# Patient Record
Sex: FEMALE | Race: WHITE | NOT HISPANIC OR LATINO | Employment: OTHER | URBAN - METROPOLITAN AREA
[De-identification: names, ages, dates, MRNs, and addresses within clinical notes are randomized per-mention and may not be internally consistent; named-entity substitution may affect disease eponyms.]

---

## 2017-01-27 ENCOUNTER — CLINICAL SUPPORT (OUTPATIENT)
Dept: CARDIOLOGY | Facility: CLINIC | Age: 72
End: 2017-01-27
Payer: MEDICARE

## 2017-01-27 ENCOUNTER — OFFICE VISIT (OUTPATIENT)
Dept: CARDIOLOGY | Facility: CLINIC | Age: 72
End: 2017-01-27
Payer: MEDICARE

## 2017-01-27 VITALS
SYSTOLIC BLOOD PRESSURE: 119 MMHG | HEART RATE: 63 BPM | HEIGHT: 66 IN | BODY MASS INDEX: 21.36 KG/M2 | DIASTOLIC BLOOD PRESSURE: 56 MMHG | WEIGHT: 132.94 LBS

## 2017-01-27 DIAGNOSIS — R09.89 BRUIT: ICD-10-CM

## 2017-01-27 DIAGNOSIS — E78.5 HYPERLIPIDEMIA, UNSPECIFIED HYPERLIPIDEMIA TYPE: ICD-10-CM

## 2017-01-27 DIAGNOSIS — I65.23 CAROTID ARTERY PLAQUE, BILATERAL: ICD-10-CM

## 2017-01-27 DIAGNOSIS — I65.23 CAROTID ARTERY PLAQUE, BILATERAL: Primary | ICD-10-CM

## 2017-01-27 LAB — INTERNAL CAROTID STENOSIS: ABNORMAL

## 2017-01-27 PROCEDURE — 99999 PR PBB SHADOW E&M-EST. PATIENT-LVL III: CPT | Mod: PBBFAC,,, | Performed by: INTERNAL MEDICINE

## 2017-01-27 PROCEDURE — 99213 OFFICE O/P EST LOW 20 MIN: CPT | Mod: S$PBB,,, | Performed by: INTERNAL MEDICINE

## 2017-01-27 PROCEDURE — 93880 EXTRACRANIAL BILAT STUDY: CPT | Mod: 26,S$PBB,, | Performed by: INTERNAL MEDICINE

## 2017-01-27 PROCEDURE — 99213 OFFICE O/P EST LOW 20 MIN: CPT | Mod: PBBFAC | Performed by: INTERNAL MEDICINE

## 2017-01-27 RX ORDER — ATORVASTATIN CALCIUM 20 MG/1
20 TABLET, FILM COATED ORAL DAILY
Qty: 90 TABLET | Refills: 3 | Status: SHIPPED | OUTPATIENT
Start: 2017-01-27 | End: 2017-11-03 | Stop reason: SDUPTHER

## 2017-01-27 NOTE — PATIENT INSTRUCTIONS
Continue regular exercise aiming for  30 minutes a day at least 5 days a week.  mediterranean diet as discussed

## 2017-01-27 NOTE — MR AVS SNAPSHOT
[facility]  [address]  [facility] [address]  Phone: [phone]                  Prema Phillips   2017 11:00 AM   Office Visit    Description:  Female : 1945   Provider:  Efraín Ash MD   Department:  [facility]           Reason for Visit     Carotid artery plaque, bilateral           Diagnoses this Visit        Comments    Carotid artery plaque, bilateral    -  Primary     Hyperlipidemia, unspecified hyperlipidemia type                To Do List           Future Appointments        Provider Department Dept Phone    3/9/2017 7:00 AM LAB, [facility] [phone]      Goals (5 Years of Data)     None      Follow-Up and Disposition     Return in about 1 year (around 2018).      [facility]     [facility] -  Assistance  Registered nurses in the [facility] provide clinical advisement, health education, appointment booking, and other advisory services.  Call for this free service at [phone].             Medications           Message regarding Medications     Verify the changes and/or additions to your medication regime listed below are the same as discussed with your clinician today.  If any of these changes or additions are incorrect, please notify your healthcare provider.        STOP taking these medications     vitamins  A,C,E-zinc-copper [id number] unit-mg-unit Cap Take by mouth once daily.           Verify that the below list of medications is an accurate representation of the medications you are currently taking.  If none reported, the list may be blank. If incorrect, please contact your healthcare provider. Carry this list with you in case of emergency.           Current Medications     aspirin (ECOTRIN) 81 MG EC tablet Take 81 mg by mouth once daily.    azelastine (ASTELIN) 137 mcg (0.1 %) nasal spray 1 spray (137 mcg total) by Nasal route 2 (two) times daily.     "calcium-vitamin D3-vitamin K 500-100-40 mg-unit-mcg Chew Take by mouth once daily.    clobetasol (TEMOVATE) 0.05 % cream     eletriptan (RELPAX) 40 MG tablet Take 40 mg by mouth. 1 Tablet Oral As directed    estrogens, conjugated, (PREMARIN) 0.3 MG tablet Take 0.3 mg by mouth once daily.    fluticasone (FLONASE) 50 mcg/actuation nasal spray 2 sprays by Each Nare route once daily. 1 Aerosol, Spray Nasal Every morning    hydrocodone-acetaminophen 5-325mg (NORCO) 5-325 mg per tablet Take 1 tablet by mouth every 8 (eight) hours as needed.    levothyroxine (SYNTHROID) 75 MCG tablet Take 1 tablet (75 mcg total) by mouth before breakfast.    lorazepam (ATIVAN) 1 MG tablet Take 1 mg by mouth once daily.    meloxicam (MOBIC) 15 MG tablet Take 15 mg by mouth once daily.    multivitamin capsule Take 1 capsule by mouth once daily.    simvastatin (ZOCOR) 20 MG tablet Take 1 tablet (20 mg total) by mouth every evening.    trazodone (DESYREL) 50 MG tablet TAKE 1 TABLET BY MOUTH EVERY EVENING    tretinoin (RETIN-A) 0.05 % cream nightly.     VIT A/VIT C/VIT E/ZINC/COPPER (PRESERVISION AREDS ORAL) Take by mouth.           Clinical Reference Information           Vital Signs - Last Recorded  Most recent update: 1/27/2017 10:52 AM by Flora Perez MA    BP Pulse Ht Wt BMI    (!) 119/56 (BP Location: Left arm, Patient Position: Sitting, BP Method: Automatic) 63 5' 6" (1.676 m) 60.3 kg (132 lb 15 oz) 21.46 kg/m2      Blood Pressure          Most Recent Value    Right Arm BP - Sitting  118/56    Left Arm BP - Sitting  119/56    BP  (!)  119/56      Allergies as of 1/27/2017     Neomycin      Immunizations Administered on Date of Encounter - 1/27/2017     None      Instructions    Continue regular exercise aiming for  30 minutes a day at least 5 days a week.  mediterranean diet as discussed       "

## 2017-01-27 NOTE — PROGRESS NOTES
Subjective:   Patient ID:  Prema Phillips is a 72 y.o. female who presents for follow-up of Carotid artery plaque, bilateral (1 yr f/u )      HPI:   Prema Phillips presents for follow up of bilateral carotid plaque that is nonobstructive. A repeat is pending. The patient remains asymptomatic. Prema Phillips denies chest pain, shortness of breath, palpitations, presyncope , or syncope. She is walking regularly.4 times a week a mile ). Prema Phillips has dyslipidemia with LDL less than 100 and elevated HDL.  Review of Systems   Constitution: Negative for weakness, malaise/fatigue, weight gain and weight loss.   HENT: Negative for headaches.    Eyes: Negative for blurred vision.   Cardiovascular: Negative for chest pain, claudication, cyanosis, dyspnea on exertion, irregular heartbeat, leg swelling, near-syncope, orthopnea, palpitations, paroxysmal nocturnal dyspnea and syncope.   Respiratory: Negative for cough, shortness of breath and wheezing.    Musculoskeletal: Positive for joint pain. Negative for falls and myalgias.        Arthritis first finger right hand.   Gastrointestinal: Negative for abdominal pain, heartburn, nausea and vomiting.   Genitourinary: Negative for nocturia.   Neurological: Negative for brief paralysis, dizziness, focal weakness, numbness and paresthesias.   Psychiatric/Behavioral: Negative for altered mental status.       Current Outpatient Prescriptions   Medication Sig    aspirin (ECOTRIN) 81 MG EC tablet Take 81 mg by mouth once daily.    azelastine (ASTELIN) 137 mcg (0.1 %) nasal spray 1 spray (137 mcg total) by Nasal route 2 (two) times daily.    calcium-vitamin D3-vitamin K 500-100-40 mg-unit-mcg Chew Take by mouth once daily.    clobetasol (TEMOVATE) 0.05 % cream     eletriptan (RELPAX) 40 MG tablet Take 40 mg by mouth. 1 Tablet Oral As directed    estrogens, conjugated, (PREMARIN) 0.3 MG tablet Take 0.3 mg by mouth once daily.    fluticasone (FLONASE) 50 mcg/actuation nasal spray 2  "sprays by Each Nare route once daily. 1 Aerosol, Spray Nasal Every morning    hydrocodone-acetaminophen 5-325mg (NORCO) 5-325 mg per tablet Take 1 tablet by mouth every 8 (eight) hours as needed.    levothyroxine (SYNTHROID) 75 MCG tablet Take 1 tablet (75 mcg total) by mouth before breakfast.    lorazepam (ATIVAN) 1 MG tablet Take 1 mg by mouth once daily.    meloxicam (MOBIC) 15 MG tablet Take 15 mg by mouth once daily.    multivitamin capsule Take 1 capsule by mouth once daily.    simvastatin (ZOCOR) 20 MG tablet Take 1 tablet (20 mg total) by mouth every evening.    trazodone (DESYREL) 50 MG tablet TAKE 1 TABLET BY MOUTH EVERY EVENING    tretinoin (RETIN-A) 0.05 % cream nightly.     VIT A/VIT C/VIT E/ZINC/COPPER (PRESERVISION AREDS ORAL) Take by mouth.     No current facility-administered medications for this visit.      Objective:   Physical Exam   Constitutional: She is oriented to person, place, and time. She appears well-developed. No distress.   BP (!) 119/56 (BP Location: Left arm, Patient Position: Sitting, BP Method: Automatic)  Pulse 63  Ht 5' 6" (1.676 m)  Wt 60.3 kg (132 lb 15 oz)  BMI 21.46 kg/m2   HENT:   Head: Normocephalic.   Eyes: Conjunctivae are normal. Pupils are equal, round, and reactive to light. No scleral icterus.   Neck: Neck supple. No JVD present. No thyromegaly present.   Cardiovascular: Normal rate, regular rhythm, normal heart sounds and intact distal pulses.  PMI is not displaced.  Exam reveals no gallop and no friction rub.    No murmur heard.  Pulmonary/Chest: Effort normal and breath sounds normal. No respiratory distress. She has no wheezes. She has no rales.   Abdominal: Soft. She exhibits no distension. There is no splenomegaly or hepatomegaly. There is no tenderness.   Musculoskeletal: She exhibits no edema or tenderness.   Gait normal  Brace on right first finger   Neurological: She is alert and oriented to person, place, and time.   Skin: Skin is warm and dry. She " is not diaphoretic.   Psychiatric: She has a normal mood and affect. Her behavior is normal.       Lab Results   Component Value Date     12/02/2016    K 4.1 12/02/2016     12/02/2016    CO2 28 12/02/2016    BUN 19 12/02/2016    CREATININE 0.9 12/02/2016    GLU 88 12/02/2016    AST 17 12/02/2016    ALT 11 12/02/2016    ALBUMIN 3.3 (L) 12/02/2016    PROT 6.3 12/02/2016    BILITOT 0.3 12/02/2016    WBC 5.20 06/02/2016    HGB 12.9 06/02/2016    HCT 38.4 06/02/2016    MCV 92 06/02/2016     06/02/2016    TSH 4.430 (H) 12/02/2016    CHOL 175 12/02/2016    HDL 61 12/02/2016    LDLCALC 91.6 12/02/2016    TRIG 112 12/02/2016       Assessment:     1. Carotid artery plaque, bilateral : Repeat interpretation pending   2. Hyperlipidemia, unspecified hyperlipidemia type :  on low intensity statin therapy with LDL less than 100 and elevated HDL.       Plan:     Prema was seen today for carotid artery plaque, bilateral.    Diagnoses and all orders for this visit:    Carotid artery plaque, bilateral    Results available and mild progression since last check   Repeat in a year.  Hyperlipidemia, unspecified hyperlipidemia type  Atorvastatin 20 mg daily in place of Simvastatin.  Repeat lipids and CMP in 2 months.  mediterranean diet discussed

## 2017-03-09 ENCOUNTER — LAB VISIT (OUTPATIENT)
Dept: LAB | Facility: HOSPITAL | Age: 72
End: 2017-03-09
Attending: FAMILY MEDICINE
Payer: MEDICARE

## 2017-03-09 ENCOUNTER — TELEPHONE (OUTPATIENT)
Dept: FAMILY MEDICINE | Facility: CLINIC | Age: 72
End: 2017-03-09

## 2017-03-09 DIAGNOSIS — E03.9 ACQUIRED HYPOTHYROIDISM: ICD-10-CM

## 2017-03-09 LAB
T4 FREE SERPL-MCNC: 1.05 NG/DL
TSH SERPL DL<=0.005 MIU/L-ACNC: 1.64 UIU/ML

## 2017-03-09 PROCEDURE — 84439 ASSAY OF FREE THYROXINE: CPT

## 2017-03-09 PROCEDURE — 36415 COLL VENOUS BLD VENIPUNCTURE: CPT | Mod: PO

## 2017-03-09 PROCEDURE — 84443 ASSAY THYROID STIM HORMONE: CPT

## 2017-03-09 NOTE — TELEPHONE ENCOUNTER
Called patient to review results. Left a voicemail requesting a callback.        Thyroid labs now perfectly in range on current dose of thyroid medication. Will see her in June for annual physical, sooner if concerns

## 2017-03-27 ENCOUNTER — LAB VISIT (OUTPATIENT)
Dept: LAB | Facility: HOSPITAL | Age: 72
End: 2017-03-27
Attending: INTERNAL MEDICINE
Payer: MEDICARE

## 2017-03-27 DIAGNOSIS — E78.5 HYPERLIPIDEMIA, UNSPECIFIED HYPERLIPIDEMIA TYPE: ICD-10-CM

## 2017-03-27 LAB
ALBUMIN SERPL BCP-MCNC: 3.4 G/DL
ALP SERPL-CCNC: 59 U/L
ALT SERPL W/O P-5'-P-CCNC: 14 U/L
ANION GAP SERPL CALC-SCNC: 6 MMOL/L
AST SERPL-CCNC: 19 U/L
BILIRUB SERPL-MCNC: 0.4 MG/DL
BUN SERPL-MCNC: 17 MG/DL
CALCIUM SERPL-MCNC: 8.9 MG/DL
CHLORIDE SERPL-SCNC: 107 MMOL/L
CHOLEST/HDLC SERPL: 2.7 {RATIO}
CO2 SERPL-SCNC: 28 MMOL/L
CREAT SERPL-MCNC: 0.8 MG/DL
EST. GFR  (AFRICAN AMERICAN): >60 ML/MIN/1.73 M^2
EST. GFR  (NON AFRICAN AMERICAN): >60 ML/MIN/1.73 M^2
GLUCOSE SERPL-MCNC: 87 MG/DL
HDL/CHOLESTEROL RATIO: 37.5 %
HDLC SERPL-MCNC: 168 MG/DL
HDLC SERPL-MCNC: 63 MG/DL
LDLC SERPL CALC-MCNC: 91 MG/DL
NONHDLC SERPL-MCNC: 105 MG/DL
POTASSIUM SERPL-SCNC: 4.2 MMOL/L
PROT SERPL-MCNC: 6.5 G/DL
SODIUM SERPL-SCNC: 141 MMOL/L
TRIGL SERPL-MCNC: 70 MG/DL

## 2017-03-27 PROCEDURE — 80061 LIPID PANEL: CPT

## 2017-03-27 PROCEDURE — 80053 COMPREHEN METABOLIC PANEL: CPT

## 2017-03-27 PROCEDURE — 36415 COLL VENOUS BLD VENIPUNCTURE: CPT | Mod: PO

## 2017-04-25 ENCOUNTER — PATIENT MESSAGE (OUTPATIENT)
Dept: FAMILY MEDICINE | Facility: CLINIC | Age: 72
End: 2017-04-25

## 2017-04-25 RX ORDER — ALPRAZOLAM 1 MG/1
1 TABLET ORAL NIGHTLY
Refills: 3 | COMMUNITY
Start: 2017-02-08 | End: 2017-10-30

## 2017-04-25 RX ORDER — ESTRADIOL 0.5 MG/1
0.5 TABLET ORAL DAILY
Refills: 3 | COMMUNITY
Start: 2017-02-08 | End: 2017-10-30

## 2017-07-27 ENCOUNTER — TELEPHONE (OUTPATIENT)
Dept: FAMILY MEDICINE | Facility: CLINIC | Age: 72
End: 2017-07-27

## 2017-07-27 NOTE — TELEPHONE ENCOUNTER
----- Message from Olive Austin sent at 7/27/2017 11:29 AM CDT -----  Contact: Self/ [phone]  Patient would like to reschedule her appointment for the fourth week of October because she will be out of town. Patient would like to speak with you about the schedule. Please advise.

## 2017-07-27 NOTE — TELEPHONE ENCOUNTER
----- Message from Kari Cordero sent at 7/27/2017 10:43 AM CDT -----  Contact: [phone]  Patient is requesting to speak with you to get an appt. For October 2017 for a physical

## 2017-10-30 ENCOUNTER — OFFICE VISIT (OUTPATIENT)
Dept: FAMILY MEDICINE | Facility: CLINIC | Age: 72
End: 2017-10-30
Payer: MEDICARE

## 2017-10-30 ENCOUNTER — HOSPITAL ENCOUNTER (OUTPATIENT)
Dept: RADIOLOGY | Facility: HOSPITAL | Age: 72
Discharge: HOME | End: 2017-10-30
Attending: FAMILY MEDICINE
Payer: MEDICARE

## 2017-10-30 VITALS
DIASTOLIC BLOOD PRESSURE: 71 MMHG | HEIGHT: 66 IN | SYSTOLIC BLOOD PRESSURE: 124 MMHG | WEIGHT: 137.56 LBS | OXYGEN SATURATION: 99 % | BODY MASS INDEX: 22.11 KG/M2 | HEART RATE: 72 BPM

## 2017-10-30 DIAGNOSIS — M70.62 TROCHANTERIC BURSITIS OF BOTH HIPS: ICD-10-CM

## 2017-10-30 DIAGNOSIS — E03.9 ACQUIRED HYPOTHYROIDISM: ICD-10-CM

## 2017-10-30 DIAGNOSIS — F51.01 PRIMARY INSOMNIA: ICD-10-CM

## 2017-10-30 DIAGNOSIS — M76.30 ILIOTIBIAL BAND SYNDROME, UNSPECIFIED LATERALITY: ICD-10-CM

## 2017-10-30 DIAGNOSIS — Z90.79 HISTORY OF TOTAL HYSTERECTOMY WITH BILATERAL SALPINGO-OOPHORECTOMY (BSO): ICD-10-CM

## 2017-10-30 DIAGNOSIS — Z79.82 LONG-TERM USE OF ASPIRIN THERAPY: ICD-10-CM

## 2017-10-30 DIAGNOSIS — Z79.899 MEDICATION MANAGEMENT: ICD-10-CM

## 2017-10-30 DIAGNOSIS — I65.23 ATHEROSCLEROSIS OF BOTH CAROTID ARTERIES: ICD-10-CM

## 2017-10-30 DIAGNOSIS — M70.61 TROCHANTERIC BURSITIS OF BOTH HIPS: ICD-10-CM

## 2017-10-30 DIAGNOSIS — Z79.1 LONG TERM CURRENT USE OF NON-STEROIDAL ANTI-INFLAMMATORIES (NSAID): ICD-10-CM

## 2017-10-30 DIAGNOSIS — M85.80 OSTEOPENIA OF THE ELDERLY: ICD-10-CM

## 2017-10-30 DIAGNOSIS — Z90.722 HISTORY OF TOTAL HYSTERECTOMY WITH BILATERAL SALPINGO-OOPHORECTOMY (BSO): ICD-10-CM

## 2017-10-30 DIAGNOSIS — Z78.0 POSTMENOPAUSAL: ICD-10-CM

## 2017-10-30 DIAGNOSIS — Z79.890 POSTMENOPAUSAL HRT (HORMONE REPLACEMENT THERAPY): ICD-10-CM

## 2017-10-30 DIAGNOSIS — H35.30: ICD-10-CM

## 2017-10-30 DIAGNOSIS — Z00.00 ROUTINE GENERAL MEDICAL EXAMINATION AT A HEALTH CARE FACILITY: Primary | ICD-10-CM

## 2017-10-30 DIAGNOSIS — I45.10 RBBB: ICD-10-CM

## 2017-10-30 DIAGNOSIS — G43.709 CHRONIC MIGRAINE WITHOUT AURA WITHOUT STATUS MIGRAINOSUS, NOT INTRACTABLE: ICD-10-CM

## 2017-10-30 DIAGNOSIS — E78.5 HYPERLIPIDEMIA, UNSPECIFIED HYPERLIPIDEMIA TYPE: ICD-10-CM

## 2017-10-30 DIAGNOSIS — J30.2 CHRONIC SEASONAL ALLERGIC RHINITIS, UNSPECIFIED TRIGGER: ICD-10-CM

## 2017-10-30 DIAGNOSIS — Z90.710 HISTORY OF TOTAL HYSTERECTOMY WITH BILATERAL SALPINGO-OOPHORECTOMY (BSO): ICD-10-CM

## 2017-10-30 PROBLEM — G43.909 MIGRAINE HEADACHE: Status: ACTIVE | Noted: 2017-10-30

## 2017-10-30 PROCEDURE — 99214 OFFICE O/P EST MOD 30 MIN: CPT | Mod: PBBFAC,PO | Performed by: FAMILY MEDICINE

## 2017-10-30 PROCEDURE — 99999 PR PBB SHADOW E&M-EST. PATIENT-LVL IV: CPT | Mod: PBBFAC,,, | Performed by: FAMILY MEDICINE

## 2017-10-30 PROCEDURE — 99397 PER PM REEVAL EST PAT 65+ YR: CPT | Mod: S$PBB,,, | Performed by: FAMILY MEDICINE

## 2017-10-30 PROCEDURE — 77080 DXA BONE DENSITY AXIAL: CPT | Mod: TC

## 2017-10-30 PROCEDURE — 77080 DXA BONE DENSITY AXIAL: CPT | Mod: 26,,, | Performed by: RADIOLOGY

## 2017-10-30 RX ORDER — LEVOTHYROXINE SODIUM 75 UG/1
75 TABLET ORAL
Qty: 90 TABLET | Refills: 3 | Status: SHIPPED | OUTPATIENT
Start: 2017-10-30 | End: 2018-11-02 | Stop reason: SDUPTHER

## 2017-10-30 RX ORDER — LEVOTHYROXINE SODIUM 75 UG/1
75 TABLET ORAL
Qty: 90 TABLET | Refills: 3 | Status: SHIPPED | OUTPATIENT
Start: 2017-10-30 | End: 2017-10-30 | Stop reason: SDUPTHER

## 2017-10-30 NOTE — PATIENT INSTRUCTIONS
"Trial of stretches including "leg cross over stretches with Tylenol or strap" and "figure of 4" stretches to help with bilateral iliotibial band tightness and associated trochanteric bursitis of the hip.  If this pain persists follow-up to consider cortisone injection.  Otherwise, health maintenance reviewed and is up to date.  Continue regular exercise and regular eye and dental exams.  Follow-up in one year for annual physical, sooner if concerns  "

## 2017-10-30 NOTE — PROGRESS NOTES
Office Visit    Patient Name: Prema Phillips    : 1945  MRN: [id number]    Subjective:  Prema is a 72 y.o. female who presents today for:    Annual Exam    Prema Phillips presents today for annual wellness exam and monitoring of chronic conditions including hyperlipidemia, nonocclusive bilateral carotid artery disease, acquired hypothyroidism, postmenopausal osteopenia with use of hormone replacement therapy, migraine headaches, knee bursitis, allergic rhinitis.  She is postmenopausal.  She has a gynecologist Dr. Sheppard but no longer needs paps due to history of TAYLER/BSO.      They have been feeling overall well except for some new bilateral leg pain over the last year. Notes pain radiating from her hip down the outside of her leg to around her knee area.  No numbness or paresthesias, no leg weakness. Started around the same time she changed from simvastatin to lipitor due to some increased carotid artery plaque.        General lifestyle habits are as follows:  Diet is described as healthy, exercise is described as fair-- walking, sleep is described as fair- Ativan most nights helps-- interrupted but ultimately sleeps 7 hours nightly. Weight is up a few punds in  last year bu overall stable     Immunizations: TDaP 2015, Pneumovax 23 completed  and Prevnar 13 2016 , Zostavax complete, YEARLY FLU COMPLETED     Screening Tests: last Mammogram 2017--> repeat one year, DEXA-->10/2015--> mild osteopenia and repeat 2 years (ordered) , Colonoscopy up to date per Dr. Watt with [facility], last one was 2014--> repeat 5 years (family history), Hep C screening negative 2016     Eye/Dental: up to date      Carotid US 2017: reviewed by Dr Ash- Cardiology-- mild progression since previous study  CONCLUSIONS   There is 40 - 49% right Internal Carotid stenosis.  There is 40 - 49% left Internal Carotid stenosis.       Past Medical History  Past Medical History:   Diagnosis Date    Allergic rhinitis  5/31/2016    Carotid artery plaque 11/18/2013    History of total hysterectomy with bilateral salpingo-oophorectomy (BSO) 5/31/2016    Hyperlipidemia 7/17/2012    Hypothyroid     Insomnia     Macular degeneration, right eye 5/31/2016    Migraine headache     Osteopenia of the elderly 10/20/2015    Postmenopausal HRT (hormone replacement therapy)     RBBB 7/17/2012       Past Surgical History  Past Surgical History:   Procedure Laterality Date    APPENDECTOMY      STAPEDES SURGERY  2006    TOTAL ABDOMINAL HYSTERECTOMY W/ BILATERAL SALPINGOOPHORECTOMY         Family History  Family History   Problem Relation Age of Onset    Heart disease Father     COPD Father     Cancer Maternal Aunt      breast cancer    Cancer Maternal Grandmother      breast cancer    Cancer Cousin      colon cancer       Social History  Social History     Social History    Marital status:      Spouse name: N/A    Number of children: 2    Years of education: N/A     Occupational History    Not on file.     Social History Main Topics    Smoking status: Never Smoker    Smokeless tobacco: Never Used    Alcohol use Yes      Comment: rare    Drug use: No    Sexual activity: Yes     Partners: Male      Comment: 2 kids,       Other Topics Concern    Not on file     Social History Narrative    No narrative on file       Current Medications  Medications reviewed and updated.     Allergies   Review of patient's allergies indicates:   Allergen Reactions    Neomycin      Other reaction(s): Unknown       Review of Systems (Pertinent positives)  Review of Systems   Constitutional: Negative for activity change and unexpected weight change.   HENT: Positive for hearing loss. Negative for rhinorrhea and trouble swallowing.    Eyes: Positive for visual disturbance. Negative for discharge.   Respiratory: Negative for chest tightness and wheezing.    Cardiovascular: Negative for chest pain and palpitations.  "  Gastrointestinal: Negative for blood in stool, constipation, diarrhea and vomiting.   Endocrine: Negative for polydipsia and polyuria.   Genitourinary: Negative for difficulty urinating, dysuria, hematuria and menstrual problem.   Musculoskeletal: Negative for arthralgias, back pain (bilateral leg pain), joint swelling and neck pain.   Neurological: Negative for weakness and headaches.   Psychiatric/Behavioral: Negative for confusion and dysphoric mood.       /71   Pulse 72   Ht 5' 6" (1.676 m)   Wt 62.4 kg (137 lb 9.1 oz)   SpO2 99%   BMI 22.20 kg/m²     Physical Exam   Constitutional: She is oriented to person, place, and time. She appears well-developed and well-nourished. No distress.   HENT:   Head: Normocephalic and atraumatic.   Right Ear: Ear canal normal. Tympanic membrane is not erythematous and not bulging.   Left Ear: Ear canal normal. Tympanic membrane is not erythematous and not bulging.   Mouth/Throat: No oropharyngeal exudate.   Eyes: Conjunctivae are normal.   Neck: Carotid bruit is not present. No thyroid mass and no thyromegaly present.   Cardiovascular: Normal rate, regular rhythm and normal heart sounds.    No murmur heard.  Pulses:       Dorsalis pedis pulses are 2+ on the right side, and 2+ on the left side.   Pulmonary/Chest: Effort normal and breath sounds normal. No respiratory distress.   Abdominal: Soft. Bowel sounds are normal. She exhibits no distension and no mass. There is no hepatosplenomegaly. There is no tenderness.   Musculoskeletal: Normal range of motion.        Legs:  Lymphadenopathy:     She has no cervical adenopathy.   Neurological: She is alert and oriented to person, place, and time.   Skin: Skin is warm and dry. No rash noted.   Psychiatric: She has a normal mood and affect.   Vitals reviewed.        Assessment/Plan:  Prema Phillips is a 72 y.o. female who presents today for :    Prema was seen today for annual exam.    Diagnoses and all orders for this " visit:    Routine general medical examination at a health care facility  -     Hemoglobin A1c; Future  -     Comprehensive metabolic panel; Future  -     Lipid panel; Future  -     Vitamin D; Future  -     CBC auto differential; Future  -     TSH; Future    BMI 22.0-22.9, adult    History of total hysterectomy with bilateral salpingo-oophorectomy (BSO)    Postmenopausal HRT (hormone replacement therapy)    Osteopenia of the elderly  -     DXA Bone Density Spine And Hip; Future  -     Vitamin D; Future    Postmenopausal  -     DXA Bone Density Spine And Hip; Future    Chronic migraine without aura without status migrainosus, not intractable    Acquired hypothyroidism  -     TSH; Future  -     levothyroxine (SYNTHROID) 75 MCG tablet; Take 1 tablet (75 mcg total) by mouth before breakfast.    Hyperlipidemia, unspecified hyperlipidemia type  -     Hemoglobin A1c; Future  -     Comprehensive metabolic panel; Future  -     Lipid panel; Future    Atherosclerosis of both carotid arteries  -     Lipid panel; Future  -     CBC auto differential; Future    Long-term use of aspirin therapy    RBBB  -     CBC auto differential; Future    Primary insomnia    Chronic seasonal allergic rhinitis, unspecified trigger    Macular degeneration, right eye    Long term current use of non-steroidal anti-inflammatories (NSAID)  -     Comprehensive metabolic panel; Future    Medication management  -     Hemoglobin A1c; Future  -     Comprehensive metabolic panel; Future  -     Lipid panel; Future  -     Vitamin D; Future  -     CBC auto differential; Future  -     TSH; Future    Iliotibial band syndrome, unspecified laterality    Trochanteric bursitis of both hips            ICD-10-CM ICD-9-CM    1. Routine general medical examination at a health care facility Z00.00 V70.0 Hemoglobin A1c      Comprehensive metabolic panel      Lipid panel      Vitamin D      CBC auto differential      TSH   2. BMI 22.0-22.9, adult Z68.22 V85.1    3. History  "of total hysterectomy with bilateral salpingo-oophorectomy (BSO) Z90.710 V15.29     Z90.722      Z90.79     4. Postmenopausal HRT (hormone replacement therapy) Z79.890 V07.4    5. Osteopenia of the elderly M85.80 733.90 DXA Bone Density Spine And Hip      Vitamin D   6. Postmenopausal Z78.0 V49.81 DXA Bone Density Spine And Hip   7. Chronic migraine without aura without status migrainosus, not intractable G43.709 346.70    8. Acquired hypothyroidism E03.9 244.9 TSH      levothyroxine (SYNTHROID) 75 MCG tablet   9. Hyperlipidemia, unspecified hyperlipidemia type E78.5 272.4 Hemoglobin A1c      Comprehensive metabolic panel      Lipid panel   10. Atherosclerosis of both carotid arteries I65.23 433.10 Lipid panel     433.30 CBC auto differential   11. Long-term use of aspirin therapy Z79.82 V58.66    12. RBBB I45.10 426.4 CBC auto differential   13. Primary insomnia F51.01 307.42    14. Chronic seasonal allergic rhinitis, unspecified trigger J30.2 477.8    15. Macular degeneration, right eye H35.30 362.50    16. Long term current use of non-steroidal anti-inflammatories (NSAID) Z79.1 V58.64 Comprehensive metabolic panel   17. Medication management Z79.899 V58.69 Hemoglobin A1c      Comprehensive metabolic panel      Lipid panel      Vitamin D      CBC auto differential      TSH   18. Iliotibial band syndrome, unspecified laterality M76.30 728.89    19. Trochanteric bursitis of both hips M70.61 726.5     M70.62         Patient Instructions   Trial of stretches including "leg cross over stretches with Tylenol or strap" and "figure of 4" stretches to help with bilateral iliotibial band tightness and associated trochanteric bursitis of the hip.  If this pain persists follow-up to consider cortisone injection.  Otherwise, health maintenance reviewed and is up to date.  Continue regular exercise and regular eye and dental exams.  Follow-up in one year for annual physical, sooner if concerns        Return in about 1 year " (around 10/30/2018) for return as needed for new concerns.

## 2017-11-02 ENCOUNTER — TELEPHONE (OUTPATIENT)
Dept: FAMILY MEDICINE | Facility: CLINIC | Age: 72
End: 2017-11-02

## 2017-11-03 DIAGNOSIS — E78.5 HYPERLIPIDEMIA, UNSPECIFIED HYPERLIPIDEMIA TYPE: ICD-10-CM

## 2017-11-06 RX ORDER — ATORVASTATIN CALCIUM 20 MG/1
TABLET, FILM COATED ORAL
Qty: 90 TABLET | Refills: 3 | Status: SHIPPED | OUTPATIENT
Start: 2017-11-06 | End: 2018-12-10 | Stop reason: SDUPTHER

## 2018-01-19 ENCOUNTER — CLINICAL SUPPORT (OUTPATIENT)
Dept: CARDIOLOGY | Facility: CLINIC | Age: 73
End: 2018-01-19
Attending: INTERNAL MEDICINE
Payer: MEDICARE

## 2018-01-19 DIAGNOSIS — R09.89 BRUIT: ICD-10-CM

## 2018-01-19 LAB — INTERNAL CAROTID STENOSIS: NORMAL

## 2018-01-19 PROCEDURE — 93880 EXTRACRANIAL BILAT STUDY: CPT | Mod: PBBFAC | Performed by: INTERNAL MEDICINE

## 2018-01-24 ENCOUNTER — OFFICE VISIT (OUTPATIENT)
Dept: CARDIOLOGY | Facility: CLINIC | Age: 73
End: 2018-01-24
Payer: MEDICARE

## 2018-01-24 ENCOUNTER — TELEPHONE (OUTPATIENT)
Dept: CARDIOLOGY | Facility: CLINIC | Age: 73
End: 2018-01-24

## 2018-01-24 VITALS
HEART RATE: 69 BPM | BODY MASS INDEX: 22.22 KG/M2 | SYSTOLIC BLOOD PRESSURE: 121 MMHG | HEIGHT: 66 IN | DIASTOLIC BLOOD PRESSURE: 57 MMHG | WEIGHT: 138.25 LBS

## 2018-01-24 DIAGNOSIS — E78.5 HYPERLIPIDEMIA, UNSPECIFIED HYPERLIPIDEMIA TYPE: ICD-10-CM

## 2018-01-24 DIAGNOSIS — I65.23 ATHEROSCLEROSIS OF BOTH CAROTID ARTERIES: Primary | ICD-10-CM

## 2018-01-24 PROCEDURE — 99213 OFFICE O/P EST LOW 20 MIN: CPT | Mod: PBBFAC | Performed by: INTERNAL MEDICINE

## 2018-01-24 PROCEDURE — 99213 OFFICE O/P EST LOW 20 MIN: CPT | Mod: S$PBB,,, | Performed by: INTERNAL MEDICINE

## 2018-01-24 PROCEDURE — 99999 PR PBB SHADOW E&M-EST. PATIENT-LVL III: CPT | Mod: PBBFAC,,, | Performed by: INTERNAL MEDICINE

## 2018-01-24 RX ORDER — ESTRADIOL 0.5 MG/1
0.5 TABLET ORAL DAILY
COMMUNITY
End: 2018-06-29

## 2018-01-24 NOTE — PROGRESS NOTES
Subjective:   Patient ID:  Prema Phillips is a 73 y.o. female who presents for follow-up of Carotid artery plaque, bilateral (1 yr fu) and Leg Problem (aching both legs,mainly at night)      HPI:   The patient returns for follow up of mild- moderate bilateral carotid stenosis with recent check demonstrating mild narrowing. She is asx. Prema Phillips is not exercising regularly. Prema Phillips denies chest pain, shortness of breath, palpitations, presyncope , or syncope. Prema Phillips has dyslipidemia with LDL higher last check but HDL up. Prema Phillips admits not being dietary compliant going on cruises..  Review of Systems   Constitution: Negative for weakness, malaise/fatigue, weight gain and weight loss.   Eyes: Negative for blurred vision.   Cardiovascular: Negative for chest pain, claudication, cyanosis, dyspnea on exertion, irregular heartbeat, leg swelling, near-syncope, orthopnea, palpitations, paroxysmal nocturnal dyspnea and syncope.   Respiratory: Negative for cough, shortness of breath and wheezing.    Musculoskeletal: Negative for falls and myalgias.        Nocturnal aching of thighs intermittently .   Gastrointestinal: Negative for abdominal pain, heartburn, nausea and vomiting.   Genitourinary: Negative for nocturia.   Neurological: Negative for brief paralysis, dizziness, focal weakness, headaches, numbness and paresthesias.   Psychiatric/Behavioral: Negative for altered mental status.       Current Outpatient Prescriptions   Medication Sig    aspirin (ECOTRIN) 81 MG EC tablet Take 81 mg by mouth once daily.    atorvastatin (LIPITOR) 20 MG tablet TAKE 1 TABLET ONCE DAILY    azelastine (ASTELIN) 137 mcg (0.1 %) nasal spray 1 spray (137 mcg total) by Nasal route 2 (two) times daily.    calcium-vitamin D3-vitamin K 500-100-40 mg-unit-mcg Chew Take by mouth once daily.    clobetasol (TEMOVATE) 0.05 % cream     eletriptan (RELPAX) 40 MG tablet Take 40 mg by mouth. 1 Tablet Oral As directed     "estradiol (ESTRACE) 0.5 MG tablet Take 0.5 mg by mouth once daily.    fluticasone (FLONASE) 50 mcg/actuation nasal spray 2 sprays by Each Nare route once daily. 1 Aerosol, Spray Nasal Every morning    levothyroxine (SYNTHROID) 75 MCG tablet Take 1 tablet (75 mcg total) by mouth before breakfast.    lorazepam (ATIVAN) 1 MG tablet Take 1 mg by mouth once daily.    meloxicam (MOBIC) 15 MG tablet Take 15 mg by mouth once daily.    multivitamin capsule Take 1 capsule by mouth once daily.    tretinoin (RETIN-A) 0.05 % cream nightly.     TURMERIC, BULK, MISC by Misc.(Non-Drug; Combo Route) route once daily.    VIT A/VIT C/VIT E/ZINC/COPPER (PRESERVISION AREDS ORAL) Take by mouth once daily.      No current facility-administered medications for this visit.      Objective:   Physical Exam   Constitutional: She is oriented to person, place, and time. She appears well-developed. No distress.   BP (!) 121/57 (BP Location: Left arm, Patient Position: Sitting, BP Method: Large (Automatic))   Pulse 69   Ht 5' 6" (1.676 m)   Wt 62.7 kg (138 lb 3.7 oz)   BMI 22.31 kg/m²    HENT:   Head: Normocephalic.   Eyes: Conjunctivae are normal. Pupils are equal, round, and reactive to light. No scleral icterus.   Neck: Neck supple. No JVD present. No thyromegaly present.   Cardiovascular: Normal rate, regular rhythm, normal heart sounds and intact distal pulses.  PMI is not displaced.  Exam reveals no gallop and no friction rub.    No murmur heard.  Pulmonary/Chest: Effort normal and breath sounds normal. No respiratory distress. She has no wheezes. She has no rales.   Abdominal: Soft. She exhibits no distension. There is no splenomegaly or hepatomegaly. There is no tenderness.   Musculoskeletal: She exhibits no edema or tenderness.   Gait normal   Neurological: She is alert and oriented to person, place, and time.   Skin: Skin is warm and dry. She is not diaphoretic.   Psychiatric: She has a normal mood and affect. Her behavior is " normal.       Lab Results   Component Value Date     10/30/2017    K 4.6 10/30/2017     10/30/2017    CO2 30 (H) 10/30/2017    BUN 16 10/30/2017    CREATININE 0.8 10/30/2017    GLU 95 10/30/2017    HGBA1C 5.4 10/30/2017    AST 19 10/30/2017    ALT 15 10/30/2017    ALBUMIN 3.7 10/30/2017    PROT 7.3 10/30/2017    BILITOT 0.5 10/30/2017    WBC 4.87 10/30/2017    HGB 14.0 10/30/2017    HCT 42.9 10/30/2017    MCV 94 10/30/2017     10/30/2017    TSH 2.062 10/30/2017    CHOL 203 (H) 10/30/2017    HDL 72 10/30/2017    LDLCALC 111.2 10/30/2017    TRIG 99 10/30/2017       Assessment:     1. Atherosclerosis of both carotid arteries: Mild-stable    2. Hyperlipidemia, unspecified hyperlipidemia type :  on moderate intensity statin therapy but not at LDL goal last check ( HDL elevated )       Plan:     Prema was seen today for carotid artery plaque, bilateral and leg problem.    Diagnoses and all orders for this visit:    Atherosclerosis of both carotid arteries    Hyperlipidemia, unspecified hyperlipidemia type  -     Lipid panel; Future; Expected date: 03/25/2018  .[place]iter[place] diet recommended  CoQ 10 , 200 mg daily suggested  Other orders  -     estradiol (ESTRACE) 0.5 MG tablet; Take 0.5 mg by mouth once daily.  -     TURMERIC, BULK, MISC; by Misc.(Non-Drug; Combo Route) route once daily.

## 2018-01-24 NOTE — PATIENT INSTRUCTIONS
.[place] diet recommended  Graded exercise for 30 minutes a day at least 5 days a week suggested.  Suggest CoQ 10,  200 mg daily

## 2018-01-24 NOTE — TELEPHONE ENCOUNTER
----- Message from Mimi Garland sent at 1/24/2018  2:32 PM CST -----  Contact: pt   Pt called because she was on Zocar for a long period of time and she was doing great.  She said her cholesterol was always under 200.  She was switched to the Lipitor a year agon and since then her cholesterol has been running over 200.  She wants to know if there is a difference on what the medication does.  She was here today seeing Dr. Ash and she forgot to ask that question.  She wants to know if she can go back on the Zocar.  She can be reached @ [phone].  Thanks!!

## 2018-01-25 NOTE — TELEPHONE ENCOUNTER
Left a detailed VM for the pt with Dr. Ash's message, and call back name and number for any questions or concerns.

## 2018-03-23 ENCOUNTER — LAB VISIT (OUTPATIENT)
Dept: LAB | Facility: HOSPITAL | Age: 73
End: 2018-03-23
Attending: INTERNAL MEDICINE
Payer: MEDICARE

## 2018-03-23 DIAGNOSIS — E78.5 HYPERLIPIDEMIA, UNSPECIFIED HYPERLIPIDEMIA TYPE: ICD-10-CM

## 2018-03-23 LAB
CHOLEST SERPL-MCNC: 158 MG/DL
CHOLEST/HDLC SERPL: 2.6 {RATIO}
HDLC SERPL-MCNC: 61 MG/DL
HDLC SERPL: 38.6 %
LDLC SERPL CALC-MCNC: 80.2 MG/DL
NONHDLC SERPL-MCNC: 97 MG/DL
TRIGL SERPL-MCNC: 84 MG/DL

## 2018-03-23 PROCEDURE — 80061 LIPID PANEL: CPT

## 2018-03-23 PROCEDURE — 36415 COLL VENOUS BLD VENIPUNCTURE: CPT | Mod: PO

## 2018-06-28 ENCOUNTER — TELEPHONE (OUTPATIENT)
Dept: FAMILY MEDICINE | Facility: CLINIC | Age: 73
End: 2018-06-28

## 2018-06-28 NOTE — TELEPHONE ENCOUNTER
----- Message from Oliva Zhang sent at 6/28/2018  2:06 PM CDT -----  Contact: [phone]/self  Patient would like to be seen sooner than the next available appointment. Please advise.

## 2018-06-29 ENCOUNTER — OFFICE VISIT (OUTPATIENT)
Dept: INTERNAL MEDICINE | Facility: CLINIC | Age: 73
End: 2018-06-29
Payer: MEDICARE

## 2018-06-29 VITALS
TEMPERATURE: 98 F | HEART RATE: 74 BPM | WEIGHT: 134.5 LBS | OXYGEN SATURATION: 97 % | DIASTOLIC BLOOD PRESSURE: 60 MMHG | BODY MASS INDEX: 21.62 KG/M2 | HEIGHT: 66 IN | SYSTOLIC BLOOD PRESSURE: 108 MMHG

## 2018-06-29 DIAGNOSIS — B96.89 ACUTE BACTERIAL SINUSITIS: Primary | ICD-10-CM

## 2018-06-29 DIAGNOSIS — J01.90 ACUTE BACTERIAL SINUSITIS: Primary | ICD-10-CM

## 2018-06-29 PROCEDURE — 99214 OFFICE O/P EST MOD 30 MIN: CPT | Mod: S$PBB,,, | Performed by: INTERNAL MEDICINE

## 2018-06-29 PROCEDURE — 99213 OFFICE O/P EST LOW 20 MIN: CPT | Mod: PBBFAC,PO | Performed by: INTERNAL MEDICINE

## 2018-06-29 PROCEDURE — 99999 PR PBB SHADOW E&M-EST. PATIENT-LVL III: CPT | Mod: PBBFAC,,, | Performed by: INTERNAL MEDICINE

## 2018-06-29 RX ORDER — AMOXICILLIN AND CLAVULANATE POTASSIUM 875; 125 MG/1; MG/1
1 TABLET, FILM COATED ORAL EVERY 12 HOURS
Qty: 10 TABLET | Refills: 0 | Status: SHIPPED | OUTPATIENT
Start: 2018-06-29 | End: 2018-07-04

## 2018-06-29 RX ORDER — ESTROGENS, CONJUGATED 0.3 MG/1
0.3 TABLET, FILM COATED ORAL DAILY
COMMUNITY
Start: 2018-06-27 | End: 2020-12-17

## 2018-06-29 NOTE — PATIENT INSTRUCTIONS
Please take one dose of allegra by mouth daily  Please use azelastine nasal spray, 1 spray in each nostril, 1 to 2 times daily  Please take all antibiotics that are prescribed for full duration of 5 days total.      Amoxicillin; Clavulanic Acid tablets  What is this medicine?  AMOXICILLIN; CLAVULANIC ACID (a mox i JESUS in; FRED paulino lety ic AS id) is a penicillin antibiotic. It is used to treat certain kinds of bacterial infections. It will not work for colds, flu, or other viral infections.  How should I use this medicine?  Take this medicine by mouth with a full glass of water. Follow the directions on the prescription label. Take at the start of a meal. Do not crush or chew. If the tablet has a score line, you may cut it in half at the score line for easier swallowing. Take your medicine at regular intervals. Do not take your medicine more often than directed. Take all of your medicine as directed even if you think you are better. Do not skip doses or stop your medicine early.  Talk to your pediatrician regarding the use of this medicine in children. Special care may be needed.  What side effects may I notice from receiving this medicine?  Side effects that you should report to your doctor or health care professional as soon as possible:  · allergic reactions like skin rash, itching or hives, swelling of the face, lips, or tongue  · breathing problems  · dark urine  · fever or chills, sore throat  · redness, blistering, peeling or loosening of the skin, including inside the mouth  · seizures  · trouble passing urine or change in the amount of urine  · unusual bleeding, bruising  · unusually weak or tired  · white patches or sores in the mouth or throat  Side effects that usually do not require medical attention (report to your doctor or health care professional if they continue or are bothersome):  · diarrhea  · dizziness  · headache  · nausea, vomiting  · stomach upset  · vaginal or anal irritation  What may  interact with this medicine?  · allopurinol  · anticoagulants  · birth control pills  · methotrexate  · probenecid  What if I miss a dose?  If you miss a dose, take it as soon as you can. If it is almost time for your next dose, take only that dose. Do not take double or extra doses.  Where should I keep my medicine?  Keep out of the reach of children.  Store at room temperature below 25 degrees C (77 degrees F). Keep container tightly closed. Throw away any unused medicine after the expiration date.  What should I tell my health care provider before I take this medicine?  They need to know if you have any of these conditions:  · bowel disease, like colitis  · kidney disease  · liver disease  · mononucleosis  · an unusual or allergic reaction to amoxicillin, penicillin, cephalosporin, other antibiotics, clavulanic acid, other medicines, foods, dyes, or preservatives  · pregnant or trying to get pregnant  · breast-feeding  What should I watch for while using this medicine?  Tell your doctor or health care professional if your symptoms do not improve.  Do not treat diarrhea with over the counter products. Contact your doctor if you have diarrhea that lasts more than 2 days or if it is severe and watery.  If you have diabetes, you may get a false-positive result for sugar in your urine. Check with your doctor or health care professional.  Birth control pills may not work properly while you are taking this medicine. Talk to your doctor about using an extra method of birth control.  NOTE:This sheet is a summary. It may not cover all possible information. If you have questions about this medicine, talk to your doctor, pharmacist, or health care provider. Copyright© 2017 [employer]

## 2018-06-29 NOTE — PROGRESS NOTES
"Subjective:       Patient ID: Prema Phillips is a 73 y.o. female.    Chief Complaint: Sinus Problem (x 1 wk n/off) and Nasal Congestion    HPI Mrs. Phillips is a 73 year old female with ALLERGIC rhinitis and a history of a right stapes surgery who presents with a chief complaint of sinus problem.  Since last week, she has felt "clogged up".  She has been feeling lousy.  She has sinus pressure and pain in multiple areas of the face.  She used Flonase.  However she only used it for 2 days because it caused her to feel nauseated.  She has had associated symptoms of headache (she points to the front of her face as the location of this), clear runny nose, postnasal drip, and left ear discomfort.  She denies any sore throat.  She has not had any associated fever.  She has a prescription for azelastine nasal spray, but she has not used this recently at home.  Symptoms are constant and worsening.  The patient has plans to fly to [place] next week and she is worried that her symptoms will worsen, particularly before she gets on a plane.    Review of Systems   Constitutional: Negative for fever.   HENT: Positive for congestion, postnasal drip, rhinorrhea, sinus pain and sinus pressure.    Neurological: Positive for headaches.       Objective:      Physical Exam   Constitutional: She is oriented to person, place, and time. She appears well-developed and well-nourished. No distress.   HENT:   Head: Normocephalic and atraumatic.   Right Ear: External ear and ear canal normal.   Left Ear: Tympanic membrane, external ear and ear canal normal.   Nose: Nose normal.   Mouth/Throat: Oropharynx is clear and moist. No oropharyngeal exudate.   Unable to visualize a light reflex on the right.   Eyes: Conjunctivae and EOM are normal.   Cardiovascular: Normal rate, regular rhythm, normal heart sounds and intact distal pulses.  Exam reveals no gallop and no friction rub.    No murmur heard.  Pulmonary/Chest: Effort normal and breath sounds " normal. No respiratory distress. She has no wheezes. She has no rales.   Musculoskeletal: She exhibits no edema or deformity.   Neurological: She is alert and oriented to person, place, and time.   Skin: Skin is warm and dry. She is not diaphoretic.   Psychiatric: She has a normal mood and affect. Her behavior is normal. Judgment and thought content normal.   Vitals reviewed.      Assessment:       1. Acute bacterial sinusitis        Plan:     #1 acute bacterial sinusitis  Augmentin 875/125 mg by mouth twice a day ×5 days  Azelastine nasal spray, one spray in each nostril 1-2 times daily, use routinely for the next week  Patient has Allegra at home. Recommend she take one tablet daily, consistently, for at least the next one week    RTC PRN

## 2018-08-23 ENCOUNTER — TELEPHONE (OUTPATIENT)
Dept: FAMILY MEDICINE | Facility: CLINIC | Age: 73
End: 2018-08-23

## 2018-08-23 NOTE — TELEPHONE ENCOUNTER
----- Message from Rosalio Campos sent at 8/23/2018 10:01 AM CDT -----  Contact: [phone]/self  Pt requesting to speak with you concerning a letter she received in the mail  Please call

## 2018-08-23 NOTE — TELEPHONE ENCOUNTER
Returned patient's call and she said she received a letter in regards to the 24 hour nurse line. She wanted to know if there were any fees. I informed her to call the number provided on the paper for additional info. I also gave her the number to our billing dept.

## 2018-09-19 ENCOUNTER — TELEPHONE (OUTPATIENT)
Dept: FAMILY MEDICINE | Facility: CLINIC | Age: 73
End: 2018-09-19

## 2018-09-19 DIAGNOSIS — M85.80 OSTEOPENIA OF THE ELDERLY: ICD-10-CM

## 2018-09-19 DIAGNOSIS — E03.9 ACQUIRED HYPOTHYROIDISM: Primary | ICD-10-CM

## 2018-09-19 DIAGNOSIS — Z79.899 MEDICATION MANAGEMENT: ICD-10-CM

## 2018-09-19 DIAGNOSIS — E78.5 HYPERLIPIDEMIA, UNSPECIFIED HYPERLIPIDEMIA TYPE: ICD-10-CM

## 2018-09-19 NOTE — TELEPHONE ENCOUNTER
----- Message from Keshia Mahoney sent at 9/19/2018  8:51 AM CDT -----  Contact: [phone]/ self   Pt its requesting lab work for her physical . Please advise

## 2018-09-20 ENCOUNTER — TELEPHONE (OUTPATIENT)
Dept: FAMILY MEDICINE | Facility: CLINIC | Age: 73
End: 2018-09-20

## 2018-10-30 ENCOUNTER — LAB VISIT (OUTPATIENT)
Dept: LAB | Facility: HOSPITAL | Age: 73
End: 2018-10-30
Attending: FAMILY MEDICINE
Payer: MEDICARE

## 2018-10-30 DIAGNOSIS — E78.5 HYPERLIPIDEMIA, UNSPECIFIED HYPERLIPIDEMIA TYPE: ICD-10-CM

## 2018-10-30 DIAGNOSIS — M85.80 OSTEOPENIA OF THE ELDERLY: ICD-10-CM

## 2018-10-30 DIAGNOSIS — E03.9 ACQUIRED HYPOTHYROIDISM: ICD-10-CM

## 2018-10-30 DIAGNOSIS — Z79.899 MEDICATION MANAGEMENT: ICD-10-CM

## 2018-10-30 LAB
ALBUMIN SERPL BCP-MCNC: 3.3 G/DL
ALP SERPL-CCNC: 56 U/L
ALT SERPL W/O P-5'-P-CCNC: 13 U/L
ANION GAP SERPL CALC-SCNC: 6 MMOL/L
AST SERPL-CCNC: 17 U/L
BASOPHILS # BLD AUTO: 0.02 K/UL
BASOPHILS NFR BLD: 0.4 %
BILIRUB SERPL-MCNC: 0.4 MG/DL
BUN SERPL-MCNC: 17 MG/DL
CALCIUM SERPL-MCNC: 9.3 MG/DL
CHLORIDE SERPL-SCNC: 106 MMOL/L
CHOLEST SERPL-MCNC: 174 MG/DL
CHOLEST/HDLC SERPL: 2.8 {RATIO}
CO2 SERPL-SCNC: 28 MMOL/L
CREAT SERPL-MCNC: 0.8 MG/DL
DIFFERENTIAL METHOD: NORMAL
EOSINOPHIL # BLD AUTO: 0.2 K/UL
EOSINOPHIL NFR BLD: 3.2 %
ERYTHROCYTE [DISTWIDTH] IN BLOOD BY AUTOMATED COUNT: 12.8 %
EST. GFR  (AFRICAN AMERICAN): >60 ML/MIN/1.73 M^2
EST. GFR  (NON AFRICAN AMERICAN): >60 ML/MIN/1.73 M^2
ESTIMATED AVG GLUCOSE: 111 MG/DL
GLUCOSE SERPL-MCNC: 93 MG/DL
HBA1C MFR BLD HPLC: 5.5 %
HCT VFR BLD AUTO: 38.8 %
HDLC SERPL-MCNC: 62 MG/DL
HDLC SERPL: 35.6 %
HGB BLD-MCNC: 12.7 G/DL
LDLC SERPL CALC-MCNC: 83.6 MG/DL
LYMPHOCYTES # BLD AUTO: 1.8 K/UL
LYMPHOCYTES NFR BLD: 36.2 %
MCH RBC QN AUTO: 31 PG
MCHC RBC AUTO-ENTMCNC: 32.7 G/DL
MCV RBC AUTO: 95 FL
MONOCYTES # BLD AUTO: 0.4 K/UL
MONOCYTES NFR BLD: 8.7 %
NEUTROPHILS # BLD AUTO: 2.6 K/UL
NEUTROPHILS NFR BLD: 51.3 %
NONHDLC SERPL-MCNC: 112 MG/DL
PLATELET # BLD AUTO: 196 K/UL
PMV BLD AUTO: 11.3 FL
POTASSIUM SERPL-SCNC: 4.3 MMOL/L
PROT SERPL-MCNC: 6.4 G/DL
RBC # BLD AUTO: 4.1 M/UL
SODIUM SERPL-SCNC: 140 MMOL/L
TRIGL SERPL-MCNC: 142 MG/DL
TSH SERPL DL<=0.005 MIU/L-ACNC: 1.36 UIU/ML
WBC # BLD AUTO: 5.03 K/UL

## 2018-10-30 PROCEDURE — 85025 COMPLETE CBC W/AUTO DIFF WBC: CPT

## 2018-10-30 PROCEDURE — 84443 ASSAY THYROID STIM HORMONE: CPT

## 2018-10-30 PROCEDURE — 80061 LIPID PANEL: CPT

## 2018-10-30 PROCEDURE — 83036 HEMOGLOBIN GLYCOSYLATED A1C: CPT

## 2018-10-30 PROCEDURE — 80053 COMPREHEN METABOLIC PANEL: CPT

## 2018-10-30 PROCEDURE — 36415 COLL VENOUS BLD VENIPUNCTURE: CPT

## 2018-11-02 ENCOUNTER — OFFICE VISIT (OUTPATIENT)
Dept: FAMILY MEDICINE | Facility: CLINIC | Age: 73
End: 2018-11-02
Payer: MEDICARE

## 2018-11-02 VITALS
TEMPERATURE: 98 F | BODY MASS INDEX: 22.15 KG/M2 | DIASTOLIC BLOOD PRESSURE: 59 MMHG | SYSTOLIC BLOOD PRESSURE: 98 MMHG | WEIGHT: 137.81 LBS | HEIGHT: 66 IN | HEART RATE: 75 BPM

## 2018-11-02 DIAGNOSIS — Z00.00 ROUTINE GENERAL MEDICAL EXAMINATION AT A HEALTH CARE FACILITY: Primary | ICD-10-CM

## 2018-11-02 DIAGNOSIS — Z90.722 HISTORY OF TOTAL HYSTERECTOMY WITH BILATERAL SALPINGO-OOPHORECTOMY (BSO): ICD-10-CM

## 2018-11-02 DIAGNOSIS — E03.9 ACQUIRED HYPOTHYROIDISM: ICD-10-CM

## 2018-11-02 DIAGNOSIS — M85.80 OSTEOPENIA OF THE ELDERLY: ICD-10-CM

## 2018-11-02 DIAGNOSIS — Z79.890 POSTMENOPAUSAL HRT (HORMONE REPLACEMENT THERAPY): ICD-10-CM

## 2018-11-02 DIAGNOSIS — F51.01 PRIMARY INSOMNIA: ICD-10-CM

## 2018-11-02 DIAGNOSIS — I45.10 RBBB: ICD-10-CM

## 2018-11-02 DIAGNOSIS — Z90.79 HISTORY OF TOTAL HYSTERECTOMY WITH BILATERAL SALPINGO-OOPHORECTOMY (BSO): ICD-10-CM

## 2018-11-02 DIAGNOSIS — I65.23 ATHEROSCLEROSIS OF BOTH CAROTID ARTERIES: ICD-10-CM

## 2018-11-02 DIAGNOSIS — Z90.710 HISTORY OF TOTAL HYSTERECTOMY WITH BILATERAL SALPINGO-OOPHORECTOMY (BSO): ICD-10-CM

## 2018-11-02 DIAGNOSIS — Z79.82 LONG-TERM USE OF ASPIRIN THERAPY: ICD-10-CM

## 2018-11-02 DIAGNOSIS — G43.709 CHRONIC MIGRAINE WITHOUT AURA WITHOUT STATUS MIGRAINOSUS, NOT INTRACTABLE: ICD-10-CM

## 2018-11-02 DIAGNOSIS — E78.5 HYPERLIPIDEMIA, UNSPECIFIED HYPERLIPIDEMIA TYPE: ICD-10-CM

## 2018-11-02 DIAGNOSIS — Z79.899 MEDICATION MANAGEMENT: ICD-10-CM

## 2018-11-02 DIAGNOSIS — H93.13 TINNITUS OF BOTH EARS: ICD-10-CM

## 2018-11-02 DIAGNOSIS — H35.30 MACULAR DEGENERATION OF RIGHT EYE, UNSPECIFIED TYPE: ICD-10-CM

## 2018-11-02 DIAGNOSIS — J30.2 SEASONAL ALLERGIC RHINITIS, UNSPECIFIED TRIGGER: ICD-10-CM

## 2018-11-02 PROCEDURE — 99213 OFFICE O/P EST LOW 20 MIN: CPT | Mod: PBBFAC,PO | Performed by: FAMILY MEDICINE

## 2018-11-02 PROCEDURE — 99999 PR PBB SHADOW E&M-EST. PATIENT-LVL III: CPT | Mod: PBBFAC,,, | Performed by: FAMILY MEDICINE

## 2018-11-02 PROCEDURE — 99397 PER PM REEVAL EST PAT 65+ YR: CPT | Mod: S$PBB,,, | Performed by: FAMILY MEDICINE

## 2018-11-02 RX ORDER — AZELASTINE 1 MG/ML
1 SPRAY, METERED NASAL 2 TIMES DAILY
Qty: 30 ML | Refills: 11 | Status: SHIPPED | OUTPATIENT
Start: 2018-11-02 | End: 2023-02-13 | Stop reason: SDUPTHER

## 2018-11-02 RX ORDER — ACETAMINOPHEN 160 MG/5ML
200 SUSPENSION, ORAL (FINAL DOSE FORM) ORAL DAILY
Status: ON HOLD | COMMUNITY
End: 2024-03-13 | Stop reason: HOSPADM

## 2018-11-02 RX ORDER — LEVOTHYROXINE SODIUM 75 UG/1
75 TABLET ORAL
Qty: 90 TABLET | Refills: 3 | Status: SHIPPED | OUTPATIENT
Start: 2018-11-02 | End: 2018-11-05 | Stop reason: SDUPTHER

## 2018-11-02 NOTE — PROGRESS NOTES
Office Visit    Patient Name: Prema Phillips    : 1945  MRN: [id number]    Subjective:  Prema is a 73 y.o. female who presents today for:    Annual Exam    Prema Phillips presents today for annual wellness exam and monitoring of chronic conditions including hyperlipidemia, nonocclusive bilateral carotid artery disease, acquired hypothyroidism, postmenopausal osteopenia with use of hormone replacement therapy, migraine headaches, allergic rhinitis.  She is postmenopausal.      She has a gynecologist Dr. Sheppard but no longer needs paps due to history of TAYLER/BSO.   Saw cardiologist Dr Ash 18-- stable and F/U 1 year-- Carotid stenosis improved on US.      They have been feeling overall well--  Saw Dr Conner 18 for UC for sinusitis.  MSK/Osteoarthritis stable and takes Meloxicam as needed.       General lifestyle habits are as follows:  Diet is described as healthy-- fruits/veggies/some fish, exercise is described as fair-- walking sometimes for exercise, sleep is described as fair- Ativan most nights helps (RX's by Dr Daniels)-- interrupted but ultimately sleeps 7 hours nightly. Weight is overall stable     Immunizations: TDaP 2015, Pneumovax 23 completed  and Prevnar 13 2016 , Zostavax completed, YEARLY FLU COMPLETED     Screening Tests: last Mammogram 18 per GYN--> repeat one year, DEXA-->2017--> stable osteopenia and repeat 2 years , Colonoscopy up to date per Dr. Watt with [facility], last one was 2014--> repeat 5 years (family history), Hep C screening negative 2016     Eye/Dental: up to date, ophthalmologist-- macular degeneration-- seeing retina specialist-- [facility] and cousins- [facility], dental UTD      Past Medical History  Past Medical History:   Diagnosis Date    Allergic rhinitis 2016    Carotid artery plaque 2013    History of total hysterectomy with bilateral salpingo-oophorectomy (BSO) 2016    Hyperlipidemia 2012    Hypothyroid      Insomnia     Macular degeneration, right eye 5/31/2016    Migraine headache     Osteopenia of the elderly 10/20/2015    Postmenopausal HRT (hormone replacement therapy)     RBBB 7/17/2012       Past Surgical History  Past Surgical History:   Procedure Laterality Date    APPENDECTOMY      STAPEDES SURGERY  2006    TOTAL ABDOMINAL HYSTERECTOMY W/ BILATERAL SALPINGOOPHORECTOMY         Family History  Family History   Problem Relation Age of Onset    Heart disease Father     COPD Father     Cancer Maternal Aunt         breast cancer    Cancer Maternal Grandmother         breast cancer    Cancer Cousin         colon cancer       Social History  Social History     Socioeconomic History    Marital status:      Spouse name: Not on file    Number of children: 2    Years of education: Not on file    Highest education level: Not on file   Social Needs    Financial resource strain: Not on file    Food insecurity - worry: Not on file    Food insecurity - inability: Not on file    Transportation needs - medical: Not on file    Transportation needs - non-medical: Not on file   Occupational History    Not on file   Tobacco Use    Smoking status: Never Smoker    Smokeless tobacco: Never Used   Substance and Sexual Activity    Alcohol use: Yes     Comment: rare    Drug use: No    Sexual activity: Yes     Partners: Male     Comment: 2 kids,     Other Topics Concern    Not on file   Social History Narrative    Not on file       Current Medications  Medications reviewed and updated.     Allergies   Review of patient's allergies indicates:   Allergen Reactions    Neomycin      Other reaction(s): Unknown       Review of Systems (Pertinent positives)  Review of Systems   Constitutional: Negative for activity change and unexpected weight change.   HENT: Positive for hearing loss (stable on hearing aids) and rhinorrhea. Negative for trouble swallowing.    Eyes: Positive for visual disturbance.  "Negative for discharge.   Respiratory: Negative for chest tightness and wheezing.    Cardiovascular: Negative for chest pain and palpitations.   Gastrointestinal: Negative for blood in stool, constipation, diarrhea and vomiting.   Endocrine: Negative for polydipsia and polyuria.   Genitourinary: Negative for difficulty urinating, dysuria, hematuria and menstrual problem.   Musculoskeletal: Positive for arthralgias (athritis). Negative for joint swelling and neck pain.   Neurological: Negative for weakness and headaches.   Psychiatric/Behavioral: Negative for confusion and dysphoric mood.       BP (!) 98/59   Pulse 75   Temp 98.1 °F (36.7 °C)   Ht 5' 6" (1.676 m)   Wt 62.5 kg (137 lb 12.6 oz)   BMI 22.24 kg/m²     Physical Exam   Constitutional: She is oriented to person, place, and time. She appears well-developed and well-nourished. No distress.   HENT:   Head: Normocephalic and atraumatic.   Right Ear: Ear canal normal. Tympanic membrane is not erythematous and not bulging.   Left Ear: Ear canal normal. Tympanic membrane is not erythematous and not bulging.   Mouth/Throat: No oropharyngeal exudate.   Eyes: Conjunctivae are normal.   Neck: Carotid bruit is not present. No thyroid mass and no thyromegaly present.   Cardiovascular: Normal rate, regular rhythm and normal heart sounds.   No murmur heard.  Pulses:       Dorsalis pedis pulses are 2+ on the right side, and 2+ on the left side.   Pulmonary/Chest: Effort normal and breath sounds normal. No respiratory distress.   Abdominal: Soft. Bowel sounds are normal. She exhibits no distension and no mass. There is no hepatosplenomegaly. There is no tenderness.   Musculoskeletal: Normal range of motion.   Lymphadenopathy:     She has no cervical adenopathy.   Neurological: She is alert and oriented to person, place, and time.   Skin: Skin is warm and dry. No rash noted.   Psychiatric: She has a normal mood and affect.   Vitals " reviewed.        Assessment/Plan:  Prema Phillips is a 73 y.o. female who presents today for :    Prema was seen today for annual exam.    Diagnoses and all orders for this visit:    Routine general medical examination at a health care facility  Comments:  health maintenance reviewed and up to date, FLU shot completed, advised on healthy diet, regular exercise, eye/dental exams     BMI 22.0-22.9, adult    History of total hysterectomy with bilateral salpingo-oophorectomy (BSO)    Postmenopausal HRT (hormone replacement therapy)  Comments:  premarin per GYN     Osteopenia of the elderly  Comments:  continue calcium, vitamin D, WB exercises, and repeat DEXA next year    Hyperlipidemia, unspecified hyperlipidemia type  Comments:  continue lipitor, LDL 83 on recent labs, recheck 1 yr    Acquired hypothyroidism  Comments:  on synthroid, TSH normal range on recent labs  Orders:  -     levothyroxine (SYNTHROID) 75 MCG tablet; Take 1 tablet (75 mcg total) by mouth before breakfast.    Atherosclerosis of both carotid arteries  Comments:  only 20-39% stenosis noted on US 1/19/2018: continue aspirin, statin, consider repeat US in 2 years to follow    Long-term use of aspirin therapy    RBBB    Chronic migraine without aura without status migrainosus, not intractable  Comments:  Relpax PRN, currently rare    Tinnitus of both ears  Comments:  stable on hearing aids-- monitored by audiology    Seasonal allergic rhinitis, unspecified trigger  Comments:  stable on astelin/ flonase, with PRN antihistamine  Orders:  -     azelastine (ASTELIN) 137 mcg (0.1 %) nasal spray; 1 spray (137 mcg total) by Nasal route 2 (two) times daily.    Macular degeneration of right eye, unspecified type    Primary insomnia    Medication management    Seasonal allergic rhinitis, unspecified trigger  -     azelastine (ASTELIN) 137 mcg (0.1 %) nasal spray; 1 spray (137 mcg total) by Nasal route 2 (two) times daily.    Acquired hypothyroidism  -      levothyroxine (SYNTHROID) 75 MCG tablet; Take 1 tablet (75 mcg total) by mouth before breakfast.            ICD-10-CM ICD-9-CM    1. Routine general medical examination at a health care facility Z00.00 V70.0     health maintenance reviewed and up to date, FLU shot completed, advised on healthy diet, regular exercise, eye/dental exams    2. BMI 22.0-22.9, adult Z68.22 V85.1    3. History of total hysterectomy with bilateral salpingo-oophorectomy (BSO) Z90.710 V15.29     Z90.722      Z90.79     4. Postmenopausal HRT (hormone replacement therapy) Z79.890 V07.4     premarin per GYN    5. Osteopenia of the elderly M85.80 733.90     continue calcium, vitamin D, WB exercises, and repeat DEXA next year   6. Hyperlipidemia, unspecified hyperlipidemia type E78.5 272.4     continue lipitor, LDL 83 on recent labs, recheck 1 yr   7. Acquired hypothyroidism E03.9 244.9 levothyroxine (SYNTHROID) 75 MCG tablet    on synthroid, TSH normal range on recent labs   8. Atherosclerosis of both carotid arteries I65.23 433.10      433.30     only 20-39% stenosis noted on US 1/19/2018: continue aspirin, statin, consider repeat US in 2 years to follow   9. Long-term use of aspirin therapy Z79.82 V58.66    10. RBBB I45.10 426.4    11. Chronic migraine without aura without status migrainosus, not intractable G43.709 346.70     Relpax PRN, currently rare   12. Tinnitus of both ears H93.13 388.30     stable on hearing aids-- monitored by audiology   13. Seasonal allergic rhinitis, unspecified trigger J30.2 477.9 azelastine (ASTELIN) 137 mcg (0.1 %) nasal spray    stable on astelin/ flonase, with PRN antihistamine   14. Macular degeneration of right eye, unspecified type H35.30 362.50    15. Primary insomnia F51.01 307.42    16. Medication management Z79.899 V58.69    17. Seasonal allergic rhinitis, unspecified trigger J30.2 477.9 azelastine (ASTELIN) 137 mcg (0.1 %) nasal spray   18. Acquired hypothyroidism E03.9 244.9 levothyroxine (SYNTHROID)  75 MCG tablet       There are no Patient Instructions on file for this visit.      Follow-up in about 1 year (around 11/2/2019) for return as needed for new concerns.

## 2018-11-02 NOTE — LETTER
November 2, 2018      Other  [address]  [place]  [place] [address]           [address]  [place] [address]  Phone: [phone]  Fax: [phone]          Patient: Prema Phillips   MR Number: [id number]   YOB: 1945   Date of Visit: 11/2/2018       Dear Other:    Thank you for referring Prema Phillips to me for evaluation. Attached you will find relevant portions of my assessment and plan of care.    If you have questions, please do not hesitate to call me. I look forward to following Prema Phillips along with you.    Sincerely,    Odette Zhang MD    Enclosure  CC:  No Recipients    If you would like to receive this communication electronically, please contact [email or web address] or [phone] to request more information on [employer] Link access.    For providers and/or their staff who would like to refer a patient to [facility], please contact us through our one-stop-shop provider referral line, [facility], at [phone].    If you feel you have received this communication in error or would no longer like to receive these types of communications, please e-mail [email or web address]

## 2018-11-05 DIAGNOSIS — E03.9 ACQUIRED HYPOTHYROIDISM: ICD-10-CM

## 2018-11-05 RX ORDER — LEVOTHYROXINE SODIUM 75 UG/1
75 TABLET ORAL
Qty: 90 TABLET | Refills: 3 | Status: SHIPPED | OUTPATIENT
Start: 2018-11-05 | End: 2019-11-08 | Stop reason: SDUPTHER

## 2018-11-05 NOTE — TELEPHONE ENCOUNTER
----- Message from Paris Greenfield sent at 11/5/2018  8:57 AM CST -----  Contact: [phone]/ self    Patient called in stating the following medication went to incorrect pharmacy. Pt would like Rx sent to [facility] - [place], [place] - [address] AT Portal to Registered [facility] Sites. Pt stated she gets the brand name at generic price.     1. levothyroxine (SYNTHROID) 75 MCG tablet

## 2018-12-10 ENCOUNTER — OFFICE VISIT (OUTPATIENT)
Dept: FAMILY MEDICINE | Facility: CLINIC | Age: 73
End: 2018-12-10
Payer: MEDICARE

## 2018-12-10 VITALS
SYSTOLIC BLOOD PRESSURE: 100 MMHG | HEIGHT: 66 IN | TEMPERATURE: 98 F | OXYGEN SATURATION: 97 % | HEART RATE: 70 BPM | DIASTOLIC BLOOD PRESSURE: 68 MMHG | WEIGHT: 136.88 LBS | BODY MASS INDEX: 22 KG/M2

## 2018-12-10 DIAGNOSIS — E03.9 ACQUIRED HYPOTHYROIDISM: ICD-10-CM

## 2018-12-10 DIAGNOSIS — F51.01 PRIMARY INSOMNIA: ICD-10-CM

## 2018-12-10 DIAGNOSIS — Z00.00 ENCOUNTER FOR PREVENTIVE HEALTH EXAMINATION: Primary | ICD-10-CM

## 2018-12-10 DIAGNOSIS — E78.5 HYPERLIPIDEMIA, UNSPECIFIED HYPERLIPIDEMIA TYPE: ICD-10-CM

## 2018-12-10 DIAGNOSIS — I65.23 ATHEROSCLEROSIS OF BOTH CAROTID ARTERIES: ICD-10-CM

## 2018-12-10 DIAGNOSIS — G43.709 CHRONIC MIGRAINE WITHOUT AURA WITHOUT STATUS MIGRAINOSUS, NOT INTRACTABLE: ICD-10-CM

## 2018-12-10 DIAGNOSIS — H35.30 MACULAR DEGENERATION OF RIGHT EYE, UNSPECIFIED TYPE: ICD-10-CM

## 2018-12-10 PROCEDURE — 99215 OFFICE O/P EST HI 40 MIN: CPT | Mod: PBBFAC,PO | Performed by: NURSE PRACTITIONER

## 2018-12-10 PROCEDURE — G0439 PPPS, SUBSEQ VISIT: HCPCS | Mod: S$GLB,,, | Performed by: NURSE PRACTITIONER

## 2018-12-10 PROCEDURE — 99999 PR PBB SHADOW E&M-EST. PATIENT-LVL V: CPT | Mod: PBBFAC,,, | Performed by: NURSE PRACTITIONER

## 2018-12-10 RX ORDER — ATORVASTATIN CALCIUM 20 MG/1
20 TABLET, FILM COATED ORAL DAILY
Qty: 90 TABLET | Refills: 0 | Status: SHIPPED | OUTPATIENT
Start: 2018-12-10 | End: 2019-01-24 | Stop reason: SDUPTHER

## 2018-12-10 NOTE — PATIENT INSTRUCTIONS
Counseling and Referral of Other Preventative  (Italic type indicates deductible and co-insurance are waived)    Patient Name: Prema Phillips  Today's Date: 12/10/2018    Health Maintenance       Date Due Completion Date    Zoster Vaccine 01/18/2005 ---    Pneumococcal Vaccine (65+ Low/Medium Risk) (2 of 2 - PPSV23) 05/31/2017 5/31/2016    Eye Exam 06/26/2019 6/26/2018    Override on 12/8/2017: [facility]    Override on 12/5/2016: [facility]    High Dose Statin 11/02/2019 11/2/2018    Mammogram 01/25/2020 1/25/2018    DEXA SCAN 11/01/2020 11/1/2017    Lipid Panel 10/30/2023 10/30/2018    Colonoscopy 05/15/2024 5/15/2014    Override on 5/5/2009: Done (repeat in 5 yrs-shalini cherry )    TETANUS VACCINE 06/01/2025 6/1/2015        No orders of the defined types were placed in this encounter.    The following information is provided to all patients.  This information is to help you find resources for any of the problems found today that may be affecting your health:                Living healthy guide: www.[facility].[place].gov      Understanding Diabetes: www.diabetes.org      Eating healthy: www.cdc.gov/healthyweight      CDC home safety checklist: www.cdc.gov/steadi/patient.html      Agency on Aging: www.goea.[place].[facility]      Alcoholics anonymous (AA): www.aa.org      Physical Activity: www.adal.nih.gov/nt4juay      Tobacco use: www.quitwithusla.org

## 2018-12-10 NOTE — TELEPHONE ENCOUNTER
----- Message from Clover Burnett sent at 12/10/2018 10:48 AM CST -----  Contact: pt  RX request - refill or new RX.  Is this a refill or new RX:  refill  RX name and strength:  atorvastatin (LIPITOR) 20 MG tablet   Directions: TAKE 1 TABLET ONCE DAILY   Is this a 30 day or 90 day RX:  90  Local pharmacy or mail order pharmacy:  Mail order  Pharmacy name and phone # [facility]  [facility] - [place], [place] - [facility] [address] AT Portal to Registered [facility] Sites [phone] (Phone)  [phone] (Fax)    Mihir Richmond 1/24/18

## 2018-12-11 NOTE — PROGRESS NOTES
"Prema Phillips presented for a  Medicare AWV and comprehensive Health Risk Assessment today. The following components were reviewed and updated:    · Medical history  · Family History  · Social history  · Allergies and Current Medications  · Health Risk Assessment  · Health Maintenance  · Care Team     ** See Completed Assessments for Annual Wellness Visit within the encounter summary.**       The following assessments were completed:  · Living Situation  · CAGE  · Depression Screening  · Timed Get Up and Go  · Whisper Test  · Cognitive Function Screening  · Nutrition Screening  · ADL Screening  · PAQ Screening    Vitals:    12/10/18 1354   BP: 100/68   BP Location: Right arm   Patient Position: Sitting   BP Method: Medium (Manual)   Pulse: 70   Temp: 97.6 °F (36.4 °C)   TempSrc: Oral   SpO2: 97%   Weight: 62.1 kg (136 lb 14.5 oz)   Height: 5' 6" (1.676 m)     Body mass index is 22.1 kg/m².     Physical Exam   Constitutional: She is oriented to person, place, and time. She appears well-developed and well-nourished. No distress.   HENT:   Head: Normocephalic and atraumatic.   Eyes: EOM are normal. Pupils are equal, round, and reactive to light.   Neck: Neck supple. No JVD present. No tracheal deviation present.   Cardiovascular: Normal rate, regular rhythm, normal heart sounds and intact distal pulses.   No murmur heard.  Pulmonary/Chest: Effort normal and breath sounds normal. No respiratory distress. She has no wheezes. She has no rales.   Abdominal: Soft. Bowel sounds are normal. She exhibits no distension and no mass. There is no tenderness.   Musculoskeletal: Normal range of motion. She exhibits no edema or tenderness.   Neurological: She is alert and oriented to person, place, and time. Coordination normal.   Skin: Skin is warm and dry. No erythema. No pallor.   Psychiatric: She has a normal mood and affect. Her behavior is normal. Judgment and thought content normal. Cognition and memory are normal. She expresses " no homicidal and no suicidal ideation.   Nursing note and vitals reviewed.        Diagnoses and health risks identified today and associated recommendations/orders:    1. Encounter for preventive health examination    2. Hyperlipidemia, unspecified hyperlipidemia type  Chronic; stable with lifestyle modifications.  Followed by PCP.    3. Atherosclerosis of both carotid arteries  Chronic; stable.  Followed by Cardiology.    4. Acquired hypothyroidism  Chronic; stable on medication.  Followed by PCP.    5. Chronic migraine without aura without status migrainosus, not intractable  Chronic; stable on medication.  Followed by PCP.    6. Primary insomnia  Chronic; stable on medication.  Followed by PCP.    7. Macular degeneration of right eye, unspecified type  Chronic; stable.  Followed by external Ophthalmology.    8. BMI 22.0-22.9, adult      Provided Prema with a 5-10 year written screening schedule and personal prevention plan. Recommendations were developed using the USPSTF age appropriate recommendations. Education, counseling, and referrals were provided as needed. After Visit Summary printed and given to patient which includes a list of additional screenings\tests needed.    Follow-up in about 11 months (around 11/2/2019) for follow-up with PCP, Annual Wellness Visit in 1 year.    Irina Singh NP

## 2018-12-18 ENCOUNTER — TELEPHONE (OUTPATIENT)
Dept: FAMILY MEDICINE | Facility: CLINIC | Age: 73
End: 2018-12-18

## 2018-12-18 NOTE — TELEPHONE ENCOUNTER
----- Message from Bonnie Robles sent at 12/18/2018 10:32 AM CST -----  Contact: [phone]/self  Patient is requesting a call back regarding medical clearance for cataract surgery. She needs an EKG ordered and to see doctor as soon as possible. Please advise.

## 2018-12-18 NOTE — TELEPHONE ENCOUNTER
Spoke to pt and having Cataract Surgery on 1/2. Scheduled pt with Dr. Hoff for Pre-Op clearance. Pt bringing paperwork to appt.

## 2019-01-24 ENCOUNTER — OFFICE VISIT (OUTPATIENT)
Dept: CARDIOLOGY | Facility: CLINIC | Age: 74
End: 2019-01-24
Payer: MEDICARE

## 2019-01-24 VITALS
WEIGHT: 136.69 LBS | DIASTOLIC BLOOD PRESSURE: 58 MMHG | SYSTOLIC BLOOD PRESSURE: 119 MMHG | BODY MASS INDEX: 21.97 KG/M2 | HEART RATE: 70 BPM | HEIGHT: 66 IN

## 2019-01-24 DIAGNOSIS — E78.5 HYPERLIPIDEMIA, UNSPECIFIED HYPERLIPIDEMIA TYPE: ICD-10-CM

## 2019-01-24 DIAGNOSIS — I65.23 ATHEROSCLEROSIS OF BOTH CAROTID ARTERIES: Primary | ICD-10-CM

## 2019-01-24 PROCEDURE — 99999 PR PBB SHADOW E&M-EST. PATIENT-LVL IV: ICD-10-PCS | Mod: PBBFAC,,, | Performed by: INTERNAL MEDICINE

## 2019-01-24 PROCEDURE — 99213 PR OFFICE/OUTPT VISIT, EST, LEVL III, 20-29 MIN: ICD-10-PCS | Mod: S$PBB,,, | Performed by: INTERNAL MEDICINE

## 2019-01-24 PROCEDURE — 99999 PR PBB SHADOW E&M-EST. PATIENT-LVL IV: CPT | Mod: PBBFAC,,, | Performed by: INTERNAL MEDICINE

## 2019-01-24 PROCEDURE — 99214 OFFICE O/P EST MOD 30 MIN: CPT | Mod: PBBFAC | Performed by: INTERNAL MEDICINE

## 2019-01-24 PROCEDURE — 99213 OFFICE O/P EST LOW 20 MIN: CPT | Mod: S$PBB,,, | Performed by: INTERNAL MEDICINE

## 2019-01-24 RX ORDER — ATORVASTATIN CALCIUM 20 MG/1
20 TABLET, FILM COATED ORAL DAILY
Qty: 90 TABLET | Refills: 3 | Status: SHIPPED | OUTPATIENT
Start: 2019-01-24 | End: 2020-03-09

## 2019-01-24 NOTE — PROGRESS NOTES
Subjective:   Patient ID:  Prema Phillips is a 74 y.o. female who presents for follow-up of Atherosclerosis of both carotid arteries (1 yr fu)      HPI:   Prema Phillips has mild  carotid stenosis. Prema Phillips currently exercises.  Review of Systems   Constitution: Negative for weakness, malaise/fatigue, weight gain and weight loss.   Eyes: Negative for blurred vision.   Cardiovascular: Negative for chest pain, claudication, cyanosis, dyspnea on exertion, irregular heartbeat, leg swelling, near-syncope, orthopnea, palpitations, paroxysmal nocturnal dyspnea and syncope.   Respiratory: Negative for cough, shortness of breath and wheezing.    Musculoskeletal: Negative for falls and myalgias.        Occasional leg aching at rest.   Gastrointestinal: Negative for abdominal pain, heartburn, nausea and vomiting.   Genitourinary: Negative for nocturia.   Neurological: Negative for brief paralysis, dizziness, focal weakness, headaches, numbness and paresthesias.   Psychiatric/Behavioral: Negative for altered mental status.    a couple times a week and is active working in her yard. Prema Phillips denies chest pain, shortness of breath, palpitations, lateralizing neurologic sxs, presyncope , or syncope. Prema Phillips has dyslipidemia  on moderate intensity statin therapy with LDL in the 80s but HDL elevated...    Current Outpatient Medications   Medication Sig    aspirin (ECOTRIN) 81 MG EC tablet Take 81 mg by mouth once daily.    atorvastatin (LIPITOR) 20 MG tablet Take 1 tablet (20 mg total) by mouth once daily.    azelastine (ASTELIN) 137 mcg (0.1 %) nasal spray 1 spray (137 mcg total) by Nasal route 2 (two) times daily.    calcium-vitamin D3-vitamin K 500-100-40 mg-unit-mcg Chew Take by mouth once daily.    clobetasol (TEMOVATE) 0.05 % cream daily as needed.     coenzyme Q10 (CO Q-10) 200 mg capsule Take 200 mg by mouth once daily.    eletriptan (RELPAX) 40 MG tablet Take 40 mg by mouth. 1 Tablet Oral As directed  "   levothyroxine (SYNTHROID) 75 MCG tablet Take 1 tablet (75 mcg total) by mouth before breakfast.    lorazepam (ATIVAN) 1 MG tablet Take 1 mg by mouth once daily.    meloxicam (MOBIC) 15 MG tablet Take 15 mg by mouth once daily.    multivitamin capsule Take 1 capsule by mouth once daily.    PREMARIN 0.3 mg tablet Take 0.3 mg by mouth once daily.     tretinoin (RETIN-A) 0.05 % cream nightly.     TURMERIC, BULK, MISC by Misc.(Non-Drug; Combo Route) route once daily.    VIT A/VIT C/VIT E/ZINC/COPPER (PRESERVISION AREDS ORAL) Take by mouth once daily.      No current facility-administered medications for this visit.      Objective:   Physical Exam   Constitutional: She is oriented to person, place, and time. She appears well-developed. No distress.   BP (!) 119/58 (BP Location: Left arm, Patient Position: Sitting, BP Method: Large (Automatic))   Pulse 70   Ht 5' 6" (1.676 m)   Wt 62 kg (136 lb 11 oz)   BMI 22.06 kg/m²    HENT:   Head: Normocephalic.   Eyes: Conjunctivae are normal. Pupils are equal, round, and reactive to light. No scleral icterus.   Neck: Neck supple. No JVD present. No thyromegaly present.   Cardiovascular: Normal rate, regular rhythm, normal heart sounds and intact distal pulses. PMI is not displaced. Exam reveals no gallop and no friction rub.   No murmur heard.  Pulses:       Femoral pulses are 2+ on the right side, and 2+ on the left side.       Dorsalis pedis pulses are 0 on the right side, and 2+ on the left side.        Posterior tibial pulses are 2+ on the right side, and 2+ on the left side.   Pulmonary/Chest: Effort normal and breath sounds normal. No respiratory distress. She has no wheezes. She has no rales.   Abdominal: Soft. She exhibits no distension. There is no splenomegaly or hepatomegaly. There is no tenderness.   Musculoskeletal: She exhibits no edema or tenderness.   Gait normal   Neurological: She is alert and oriented to person, place, and time.   Skin: Skin is warm " and dry. She is not diaphoretic.   Psychiatric: She has a normal mood and affect. Her behavior is normal.       Lab Results   Component Value Date     10/30/2018    K 4.3 10/30/2018     10/30/2018    CO2 28 10/30/2018    BUN 17 10/30/2018    CREATININE 0.8 10/30/2018    GLU 93 10/30/2018    HGBA1C 5.5 10/30/2018    AST 17 10/30/2018    ALT 13 10/30/2018    ALBUMIN 3.3 (L) 10/30/2018    PROT 6.4 10/30/2018    BILITOT 0.4 10/30/2018    WBC 5.03 10/30/2018    HGB 12.7 10/30/2018    HCT 38.8 10/30/2018    MCV 95 10/30/2018     10/30/2018    TSH 1.358 10/30/2018    CHOL 174 10/30/2018    HDL 62 10/30/2018    LDLCALC 83.6 10/30/2018    TRIG 142 10/30/2018       Assessment:     1. Atherosclerosis of both carotid arteries : Mild narrowing last CFS   2. Hyperlipidemia, unspecified hyperlipidemia type :  on moderate intensity statin therapy.       Plan:     Prema was seen today for atherosclerosis of both carotid arteries.    Diagnoses and all orders for this visit:    Atherosclerosis of both carotid arteries  -     Cancel: CV Ultrasound doppler carotid (Cupid Only); Future  -     CV Ultrasound doppler carotid (Cupid Only); Future; Expected date: 01/24/2020    Hyperlipidemia, unspecified hyperlipidemia type  -     atorvastatin (LIPITOR) 20 MG tablet; Take 1 tablet (20 mg total) by mouth once daily.  Continue mediterranean diet

## 2019-03-28 ENCOUNTER — TELEPHONE (OUTPATIENT)
Dept: INTERNAL MEDICINE | Facility: CLINIC | Age: 74
End: 2019-03-28

## 2019-03-28 NOTE — TELEPHONE ENCOUNTER
----- Message from Mandy Lawson sent at 3/28/2019 12:43 PM CDT -----  Contact: Self/ [phone]  Patient asked to schedule a medical clearance appointment June 1 but the schedule for June is not coming up.    Please call patient.

## 2019-06-05 ENCOUNTER — OFFICE VISIT (OUTPATIENT)
Dept: FAMILY MEDICINE | Facility: CLINIC | Age: 74
End: 2019-06-05
Payer: MEDICARE

## 2019-06-05 ENCOUNTER — LAB VISIT (OUTPATIENT)
Dept: LAB | Facility: HOSPITAL | Age: 74
End: 2019-06-05
Attending: FAMILY MEDICINE
Payer: MEDICARE

## 2019-06-05 VITALS
WEIGHT: 136.69 LBS | BODY MASS INDEX: 21.97 KG/M2 | OXYGEN SATURATION: 98 % | HEIGHT: 66 IN | DIASTOLIC BLOOD PRESSURE: 70 MMHG | TEMPERATURE: 98 F | HEART RATE: 81 BPM | SYSTOLIC BLOOD PRESSURE: 116 MMHG

## 2019-06-05 DIAGNOSIS — Z01.810 PREOP CARDIOVASCULAR EXAM: ICD-10-CM

## 2019-06-05 DIAGNOSIS — I45.10 RBBB: ICD-10-CM

## 2019-06-05 DIAGNOSIS — H25.9 SENILE CATARACT OF RIGHT EYE, UNSPECIFIED AGE-RELATED CATARACT TYPE: Primary | ICD-10-CM

## 2019-06-05 DIAGNOSIS — Z79.899 MEDICATION MANAGEMENT: ICD-10-CM

## 2019-06-05 DIAGNOSIS — E78.5 HYPERLIPIDEMIA, UNSPECIFIED HYPERLIPIDEMIA TYPE: ICD-10-CM

## 2019-06-05 DIAGNOSIS — E03.9 ACQUIRED HYPOTHYROIDISM: ICD-10-CM

## 2019-06-05 DIAGNOSIS — I65.23 ATHEROSCLEROSIS OF BOTH CAROTID ARTERIES: ICD-10-CM

## 2019-06-05 DIAGNOSIS — H53.9 VISION ABNORMALITIES: ICD-10-CM

## 2019-06-05 DIAGNOSIS — Z79.82 LONG-TERM USE OF ASPIRIN THERAPY: ICD-10-CM

## 2019-06-05 DIAGNOSIS — M54.2 NECK PAIN: ICD-10-CM

## 2019-06-05 LAB
ALBUMIN SERPL BCP-MCNC: 3.6 G/DL (ref 3.5–5.2)
ALP SERPL-CCNC: 69 U/L (ref 55–135)
ALT SERPL W/O P-5'-P-CCNC: 12 U/L (ref 10–44)
ANION GAP SERPL CALC-SCNC: 4 MMOL/L (ref 8–16)
AST SERPL-CCNC: 18 U/L (ref 10–40)
BASOPHILS # BLD AUTO: 0.02 K/UL (ref 0–0.2)
BASOPHILS NFR BLD: 0.3 % (ref 0–1.9)
BILIRUB SERPL-MCNC: 0.2 MG/DL (ref 0.1–1)
BUN SERPL-MCNC: 17 MG/DL (ref 8–23)
CALCIUM SERPL-MCNC: 9.7 MG/DL (ref 8.7–10.5)
CHLORIDE SERPL-SCNC: 104 MMOL/L (ref 95–110)
CO2 SERPL-SCNC: 32 MMOL/L (ref 23–29)
CREAT SERPL-MCNC: 0.9 MG/DL (ref 0.5–1.4)
DIFFERENTIAL METHOD: ABNORMAL
EOSINOPHIL # BLD AUTO: 0.1 K/UL (ref 0–0.5)
EOSINOPHIL NFR BLD: 1 % (ref 0–8)
ERYTHROCYTE [DISTWIDTH] IN BLOOD BY AUTOMATED COUNT: 12.5 % (ref 11.5–14.5)
EST. GFR  (AFRICAN AMERICAN): >60 ML/MIN/1.73 M^2
EST. GFR  (NON AFRICAN AMERICAN): >60 ML/MIN/1.73 M^2
GLUCOSE SERPL-MCNC: 94 MG/DL (ref 70–110)
HCT VFR BLD AUTO: 40.9 % (ref 37–48.5)
HGB BLD-MCNC: 13 G/DL (ref 12–16)
LYMPHOCYTES # BLD AUTO: 1.8 K/UL (ref 1–4.8)
LYMPHOCYTES NFR BLD: 31.3 % (ref 18–48)
MCH RBC QN AUTO: 29.7 PG (ref 27–31)
MCHC RBC AUTO-ENTMCNC: 31.8 G/DL (ref 32–36)
MCV RBC AUTO: 94 FL (ref 82–98)
MONOCYTES # BLD AUTO: 0.6 K/UL (ref 0.3–1)
MONOCYTES NFR BLD: 9.8 % (ref 4–15)
NEUTROPHILS # BLD AUTO: 3.3 K/UL (ref 1.8–7.7)
NEUTROPHILS NFR BLD: 57.4 % (ref 38–73)
PLATELET # BLD AUTO: 208 K/UL (ref 150–350)
PMV BLD AUTO: 11.2 FL (ref 9.2–12.9)
POTASSIUM SERPL-SCNC: 4.4 MMOL/L (ref 3.5–5.1)
PROT SERPL-MCNC: 7 G/DL (ref 6–8.4)
RBC # BLD AUTO: 4.37 M/UL (ref 4–5.4)
SODIUM SERPL-SCNC: 140 MMOL/L (ref 136–145)
TSH SERPL DL<=0.005 MIU/L-ACNC: 1.47 UIU/ML (ref 0.4–4)
WBC # BLD AUTO: 5.81 K/UL (ref 3.9–12.7)

## 2019-06-05 PROCEDURE — 84443 ASSAY THYROID STIM HORMONE: CPT

## 2019-06-05 PROCEDURE — 99999 PR PBB SHADOW E&M-EST. PATIENT-LVL V: CPT | Mod: PBBFAC,,, | Performed by: FAMILY MEDICINE

## 2019-06-05 PROCEDURE — 93010 EKG 12-LEAD: ICD-10-PCS | Mod: ,,, | Performed by: INTERNAL MEDICINE

## 2019-06-05 PROCEDURE — 99215 OFFICE O/P EST HI 40 MIN: CPT | Mod: PBBFAC,PO | Performed by: FAMILY MEDICINE

## 2019-06-05 PROCEDURE — 93005 ELECTROCARDIOGRAM TRACING: CPT

## 2019-06-05 PROCEDURE — 85025 COMPLETE CBC W/AUTO DIFF WBC: CPT

## 2019-06-05 PROCEDURE — 99214 OFFICE O/P EST MOD 30 MIN: CPT | Mod: S$PBB,,, | Performed by: FAMILY MEDICINE

## 2019-06-05 PROCEDURE — 93010 ELECTROCARDIOGRAM REPORT: CPT | Mod: ,,, | Performed by: INTERNAL MEDICINE

## 2019-06-05 PROCEDURE — 99214 PR OFFICE/OUTPT VISIT, EST, LEVL IV, 30-39 MIN: ICD-10-PCS | Mod: S$PBB,,, | Performed by: FAMILY MEDICINE

## 2019-06-05 PROCEDURE — 80053 COMPREHEN METABOLIC PANEL: CPT

## 2019-06-05 PROCEDURE — 36415 COLL VENOUS BLD VENIPUNCTURE: CPT

## 2019-06-05 PROCEDURE — 99999 PR PBB SHADOW E&M-EST. PATIENT-LVL V: ICD-10-PCS | Mod: PBBFAC,,, | Performed by: FAMILY MEDICINE

## 2019-06-05 NOTE — PROGRESS NOTES
Office Visit    Patient Name: Prema Phillips    : 1945  MRN: [id number]    Subjective:  Prema is a 74 y.o. 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 chest pain, shortness of breath, palpitations, dizziness/lightheadedness, edema    Previous Trouble with Anesthesia: NONE    Easy Bleeding/Bruising?  Use of anticoagulants, blood thinners?:  She takes a daily baby aspirin and meloxicam as needed for pain-advised to stop these at least 10 days prior to surgery.    Chronic Conditions well Controlled: yes, lipid panel at goal on atorvastatin, TSH within normal limits on Synthroid, history of right bundle branch block but with good stable exercise tolerance.  Does have some chronic neck pain and would like PT for referral for this        Past Medical History  Past Medical History:   Diagnosis Date    Allergic rhinitis 2016    Carotid artery plaque 2013    History of total hysterectomy with bilateral salpingo-oophorectomy (BSO) 2016    Hyperlipidemia 2012    Hypothyroid     Insomnia     Macular degeneration, right eye 2016    Migraine headache     Osteopenia of the elderly 10/20/2015    Postmenopausal HRT (hormone replacement therapy)     RBBB 2012       Past Surgical History  Past Surgical History:    Procedure Laterality Date    APPENDECTOMY      HYSTERECTOMY      STAPEDES SURGERY  2006    TOTAL ABDOMINAL HYSTERECTOMY W/ BILATERAL SALPINGOOPHORECTOMY      TUBAL LIGATION         Family History  Family History   Problem Relation Age of Onset    Heart disease Father     COPD Father     Cancer Maternal Aunt         breast cancer    Cancer Maternal Grandmother         breast cancer    Cancer Cousin         colon cancer       Social History  Social History     Socioeconomic History    Marital status:      Spouse name: Not on file    Number of children: 2    Years of education: Not on file    Highest education level: Not on file   Occupational History    Not on file   Social Needs    Financial resource strain: Not on file    Food insecurity:     Worry: Not on file     Inability: Not on file    Transportation needs:     Medical: Not on file     Non-medical: Not on file   Tobacco Use    Smoking status: Never Smoker    Smokeless tobacco: Never Used   Substance and Sexual Activity    Alcohol use: Yes     Comment: occasionally; once every 3-4 months    Drug use: No    Sexual activity: Not Currently     Partners: Male     Comment: 2 kids,     Lifestyle    Physical activity:     Days per week: Not on file     Minutes per session: Not on file    Stress: Not on file   Relationships    Social connections:     Talks on phone: Patient refused     Gets together: Patient refused     Attends Islam service: Not on file     Active member of club or organization: Yes     Attends meetings of clubs or organizations: More than 4 times per year     Relationship status:    Other Topics Concern    Not on file   Social History Narrative    Not on file       Current Medications  Medications reviewed and updated.     Allergies   Review of patient's allergies indicates:   Allergen Reactions    Neomycin Other (See Comments)     Other reaction(s): Unknown       Review of Systems (Pertinent  "positives)  Review of Systems   Constitutional: Negative for activity change and unexpected weight change.   HENT: Positive for hearing loss. Negative for rhinorrhea and trouble swallowing.    Eyes: Positive for visual disturbance. Negative for discharge.   Respiratory: Negative for chest tightness and wheezing.    Cardiovascular: Negative for chest pain and palpitations.   Gastrointestinal: Negative for blood in stool, constipation, diarrhea and vomiting.   Endocrine: Negative for polydipsia and polyuria.   Genitourinary: Negative for difficulty urinating, dysuria, hematuria and menstrual problem.   Musculoskeletal: Negative for arthralgias, joint swelling and neck pain.   Neurological: Negative for weakness and headaches.   Psychiatric/Behavioral: Negative for confusion and dysphoric mood.       /70   Pulse 81   Temp 97.7 °F (36.5 °C)   Ht 5' 6" (1.676 m)   Wt 62 kg (136 lb 11 oz)   SpO2 98%   BMI 22.06 kg/m²     Physical Exam   Constitutional: She is oriented to person, place, and time. She appears well-developed and well-nourished. No distress.   HENT:   Head: Normocephalic and atraumatic.   Mouth/Throat: Oropharynx is clear and moist. No oropharyngeal exudate.   Eyes: Conjunctivae are normal.   Neck: Muscular tenderness (and tightness) present.   Cardiovascular: Normal rate and regular rhythm.   Pulmonary/Chest: Effort normal and breath sounds normal.   Musculoskeletal: She exhibits no edema.   Lymphadenopathy:     She has no cervical adenopathy.   Neurological: She is alert and oriented to person, place, and time.   Skin: Skin is warm and dry.   Psychiatric: She has a normal mood and affect.   Vitals reviewed.        Assessment/Plan:  Prema Phillips is a 74 y.o. female who presents today for :    Prema was seen today for pre-op exam.    Diagnoses and all orders for this visit:    Senile cataract of right eye, unspecified age-related cataract type    Vision abnormalities  Comments:  Related to right " eye cataract and macular degeneration, surgery plan for above, following with Ophthalmology    Preop cardiovascular exam  Comments:  Patien clear for surgery pending results of labs and EKG.  Will complete/ FAX forms from ophtho office.  Chronic conditions controlled, good exercise tolerance.  Orders:  -     Comprehensive metabolic panel; Future  -     CBC auto differential; Future  -     TSH; Future  -     EKG 12-lead; Future    Hyperlipidemia, unspecified hyperlipidemia type  -     Comprehensive metabolic panel; Future  -     CBC auto differential; Future  -     TSH; Future  -     EKG 12-lead; Future    RBBB  -     EKG 12-lead; Future    Acquired hypothyroidism  Comments:  Has been stable on levothyroxine 75 mcg daily, recheck ordered  Orders:  -     TSH; Future    Atherosclerosis of both carotid arteries  Comments:  Stable on aspirin, statin, advised to stop aspirin 1 week prior to surgery  CONCLUSIONS   There is 20 - 39% right & left Internal Carotid stenosis.    Orders:  -     EKG 12-lead; Future    Long-term use of aspirin therapy  Comments:  Stopping daily baby aspirin 10 days prior to surgery.    BMI 22.0-22.9, adult    Medication management  -     Comprehensive metabolic panel; Future  -     CBC auto differential; Future  -     TSH; Future    Neck pain  -     Ambulatory Referral to Physical/Occupational Therapy            ICD-10-CM ICD-9-CM    1. Senile cataract of right eye, unspecified age-related cataract type H25.9 366.10    2. Vision abnormalities H53.9 368.9     Related to right eye cataract and macular degeneration, surgery plan for above, following with Ophthalmology   3. Preop cardiovascular exam Z01.810 V72.81 Comprehensive metabolic panel      CBC auto differential      TSH      EKG 12-lead    Patien clear for surgery pending results of labs and EKG.  Will complete/ FAX forms from ophtho office.  Chronic conditions controlled, good exercise tolerance.   4. Hyperlipidemia, unspecified  hyperlipidemia type E78.5 272.4 Comprehensive metabolic panel      CBC auto differential      TSH      EKG 12-lead   5. RBBB I45.10 426.4 EKG 12-lead   6. Acquired hypothyroidism E03.9 244.9 TSH    Has been stable on levothyroxine 75 mcg daily, recheck ordered   7. Atherosclerosis of both carotid arteries I65.23 433.10 EKG 12-lead     433.30     Stable on aspirin, statin, advised to stop aspirin 1 week prior to surgery  CONCLUSIONS   There is 20 - 39% right & left Internal Carotid stenosis.     8. Long-term use of aspirin therapy Z79.82 V58.66     Stopping daily baby aspirin 10 days prior to surgery.   9. BMI 22.0-22.9, adult Z68.22 V85.1    10. Medication management Z79.899 V58.69 Comprehensive metabolic panel      CBC auto differential      TSH   11. Neck pain M54.2 723.1 Ambulatory Referral to Physical/Occupational Therapy       Patient Instructions   She takes a daily baby aspirin and meloxicam as needed for pain-advised to stop these at least 10 days prior to surgery.        Follow up for return as needed for new concerns- annual exam 11/2019.

## 2019-06-10 ENCOUNTER — TELEPHONE (OUTPATIENT)
Dept: FAMILY MEDICINE | Facility: CLINIC | Age: 74
End: 2019-06-10

## 2019-06-10 NOTE — TELEPHONE ENCOUNTER
Called pt to let her know that EKG showed normal variant stable right bundle branch block nothing that has changed from prior. The clearance  was completed for pt and faxed it over. Left message requesting a call back.

## 2019-06-10 NOTE — TELEPHONE ENCOUNTER
----- Message from Nicolasa Becerra sent at 6/10/2019 10:42 AM CDT -----  No. [phone]    Patient returned your call.

## 2019-06-10 NOTE — TELEPHONE ENCOUNTER
----- Message from Odette Zhang MD sent at 6/8/2019 12:58 PM CDT -----  Prema- your EKG shows a normal variant stable right bundle branch block-- unchanged from prior. Your clearance for was completed Friday. #NASRIN please ensure that clearance form with copy of EKG and labs have been faxed, thanks.

## 2019-06-18 ENCOUNTER — PATIENT MESSAGE (OUTPATIENT)
Dept: FAMILY MEDICINE | Facility: CLINIC | Age: 74
End: 2019-06-18

## 2019-07-05 ENCOUNTER — CLINICAL SUPPORT (OUTPATIENT)
Dept: REHABILITATION | Facility: HOSPITAL | Age: 74
End: 2019-07-05
Payer: MEDICARE

## 2019-07-05 DIAGNOSIS — M43.6 STIFFNESS OF NECK: ICD-10-CM

## 2019-07-05 DIAGNOSIS — M54.2 MUSCULOSKELETAL NECK PAIN: Primary | ICD-10-CM

## 2019-07-05 DIAGNOSIS — R29.898 DECREASED ROM OF NECK: ICD-10-CM

## 2019-07-05 PROCEDURE — 97110 THERAPEUTIC EXERCISES: CPT | Mod: PN

## 2019-07-05 PROCEDURE — 97161 PT EVAL LOW COMPLEX 20 MIN: CPT | Mod: PN

## 2019-07-05 NOTE — PLAN OF CARE
[facility]  Physical Therapy Initial Evaluation    Name: Prema Phillips  Clinic Number: [id number]    Therapy Diagnosis:   Encounter Diagnoses   Name Primary?    Musculoskeletal neck pain Yes    Stiffness of neck     Decreased ROM of neck      Physician: Odette Zhang MD    Physician Orders: PT Eval and Treat: please evaluate for benefits of DRY NEEDLING AND ASTYM.  Medical Diagnosis: M54.2 (ICD-10-CM) - Neck pain  Evaluation Date: 7/5/2019  Authorization Period Expiration: 12/31/2019  Plan of Care Certification Period: 7/5/2019 to 8/16/2019  Visit # / Visits authorized: 1/50  FOTO: 1/10    Time In: 0915  Time Out: 0956  Total Billable Time: 41 minutes    Precautions: Standard     Subjective     Date of onset: 5 years ago; exacerbation 3 weeks ago    History of current condition - Prema reports: Chronic neck pain worsening 3 weeks ago with extreme neck rotation while trying to find something off of bed. Patient reports no mechanism of injury but that she is a tense person which increases pain when worse. Patient reports history of compression fracture in middle back; area burns with increased daily activities. Patient denies arm pain, numbness or tingling. Patient does report history of B hand arthritis. Patient mentioned neck pain at recent visit with Dr. Zhang; she suggested PT for dry needling.     Past Medical History:   Diagnosis Date    Allergic rhinitis 5/31/2016    Carotid artery plaque 11/18/2013    History of total hysterectomy with bilateral salpingo-oophorectomy (BSO) 5/31/2016    Hyperlipidemia 7/17/2012    Hypothyroid     Insomnia     Macular degeneration, right eye 5/31/2016    Migraine headache     Osteopenia of the elderly 10/20/2015    Postmenopausal HRT (hormone replacement therapy)     RBBB 7/17/2012     Prema Phillips  has a past surgical history that includes Stapedes surgery (2006); Total abdominal hysterectomy w/ bilateral salpingoophorectomy;  Appendectomy; Hysterectomy; Tubal ligation; and Catatact surgery (Right, 2019).    Prema has a current medication list which includes the following prescription(s): aspirin, atorvastatin, azelastine, calcium carbonate, calcium-vitamin d3-vitamin k, clobetasol, coenzyme q10, eletriptan, levothyroxine, lorazepam, meloxicam, multivitamin, premarin, tretinoin, turmeric, and vit a/vit c/vit e/zinc/copper.    Review of patient's allergies indicates:   Allergen Reactions    Neomycin Other (See Comments)     Other reaction(s): Unknown      Imaging: None    Prior Therapy: For shoulder after tripping and hitting it against door; unsure laterality   Social History: Patient lives with . Patient denies household barriers  Occupation: Retired  Prior Level of Function: Independent  Current Level of Function: Independent; pain with cervical AROM    Pain:  Current 2/10, worst 5/10, best 1/10   Location: B cervicoscapular area  Description: Pulling  Aggravating Factors: Cervical rotation and sidebending  Easing Factors: Rest, relaxation    Pts goals: Move neck without pain    Objective     Posture: Hyper-kyphotic thoracic spine; lower cervical flexion and upper extension; B scapular abduction and protraction   Palpation: Tenderness to B upper trapezius, less to B middle trapezius and levator scapulae  Range of Motion/Strength:     Cervical AROM: Pain/Dysfunction with Movement:   Flexion: 66 degrees No pain   Extension: 41 degrees Stretching sensation to B cervicoscapular area   Right side bendin degrees Painful pull to L side of neck; combined with R cervical rotation   Left side bendin degrees Painful pull to R side of neck   Right rotation: 50 degrees Painful pull to L side of neck   Left rotation: 60 degrees Painful pull to R side of neck     Shoulder  Right   Left  Pain/Dysfunction with Movement    AROM PROM MMT AROM PROM MMT    flexion WNL NT 4/5 WNL NT 4/5    extension WNL NT 4/5 WNL NT 4/5    abduction  "WNL NT 4-/5 WNL NT 4-/5    IR at 90° abd WNL NT 5/5 WNL NT 4+/5    IR at 0° abd WNL NT 5/5 WNL NT 5/5    ER at 90° abd WNL NT 4/5 WNL NT 4/5    ER at 0° abd WNL NT 4/5 WNL NT 4+/5       Flexibility: B upper trapezius impaired  Special tests: Spurling's (-) for pain with overpressure; contralateral neck pain with positioning B   Maximal cervical compression (-) for pain with overpressure; contralateral neck pain with positioning B    CMS Impairment/Limitation/Restriction for FOTO Neck Survey    Therapist reviewed FOTO scores for Prema Phillips on 7/5/2019.   FOTO documents entered into [employer] - see Media section.    Limitation Score: 44%  Category: Mobility    Current : CK = at least 40% but < 60% impaired, limited or restricted  Goal: CJ = at least 20% but < 40% impaired, limited or restricted     TREATMENT     Treatment Time In: 0940  Treatment Time Out: 0956  Total Treatment time separate from Evaluation time: 16 minutes    Prema received therapeutic exercises to develop strength and flexibility for 10 minutes including:  Shoulder rolls: x10; verbal and visual cues for pain free technique  Shoulder shrugs: x10; visual cues for technique  Upper trapezius stretch: x20" B; visual cues for technique  Scapular retraction and depression: x10; visual and tactile cues for technique  Visual cues in mirror    Prema received the following manual therapy techniques: were applied to the: B cervicoscapular area for 6 minutes, including:  Instrument assisted (Hawk's ) soft tissue mobilization along course of B upper trapezius    Home Exercises and Patient Education Provided    Education provided:   - Mechanisms underlying pain and impairments; role of therapy to address  - HEP of therapeutic exercises performed above    Written Home Exercises Provided: yes.  Exercises were reviewed and Prema was able to demonstrate them prior to the end of the session.  Prema demonstrated good  understanding of the education provided.     See EMR " under Patient Instructions for exercises provided 7/5/2019.    Assessment     Prema is a 74 y.o. female referred to outpatient Physical Therapy with a medical diagnosis of neck pain. Pt presents to initial evaluation with signs and symptoms of myofascial irritation to B cervicoscapular area, most notable to B upper trapezius; stress may be a contributing factor. Cervical side bend and rotation AROM limited. Increased thoracic kyphosis and habitual forward head positioning. Verbal cues required throughout therapeutic exercises to perform through pain free range, especially with upper trap involvement. Patient tolerated manual intervention well.     Pt prognosis is Excellent.   Pt will benefit from skilled outpatient Physical Therapy to address the deficits stated above and in the chart below, provide pt/family education, and to maximize pt's level of independence.     Plan of care discussed with patient: Yes  Pt's spiritual, cultural and educational needs considered and pt agreeable to plan of care and goals as stated below:     Anticipated Barriers for therapy: none    Medical Necessity is demonstrated by the following  History  Co-morbidities and personal factors that may impact the plan of care Co-morbidities:   Arthritis, Back pain, Headaches    Personal Factors:   lifestyle-Patient completed 20 minutes of exercise seldom or never     moderate   Examination  Body Structures and Functions, activity limitations and participation restrictions that may impact the plan of care Body Regions:   neck    Body Systems:    gross symmetry  ROM  transitions    Participation Restrictions:   none    Activity limitations:   Learning and applying knowledge  no deficits    General Tasks and Commands  no deficits    Communication  no deficits    Mobility  Cervical AROM    Self care  no deficits    Domestic Life  doing house work (cleaning house, washing dishes, laundry)    Interactions/Relationships  no deficits    Life Areas  no  deficits    Community and Social Life  recreation and leisure         moderate   Clinical Presentation stable and uncomplicated low   Decision Making/ Complexity Score: low     Goals:  Short Term Goals (3 Weeks):   1. Pt will be independent with HEP to supplement PT in improving pain free cervical mobility  2. Pt will improve cervical side bending AROM 10 deg in all planes to improve cervical mobility.  3. Pt will report 50% improvement in pain to promote QOL.  Long Term Goals (6 Weeks):    1. Pt will improve FOTO to </=34% limitation to improve perceived limitation with changing and maintaining mobility.  2. Pt will report no pain with cervical AROM in all planes to promote functional QOL.   3. Pt will report 100% improvement in pain to promote QOL.    Plan     Certification Period: 7/5/2019 to 8/16/2019.    Outpatient Physical Therapy 1 times weekly for 6 weeks to include the following interventions: Manual Therapy, Moist Heat/ Ice, Neuromuscular Re-ed, Patient Education, Therapeutic Activites and Therapeutic Exercise.     Jena Martínez, PT, DPT

## 2019-07-17 ENCOUNTER — CLINICAL SUPPORT (OUTPATIENT)
Dept: REHABILITATION | Facility: HOSPITAL | Age: 74
End: 2019-07-17
Payer: MEDICARE

## 2019-07-17 DIAGNOSIS — R29.898 DECREASED ROM OF NECK: ICD-10-CM

## 2019-07-17 DIAGNOSIS — M54.2 MUSCULOSKELETAL NECK PAIN: ICD-10-CM

## 2019-07-17 DIAGNOSIS — M43.6 STIFFNESS OF NECK: ICD-10-CM

## 2019-07-17 PROCEDURE — 97140 MANUAL THERAPY 1/> REGIONS: CPT | Mod: PN

## 2019-07-17 PROCEDURE — 97110 THERAPEUTIC EXERCISES: CPT | Mod: PN

## 2019-07-17 NOTE — PROGRESS NOTES
Physical Therapy Daily Treatment Note     Name: Prema Phillips  Clinic Number: [id number]    Therapy Diagnosis:   Encounter Diagnoses   Name Primary?    Musculoskeletal neck pain     Stiffness of neck     Decreased ROM of neck      Physician: Odette Zhang MD    Visit Date: 7/17/2019  Physician Orders: PT Eval and Treat: please evaluate for benefits of DRY NEEDLING AND ASTYM.  Medical Diagnosis: M54.2 (ICD-10-CM) - Neck pain  Evaluation Date: 7/5/2019  Authorization Period Expiration: 12/31/2019  Plan of Care Certification Period: 7/5/2019 to 8/16/2019  Visit # / Visits authorized: 1/50  FOTO: 1/10    Time In: 1000  Time Out: 1100  Total Billable Time: 30 minutes (1Te, 1 MT) 30 min group    Precautions: Standard    Subjective      Pt reports: she was somewhat compliant with home exercise program given last session.   Response to previous treatment:NC  Functional change: NC    Pain: 3/10  Location: bilateral neck      Objective     Prema received therapeutic exercises to develop ROM and posture for 25 minutes including:  - Cervical retraction  - Open books  - Trunk rotation with bar 3x 10 sec B  - Brugers x 20  - UT stretch Seated    Prema received the following manual therapy techniques: Joint mobilizations, Myofacial release: 20 min  - Strain/counterstrain SCM release  - MFR CT junction  - Prone IASTM to UT        Assessment     Pt tolerated tx well noting she felt like she got a workout.  Prema is progressing well towards her goals.   Pt prognosis is Excellent.     Pt will continue to benefit from skilled outpatient physical therapy to address the deficits listed in the problem list box on initial evaluation, provide pt/family education and to maximize pt's level of independence in the home and community environment.     Pt's spiritual, cultural and educational needs considered and pt agreeable to plan of care and goals.      Plan     Continue POC    Gianluca Morales, PT

## 2019-07-29 ENCOUNTER — PATIENT MESSAGE (OUTPATIENT)
Dept: FAMILY MEDICINE | Facility: CLINIC | Age: 74
End: 2019-07-29

## 2019-07-29 DIAGNOSIS — F51.01 PRIMARY INSOMNIA: Primary | ICD-10-CM

## 2019-07-29 RX ORDER — TRAZODONE HYDROCHLORIDE 50 MG/1
50 TABLET ORAL NIGHTLY PRN
Qty: 30 TABLET | Refills: 11 | Status: SHIPPED | OUTPATIENT
Start: 2019-07-29 | End: 2019-11-04

## 2019-07-31 ENCOUNTER — PES CALL (OUTPATIENT)
Dept: ADMINISTRATIVE | Facility: CLINIC | Age: 74
End: 2019-07-31

## 2019-07-31 ENCOUNTER — CLINICAL SUPPORT (OUTPATIENT)
Dept: REHABILITATION | Facility: HOSPITAL | Age: 74
End: 2019-07-31
Payer: MEDICARE

## 2019-07-31 DIAGNOSIS — M54.2 MUSCULOSKELETAL NECK PAIN: ICD-10-CM

## 2019-07-31 DIAGNOSIS — M43.6 STIFFNESS OF NECK: ICD-10-CM

## 2019-07-31 DIAGNOSIS — R29.898 DECREASED ROM OF NECK: ICD-10-CM

## 2019-07-31 PROCEDURE — 97140 MANUAL THERAPY 1/> REGIONS: CPT | Mod: PN

## 2019-07-31 PROCEDURE — 97110 THERAPEUTIC EXERCISES: CPT | Mod: PN

## 2019-07-31 NOTE — PROGRESS NOTES
Physical Therapy Dry Needling Note       Date:  7/31/2019    Name: Prema Phillips  Clinic Number: [id number]    Therapy Diagnosis:   Encounter Diagnoses   Name Primary?    Musculoskeletal neck pain     Stiffness of neck     Decreased ROM of neck      Physician: Odette Zhang MD    Start Time:  1430  Stop Time:  1440  Total Billable Time: 10 minutes (MT-1)    Subjective     Pt reports: See note by Jena Martínez PT.  See note by Jena Martínez PT    Pain: See note by Jena Martínez PT  Location: See note by Jena Martínez PT    Patient verbalized consent to FDN: yes    Objective   Palpation Assessment to determine the necessity for Functional Dry Needling  Tenderness to B upper trapezius.   Patient provided written and verbal consent to Functional Dry Needling   Prema received the following manual therapy techniques:  Functional Dry Needling  to B upper trapezius.     Home Exercises Provided and Patient Education Provided     Education provided:   - functional dry needling    Written Handout on response to FDN provided: yes    Prema demonstrated good  understanding of the education provided.       Assessment     Patient demonstrated appropriate response to Functional Dry Needling.     Plan     Monitor response to Functional Dry Needling. Continue with Functional Dry Needling in POC as appropriate

## 2019-07-31 NOTE — PROGRESS NOTES
"                            Physical Therapy Daily Treatment Note     Name: Prema Phillips  Clinic Number: [id number]    Therapy Diagnosis:   Encounter Diagnoses   Name Primary?    Musculoskeletal neck pain     Stiffness of neck     Decreased ROM of neck      Physician: Odette Zhang MD    Visit Date: 7/31/2019  Physician Orders: PT Eval and Treat: please evaluate for benefits of DRY NEEDLING AND ASTYM.  Medical Diagnosis: M54.2 (ICD-10-CM) - Neck pain  Evaluation Date: 7/5/2019  Authorization Period Expiration: 12/31/2019  Plan of Care Certification Period: 7/5/2019 to 8/16/2019  Visit # / Visits authorized: 3/50  FOTO: 3/10  Cap visit: 60.64  Cap total: 295.12    Time In: 1403; 1440  Time Out: 1430; 1500  Total Billable Time: 30 minutes     Precautions: Standard    Subjective      Pt reports: no change since initial evaluation. Does not think tools are making much of a difference  she was compliant with home exercise program.   Response to previous treatment: no change.  Functional change: no change.    Pain: 0/10 at rest; 7-8/10 with cervical side bend  Location: bilateral neck      Objective     Prema received hot pack to neck for 10 minutes to decrease pain and improve tissue extensibility prior to interventions performed.    Prema received therapeutic exercises to develop strength, endurance, and flexibilityand posture for 37 minutes including:  Palpation: tenderness to distal 1/2 of B upper trapezius    Supine:  - Chin tuck: 5"x10; verbal and tactile cues to relax superficial cervical flexors and upper traps  - Chin tuck + cervical retraction: 5"x10; verbal and tactile cues to relax superficial cervical flexors and upper traps  - Shoulder shrugs: 2x10    Seated  - Shoulder rolls: x10  - Upper trapezius stretch: 2x20" B before dry needling; 3x30" B after  - Scapular retraction and depression: 5"x10; verbal and tactile cues to relax superficial cervical flexors and upper traps    Rows: YTB x10    Home " Exercises Provided and Patient Education Provided     Education provided:   - Continue HEP; perform in mirror if unsure if shoulders are elevated   - Perform a few shoulder shrugs hourly with emphasis on scapular depression to decrease tension in neck    Written Home Exercises Provided: Not today  Exercises were reviewed and Prema was able to demonstrate them prior to the end of the session.  Prema demonstrated good  understanding of the education provided.     See EMR under Patient Instructions for exercises provided 7/5/2019.    Assessment     Decreased pain with cervical side bend after needling performed. Cues required throughout session to decrease platysmal and SCM activation during chin tuck exercises and decrease upper trap activation throughout.    Prema is progressing well towards her goals.   Pt prognosis is Excellent.   Pt will continue to benefit from skilled outpatient physical therapy to address the deficits listed in the problem list box on initial evaluation, provide pt/family education and to maximize pt's level of independence in the home and community environment.     Pt's spiritual, cultural and educational needs considered and pt agreeable to plan of care and goals as stated below:   Anticipated Barriers for therapy: none     Goals:  Short Term Goals (3 Weeks):   1. Pt will be independent with HEP to supplement PT in improving pain free cervical mobility. PROGRESSING, NOT MET 7/31/2019   2. Pt will improve cervical side bending AROM 10 deg in all planes to improve cervical mobility. PROGRESSING, NOT MET 7/31/2019   3. Pt will report 50% improvement in pain to promote QOL. PROGRESSING, NOT MET 7/31/2019   Long Term Goals (6 Weeks):    1. Pt will improve FOTO to </=34% limitation to improve perceived limitation with changing and maintaining mobility. PROGRESSING, NOT MET 7/31/2019   2. Pt will report no pain with cervical AROM in all planes to promote functional QOL. PROGRESSING, NOT MET 7/31/2019    3. Pt will report 100% improvement in pain to promote QOL. PROGRESSING, NOT MET 7/31/2019     Plan     Assess response to needling.    Jena Martínez, PT

## 2019-08-02 ENCOUNTER — CLINICAL SUPPORT (OUTPATIENT)
Dept: REHABILITATION | Facility: HOSPITAL | Age: 74
End: 2019-08-02
Payer: MEDICARE

## 2019-08-02 DIAGNOSIS — M43.6 STIFFNESS OF NECK: ICD-10-CM

## 2019-08-02 DIAGNOSIS — M54.2 MUSCULOSKELETAL NECK PAIN: ICD-10-CM

## 2019-08-02 DIAGNOSIS — R29.898 DECREASED ROM OF NECK: ICD-10-CM

## 2019-08-02 PROCEDURE — 97110 THERAPEUTIC EXERCISES: CPT | Mod: PN

## 2019-08-02 PROCEDURE — 97140 MANUAL THERAPY 1/> REGIONS: CPT | Mod: PN

## 2019-08-02 NOTE — PROGRESS NOTES
"                            Physical Therapy Daily Treatment Note     Name: Prema Phillips  Clinic Number: [id number]    Therapy Diagnosis:   Encounter Diagnoses   Name Primary?    Musculoskeletal neck pain     Stiffness of neck     Decreased ROM of neck      Physician: Odette Zhang MD    Visit Date: 8/2/2019  Physician Orders: PT Eval and Treat: please evaluate for benefits of DRY NEEDLING AND ASTYM.  Medical Diagnosis: M54.2 (ICD-10-CM) - Neck pain  Evaluation Date: 7/5/2019  Authorization Period Expiration: 12/31/2019  Plan of Care Certification Period: 7/5/2019 to 8/16/2019  Visit # / Visits authorized: 4/50  FOTO: 4/10  Cap visit: 57.78  Cap total: 352.9    Time In: 1400  Time Out: 1455  Total Billable Time: 25 minutes     Precautions: Standard    Subjective      Pt reports: improved pain on R side of neck after needling; soreness on L  she was compliant with home exercise program.   Response to previous treatment: soreness on L side of neck; decreased pain on R side  Functional change: increased postural awareness    Pain: 5-6/10 R side of neck; 7-8/10 L side of neck    Objective     Prema received hot pack to neck for 10 minutes to decrease pain and improve tissue extensibility prior to interventions performed.    Prema received the following manual therapy techniques: were applied to the: neck for 15 minutes, including:  Soft tissue mobilization and myofascial release to B upper trapezius and levator scapulae  Manual upper trapezius stretching    Prema received therapeutic exercises to develop strength, endurance, and flexibility for 10 minutes including:  Palpation: tenderness to distal 1/2 of B upper trapezius    Seated  - Shoulder rolls: 2x10  - Cervical retraction: x10; verbal and tactile cues for correct technique  - Upper trapezius stretch: 3x30" B  - Levator scapulae stretch: 3x30" B  - Scapular retraction and depression: 5"x15; verbal and tactile cues for correct technique  - Self upper trapezius " "TPR via theracane    Standing:  - Scapular retraction and depression against 1/2 foam on wall: 5"x15  - Rows: YTB x10  - Wall angels: x10    Home Exercises Provided and Patient Education Provided     Education provided:   - Continue HEP  - Gave options of locations for theracane purchase    Written Home Exercises Provided: Not today  Exercises were reviewed and Prema was able to demonstrate them prior to the end of the session.  Prema demonstrated good  understanding of the education provided.     See EMR under Patient Instructions for exercises provided 7/5/2019.    Assessment     Decreased pain after manual therapy; guarding throughout. Appeared to have more relief with self TPR using theracane. Cues required for both cervical and scapular retractions.    Prema is progressing well towards her goals.   Pt prognosis is Excellent.   Pt will continue to benefit from skilled outpatient physical therapy to address the deficits listed in the problem list box on initial evaluation, provide pt/family education and to maximize pt's level of independence in the home and community environment.     Pt's spiritual, cultural and educational needs considered and pt agreeable to plan of care and goals as stated below:   Anticipated Barriers for therapy: none     Goals:  Short Term Goals (3 Weeks):   1. Pt will be independent with HEP to supplement PT in improving pain free cervical mobility. PROGRESSING, NOT MET 8/2/2019   2. Pt will improve cervical side bending AROM 10 deg in all planes to improve cervical mobility. PROGRESSING, NOT MET 8/2/2019   3. Pt will report 50% improvement in pain to promote QOL. PROGRESSING, NOT MET 8/2/2019   Long Term Goals (6 Weeks):    1. Pt will improve FOTO to </=34% limitation to improve perceived limitation with changing and maintaining mobility. PROGRESSING, NOT MET 8/2/2019   2. Pt will report no pain with cervical AROM in all planes to promote functional QOL. PROGRESSING, NOT MET 8/2/2019   3. " Pt will report 100% improvement in pain to promote QOL. PROGRESSING, NOT MET 8/2/2019     Plan     Monitor pain. Consider another attempt of dry needling.    Jena Martínez, PT

## 2019-08-05 ENCOUNTER — CLINICAL SUPPORT (OUTPATIENT)
Dept: REHABILITATION | Facility: HOSPITAL | Age: 74
End: 2019-08-05
Payer: MEDICARE

## 2019-08-05 DIAGNOSIS — M54.2 MUSCULOSKELETAL NECK PAIN: ICD-10-CM

## 2019-08-05 DIAGNOSIS — M43.6 STIFFNESS OF NECK: ICD-10-CM

## 2019-08-05 DIAGNOSIS — R29.898 DECREASED ROM OF NECK: ICD-10-CM

## 2019-08-05 PROCEDURE — 97110 THERAPEUTIC EXERCISES: CPT | Mod: PN

## 2019-08-05 PROCEDURE — 97140 MANUAL THERAPY 1/> REGIONS: CPT | Mod: PN

## 2019-08-05 NOTE — PROGRESS NOTES
Physical Therapy Daily Treatment Note     Name: Prema Phillips  Clinic Number: [id number]    Therapy Diagnosis:   Encounter Diagnoses   Name Primary?    Musculoskeletal neck pain     Stiffness of neck     Decreased ROM of neck      Physician: Odette Zhang MD    Visit Date: 8/5/2019  Physician Orders: PT Eval and Treat: please evaluate for benefits of DRY NEEDLING AND ASTYM.  Medical Diagnosis: M54.2 (ICD-10-CM) - Neck pain  Evaluation Date: 7/5/2019  Authorization Period Expiration: 12/31/2019  Plan of Care Certification Period: 7/5/2019 to 8/16/2019  Visit # / Visits authorized: 4/50  FOTO: 4/10  Cap visit: 57.78  Cap total: 352.9    Time In: 1400  Time Out: 1455  Total Billable Time: 25 minutes     Precautions: Standard    Subjective      Pt reports: improved pain on R side of neck after needling; soreness on L  she was compliant with home exercise program.   Response to previous treatment: soreness on L side of neck; decreased pain on R side  Functional change: increased postural awareness    Pain: 5-6/10 R side of neck; 7-8/10 L side of neck    Objective   O:  L cervical shift, C1-C3, decreased R to L cervical sideglides C1-C6, decreased R>L C1-C3 rotation, decreased L>R cervical SB, increased thoracic kyphosis     Prema received hot pack to neck for 10 minutes to decrease pain and improve tissue extensibility prior to interventions performed.    Prema received the following manual therapy techniques: were applied to the: neck for 15 minutes, including:  - Cervical assessment  - SBO  - Manual cervical traction  - Cervical sideglides Grade III-IV  - R cervical sidebending MET for mid cervical spine  - R down and back Grade II-III for mid cervical spine    Prema received therapeutic exercises to develop strength, endurance, and flexibility for 10 minutes including:  Seated  - Shoulder box rolls: 2x10  - Cervical retraction: x15; verbal and tactile cues for correct technique  - Upper  "trapezius stretch: 3x30" B  - Levator scapulae stretch: 3x30" B  - Scapular retraction and depression: 5"x20; verbal and tactile cues for correct technique  - Self upper trapezius TPR via theracane  - Thoracic extensions: 20x c/ bolster    Standing:  - Scapular retraction and depression against 1/2 foam on wall: 5"x15  - Rows: RTB 3x10  - Wall angels: x15, poor ROM without losing points of contact on wall    Home Exercises Provided and Patient Education Provided     Education provided:   - Continue HEP  - Gave options of locations for theracane purchase    Written Home Exercises Provided: Not today  Exercises were reviewed and Prema was able to demonstrate them prior to the end of the session.  Prema demonstrated good  understanding of the education provided.     See EMR under Patient Instructions for exercises provided 7/5/2019.    Assessment     Pt reported no increase in neck pain throughout session. Mod guarding and decreased right to left cervical sidegliding; increased thoracic kyphosis present.    Prema is progressing well towards her goals.   Pt prognosis is Excellent.   Pt will continue to benefit from skilled outpatient physical therapy to address the deficits listed in the problem list box on initial evaluation, provide pt/family education and to maximize pt's level of independence in the home and community environment.     Pt's spiritual, cultural and educational needs considered and pt agreeable to plan of care and goals as stated below:   Anticipated Barriers for therapy: none     Goals:  Short Term Goals (3 Weeks):   1. Pt will be independent with HEP to supplement PT in improving pain free cervical mobility. PROGRESSING, NOT MET 8/5/2019   2. Pt will improve cervical side bending AROM 10 deg in all planes to improve cervical mobility. PROGRESSING, NOT MET 8/5/2019   3. Pt will report 50% improvement in pain to promote QOL. PROGRESSING, NOT MET 8/5/2019   Long Term Goals (6 Weeks):    1. Pt will improve " FOTO to </=34% limitation to improve perceived limitation with changing and maintaining mobility. PROGRESSING, NOT MET 8/5/2019   2. Pt will report no pain with cervical AROM in all planes to promote functional QOL. PROGRESSING, NOT MET 8/5/2019   3. Pt will report 100% improvement in pain to promote QOL. PROGRESSING, NOT MET 8/5/2019     Plan     Monitor pain. Consider another attempt of dry needling.    Wilbur Cabral, PT

## 2019-08-07 ENCOUNTER — OFFICE VISIT (OUTPATIENT)
Dept: FAMILY MEDICINE | Facility: CLINIC | Age: 74
End: 2019-08-07
Payer: MEDICARE

## 2019-08-07 VITALS
HEIGHT: 66 IN | HEART RATE: 71 BPM | OXYGEN SATURATION: 96 % | TEMPERATURE: 98 F | BODY MASS INDEX: 21.56 KG/M2 | SYSTOLIC BLOOD PRESSURE: 116 MMHG | DIASTOLIC BLOOD PRESSURE: 68 MMHG | WEIGHT: 134.13 LBS

## 2019-08-07 DIAGNOSIS — R09.82 ALLERGIC RHINITIS WITH POSTNASAL DRIP: Primary | ICD-10-CM

## 2019-08-07 DIAGNOSIS — J30.9 ALLERGIC RHINITIS WITH POSTNASAL DRIP: Primary | ICD-10-CM

## 2019-08-07 PROCEDURE — 99999 PR PBB SHADOW E&M-EST. PATIENT-LVL V: ICD-10-PCS | Mod: PBBFAC,,, | Performed by: NURSE PRACTITIONER

## 2019-08-07 PROCEDURE — 99215 OFFICE O/P EST HI 40 MIN: CPT | Mod: PBBFAC,PN | Performed by: NURSE PRACTITIONER

## 2019-08-07 PROCEDURE — 99213 OFFICE O/P EST LOW 20 MIN: CPT | Mod: S$PBB,,, | Performed by: NURSE PRACTITIONER

## 2019-08-07 PROCEDURE — 99999 PR PBB SHADOW E&M-EST. PATIENT-LVL V: CPT | Mod: PBBFAC,,, | Performed by: NURSE PRACTITIONER

## 2019-08-07 PROCEDURE — 99213 PR OFFICE/OUTPT VISIT, EST, LEVL III, 20-29 MIN: ICD-10-PCS | Mod: S$PBB,,, | Performed by: NURSE PRACTITIONER

## 2019-08-07 RX ORDER — FLUTICASONE PROPIONATE 50 MCG
2 SPRAY, SUSPENSION (ML) NASAL DAILY
Qty: 16 G | Refills: 0 | Status: SHIPPED | OUTPATIENT
Start: 2019-08-07 | End: 2019-09-05 | Stop reason: SDUPTHER

## 2019-08-07 NOTE — PROGRESS NOTES
Subjective:       Patient ID: Prema Phillips is a 74 y.o. female.    Chief Complaint: Nasal Congestion (pt states her throat feels a little dry and scratchy.)    75 y/o female with multiple diagnoses as listed in problem list and medical history presents to clinic for urgent care visit with c/o nasal congestion.    URI    This is a new problem. The current episode started in the past 7 days. The problem has been unchanged. There has been no fever. Associated symptoms include congestion, ear pain and rhinorrhea. Pertinent negatives include no abdominal pain, chest pain, coughing, headaches, plugged ear sensation, sneezing, sore throat, swollen glands or wheezing. She has tried antihistamine for the symptoms. The treatment provided mild relief.     Patient Active Problem List   Diagnosis    RBBB    Hyperlipidemia    Hypothyroid    Carotid artery plaque    Postmenopausal HRT (hormone replacement therapy)    Insomnia    Osteopenia of the elderly    Allergic rhinitis    History of total hysterectomy with bilateral salpingo-oophorectomy (BSO)    BMI 22.0-22.9, adult    Macular degeneration, right eye    Long-term use of aspirin therapy    Family history of breast cancer in mother    Ear ringing    Migraine headache    Musculoskeletal neck pain    Stiffness of neck    Decreased ROM of neck       Current Outpatient Medications   Medication Sig Dispense Refill    aspirin (ECOTRIN) 81 MG EC tablet Take 81 mg by mouth once daily.      atorvastatin (LIPITOR) 20 MG tablet Take 1 tablet (20 mg total) by mouth once daily. 90 tablet 3    azelastine (ASTELIN) 137 mcg (0.1 %) nasal spray 1 spray (137 mcg total) by Nasal route 2 (two) times daily. 30 mL 11    calcium carbonate (CALCIUM 500 ORAL) calcium      calcium-vitamin D3-vitamin K 500-100-40 mg-unit-mcg Chew Take by mouth once daily.      clobetasol (TEMOVATE) 0.05 % cream daily as needed.       coenzyme Q10 (CO Q-10) 200 mg capsule Take 200 mg by mouth  once daily.      eletriptan (RELPAX) 40 MG tablet Take 40 mg by mouth. 1 Tablet Oral As directed      levothyroxine (SYNTHROID) 75 MCG tablet Take 1 tablet (75 mcg total) by mouth before breakfast. 90 tablet 3    lorazepam (ATIVAN) 1 MG tablet Take 1 mg by mouth once daily.  3    multivitamin capsule Take 1 capsule by mouth once daily.      PREMARIN 0.3 mg tablet Take 0.3 mg by mouth once daily.       traZODone (DESYREL) 50 MG tablet Take 1 tablet (50 mg total) by mouth nightly as needed for Insomnia. 30 tablet 11    tretinoin (RETIN-A) 0.05 % cream nightly.       TURMERIC, BULK, MISC by Misc.(Non-Drug; Combo Route) route once daily.      VIT A/VIT C/VIT E/ZINC/COPPER (PRESERVISION AREDS ORAL) Take by mouth once daily.       fluticasone propionate (FLONASE) 50 mcg/actuation nasal spray 2 sprays (100 mcg total) by Each Nostril route once daily. 16 g 0    meloxicam (MOBIC) 15 MG tablet Take 15 mg by mouth once daily.  6     No current facility-administered medications for this visit.        Past Medical History:   Diagnosis Date    Allergic rhinitis 5/31/2016    Carotid artery plaque 11/18/2013    History of total hysterectomy with bilateral salpingo-oophorectomy (BSO) 5/31/2016    Hyperlipidemia 7/17/2012    Hypothyroid     Insomnia     Macular degeneration, right eye 5/31/2016    Migraine headache     Osteopenia of the elderly 10/20/2015    Postmenopausal HRT (hormone replacement therapy)     RBBB 7/17/2012       Past Surgical History:   Procedure Laterality Date    APPENDECTOMY      Catatact surgery Right 06/19/2019    without any complications    HYSTERECTOMY      STAPEDES SURGERY  2006    TOTAL ABDOMINAL HYSTERECTOMY W/ BILATERAL SALPINGOOPHORECTOMY      TUBAL LIGATION         Family History   Problem Relation Age of Onset    Heart disease Father     COPD Father     Cancer Maternal Aunt         breast cancer    Cancer Maternal Grandmother         breast cancer    Cancer Cousin          colon cancer       Social History     Socioeconomic History    Marital status:      Spouse name: Not on file    Number of children: 2    Years of education: Not on file    Highest education level: Not on file   Occupational History    Not on file   Social Needs    Financial resource strain: Not on file    Food insecurity:     Worry: Not on file     Inability: Not on file    Transportation needs:     Medical: Not on file     Non-medical: Not on file   Tobacco Use    Smoking status: Never Smoker    Smokeless tobacco: Never Used   Substance and Sexual Activity    Alcohol use: Yes     Comment: occasionally; once every 3-4 months    Drug use: No    Sexual activity: Not Currently     Partners: Male     Comment: 2 kids,     Lifestyle    Physical activity:     Days per week: Not on file     Minutes per session: Not on file    Stress: Not on file   Relationships    Social connections:     Talks on phone: Patient refused     Gets together: Patient refused     Attends Zoroastrianism service: Not on file     Active member of club or organization: Yes     Attends meetings of clubs or organizations: More than 4 times per year     Relationship status:    Other Topics Concern    Not on file   Social History Narrative    Not on file       Review of Systems   Constitutional: Negative for fatigue and fever.   HENT: Positive for congestion, ear pain and rhinorrhea. Negative for sneezing and sore throat.    Respiratory: Negative for cough and wheezing.    Cardiovascular: Negative for chest pain.   Gastrointestinal: Negative for abdominal pain.   Musculoskeletal: Negative for myalgias.   Allergic/Immunologic: Positive for environmental allergies. Negative for immunocompromised state.   Neurological: Negative for headaches.   Hematological: Negative for adenopathy. Does not bruise/bleed easily.         Objective:     Vitals:    08/07/19 1623   BP: 116/68   Pulse: 71   Temp: 97.9 °F (36.6 °C)  "  TempSrc: Oral   SpO2: 96%   Weight: 60.9 kg (134 lb 2.4 oz)   Height: 5' 6" (1.676 m)          Physical Exam   Constitutional: She is oriented to person, place, and time. She appears well-developed and well-nourished.   HENT:   Head: Normocephalic.   Right Ear: Tympanic membrane and ear canal normal.   Left Ear: Ear canal normal. Tympanic membrane is not injected. A middle ear effusion is present.   Nose: Rhinorrhea present. No mucosal edema.   Mouth/Throat: Uvula is midline. No oropharyngeal exudate, posterior oropharyngeal edema or posterior oropharyngeal erythema.   Eyes: Pupils are equal, round, and reactive to light. Conjunctivae are normal.   Neck: Normal range of motion. Neck supple.   Cardiovascular: Normal rate and regular rhythm.   Pulmonary/Chest: Effort normal.   Musculoskeletal: Normal range of motion.   Lymphadenopathy:     She has no cervical adenopathy.   Neurological: She is oriented to person, place, and time.   Skin: Skin is warm and dry. Capillary refill takes less than 2 seconds.   Psychiatric: She has a normal mood and affect. Her behavior is normal.         Assessment:         ICD-10-CM ICD-9-CM   1. Allergic rhinitis with postnasal drip J30.9 477.9    R09.82 784.91       Plan:       Allergic rhinitis with postnasal drip  -     fluticasone propionate (FLONASE) 50 mcg/actuation nasal spray; 2 sprays (100 mcg total) by Each Nostril route once daily.  Dispense: 16 g; Refill: 0        -   Take loratadine 10 mg daily for at least 2 weeks then as needed     Follow up if symptoms worsen or fail to improve.     Patient's Medications   New Prescriptions    FLUTICASONE PROPIONATE (FLONASE) 50 MCG/ACTUATION NASAL SPRAY    2 sprays (100 mcg total) by Each Nostril route once daily.   Previous Medications    ASPIRIN (ECOTRIN) 81 MG EC TABLET    Take 81 mg by mouth once daily.    ATORVASTATIN (LIPITOR) 20 MG TABLET    Take 1 tablet (20 mg total) by mouth once daily.    AZELASTINE (ASTELIN) 137 MCG (0.1 %) " NASAL SPRAY    1 spray (137 mcg total) by Nasal route 2 (two) times daily.    CALCIUM CARBONATE (CALCIUM 500 ORAL)    calcium    CALCIUM-VITAMIN D3-VITAMIN K 500-100-40 MG-UNIT-MCG CHEW    Take by mouth once daily.    CLOBETASOL (TEMOVATE) 0.05 % CREAM    daily as needed.     COENZYME Q10 (CO Q-10) 200 MG CAPSULE    Take 200 mg by mouth once daily.    ELETRIPTAN (RELPAX) 40 MG TABLET    Take 40 mg by mouth. 1 Tablet Oral As directed    LEVOTHYROXINE (SYNTHROID) 75 MCG TABLET    Take 1 tablet (75 mcg total) by mouth before breakfast.    LORAZEPAM (ATIVAN) 1 MG TABLET    Take 1 mg by mouth once daily.    MELOXICAM (MOBIC) 15 MG TABLET    Take 15 mg by mouth once daily.    MULTIVITAMIN CAPSULE    Take 1 capsule by mouth once daily.    PREMARIN 0.3 MG TABLET    Take 0.3 mg by mouth once daily.     TRAZODONE (DESYREL) 50 MG TABLET    Take 1 tablet (50 mg total) by mouth nightly as needed for Insomnia.    TRETINOIN (RETIN-A) 0.05 % CREAM    nightly.     TURMERIC, BULK, MISC    by Misc.(Non-Drug; Combo Route) route once daily.    VIT A/VIT C/VIT E/ZINC/COPPER (PRESERVISION AREDS ORAL)    Take by mouth once daily.    Modified Medications    No medications on file   Discontinued Medications    No medications on file

## 2019-08-07 NOTE — PATIENT INSTRUCTIONS

## 2019-08-08 ENCOUNTER — TELEPHONE (OUTPATIENT)
Dept: REHABILITATION | Facility: HOSPITAL | Age: 74
End: 2019-08-08

## 2019-08-09 LAB
LEFT EYE DM RETINOPATHY: NORMAL
RIGHT EYE DM RETINOPATHY: NORMAL

## 2019-08-15 ENCOUNTER — CLINICAL SUPPORT (OUTPATIENT)
Dept: REHABILITATION | Facility: HOSPITAL | Age: 74
End: 2019-08-15
Payer: MEDICARE

## 2019-08-15 DIAGNOSIS — M54.2 MUSCULOSKELETAL NECK PAIN: ICD-10-CM

## 2019-08-15 DIAGNOSIS — R29.898 DECREASED ROM OF NECK: ICD-10-CM

## 2019-08-15 DIAGNOSIS — M43.6 STIFFNESS OF NECK: ICD-10-CM

## 2019-08-15 PROCEDURE — 97110 THERAPEUTIC EXERCISES: CPT | Mod: PN

## 2019-08-15 PROCEDURE — 97140 MANUAL THERAPY 1/> REGIONS: CPT | Mod: PN

## 2019-08-15 NOTE — PROGRESS NOTES
Physical Therapy Daily Treatment Note     Name: Prema Phillips  Clinic Number: [id number]    Therapy Diagnosis:   Encounter Diagnoses   Name Primary?    Musculoskeletal neck pain     Stiffness of neck     Decreased ROM of neck      Physician: Odette Zhang MD    Visit Date: 8/15/2019  Physician Orders: PT Eval and Treat: please evaluate for benefits of DRY NEEDLING AND ASTYM.  Medical Diagnosis: M54.2 (ICD-10-CM) - Neck pain  Evaluation Date: 7/5/2019  Authorization Period Expiration: 12/31/2019  Plan of Care Certification Period: 7/5/2019 to 8/16/2019  Visit # / Visits authorized: 5/50  FOTO: 5/10  Cap visit: 57.78  Cap total: 410.68    Time In: 1609  Time Out: 1655  Total Billable Time: 26 minutes     Precautions: Standard    Subjective      Pt reports: stress contributing to pain; a lot has been going on outside of therapy..   She was NOT compliant with home exercise program.   Response to previous treatment: intermittent improvements in pain  Functional change: improved rotation of neck    Pain: 0/10    Objective     Prema received the following manual therapy techniques: were applied to the: neck for 15 minutes, including:  - Suboccipital release  - Soft tissue mobilization to B upper trapezius  - Grade III sideglides of C2-7 articular pillars  - Grade III posterior to anterior mobilizations of C2-7 spinous processes    Prema received therapeutic exercises to develop strength, endurance, and flexibility for 31 minutes including:  Palpation: decreased R to L cervical sideglides C2-C6  Kinesiotape application to B upper trapezius with 50% distal to proximal pull for inhibition B    Seated  - Cervical side bend: x5 B before and after kinesiotape application  - Cervical retraction: 2x10; visual cues from mirror  - Scapular retraction and depression: 2x10  - Cervical + scapular retraction; visual cues from mirror  - Thoracic extensions: x20 against bolster over chair  armrests    Standing:  - Rows: Purple sports cord 3x10  - Straight arm pull downs: Purple sports cord 3x10  - Wall angels: 2x10    Home Exercises Provided and Patient Education Provided     Education provided:   - Continue HEP  - Gave options of locations for theracane purchase    Written Home Exercises Provided: Not today  Exercises were reviewed and Prema was able to demonstrate them prior to the end of the session.  Prema demonstrated good  understanding of the education provided.     See EMR under Patient Instructions for exercises provided 7/5/2019.    Assessment     Decreased pain with cervical side bending, more so L sided pain with R side bend, after taping. Continued cues required for proper performance of retraction exercises. Difficulty with range aspect of wall angels.     Prema is progressing well towards her goals.   Pt prognosis is Excellent.   Pt will continue to benefit from skilled outpatient physical therapy to address the deficits listed in the problem list box on initial evaluation, provide pt/family education and to maximize pt's level of independence in the home and community environment.     Pt's spiritual, cultural and educational needs considered and pt agreeable to plan of care and goals as stated below:   Anticipated Barriers for therapy: none     Goals:  Short Term Goals (3 Weeks):   1. Pt will be independent with HEP to supplement PT in improving pain free cervical mobility. PROGRESSING, NOT MET 8/15/2019   2. Pt will improve cervical side bending AROM 10 deg in all planes to improve cervical mobility. PROGRESSING, NOT MET 8/15/2019   3. Pt will report 50% improvement in pain to promote QOL. PROGRESSING, NOT MET 8/15/2019   Long Term Goals (6 Weeks):    1. Pt will improve FOTO to </=34% limitation to improve perceived limitation with changing and maintaining mobility. PROGRESSING, NOT MET 8/15/2019   2. Pt will report no pain with cervical AROM in all planes to promote functional QOL.  PROGRESSING, NOT MET 8/15/2019   3. Pt will report 100% improvement in pain to promote QOL. PROGRESSING, NOT MET 8/15/2019     Plan     Plan of care update next.     Jena Martínez, PT

## 2019-08-15 NOTE — PROGRESS NOTES
"  Physical Therapy Daily Treatment Note     Name: Prema Phillips  Clinic Number: [id number]    Therapy Diagnosis: No diagnosis found.  Physician: Odette Zhang MD    Visit Date: 8/15/2019    Physician Orders: PT Eval and Treat: please evaluate for benefits of DRY NEEDLING AND ASTYM.  Medical Diagnosis: M54.2 (ICD-10-CM) - Neck pain  Evaluation Date: 7/5/2019  Authorization Period Expiration: 12/31/2019  Plan of Care Certification Period: 7/5/2019 to 8/16/2019  Visit # / Visits authorized: 5/50  FOTO: 5/10  Cap visit: ***  Cap total: 352.9     Time In: ***  Time Out: ***  Total Billable Time: *** minutes      Precautions: Standard      Subjective     Pt reports: ***.  She {Actions; was/was not:47047} compliant with home exercise program.  Response to previous treatment: ***  Functional change: ***    Pain: {0-10:87018::"0"}/10  Location: {RIGHT/LEFT/BILATERAL:26281} {LOCATION ON BODY:35521}     Objective      Prema received hot pack to neck for 10 minutes to decrease pain and improve tissue extensibility prior to interventions performed.    Prema received the following manual therapy techniques: {AMB PT PROGRESS MANUAL THERAPY:12847} were applied to the: *** for *** minutes, including:  ***  Soft tissue mobilization and myofascial release to B upper trapezius and levator scapulae  Manual upper trapezius stretching    Prema received therapeutic exercises to develop {AMB PT PROGRESS OBJECTIVE:12707} for *** minutes including:  ***  Seated  - Shoulder box rolls: 2x10  - Cervical retraction: x15; verbal and tactile cues for correct technique  - Upper trapezius stretch: 3x30" B  - Levator scapulae stretch: 3x30" B  - Scapular retraction and depression: 5"x20; verbal and tactile cues for correct technique  - Self upper trapezius TPR via theracane  - Thoracic extensions: 20x c/ bolster     Standing:  - Scapular retraction and depression against 1/2 foam on wall: 5"x15  - Rows: RTB 3x10  - Wall angels: x15, poor ROM without " "Subjective:       Patient ID: Marco Antonio Chavez is a 33 y.o. male.    Chief Complaint: Chills; Generalized Body Aches; Fever; Sinusitis; Cough; and Night Sweats    Cough   This is a new problem. The current episode started in the past 7 days. The problem has been waxing and waning. The problem occurs every few minutes. The cough is non-productive. Associated symptoms include a fever, myalgias, nasal congestion and a sore throat. Pertinent negatives include no chest pain, chills, shortness of breath or wheezing. Nothing aggravates the symptoms. He has tried oral steroids and OTC cough suppressant for the symptoms. The treatment provided mild relief. There is no history of asthma.       There is no problem list on file for this patient.      No family history on file.  Past Surgical History:   Procedure Laterality Date    NO PAST SURGERIES           Current Outpatient Prescriptions:     ibuprofen (ADVIL,MOTRIN) 200 MG tablet, Take 400 mg by mouth every 6 (six) hours as needed., Disp: , Rfl:     traZODone (DESYREL) 50 MG tablet, Take 50 mg by mouth., Disp: , Rfl:     Review of Systems   Constitutional: Positive for fever. Negative for chills.   HENT: Positive for sore throat.    Eyes: Negative for visual disturbance.   Respiratory: Positive for cough. Negative for shortness of breath and wheezing.    Cardiovascular: Negative for chest pain.   Gastrointestinal: Negative for abdominal pain.   Musculoskeletal: Positive for myalgias.   Neurological: Negative for dizziness.       Objective:   /86 (BP Location: Left arm, Patient Position: Sitting, BP Method: X-Large (Automatic))   Pulse 102   Temp 97.1 °F (36.2 °C) (Tympanic)   Resp 18   Ht 5' 10.5" (1.791 m)   Wt 106.8 kg (235 lb 7.2 oz)   SpO2 96%   BMI 33.31 kg/m²      Physical Exam   Constitutional: He is oriented to person, place, and time. He appears well-developed and well-nourished.   HENT:   Head: Normocephalic and atraumatic.   Eyes: Conjunctivae are " "losing points of contact on wall    Home Exercises Provided and Patient Education Provided     Education provided:   - ***    Written Home Exercises Provided: {Blank single:64936::"yes","Patient instructed to cont prior HEP"}.  Exercises were reviewed and Prema was able to demonstrate them prior to the end of the session.  Prema demonstrated {Desc; good/fair/poor:69279} understanding of the education provided.     See EMR under {Blank single:18123::"Media","Patient Instructions"} for exercises provided {Blank single:68426::"8/15/2019","prior visit"}.      Assessment     ***  Prema {IS/IS NOT:94057} progressing well towards her goals.   Pt prognosis is {REHAB PROGNOSIS OHS:65721}.     Pt will continue to benefit from skilled outpatient physical therapy to address the deficits listed in the problem list box on initial evaluation, provide pt/family education and to maximize pt's level of independence in the home and community environment.     Pt's spiritual, cultural and educational needs considered and pt agreeable to plan of care and goals.    Anticipated barriers to physical therapy: none    Goals:   Short Term Goals (3 Weeks):   1. Pt will be independent with HEP to supplement PT in improving pain free cervical mobility. PROGRESSING, NOT MET 8/5/2019   2. Pt will improve cervical side bending AROM 10 deg in all planes to improve cervical mobility. PROGRESSING, NOT MET 8/5/2019   3. Pt will report 50% improvement in pain to promote QOL. PROGRESSING, NOT MET 8/5/2019   Long Term Goals (6 Weeks):    1. Pt will improve FOTO to </=34% limitation to improve perceived limitation with changing and maintaining mobility. PROGRESSING, NOT MET 8/5/2019   2. Pt will report no pain with cervical AROM in all planes to promote functional QOL. PROGRESSING, NOT MET 8/5/2019   3. Pt will report 100% improvement in pain to promote QOL. PROGRESSING, NOT MET 8/5/2019     Plan     ***    Alpa Dove PTA   " normal.   Cardiovascular: Normal rate.    Pulmonary/Chest: Effort normal. No respiratory distress.   Musculoskeletal: He exhibits no edema.   Neurological: He is alert and oriented to person, place, and time. Coordination normal.   Skin: Skin is warm and dry. No rash noted.   Psychiatric: He has a normal mood and affect. His behavior is normal.   Vitals reviewed.      Assessment & Plan      1. Common cold  Rapid flu was negative.  He recently had a intramuscular steroid injection earlier this week.  I encouraged him to continue supportive care with over-the-counter medication, adequate hydration and rest.  Follow-up if symptoms worsen  - Influenza antigen Nasopharyngeal Swab

## 2019-08-19 ENCOUNTER — CLINICAL SUPPORT (OUTPATIENT)
Dept: REHABILITATION | Facility: HOSPITAL | Age: 74
End: 2019-08-19
Payer: MEDICARE

## 2019-08-19 DIAGNOSIS — R29.898 DECREASED ROM OF NECK: ICD-10-CM

## 2019-08-19 DIAGNOSIS — M54.2 MUSCULOSKELETAL NECK PAIN: ICD-10-CM

## 2019-08-19 DIAGNOSIS — M43.6 STIFFNESS OF NECK: ICD-10-CM

## 2019-08-19 PROCEDURE — G8978 MOBILITY CURRENT STATUS: HCPCS | Mod: CJ,PN

## 2019-08-19 PROCEDURE — G8979 MOBILITY GOAL STATUS: HCPCS | Mod: CJ,PN

## 2019-08-19 PROCEDURE — 97110 THERAPEUTIC EXERCISES: CPT | Mod: PN

## 2019-08-19 NOTE — PROGRESS NOTES
Physical Therapy Daily Treatment Note     Name: Prema Phillips  Clinic Number: [id number]    Therapy Diagnosis:   Encounter Diagnoses   Name Primary?    Musculoskeletal neck pain     Stiffness of neck     Decreased ROM of neck      Physician: Odette Zhang MD    Visit Date: 8/19/2019  Physician Orders: PT Eval and Treat: please evaluate for benefits of DRY NEEDLING AND ASTYM.  Medical Diagnosis: M54.2 (ICD-10-CM) - Neck pain  Evaluation Date: 7/5/2019  Authorization Period Expiration: 12/31/2019  Plan of Care Certification Period: 7/5/2019 to 8/16/2019 update date next  Visit # / Visits authorized: 7/50  FOTO: 7/10  Cap visit: 90.96  Cap total: 501.64    Time In: 1300  Time Out: 1348  Total Billable Time: 48 minutes     Precautions: Standard    Subjective      Pt reports: making a point to not stretch to pain. Going on vacation for a week starting this week.   She was compliant with home exercise program.   Response to previous treatment: decreased pain.  Functional change: improved rotation and side bending of neck.     Pain: 0/10  Location: B cervicoscapular area    Objective     Prema received the following manual therapy techniques: were applied to the: neck for 10 minutes, including:  - Suboccipital release  - Soft tissue mobilization to B upper trapezius  - Grade III-IV posterior to anterior mobilizations of C2-7 and T1-3 spinous processes    Prema received therapeutic exercises to develop strength, endurance, and flexibility for 38 minutes including:    Supine over 2 towel rolls: pec stretch x2'; 1# dowel flexion 2x10    Seated  - Cervical side bend + respiration: x5 B with end range on exhale  - Cervical retraction: 2x10; visual cues from mirror, tactile cues to prevent upper trapezius activation  - Scapular retraction and depression: 2x10  - Cervical + scapular retraction: 2x10; visual cues from mirror  - Chair thoracic extension mobilizations: x20 against tan bolster  "    Standing:  - Rows: Pink sports cord 3x10  - Doorway pec stretch: 3x20"  - Wall angels: 2x10    Home Exercises Provided and Patient Education Provided     Education provided:   - Gave patient updated HEP including: cervical retractions, scapular retractions, cervical + scapular retractions, thoracic extension mobilizations, upper trapezius stretch, doorway pec stretch, wall angels, and rows    Written Home Exercises Provided: Yes.   Exercises were reviewed and Prema was able to demonstrate them prior to the end of the session.  Prema demonstrated good  understanding of the education provided.     See EMR under Patient Instructions for exercises provided 8/19/2019.    Assessment     See Updated POC in Treatment section     Plan     See Updated POC in Treatment section     Jena Martínez, PT   "

## 2019-08-19 NOTE — PLAN OF CARE
Outpatient Therapy Updated Plan of Care     Visit Date: 8/19/2019  Name: Prema Phillips  Clinic Number: [id number]    Therapy Diagnosis:   Encounter Diagnoses   Name Primary?    Musculoskeletal neck pain     Stiffness of neck     Decreased ROM of neck      Physician: Odette Zhang MD    Physician Orders: PT Eval and Treat: please evaluate for benefits of DRY NEEDLING AND ASTYM.  Medical Diagnosis: M54.2 (ICD-10-CM) - Neck pain  Evaluation Date: 7/5/2019    Total Visits Received: 7    Current Certification Period:  7/5/2019 to 8/16/2019  Precautions:  Standard  Visits from Evaluation Date:  7  Functional Level Prior to Evaluation:  Independent    Subjective     Update: Patient reports 75% improvement since initial evaluation in early July. Cervical motion improved as well as stretching within tolerance. Patient also reports improved awareness and self correction of posture. Patient would like to continue therapy to achieve full pain relief and pain free cervical motion.    Objective     Update:     Posture: Increased upper thoracic kyphosis  Palpation: Tenderness throughout B upper trapezius; posterior to anterior hypomobility at C7 and T1-2 spinous processes    Cervical AROM: Pain/Dysfunction with Movement:   Flexion 57     Extension 40     Right side bending 24    Left side bending 26    Right rotation 45    Left rotation 51      Shoulder  Right  Left Pain/Dysfunction with Movement    AROM PROM AROM PROM    flexion WNL NT WNL NT    extension WNL NT WNL NT    abduction WNL NT WNL NT    IR at 90° abd WNL NT WNL NT    IR at 0° abd WNL NT WNL NT    ER at 90° abd 90% NT 80% NT    ER at 0° abd 90% NT 80% NT      CMS Impairment/Limitation/Restriction for FOTO Neck Survey    Therapist reviewed FOTO scores for Prema Phillips on 8/19/2019.   FOTO documents entered into [employer] - see Media section.    Limitation Score: 39%  Category: Body Position    Current : CJ = at least 20% but < 40% impaired, limited or  restricted  Goal: CJ = at least 20% but < 40% impaired, limited or restricted     Assessment     Update: Less cueing required for correct performance of retraction exercises; cervical retraction performance better when combined with scapular retraction. Hypomobility of upper thoracic segments may contribute to pain and impaired cervical rotation and side bending. No change in ROM compared to evaluation; rotation and flexion with regression however it may be related to avoidance of painful stretching.    Prema is progressing well towards her goals.   Pt prognosis is Excellent.   Pt will continue to benefit from skilled outpatient physical therapy to address the deficits listed in the problem list box on initial evaluation, provide pt/family education and to maximize pt's level of independence in the home and community environment.     Pt's spiritual, cultural and educational needs considered and pt agreeable to plan of care and goals as stated below:   Anticipated Barriers for therapy: none     Goals:  Short Term Goals (3 Weeks):   1. Pt will be independent with HEP to supplement PT in improving pain free cervical mobility. MET  2. Pt will improve cervical side bending AROM 10 deg in all planes to improve cervical mobility. NOT MET 2019   3. Pt will report 50% improvement in pain to promote QOL. MET  Short Term Goal Status: Continue goal #2  Long Term Goals (6 Weeks):    1. Pt will improve FOTO to </=34% limitation to improve perceived limitation with changing and maintaining mobility. PROGRESSING, NOT MET 2019   2. Pt will report no pain with cervical AROM in all planes to promote functional QOL. MET  3. Pt will report 100% improvement in pain to promote QOL. PROGRESSING, NOT MET 2019   Long Term Goal Status: Continue goals #1 and 3  Reasons for Recertification of Therapy:  POC     Plan     Updated Certification Period: 2019 to 2019  Recommended Treatment Plan: 10 times in 40 days:  Electrical Stimulation -, Manual Therapy, Moist Heat/ Ice, Neuromuscular Re-ed, Patient Education, Therapeutic Activites and Therapeutic Exercise  Other Recommendations: modalities prn, ASTYM prn, kinesiotape prn, Functional Dry Needling prn     Jena Martínez, PT  8/19/2019      I CERTIFY THE NEED FOR THESE SERVICES FURNISHED UNDER THIS PLAN OF TREATMENT AND WHILE UNDER MY CARE    Physician's comments:        Physician's Signature: ___________________________________________________

## 2019-08-23 ENCOUNTER — PES CALL (OUTPATIENT)
Dept: ADMINISTRATIVE | Facility: CLINIC | Age: 74
End: 2019-08-23

## 2019-08-27 ENCOUNTER — DOCUMENTATION ONLY (OUTPATIENT)
Dept: REHABILITATION | Facility: HOSPITAL | Age: 74
End: 2019-08-27

## 2019-08-27 DIAGNOSIS — M54.2 MUSCULOSKELETAL NECK PAIN: ICD-10-CM

## 2019-08-27 DIAGNOSIS — R29.898 DECREASED ROM OF NECK: ICD-10-CM

## 2019-08-27 DIAGNOSIS — M43.6 STIFFNESS OF NECK: ICD-10-CM

## 2019-08-30 ENCOUNTER — CLINICAL SUPPORT (OUTPATIENT)
Dept: REHABILITATION | Facility: HOSPITAL | Age: 74
End: 2019-08-30
Payer: MEDICARE

## 2019-08-30 DIAGNOSIS — M43.6 STIFFNESS OF NECK: ICD-10-CM

## 2019-08-30 DIAGNOSIS — R29.898 DECREASED ROM OF NECK: ICD-10-CM

## 2019-08-30 DIAGNOSIS — M54.2 MUSCULOSKELETAL NECK PAIN: ICD-10-CM

## 2019-08-30 PROCEDURE — 97140 MANUAL THERAPY 1/> REGIONS: CPT | Mod: PN

## 2019-08-30 PROCEDURE — 97110 THERAPEUTIC EXERCISES: CPT | Mod: PN

## 2019-08-30 NOTE — PROGRESS NOTES
Physical Therapy Daily Treatment Note     Name: Prema Phillips  Clinic Number: [id number]    Therapy Diagnosis:   Encounter Diagnoses   Name Primary?    Musculoskeletal neck pain     Stiffness of neck     Decreased ROM of neck      Physician: Odette Zhang MD    Visit Date: 8/30/2019  Physician Orders: PT Eval and Treat: please evaluate for benefits of DRY NEEDLING AND ASTYM.  Medical Diagnosis: M54.2 (ICD-10-CM) - Neck pain  Evaluation Date: 7/5/2019  Authorization Period Expiration: 12/31/2019  Plan of Care Certification Period: 8/19/2019 to 9/27/2019  Visit # / Visits authorized: 8/50  FOTO: 8/10  Cap visit: 88.1  Cap total: 589.74    Time In: 1305  Time Out: 1353  Total Billable Time: 48 minutes     Precautions: Standard    Subjective      Pt reports: tightness at end range cervical lateral flexion and rotation. Patient reports she does not have any additional appointments scheduled but would like a break secondary to her  having surgery in 2 weeks and personally having so many medically related appointments over the summer.  She was compliant with home exercise program.   Response to previous treatment: decreased pain.  Functional change: improved rotation and side bending of neck.     Pain: 0/10  Location: B cervicoscapular area    Objective     Prema received the following manual therapy techniques: were applied to the: neck for 10 minutes, including:  - Soft tissue mobilization to B upper trapezius  - Grade III-IV posterior to anterior mobilizations of C2-7 and T1-3 spinous processes  - Grade III side glides from C4-7 articular pillars    Prema received therapeutic exercises to develop strength, endurance, and flexibility for 38 minutes including:  Cervical side bend: WNL B, increased contralateral stretch at end range B; verbal and visual cues to correct ipsilateral rotation  Cervical rotation: WFL B, increased contralateral stretch at end range B    Supine over 2 towel  "rolls: pec stretch x2'; 1# dowel flexion 2x10; shoulder horizontal abduction RTB 2x10   Supine cervical rotation + respiration: x5 B with end range motion on exhale    Seated  - Cervical rotation + respiration: x5 B with end range motion on exhale  - Cervical retraction: 3x10  - Scapular retraction and depression: 3x10  - Cervical + scapular retraction: 3x10  - Chair thoracic extension mobilizations: x10 to both upper and middle thoracic segmenets against tan bolster     Standing:  - Rows: Pink sports cord 3x10  - Doorway pec stretch: 3x30"  - Wall angels: 2x10    Home Exercises Provided and Patient Education Provided     Education provided:   - Continue updated HEP.   - E-mail physical therapist over the next few weeks with regards to pain and function.     Written Home Exercises Provided: Not today.  Exercises were reviewed and Prema was able to demonstrate them prior to the end of the session.  Prema demonstrated good  understanding of the education provided.     See EMR under Patient Instructions for exercises provided 8/19/2019.    Assessment     No cues required for correct performance of retraction exercises. Cervical side bending appears normal today.      Prema is progressing well towards her goals.   Pt prognosis is Excellent.   Pt will continue to benefit from skilled outpatient physical therapy to address the deficits listed in the problem list box on initial evaluation, provide pt/family education and to maximize pt's level of independence in the home and community environment.      Pt's spiritual, cultural and educational needs considered and pt agreeable to plan of care and goals as stated below:   Anticipated Barriers for therapy: none      Goals:  Short Term Goals (3 Weeks):   2. Pt will improve cervical side bending AROM 10 deg in all planes to improve cervical mobility. PROGRESSING, NOT MET 8/30/2019   Long Term Goals (6 Weeks):    1. Pt will improve FOTO to </=34% limitation to improve perceived " limitation with changing and maintaining mobility. PROGRESSING, NOT MET 8/30/2019   3. Pt will report 100% improvement in pain to promote QOL. PROGRESSING, NOT MET 8/30/2019     Plan     Break from therapy. Follow up via e-mail.    Jena Martínez, PT

## 2019-09-05 DIAGNOSIS — J30.9 ALLERGIC RHINITIS WITH POSTNASAL DRIP: ICD-10-CM

## 2019-09-05 DIAGNOSIS — R09.82 ALLERGIC RHINITIS WITH POSTNASAL DRIP: ICD-10-CM

## 2019-09-05 RX ORDER — FLUTICASONE PROPIONATE 50 MCG
SPRAY, SUSPENSION (ML) NASAL
Qty: 16 ML | Refills: 0 | Status: SHIPPED | OUTPATIENT
Start: 2019-09-05 | End: 2022-02-11 | Stop reason: SDUPTHER

## 2019-09-06 PROBLEM — K29.70 GASTRITIS: Status: ACTIVE | Noted: 2019-09-06

## 2019-09-18 ENCOUNTER — TELEPHONE (OUTPATIENT)
Dept: FAMILY MEDICINE | Facility: CLINIC | Age: 74
End: 2019-09-18

## 2019-09-18 ENCOUNTER — PATIENT MESSAGE (OUTPATIENT)
Dept: FAMILY MEDICINE | Facility: CLINIC | Age: 74
End: 2019-09-18

## 2019-09-18 NOTE — TELEPHONE ENCOUNTER
----- Message from Bonnie Robles sent at 9/18/2019 10:00 AM CDT -----  Contact: [phone]/SELF  Type:  Sooner Apoointment Request    Caller is requesting a sooner appointment.  Caller declined first available appointment listed below.  Caller will not accept being placed on the waitlist and is requesting a message be sent to doctor.  Name of Caller: self  When is the first available appointment? 12/30  Symptoms: ANNUAL  Would the patient rather a call back or a response via [employer]?  CALL  Best Call Back Number:   Additional Information:

## 2019-10-19 ENCOUNTER — PATIENT OUTREACH (OUTPATIENT)
Dept: ADMINISTRATIVE | Facility: HOSPITAL | Age: 74
End: 2019-10-19

## 2019-11-04 ENCOUNTER — OFFICE VISIT (OUTPATIENT)
Dept: FAMILY MEDICINE | Facility: CLINIC | Age: 74
End: 2019-11-04
Payer: MEDICARE

## 2019-11-04 VITALS
BODY MASS INDEX: 21.72 KG/M2 | OXYGEN SATURATION: 99 % | WEIGHT: 135.13 LBS | HEART RATE: 69 BPM | HEIGHT: 66 IN | SYSTOLIC BLOOD PRESSURE: 112 MMHG | TEMPERATURE: 97 F | DIASTOLIC BLOOD PRESSURE: 62 MMHG

## 2019-11-04 DIAGNOSIS — K29.70 GASTRITIS, PRESENCE OF BLEEDING UNSPECIFIED, UNSPECIFIED CHRONICITY, UNSPECIFIED GASTRITIS TYPE: ICD-10-CM

## 2019-11-04 DIAGNOSIS — M54.2 MUSCULOSKELETAL NECK PAIN: ICD-10-CM

## 2019-11-04 DIAGNOSIS — K59.09 CHRONIC CONSTIPATION: ICD-10-CM

## 2019-11-04 DIAGNOSIS — H35.30 MACULAR DEGENERATION OF RIGHT EYE, UNSPECIFIED TYPE: ICD-10-CM

## 2019-11-04 DIAGNOSIS — Z79.890 POSTMENOPAUSAL HRT (HORMONE REPLACEMENT THERAPY): ICD-10-CM

## 2019-11-04 DIAGNOSIS — Z80.3 FAMILY HISTORY OF BREAST CANCER IN MOTHER: ICD-10-CM

## 2019-11-04 DIAGNOSIS — I65.23 ATHEROSCLEROSIS OF BOTH CAROTID ARTERIES: ICD-10-CM

## 2019-11-04 DIAGNOSIS — M85.80 OSTEOPENIA OF THE ELDERLY: ICD-10-CM

## 2019-11-04 DIAGNOSIS — Z90.722 HISTORY OF TOTAL HYSTERECTOMY WITH BILATERAL SALPINGO-OOPHORECTOMY (BSO): ICD-10-CM

## 2019-11-04 DIAGNOSIS — Z00.00 ROUTINE GENERAL MEDICAL EXAMINATION AT A HEALTH CARE FACILITY: Primary | ICD-10-CM

## 2019-11-04 DIAGNOSIS — E03.9 ACQUIRED HYPOTHYROIDISM: ICD-10-CM

## 2019-11-04 DIAGNOSIS — Z90.710 HISTORY OF TOTAL HYSTERECTOMY WITH BILATERAL SALPINGO-OOPHORECTOMY (BSO): ICD-10-CM

## 2019-11-04 DIAGNOSIS — H93.13 TINNITUS OF BOTH EARS: ICD-10-CM

## 2019-11-04 DIAGNOSIS — Z90.79 HISTORY OF TOTAL HYSTERECTOMY WITH BILATERAL SALPINGO-OOPHORECTOMY (BSO): ICD-10-CM

## 2019-11-04 DIAGNOSIS — E78.5 HYPERLIPIDEMIA, UNSPECIFIED HYPERLIPIDEMIA TYPE: ICD-10-CM

## 2019-11-04 DIAGNOSIS — Z79.899 ENCOUNTER FOR MONITORING LONG-TERM PROTON PUMP INHIBITOR THERAPY: ICD-10-CM

## 2019-11-04 DIAGNOSIS — F51.01 PRIMARY INSOMNIA: ICD-10-CM

## 2019-11-04 DIAGNOSIS — J30.2 SEASONAL ALLERGIC RHINITIS, UNSPECIFIED TRIGGER: ICD-10-CM

## 2019-11-04 DIAGNOSIS — Z79.82 LONG-TERM USE OF ASPIRIN THERAPY: ICD-10-CM

## 2019-11-04 DIAGNOSIS — Z78.0 POSTMENOPAUSAL: ICD-10-CM

## 2019-11-04 DIAGNOSIS — Z23 NEED FOR SHINGLES VACCINE: ICD-10-CM

## 2019-11-04 DIAGNOSIS — Z51.81 ENCOUNTER FOR MONITORING LONG-TERM PROTON PUMP INHIBITOR THERAPY: ICD-10-CM

## 2019-11-04 DIAGNOSIS — G43.709 CHRONIC MIGRAINE WITHOUT AURA WITHOUT STATUS MIGRAINOSUS, NOT INTRACTABLE: ICD-10-CM

## 2019-11-04 DIAGNOSIS — I45.10 RBBB: ICD-10-CM

## 2019-11-04 PROCEDURE — 99999 PR PBB SHADOW E&M-EST. PATIENT-LVL IV: CPT | Mod: PBBFAC,,, | Performed by: FAMILY MEDICINE

## 2019-11-04 PROCEDURE — 99214 OFFICE O/P EST MOD 30 MIN: CPT | Mod: PBBFAC,PO | Performed by: FAMILY MEDICINE

## 2019-11-04 PROCEDURE — 99397 PR PREVENTIVE VISIT,EST,65 & OVER: ICD-10-PCS | Mod: S$PBB,,, | Performed by: FAMILY MEDICINE

## 2019-11-04 PROCEDURE — 99397 PER PM REEVAL EST PAT 65+ YR: CPT | Mod: S$PBB,,, | Performed by: FAMILY MEDICINE

## 2019-11-04 PROCEDURE — 99999 PR PBB SHADOW E&M-EST. PATIENT-LVL IV: ICD-10-PCS | Mod: PBBFAC,,, | Performed by: FAMILY MEDICINE

## 2019-11-04 RX ORDER — CHOLECALCIFEROL (VITAMIN D3) 50 MCG
TABLET ORAL DAILY
Status: ON HOLD | COMMUNITY
Start: 2019-08-01 | End: 2024-03-13 | Stop reason: HOSPADM

## 2019-11-04 RX ORDER — TRAMADOL HYDROCHLORIDE 50 MG/1
50 TABLET ORAL EVERY 4 HOURS PRN
Refills: 0 | COMMUNITY
Start: 2019-08-30 | End: 2022-01-07

## 2019-11-04 RX ORDER — OMEPRAZOLE 20 MG/1
CAPSULE, DELAYED RELEASE ORAL
Refills: 3 | COMMUNITY
Start: 2019-09-04 | End: 2023-02-13 | Stop reason: SDUPTHER

## 2019-11-04 RX ORDER — OMEPRAZOLE 20 MG/1
TABLET, ORALLY DISINTEGRATING, DELAYED RELEASE ORAL
COMMUNITY
Start: 2019-08-01 | End: 2019-11-04 | Stop reason: SDUPTHER

## 2019-11-04 RX ORDER — AMITRIPTYLINE HYDROCHLORIDE 25 MG/1
TABLET, FILM COATED ORAL
Qty: 30 TABLET | Refills: 11 | Status: SHIPPED | OUTPATIENT
Start: 2019-11-04 | End: 2020-10-21 | Stop reason: SDUPTHER

## 2019-11-04 NOTE — PATIENT INSTRUCTIONS
ADVISE LOOKING INTO NEW 2-PART, NON-LIVE SHINGRIX SHINGLES VACCINE THROUGH YOUR LOCAL PHARMACY.     CITRUCEL ORANGE POWDER (NOT SUGAR FREE AS ARTIFICIAL SWEETENERS CAN WORSEN GAS/BLOATING):  MIX 1 HEAPING TABLESPOON WITH 10 OZ OF COLD WATER AND DRINK NIGHTLY AFTER DINNER. ADD A SECOND DOSE AFTER BREAKFAST IF NEEDED.

## 2019-11-05 ENCOUNTER — HOSPITAL ENCOUNTER (OUTPATIENT)
Dept: RADIOLOGY | Facility: HOSPITAL | Age: 74
Discharge: HOME | End: 2019-11-05
Attending: FAMILY MEDICINE
Payer: MEDICARE

## 2019-11-05 DIAGNOSIS — M85.80 OSTEOPENIA OF THE ELDERLY: ICD-10-CM

## 2019-11-05 DIAGNOSIS — Z78.0 POSTMENOPAUSAL: ICD-10-CM

## 2019-11-05 PROCEDURE — 77080 DEXA BONE DENSITY SPINE HIP: ICD-10-PCS | Mod: 26,,, | Performed by: RADIOLOGY

## 2019-11-05 PROCEDURE — 77080 DXA BONE DENSITY AXIAL: CPT | Mod: 26,,, | Performed by: RADIOLOGY

## 2019-11-05 PROCEDURE — 77080 DXA BONE DENSITY AXIAL: CPT | Mod: TC

## 2019-11-08 ENCOUNTER — PATIENT OUTREACH (OUTPATIENT)
Dept: ADMINISTRATIVE | Facility: HOSPITAL | Age: 74
End: 2019-11-08

## 2019-11-08 DIAGNOSIS — E03.9 ACQUIRED HYPOTHYROIDISM: ICD-10-CM

## 2019-11-09 RX ORDER — LEVOTHYROXINE SODIUM 75 UG/1
TABLET ORAL
Qty: 90 TABLET | Refills: 4 | Status: SHIPPED | OUTPATIENT
Start: 2019-11-09 | End: 2020-12-09 | Stop reason: SDUPTHER

## 2019-12-04 ENCOUNTER — PES CALL (OUTPATIENT)
Dept: ADMINISTRATIVE | Facility: CLINIC | Age: 74
End: 2019-12-04

## 2019-12-11 ENCOUNTER — OFFICE VISIT (OUTPATIENT)
Dept: FAMILY MEDICINE | Facility: CLINIC | Age: 74
End: 2019-12-11
Payer: MEDICARE

## 2019-12-11 VITALS
OXYGEN SATURATION: 97 % | WEIGHT: 134.25 LBS | BODY MASS INDEX: 21.57 KG/M2 | TEMPERATURE: 98 F | HEIGHT: 66 IN | HEART RATE: 73 BPM | DIASTOLIC BLOOD PRESSURE: 76 MMHG | SYSTOLIC BLOOD PRESSURE: 124 MMHG

## 2019-12-11 DIAGNOSIS — E78.5 HYPERLIPIDEMIA, UNSPECIFIED HYPERLIPIDEMIA TYPE: ICD-10-CM

## 2019-12-11 DIAGNOSIS — M85.80 OSTEOPENIA OF THE ELDERLY: ICD-10-CM

## 2019-12-11 DIAGNOSIS — Z72.3 LACK OF PHYSICAL ACTIVITY: ICD-10-CM

## 2019-12-11 DIAGNOSIS — I45.10 RBBB: ICD-10-CM

## 2019-12-11 DIAGNOSIS — Z79.82 LONG-TERM USE OF ASPIRIN THERAPY: ICD-10-CM

## 2019-12-11 DIAGNOSIS — E03.9 ACQUIRED HYPOTHYROIDISM: ICD-10-CM

## 2019-12-11 DIAGNOSIS — G47.00 INSOMNIA, UNSPECIFIED TYPE: ICD-10-CM

## 2019-12-11 DIAGNOSIS — H35.30 MACULAR DEGENERATION OF RIGHT EYE, UNSPECIFIED TYPE: ICD-10-CM

## 2019-12-11 DIAGNOSIS — Z23 NEED FOR PROPHYLACTIC VACCINATION AGAINST STREPTOCOCCUS PNEUMONIAE (PNEUMOCOCCUS) AND INFLUENZA: ICD-10-CM

## 2019-12-11 DIAGNOSIS — Z00.00 ENCOUNTER FOR PREVENTIVE HEALTH EXAMINATION: Primary | ICD-10-CM

## 2019-12-11 PROCEDURE — G0009 ADMIN PNEUMOCOCCAL VACCINE: HCPCS | Mod: PBBFAC,PO

## 2019-12-11 PROCEDURE — 99999 PR PBB SHADOW E&M-EST. PATIENT-LVL IV: CPT | Mod: PBBFAC,,, | Performed by: PHYSICIAN ASSISTANT

## 2019-12-11 PROCEDURE — 99214 OFFICE O/P EST MOD 30 MIN: CPT | Mod: PBBFAC,PO,25 | Performed by: PHYSICIAN ASSISTANT

## 2019-12-11 PROCEDURE — 99999 PR PBB SHADOW E&M-EST. PATIENT-LVL IV: ICD-10-PCS | Mod: PBBFAC,,, | Performed by: PHYSICIAN ASSISTANT

## 2019-12-11 PROCEDURE — G0439 PPPS, SUBSEQ VISIT: HCPCS | Mod: S$GLB,,, | Performed by: PHYSICIAN ASSISTANT

## 2019-12-11 PROCEDURE — G0439 PR MEDICARE ANNUAL WELLNESS SUBSEQUENT VISIT: ICD-10-PCS | Mod: S$GLB,,, | Performed by: PHYSICIAN ASSISTANT

## 2019-12-11 RX ORDER — LORAZEPAM 1 MG/1
0.5 TABLET ORAL
COMMUNITY
Start: 2019-12-06 | End: 2022-12-16 | Stop reason: SDUPTHER

## 2019-12-11 NOTE — PROGRESS NOTES
"Prema Phillips presented for a Medicare AWV and comprehensive Health Risk Assessment today. The following components were reviewed and updated:    · Medical history  · Family History  · Social history  · Allergies and Current Medications  · Health Risk Assessment  · Health Maintenance  · Care Team     ** See Completed Assessments for Annual Wellness Visit within the encounter summary.**       The following assessments were completed:  · Living Situation  · CAGE  · Depression Screening  · Timed Get Up and Go  · Whisper Test  · Cognitive Function Screening  · Nutrition Screening  · ADL Screening  · PAQ Screening    Vitals:    12/11/19 1106   BP: 124/76   Pulse: 73   Temp: 97.5 °F (36.4 °C)   TempSrc: Oral   SpO2: 97%   Weight: 60.9 kg (134 lb 4.2 oz)   Height: 5' 6" (1.676 m)     Body mass index is 21.67 kg/m².     Physical Exam   Constitutional: She is oriented to person, place, and time. She appears well-developed and well-nourished. No distress.   HENT:   Head: Normocephalic and atraumatic.   Right Ear: External ear normal.   Left Ear: External ear normal.   Nose: Nose normal.   Mouth/Throat: Oropharynx is clear and moist. No oropharyngeal exudate.   Eyes: Pupils are equal, round, and reactive to light. Conjunctivae and EOM are normal.   Neck: Normal range of motion. Neck supple. No JVD present. No tracheal deviation present.   Cardiovascular: Normal rate, regular rhythm, normal heart sounds and intact distal pulses.   Pulmonary/Chest: Effort normal and breath sounds normal.   Abdominal: Soft. Bowel sounds are normal. There is no tenderness.   Musculoskeletal: Normal range of motion. She exhibits no edema.   Neurological: She is alert and oriented to person, place, and time.   Skin: Skin is warm. Capillary refill takes less than 2 seconds. No erythema.   Psychiatric: She has a normal mood and affect. Her behavior is normal. Judgment and thought content normal.   Vitals reviewed.        Diagnoses and health risks " identified today and associated recommendations/orders:    1. Encounter for preventive health examination  Discussed current medical problems and addressed any outstanding health maintenance. Continue management with PCP.    2. Need for prophylactic vaccination against Streptococcus pneumoniae (pneumococcus) and influenza  - Pneumococcal polysaccharide vaccine 23-valent greater than or equal to 1yo subcutaneous/IM    3. Lack of physical activity  Encouraged moderate exercise at least three times per week. Patient is attempting to increase her physical activity.    4. Long-term use of aspirin therapy  Stable.  Followed by PCP.    5. Acquired hypothyroidism  Chronic; stable on synthroid.  Followed by PCP.    6. Hyperlipidemia, unspecified hyperlipidemia type  Chronic; stable on atorvastatin.  Followed by PCP.    7. Osteopenia of the elderly  Chronic; continue calcium and vitamin D3 supplements. Followed by PCP.    8. Macular degeneration of right eye, unspecified type  Chronic; stable. Followed by Ophthalmology.    9. Insomnia, unspecified type  Chronic; stable on current medication regimen. Followed by PCP.    10. RBBB  Chronic; stable. Followed by PCP.      Provided Prema with a 5-10 year written screening schedule and personal prevention plan. Recommendations were developed using the USPSTF age appropriate recommendations. Education, counseling, and referrals were provided as needed. After Visit Summary printed and given to patient which includes a list of additional screenings\tests needed.    I offered to discuss end of life issues, including information on how to make advance directives that the patient could use to name someone who would make medical decisions on their behalf if they became too ill to make themselves.    ___Patient declined  _X_Patient is interested, I provided paper work and offered to discuss.    Follow up in about 1 year (around 12/11/2020) for Follow-up with PCP, Annual Wellness Visit in 1  year.       Cinda Garcia PA-C

## 2019-12-11 NOTE — PATIENT INSTRUCTIONS
Counseling and Referral of Other Preventative  (Italic type indicates deductible and co-insurance are waived)    Patient Name: Prema Phillips  Today's Date: 12/11/2019    Health Maintenance       Date Due Completion Date    Pneumococcal Vaccine (65+ Low/Medium Risk) (2 of 2 - PPSV23) 05/31/2017 5/31/2016    Shingles Vaccine (2 of 2) 12/31/2019 11/5/2019    Eye Exam 08/09/2020 8/9/2019    Override on 8/9/2019: Done    Override on 12/8/2017: Done ([facility] - see media)    Override on 12/5/2016: Done ([facility])    High Dose Statin 12/11/2020 12/11/2019    Aspirin/Antiplatelet Therapy 12/11/2020 12/11/2019    Mammogram 01/31/2021 1/31/2019    DEXA SCAN 11/05/2022 11/5/2019    Colonoscopy 08/27/2024 8/27/2019    Override on 5/5/2009: Done (repeat in 5 yrs-shalini cherry )    Lipid Panel 11/05/2024 11/5/2019    TETANUS VACCINE 06/01/2025 6/1/2015        Orders Placed This Encounter   Procedures    Pneumococcal polysaccharide vaccine 23-valent greater than or equal to 3yo subcutaneous/IM     The following information is provided to all patients.  This information is to help you find resources for any of the problems found today that may be affecting your health:                Living healthy guide: www.[facility].[place].gov      Understanding Diabetes: www.diabetes.org      Eating healthy: www.cdc.gov/healthyweight      CDC home safety checklist: www.cdc.gov/steadi/patient.html      Agency on Aging: www.goea.[place].[facility]      Alcoholics anonymous (AA): www.aa.org      Physical Activity: www.adal.nih.gov/vh9ntft      Tobacco use: www.quitwithusla.org

## 2020-01-10 ENCOUNTER — TELEPHONE (OUTPATIENT)
Dept: FAMILY MEDICINE | Facility: CLINIC | Age: 75
End: 2020-01-10

## 2020-01-10 DIAGNOSIS — I45.10 RBBB: Primary | ICD-10-CM

## 2020-01-10 DIAGNOSIS — Z01.810 PRE-OPERATIVE CARDIOVASCULAR EXAMINATION: ICD-10-CM

## 2020-01-13 ENCOUNTER — TELEPHONE (OUTPATIENT)
Dept: FAMILY MEDICINE | Facility: CLINIC | Age: 75
End: 2020-01-13

## 2020-01-13 ENCOUNTER — CLINICAL SUPPORT (OUTPATIENT)
Dept: LAB | Facility: HOSPITAL | Age: 75
End: 2020-01-13
Attending: FAMILY MEDICINE
Payer: MEDICARE

## 2020-01-13 ENCOUNTER — DOCUMENTATION ONLY (OUTPATIENT)
Dept: REHABILITATION | Facility: HOSPITAL | Age: 75
End: 2020-01-13

## 2020-01-13 DIAGNOSIS — Z01.810 PRE-OPERATIVE CARDIOVASCULAR EXAMINATION: ICD-10-CM

## 2020-01-13 DIAGNOSIS — M43.6 STIFFNESS OF NECK: ICD-10-CM

## 2020-01-13 DIAGNOSIS — M54.2 MUSCULOSKELETAL NECK PAIN: ICD-10-CM

## 2020-01-13 DIAGNOSIS — I45.10 RBBB: ICD-10-CM

## 2020-01-13 DIAGNOSIS — R29.898 DECREASED ROM OF NECK: ICD-10-CM

## 2020-01-13 PROCEDURE — 93010 EKG 12-LEAD: ICD-10-PCS | Mod: ,,, | Performed by: INTERNAL MEDICINE

## 2020-01-13 PROCEDURE — 93005 ELECTROCARDIOGRAM TRACING: CPT

## 2020-01-13 PROCEDURE — 93010 ELECTROCARDIOGRAM REPORT: CPT | Mod: ,,, | Performed by: INTERNAL MEDICINE

## 2020-01-13 RX ORDER — BROMFENAC SODIUM 0.7 MG/ML
SOLUTION/ DROPS OPHTHALMIC
COMMUNITY
Start: 2020-01-05 | End: 2020-12-17

## 2020-01-13 RX ORDER — DUREZOL 0.5 MG/ML
1 EMULSION OPHTHALMIC DAILY
COMMUNITY
Start: 2020-01-03 | End: 2020-12-17

## 2020-01-13 RX ORDER — OFLOXACIN 3 MG/ML
SOLUTION/ DROPS OPHTHALMIC
COMMUNITY
Start: 2020-01-03 | End: 2020-12-17

## 2020-01-13 NOTE — PROGRESS NOTES
Pt was evaluated on 7/5/2019 and was seen 8 times for PT. Pt has not attended PT since 8/30/2019; requested break from therapy. Pt was given HEP update on 8/19/2019. Current status is unknown. Start of new calendar year. Pt to be d/c'd at this time.

## 2020-01-31 ENCOUNTER — PATIENT OUTREACH (OUTPATIENT)
Dept: ADMINISTRATIVE | Facility: HOSPITAL | Age: 75
End: 2020-01-31

## 2020-02-26 ENCOUNTER — OFFICE VISIT (OUTPATIENT)
Dept: CARDIOLOGY | Facility: CLINIC | Age: 75
End: 2020-02-26
Payer: MEDICARE

## 2020-02-26 VITALS
HEIGHT: 66 IN | DIASTOLIC BLOOD PRESSURE: 64 MMHG | SYSTOLIC BLOOD PRESSURE: 134 MMHG | WEIGHT: 137.13 LBS | HEART RATE: 77 BPM | BODY MASS INDEX: 22.04 KG/M2

## 2020-02-26 DIAGNOSIS — Z79.82 LONG-TERM USE OF ASPIRIN THERAPY: ICD-10-CM

## 2020-02-26 DIAGNOSIS — I65.23 ATHEROSCLEROSIS OF BOTH CAROTID ARTERIES: ICD-10-CM

## 2020-02-26 DIAGNOSIS — E78.5 HYPERLIPIDEMIA, UNSPECIFIED HYPERLIPIDEMIA TYPE: Primary | ICD-10-CM

## 2020-02-26 PROCEDURE — 99213 OFFICE O/P EST LOW 20 MIN: CPT | Mod: S$PBB,,, | Performed by: INTERNAL MEDICINE

## 2020-02-26 PROCEDURE — 99999 PR PBB SHADOW E&M-EST. PATIENT-LVL V: CPT | Mod: PBBFAC,,, | Performed by: INTERNAL MEDICINE

## 2020-02-26 PROCEDURE — 99215 OFFICE O/P EST HI 40 MIN: CPT | Mod: PBBFAC | Performed by: INTERNAL MEDICINE

## 2020-02-26 PROCEDURE — 99213 PR OFFICE/OUTPT VISIT, EST, LEVL III, 20-29 MIN: ICD-10-PCS | Mod: S$PBB,,, | Performed by: INTERNAL MEDICINE

## 2020-02-26 PROCEDURE — 99999 PR PBB SHADOW E&M-EST. PATIENT-LVL V: ICD-10-PCS | Mod: PBBFAC,,, | Performed by: INTERNAL MEDICINE

## 2020-02-26 NOTE — PATIENT INSTRUCTIONS
Recommended mediterranean/low cholesterol diet    Increase exercise to 30 minutes of brisk walking a day for at least 5 days a week or a total of 150 minutes a week.  You can use a stationary bike or swim for 30 minutes a day instead of walking.  Whatever exercise you choose, make sure you are working hard enough to increase your heart rate.

## 2020-02-26 NOTE — PROGRESS NOTES
Subjective:   Chief Complaint: No chief complaint on file.        History of Present Illness: Prema Phillips is a 75 y.o.  Female here for f/u visit (one year) for mild carotid stenosis.  She denies symptoms of ischemia, heart failure, arrhythmia or claudication.   Over the past 2.5months, she has been watching her cholesterol intake.  Her diet is low in fish and not following Mediterranean diet.   Inconsistent exercise.  Can walk 2.5miles without SOB,  No SOB at rest or TRONCOSO, sleeps on one pillow, no PND.  She reports last BP with /64.         Review of Systems   Constitution: Negative for chills, decreased appetite, diaphoresis, fever, malaise/fatigue, weight gain and weight loss.        Denies change in activity level   HENT: Positive for hearing loss. Negative for ear pain, nosebleeds, sore throat and tinnitus.         Bilateral hearing aides   Eyes: Negative for blurred vision, vision loss in left eye, vision loss in right eye and visual disturbance.        No amaurosis fugax; recent cataract sx, +macular degeneration   Cardiovascular: Negative for chest pain, claudication, cyanosis, dyspnea on exertion, irregular heartbeat, leg swelling, near-syncope, orthopnea, palpitations, paroxysmal nocturnal dyspnea and syncope.   Respiratory: Negative for cough, shortness of breath, sleep disturbances due to breathing, snoring and wheezing.         Denies symptoms of LEANDRA   Endocrine:        Denies DM disease   Hematologic/Lymphatic: Negative for bleeding problem. Does not bruise/bleed easily.   Skin: Negative for color change, nail changes and rash.   Musculoskeletal: Positive for arthritis. Negative for falls, muscle cramps and myalgias.        Denies muscle twitching; +hand arthritis   Gastrointestinal: Positive for constipation. Negative for abdominal pain, diarrhea, dysphagia, heartburn, hematemesis, hematochezia, melena, nausea and vomiting.        Constipation relieved with prn Miralax use   Genitourinary:  Negative for dysuria and hematuria.        No change in urinary output   Neurological: Negative for excessive daytime sleepiness, dizziness, headaches, light-headedness, loss of balance, tremors, vertigo and weakness.   Psychiatric/Behavioral: Negative for altered mental status. The patient is not nervous/anxious.    Allergic/Immunologic:        Drug allergies listed elsewhere if present     Medications:  Outpatient Encounter Medications as of 2/26/2020   Medication Sig Dispense Refill    amitriptyline (ELAVIL) 25 MG tablet 1/2-1 tab nightly for insomnia. Ok to take with the low dose of ativan 30 tablet 11    aspirin (ECOTRIN) 81 MG EC tablet Take 81 mg by mouth once daily.      atorvastatin (LIPITOR) 20 MG tablet Take 1 tablet (20 mg total) by mouth once daily. 90 tablet 3    azelastine (ASTELIN) 137 mcg (0.1 %) nasal spray 1 spray (137 mcg total) by Nasal route 2 (two) times daily. 30 mL 11    calcium carbonate (CALCIUM 500 ORAL) calcium      calcium-vitamin D3-vitamin K 500-100-40 mg-unit-mcg Chew Take by mouth once daily.      cholecalciferol, vitamin D3, (VITAMIN D3) 2,000 unit Tab once daily.       clobetasol (TEMOVATE) 0.05 % cream daily as needed.       coenzyme Q10 (CO Q-10) 200 mg capsule Take 200 mg by mouth once daily.      DUREZOL 0.05 % Drop ophthalmic solution 1 drop once daily.       eletriptan (RELPAX) 40 MG tablet Take 40 mg by mouth. 1 Tablet Oral As directed      fluticasone propionate (FLONASE) 50 mcg/actuation nasal spray SPRAY TWICE IN EACH NOSTRIL ONCE DAILY 16 mL 0    LORazepam (ATIVAN) 1 MG tablet Take 0.5 mg by mouth.      meloxicam (MOBIC) 15 MG tablet Take 15 mg by mouth once daily.  6    multivitamin capsule Take 1 capsule by mouth once daily.      omeprazole (PRILOSEC) 20 MG capsule TAKE 1 CAPSULE BY MOUTH EVERY DAY IN THE MORNING  3    PROLENSA 0.07 % Drop 1 DROP IN SURGICAL EYE IN THE MORNING START 3 DAYS BEFORE SURGERY.      SYNTHROID 75 mcg tablet TAKE 1 TABLET  BEFORE       BREAKFAST 90 tablet 4    traMADol (ULTRAM) 50 mg tablet Take 50 mg by mouth every 4 (four) hours as needed.  0    tretinoin (RETIN-A) 0.05 % cream nightly.       TURMERIC, BULK, MISC by Misc.(Non-Drug; Combo Route) route once daily.      VIT A/VIT C/VIT E/ZINC/COPPER (PRESERVISION AREDS ORAL) Take by mouth once daily.       lorazepam (ATIVAN) 1 MG tablet Take 1 mg by mouth once daily.  3    ofloxacin (OCUFLOX) 0.3 % ophthalmic solution       PREMARIN 0.3 mg tablet Take 0.3 mg by mouth once daily.        No facility-administered encounter medications on file as of 2/26/2020.      Family History:  Prema's family history includes COPD in her father; Cancer in her cousin, maternal aunt, and maternal grandmother; Heart disease in her father.    Social History:  Prema reports that she has never smoked. She has never used smokeless tobacco. She reports that she drinks alcohol. She reports that she does not use drugs.    Objective:   There were no vitals taken for this visit.    Lab Results   Component Value Date     11/05/2019    K 4.6 11/05/2019     11/05/2019    CO2 29 11/05/2019    BUN 17 11/05/2019    CREATININE 0.8 11/05/2019    GLU 90 11/05/2019    HGBA1C 5.5 10/30/2018    MG 2.1 11/05/2019    AST 18 11/05/2019    ALT 11 11/05/2019    ALBUMIN 3.5 11/05/2019    PROT 6.9 11/05/2019    BILITOT 0.4 11/05/2019    WBC 5.81 06/05/2019    HGB 13.0 06/05/2019    HCT 40.9 06/05/2019    MCV 94 06/05/2019     06/05/2019    TSH 1.793 11/05/2019    CHOL 190 11/05/2019    HDL 64 11/05/2019    LDLCALC 105.6 11/05/2019    TRIG 102 11/05/2019         Physical Exam   Constitutional: She is oriented to person, place, and time and well-developed, well-nourished, and in no distress. No distress.   HENT:   Head: Normocephalic and atraumatic.   Nose: Nose normal.   Mouth/Throat: No oropharyngeal exudate.   Eyes: Pupils are equal, round, and reactive to light. Conjunctivae and EOM are normal. Right eye  exhibits no discharge. Left eye exhibits no discharge. No scleral icterus.   Neck: Normal range of motion. Neck supple. No JVD present. No tracheal deviation present. No thyromegaly present.   Cardiovascular: Normal rate, regular rhythm, S1 normal, S2 normal, normal heart sounds and intact distal pulses. Exam reveals no gallop and no friction rub.   No murmur heard.  Pulses:       Carotid pulses are 2+ on the right side, and 2+ on the left side.       Radial pulses are 2+ on the right side, and 2+ on the left side.        Femoral pulses are 2+ on the right side, and 2+ on the left side.       Dorsalis pedis pulses are 2+ on the right side, and 2+ on the left side.        Posterior tibial pulses are 2+ on the right side, and 2+ on the left side.   Pulmonary/Chest: Effort normal and breath sounds normal. No stridor. No respiratory distress. She has no wheezes. She has no rales. She exhibits no tenderness.   Abdominal: Soft. Bowel sounds are normal. She exhibits no distension and no mass. There is no tenderness. There is no rebound and no guarding.   Musculoskeletal: Normal range of motion. She exhibits no edema, tenderness or deformity.   Lymphadenopathy:     She has no cervical adenopathy.   Neurological: She is alert and oriented to person, place, and time. Gait normal. Coordination normal.   Non focal exam   Skin: Skin is warm and dry. No rash noted. She is not diaphoretic. No cyanosis or erythema. No pallor. Nails show no clubbing.   Psychiatric: Mood, memory, affect and judgment normal.           Assessment:     1. Hyperlipidemia, unspecified hyperlipidemia type-on moderate intensity statin therapy.     2. Atherosclerosis of both carotid arteries - Mild last check 2 years ago   3. Long-term use of aspirin therapy      Plan:     Prema Phillips was seen today for mild bilateral carotid atherosclerosis.  Last US carotid 1/2018, will repeat since it has been 2 years.      Diagnosis and orders for this visit:      Hyperlipidemia, unspecified hyperlipidemia type  -Recommended mediterranean/low cholesterol diet    -Increase exercise to 30 minutes of brisk walking a day for at least 5 days a week or a total of 150 minutes a week.     -continue moderate intensity statin therapy.    Atherosclerosis of both carotid arteries  -     CV Ultrasound Bilateral Doppler Carotid; Future  - Continue lipitor 20mg daily  Recheck lipids in 6 months    Long-term use of aspirin therapy        Follow up in about 1 year (around 2/26/2021).      This patient was seen and discussed with Dr. Mihir Mccabe, APRN, FNP-BC   The patient has been interviewed and examined . Agree with the above edited note.

## 2020-03-09 ENCOUNTER — CLINICAL SUPPORT (OUTPATIENT)
Dept: CARDIOLOGY | Facility: CLINIC | Age: 75
End: 2020-03-09
Attending: NURSE PRACTITIONER
Payer: MEDICARE

## 2020-03-09 DIAGNOSIS — E78.5 HYPERLIPIDEMIA, UNSPECIFIED HYPERLIPIDEMIA TYPE: ICD-10-CM

## 2020-03-09 DIAGNOSIS — I65.23 ATHEROSCLEROSIS OF BOTH CAROTID ARTERIES: ICD-10-CM

## 2020-03-09 LAB
LEFT ARM DIASTOLIC BLOOD PRESSURE: 71 MMHG
LEFT ARM SYSTOLIC BLOOD PRESSURE: 116 MMHG
LEFT CBA DIAS: 22 CM/S
LEFT CBA SYS: 63 CM/S
LEFT CCA DIST DIAS: 20 CM/S
LEFT CCA DIST SYS: 69 CM/S
LEFT CCA MID DIAS: 19 CM/S
LEFT CCA MID SYS: 80 CM/S
LEFT CCA PROX DIAS: 17 CM/S
LEFT CCA PROX SYS: 87 CM/S
LEFT ECA DIAS: 8 CM/S
LEFT ECA SYS: 77 CM/S
LEFT ICA DIST DIAS: 38 CM/S
LEFT ICA DIST SYS: 126 CM/S
LEFT ICA MID DIAS: 30 CM/S
LEFT ICA MID SYS: 74 CM/S
LEFT ICA PROX DIAS: 24 CM/S
LEFT ICA PROX SYS: 52 CM/S
LEFT VERTEBRAL DIAS: 14 CM/S
LEFT VERTEBRAL SYS: 48 CM/S
OHS CV CAROTID RIGHT ICA EDV HIGHEST: 44
OHS CV CAROTID ULTRASOUND LEFT ICA/CCA RATIO: 1.45
OHS CV CAROTID ULTRASOUND RIGHT ICA/CCA RATIO: 1.28
OHS CV PV CAROTID LEFT HIGHEST CCA: 87
OHS CV PV CAROTID LEFT HIGHEST ICA: 126
OHS CV PV CAROTID RIGHT HIGHEST CCA: 87
OHS CV PV CAROTID RIGHT HIGHEST ICA: 111
OHS CV US CAROTID LEFT HIGHEST EDV: 38
RIGHT ARM DIASTOLIC BLOOD PRESSURE: 69 MMHG
RIGHT ARM SYSTOLIC BLOOD PRESSURE: 112 MMHG
RIGHT CBA DIAS: 26 CM/S
RIGHT CBA SYS: 64 CM/S
RIGHT CCA DIST DIAS: 20 CM/S
RIGHT CCA DIST SYS: 62 CM/S
RIGHT CCA MID DIAS: 16 CM/S
RIGHT CCA MID SYS: 77 CM/S
RIGHT CCA PROX DIAS: 19 CM/S
RIGHT CCA PROX SYS: 87 CM/S
RIGHT ECA DIAS: 10 CM/S
RIGHT ECA SYS: 82 CM/S
RIGHT ICA DIST DIAS: 44 CM/S
RIGHT ICA DIST SYS: 111 CM/S
RIGHT ICA MID DIAS: 30 CM/S
RIGHT ICA MID SYS: 84 CM/S
RIGHT ICA PROX DIAS: 23 CM/S
RIGHT ICA PROX SYS: 63 CM/S
RIGHT VERTEBRAL DIAS: 10 CM/S
RIGHT VERTEBRAL SYS: 32 CM/S

## 2020-03-09 PROCEDURE — 93880 CV US DOPPLER CAROTID (CUPID ONLY): ICD-10-PCS | Mod: 26,S$PBB,, | Performed by: INTERNAL MEDICINE

## 2020-03-09 PROCEDURE — 93880 EXTRACRANIAL BILAT STUDY: CPT | Mod: PBBFAC | Performed by: INTERNAL MEDICINE

## 2020-03-09 RX ORDER — ATORVASTATIN CALCIUM 20 MG/1
TABLET, FILM COATED ORAL
Qty: 90 TABLET | Refills: 3 | Status: SHIPPED | OUTPATIENT
Start: 2020-03-09 | End: 2021-01-08

## 2020-07-15 DIAGNOSIS — Z71.89 COMPLEX CARE COORDINATION: ICD-10-CM

## 2020-10-21 ENCOUNTER — PATIENT MESSAGE (OUTPATIENT)
Dept: FAMILY MEDICINE | Facility: CLINIC | Age: 75
End: 2020-10-21

## 2020-10-21 DIAGNOSIS — F51.01 PRIMARY INSOMNIA: ICD-10-CM

## 2020-10-21 DIAGNOSIS — G43.709 CHRONIC MIGRAINE WITHOUT AURA WITHOUT STATUS MIGRAINOSUS, NOT INTRACTABLE: ICD-10-CM

## 2020-10-22 RX ORDER — AMITRIPTYLINE HYDROCHLORIDE 25 MG/1
TABLET, FILM COATED ORAL
Qty: 30 TABLET | Refills: 11 | Status: SHIPPED | OUTPATIENT
Start: 2020-10-22 | End: 2021-10-11 | Stop reason: SDUPTHER

## 2020-12-09 ENCOUNTER — PATIENT MESSAGE (OUTPATIENT)
Dept: FAMILY MEDICINE | Facility: CLINIC | Age: 75
End: 2020-12-09

## 2020-12-09 DIAGNOSIS — E03.9 ACQUIRED HYPOTHYROIDISM: ICD-10-CM

## 2020-12-09 RX ORDER — LEVOTHYROXINE SODIUM 75 UG/1
TABLET ORAL
Qty: 90 TABLET | Refills: 0 | Status: SHIPPED | OUTPATIENT
Start: 2020-12-09 | End: 2020-12-10 | Stop reason: SDUPTHER

## 2020-12-10 DIAGNOSIS — E03.9 ACQUIRED HYPOTHYROIDISM: ICD-10-CM

## 2020-12-10 RX ORDER — LEVOTHYROXINE SODIUM 75 UG/1
TABLET ORAL
Qty: 90 TABLET | Refills: 0 | Status: SHIPPED | OUTPATIENT
Start: 2020-12-10 | End: 2020-12-11 | Stop reason: SDUPTHER

## 2020-12-10 NOTE — TELEPHONE ENCOUNTER
"Spoke to pt and stated that she need a prescription for her Levothyroxine 75mcg, but she needs the prescription to say "Dispense as Written" and the prescription also needs to have on it 5 Brand/Generic for pt to pay the generic price for brand name. Pls advise.   "

## 2020-12-10 NOTE — TELEPHONE ENCOUNTER
----- Message from Mary Anne Birch sent at 12/10/2020 11:11 AM CST -----  Type:  Patient Returning Call    Who Called: Prema   Would the patient rather a call back or a response via [facility]? Call   Best Call Back Number: [phone]   Additional Information: calling in ref to a prescription that she needs a special note added to get a lower price.  Pt has cancelled the Rx sent to pharmacy in [facility]

## 2020-12-11 ENCOUNTER — TELEPHONE (OUTPATIENT)
Dept: FAMILY MEDICINE | Facility: CLINIC | Age: 75
End: 2020-12-11

## 2020-12-11 DIAGNOSIS — E03.9 ACQUIRED HYPOTHYROIDISM: ICD-10-CM

## 2020-12-11 RX ORDER — LEVOTHYROXINE SODIUM 75 UG/1
TABLET ORAL
Qty: 90 TABLET | Refills: 0 | Status: SHIPPED | OUTPATIENT
Start: 2020-12-11 | End: 2020-12-17 | Stop reason: SDUPTHER

## 2020-12-11 NOTE — TELEPHONE ENCOUNTER
----- Message from Kerrie Redman sent at 12/11/2020 11:58 AM CST -----  Contact: TRINY Galo [facility], [phone]  States there was a problem with script # [id number]  Can't dispense Synthroid as written, doctor needs to allow for substitution.

## 2020-12-11 NOTE — TELEPHONE ENCOUNTER
----- Message from Vaishali Morse sent at 12/11/2020 11:19 AM CST -----  Contact: [phone]  Type:  Needs Medical Advice    Who Called:  PT  Reason for call: regarding the pt's prescription for SYNTHROID 75 mcg tablet, pt would like to spek with the nurse regarding the way the prescription was written  Pharmacy name and phone #:  [facility] - [place], [place] - [address] AT [address] TO [facility] S[address]  Would the patient rather a call back or a response via [facility]? Call back  Best Call Back Number: [phone]

## 2020-12-11 NOTE — TELEPHONE ENCOUNTER
"Spoke to Rosa with [facility] and stated that pt's Levothyroxine prescription was cancelled by pt. on today. Called pt and stated that the prescription was written wrong and the pharmacy wants to charge her $114. Pt states that the prescription needs to be rewritten with "Brand Only for Generic Price" and "Dispense As Written" to [facility]. Pls advise.   "

## 2020-12-11 NOTE — TELEPHONE ENCOUNTER
Spoke to pt and informed pt that the covering physician wrote her Synthroid, as per her request. Pt stated that [facility] rep informed her otherwise. Pt stated that she will call Cynthia back regarding her prescription.

## 2020-12-17 ENCOUNTER — OFFICE VISIT (OUTPATIENT)
Dept: FAMILY MEDICINE | Facility: CLINIC | Age: 75
End: 2020-12-17
Payer: MEDICARE

## 2020-12-17 VITALS
DIASTOLIC BLOOD PRESSURE: 56 MMHG | TEMPERATURE: 97 F | OXYGEN SATURATION: 98 % | WEIGHT: 137 LBS | HEART RATE: 78 BPM | RESPIRATION RATE: 18 BRPM | HEIGHT: 66 IN | SYSTOLIC BLOOD PRESSURE: 111 MMHG | BODY MASS INDEX: 22.02 KG/M2

## 2020-12-17 DIAGNOSIS — G43.709 CHRONIC MIGRAINE WITHOUT AURA WITHOUT STATUS MIGRAINOSUS, NOT INTRACTABLE: ICD-10-CM

## 2020-12-17 DIAGNOSIS — E78.5 HYPERLIPIDEMIA, UNSPECIFIED HYPERLIPIDEMIA TYPE: ICD-10-CM

## 2020-12-17 DIAGNOSIS — E03.9 ACQUIRED HYPOTHYROIDISM: Primary | ICD-10-CM

## 2020-12-17 DIAGNOSIS — E03.9 ACQUIRED HYPOTHYROIDISM: ICD-10-CM

## 2020-12-17 DIAGNOSIS — Z79.82 LONG-TERM USE OF ASPIRIN THERAPY: ICD-10-CM

## 2020-12-17 DIAGNOSIS — Z80.3 FAMILY HISTORY OF BREAST CANCER IN MOTHER: ICD-10-CM

## 2020-12-17 DIAGNOSIS — Z00.00 ENCOUNTER FOR PREVENTIVE HEALTH EXAMINATION: Primary | ICD-10-CM

## 2020-12-17 DIAGNOSIS — I65.23 ATHEROSCLEROSIS OF BOTH CAROTID ARTERIES: ICD-10-CM

## 2020-12-17 DIAGNOSIS — M85.80 OSTEOPENIA OF THE ELDERLY: ICD-10-CM

## 2020-12-17 DIAGNOSIS — H35.30 MACULAR DEGENERATION OF RIGHT EYE, UNSPECIFIED TYPE: ICD-10-CM

## 2020-12-17 PROCEDURE — G0439 PPPS, SUBSEQ VISIT: HCPCS | Mod: ,,, | Performed by: NURSE PRACTITIONER

## 2020-12-17 PROCEDURE — 99999 PR PBB SHADOW E&M-EST. PATIENT-LVL V: ICD-10-PCS | Mod: PBBFAC,,, | Performed by: NURSE PRACTITIONER

## 2020-12-17 PROCEDURE — 99215 OFFICE O/P EST HI 40 MIN: CPT | Mod: PBBFAC,PN | Performed by: NURSE PRACTITIONER

## 2020-12-17 PROCEDURE — G0439 PR MEDICARE ANNUAL WELLNESS SUBSEQUENT VISIT: ICD-10-PCS | Mod: ,,, | Performed by: NURSE PRACTITIONER

## 2020-12-17 PROCEDURE — 99999 PR PBB SHADOW E&M-EST. PATIENT-LVL V: CPT | Mod: PBBFAC,,, | Performed by: NURSE PRACTITIONER

## 2020-12-17 RX ORDER — LEVOTHYROXINE SODIUM 75 UG/1
TABLET ORAL
Qty: 90 TABLET | Refills: 0 | Status: SHIPPED | OUTPATIENT
Start: 2020-12-17 | End: 2021-02-18 | Stop reason: SDUPTHER

## 2020-12-17 NOTE — PROGRESS NOTES
"  Prema Phillips presented for a  Medicare AWV and comprehensive Health Risk Assessment today. The following components were reviewed and updated:    · Medical history  · Family History  · Social history  · Allergies and Current Medications  · Health Risk Assessment  · Health Maintenance  · Care Team         ** See Completed Assessments for Annual Wellness Visit within the encounter summary.**         The following assessments were completed:  · Living Situation  · CAGE  · Depression Screening  · Timed Get Up and Go  · Whisper Test  · Cognitive Function Screening  · Nutrition Screening  · ADL Screening  · PAQ Screening        Vitals:    12/17/20 0854   BP: (!) 111/56   Pulse: 78   Resp: 18   Temp: 97.1 °F (36.2 °C)   SpO2: 98%   Weight: 62.1 kg (137 lb)   Height: 5' 6" (1.676 m)     Body mass index is 22.11 kg/m².  Physical Exam  Vitals signs and nursing note reviewed.   Constitutional:       General: She is not in acute distress.     Appearance: She is well-developed and normal weight. She is not ill-appearing.   HENT:      Head: Normocephalic and atraumatic.   Eyes:      Pupils: Pupils are equal, round, and reactive to light.   Neck:      Musculoskeletal: Normal range of motion.   Cardiovascular:      Rate and Rhythm: Normal rate and regular rhythm.      Heart sounds: Normal heart sounds.   Pulmonary:      Effort: Pulmonary effort is normal. No respiratory distress.      Breath sounds: Normal breath sounds.   Musculoskeletal: Normal range of motion.   Skin:     General: Skin is warm and dry.   Neurological:      Mental Status: She is alert and oriented to person, place, and time.      Coordination: Coordination normal.   Psychiatric:         Mood and Affect: Mood normal.         Behavior: Behavior normal.         Thought Content: Thought content normal.         Judgment: Judgment normal.           Diagnoses and health risks identified today and associated recommendations/orders:    1. Encounter for preventive health " examination    2. Acquired hypothyroidism  Chronic; stable. Continue current treatment plan as previously prescribed by PCP.     3. Hyperlipidemia, unspecified hyperlipidemia type  Chronic; stable. Continue current treatment plan as previously prescribed by PCP.    4. Atherosclerosis of both carotid arteries  Chronic; stable. Continue current treatment plan as previously prescribed by Cardiology (Dr. Ash).    5. Long-term use of aspirin therapy  Chronic; stable. Continue current treatment plan as previously prescribed by PCP.    6. Macular degeneration of right eye, unspecified type  Chronic; stable. Continue current treatment plan as previously prescribed by Ophthalmology (Dr. Valarie MENDOZA & Dr. Lacey).     7. Chronic migraine without aura without status migrainosus, not intractable  Chronic; stable. Continue current treatment plan as previously prescribed by PCP.    8. Osteopenia of the elderly  Noted on DEXA scan dated 11/5/2019. Patient followed by PCP.     9. Family history of breast cancer in mother  Chronic; stable. Patient followed by PCP.      Provided Prema with a 5-10 year written screening schedule and personal prevention plan. Recommendations were developed using the USPSTF age appropriate recommendations. Education, counseling, and referrals were provided as needed. After Visit Summary printed and given to patient which includes a list of additional screenings\tests needed.    Follow up for HRA visit in 1 year.    Hanna Lux NP  I offered to discuss end of life issues, including information on how to make advance directives that the patient could use to name someone who would make medical decisions on their behalf if they became too ill to make themselves.    ___Patient declined  _X_Patient is interested, I provided paper work and offered to discuss.

## 2020-12-17 NOTE — PATIENT INSTRUCTIONS
Counseling and Referral of Other Preventative  (Italic type indicates deductible and co-insurance are waived)    Patient Name: Prema Phillips  Today's Date: 12/17/2020    Health Maintenance       Date Due Completion Date    Shingles Vaccine (2 of 2) 12/31/2019 11/5/2019    Influenza Vaccine (1) 08/01/2020 10/7/2019    Aspirin/Antiplatelet Therapy 02/26/2021 2/26/2020    High Dose Statin 03/09/2021 3/9/2020    Eye Exam 07/17/2021 7/17/2020    Override on 8/9/2019: Done    Override on 12/8/2017: Done ([facility] - see media)    Override on 12/5/2016: Done ([facility])    DEXA SCAN 11/05/2022 11/5/2019    Colorectal Cancer Screening 08/27/2024 8/27/2019    Override on 5/5/2009: Done (repeat in 5 yrs-shalini cherry )    Lipid Panel 11/05/2024 11/5/2019    TETANUS VACCINE 06/01/2025 6/1/2015        No orders of the defined types were placed in this encounter.    The following information is provided to all patients.  This information is to help you find resources for any of the problems found today that may be affecting your health:                Living healthy guide: www.[facility].[place].gov      Understanding Diabetes: www.diabetes.org      Eating healthy: www.cdc.gov/healthyweight      CDC home safety checklist: www.cdc.gov/steadi/patient.html      Agency on Aging: www.goea.[place].gov      Alcoholics anonymous (AA): www.aa.org      Physical Activity: www.adal.nih.gov/qp8glcx      Tobacco use: www.quitwithusla.org

## 2020-12-17 NOTE — PROGRESS NOTES
Patient was seen today for a HRA visit and requested a refill on her levothyroxine until her PCP returns from maternity leave in January.    Diagnoses and all orders for this visit:    Acquired hypothyroidism  Comments:  on synthroid, TSH normal range on recent labs  Orders:  -     levothyroxine (SYNTHROID) 75 MCG tablet; TAKE 1 TABLET BEFORE BREAKFAST DAILY. BRAND ONLY FOR GENERIC PRICE    Follow-up with PCP.    Hanna Lux NP

## 2021-02-08 ENCOUNTER — OFFICE VISIT (OUTPATIENT)
Dept: FAMILY MEDICINE | Facility: CLINIC | Age: 76
End: 2021-02-08
Payer: MEDICARE

## 2021-02-08 VITALS
SYSTOLIC BLOOD PRESSURE: 124 MMHG | WEIGHT: 136 LBS | OXYGEN SATURATION: 99 % | HEART RATE: 87 BPM | DIASTOLIC BLOOD PRESSURE: 76 MMHG | HEIGHT: 66 IN | BODY MASS INDEX: 21.86 KG/M2

## 2021-02-08 DIAGNOSIS — Z00.00 ROUTINE GENERAL MEDICAL EXAMINATION AT A HEALTH CARE FACILITY: Primary | ICD-10-CM

## 2021-02-08 DIAGNOSIS — J30.2 SEASONAL ALLERGIC RHINITIS, UNSPECIFIED TRIGGER: ICD-10-CM

## 2021-02-08 DIAGNOSIS — E78.5 HYPERLIPIDEMIA, UNSPECIFIED HYPERLIPIDEMIA TYPE: ICD-10-CM

## 2021-02-08 DIAGNOSIS — I65.23 ATHEROSCLEROSIS OF BOTH CAROTID ARTERIES: ICD-10-CM

## 2021-02-08 DIAGNOSIS — M85.80 OSTEOPENIA OF THE ELDERLY: ICD-10-CM

## 2021-02-08 DIAGNOSIS — F41.8 ANXIETY WITH DEPRESSION: ICD-10-CM

## 2021-02-08 DIAGNOSIS — Z79.899 MEDICATION MANAGEMENT: ICD-10-CM

## 2021-02-08 DIAGNOSIS — G47.00 INSOMNIA, UNSPECIFIED TYPE: ICD-10-CM

## 2021-02-08 DIAGNOSIS — G43.709 CHRONIC MIGRAINE WITHOUT AURA WITHOUT STATUS MIGRAINOSUS, NOT INTRACTABLE: ICD-10-CM

## 2021-02-08 DIAGNOSIS — Z90.710 HISTORY OF TOTAL HYSTERECTOMY WITH BILATERAL SALPINGO-OOPHORECTOMY (BSO): ICD-10-CM

## 2021-02-08 DIAGNOSIS — Z51.81 ENCOUNTER FOR MONITORING LONG-TERM PROTON PUMP INHIBITOR THERAPY: ICD-10-CM

## 2021-02-08 DIAGNOSIS — E03.9 ACQUIRED HYPOTHYROIDISM: ICD-10-CM

## 2021-02-08 DIAGNOSIS — Z79.899 ENCOUNTER FOR MONITORING LONG-TERM PROTON PUMP INHIBITOR THERAPY: ICD-10-CM

## 2021-02-08 DIAGNOSIS — I45.10 RBBB: ICD-10-CM

## 2021-02-08 DIAGNOSIS — K21.9 GASTROESOPHAGEAL REFLUX DISEASE, UNSPECIFIED WHETHER ESOPHAGITIS PRESENT: ICD-10-CM

## 2021-02-08 DIAGNOSIS — Z90.722 HISTORY OF TOTAL HYSTERECTOMY WITH BILATERAL SALPINGO-OOPHORECTOMY (BSO): ICD-10-CM

## 2021-02-08 DIAGNOSIS — Z79.82 LONG-TERM USE OF ASPIRIN THERAPY: ICD-10-CM

## 2021-02-08 DIAGNOSIS — Z23 HIGH PRIORITY FOR COVID-19 VIRUS VACCINATION: ICD-10-CM

## 2021-02-08 DIAGNOSIS — Z63.8 STRESS DUE TO FAMILY TENSION: ICD-10-CM

## 2021-02-08 DIAGNOSIS — Z90.79 HISTORY OF TOTAL HYSTERECTOMY WITH BILATERAL SALPINGO-OOPHORECTOMY (BSO): ICD-10-CM

## 2021-02-08 PROCEDURE — 99215 OFFICE O/P EST HI 40 MIN: CPT | Mod: PBBFAC,PO | Performed by: FAMILY MEDICINE

## 2021-02-08 PROCEDURE — 99999 PR PBB SHADOW E&M-EST. PATIENT-LVL V: CPT | Mod: PBBFAC,,, | Performed by: FAMILY MEDICINE

## 2021-02-08 PROCEDURE — 99397 PR PREVENTIVE VISIT,EST,65 & OVER: ICD-10-PCS | Mod: S$PBB,,, | Performed by: FAMILY MEDICINE

## 2021-02-08 PROCEDURE — 99397 PER PM REEVAL EST PAT 65+ YR: CPT | Mod: S$PBB,,, | Performed by: FAMILY MEDICINE

## 2021-02-08 PROCEDURE — 99999 PR PBB SHADOW E&M-EST. PATIENT-LVL V: ICD-10-PCS | Mod: PBBFAC,,, | Performed by: FAMILY MEDICINE

## 2021-02-08 RX ORDER — ESCITALOPRAM OXALATE 10 MG/1
10 TABLET ORAL DAILY
Qty: 30 TABLET | Refills: 3 | Status: SHIPPED | OUTPATIENT
Start: 2021-02-08 | End: 2021-04-13 | Stop reason: SDUPTHER

## 2021-02-08 SDOH — SOCIAL DETERMINANTS OF HEALTH (SDOH): OTHER SPECIFIED PROBLEMS RELATED TO PRIMARY SUPPORT GROUP: Z63.8

## 2021-02-09 ENCOUNTER — LAB VISIT (OUTPATIENT)
Dept: LAB | Facility: HOSPITAL | Age: 76
End: 2021-02-09
Attending: FAMILY MEDICINE
Payer: MEDICARE

## 2021-02-09 DIAGNOSIS — Z79.899 MEDICATION MANAGEMENT: ICD-10-CM

## 2021-02-09 DIAGNOSIS — Z51.81 ENCOUNTER FOR MONITORING LONG-TERM PROTON PUMP INHIBITOR THERAPY: ICD-10-CM

## 2021-02-09 DIAGNOSIS — E78.5 HYPERLIPIDEMIA, UNSPECIFIED HYPERLIPIDEMIA TYPE: ICD-10-CM

## 2021-02-09 DIAGNOSIS — Z79.899 ENCOUNTER FOR MONITORING LONG-TERM PROTON PUMP INHIBITOR THERAPY: ICD-10-CM

## 2021-02-09 DIAGNOSIS — E03.9 ACQUIRED HYPOTHYROIDISM: ICD-10-CM

## 2021-02-09 DIAGNOSIS — Z00.00 ROUTINE GENERAL MEDICAL EXAMINATION AT A HEALTH CARE FACILITY: ICD-10-CM

## 2021-02-09 DIAGNOSIS — M85.80 OSTEOPENIA OF THE ELDERLY: ICD-10-CM

## 2021-02-09 LAB
25(OH)D3+25(OH)D2 SERPL-MCNC: 52 NG/ML (ref 30–96)
ALBUMIN SERPL BCP-MCNC: 3.6 G/DL (ref 3.5–5.2)
ALP SERPL-CCNC: 63 U/L (ref 55–135)
ALT SERPL W/O P-5'-P-CCNC: 16 U/L (ref 10–44)
ANION GAP SERPL CALC-SCNC: 6 MMOL/L (ref 8–16)
AST SERPL-CCNC: 18 U/L (ref 10–40)
BASOPHILS # BLD AUTO: 0.04 K/UL (ref 0–0.2)
BASOPHILS NFR BLD: 0.9 % (ref 0–1.9)
BILIRUB SERPL-MCNC: 0.3 MG/DL (ref 0.1–1)
BUN SERPL-MCNC: 17 MG/DL (ref 8–23)
CALCIUM SERPL-MCNC: 8.8 MG/DL (ref 8.7–10.5)
CHLORIDE SERPL-SCNC: 105 MMOL/L (ref 95–110)
CHOLEST SERPL-MCNC: 152 MG/DL (ref 120–199)
CHOLEST/HDLC SERPL: 2.6 {RATIO} (ref 2–5)
CO2 SERPL-SCNC: 30 MMOL/L (ref 23–29)
CREAT SERPL-MCNC: 0.8 MG/DL (ref 0.5–1.4)
DIFFERENTIAL METHOD: NORMAL
EOSINOPHIL # BLD AUTO: 0.2 K/UL (ref 0–0.5)
EOSINOPHIL NFR BLD: 4.3 % (ref 0–8)
ERYTHROCYTE [DISTWIDTH] IN BLOOD BY AUTOMATED COUNT: 12.5 % (ref 11.5–14.5)
EST. GFR  (AFRICAN AMERICAN): >60 ML/MIN/1.73 M^2
EST. GFR  (NON AFRICAN AMERICAN): >60 ML/MIN/1.73 M^2
ESTIMATED AVG GLUCOSE: 117 MG/DL (ref 68–131)
GLUCOSE SERPL-MCNC: 91 MG/DL (ref 70–110)
HBA1C MFR BLD: 5.7 % (ref 4–5.6)
HCT VFR BLD AUTO: 40.6 % (ref 37–48.5)
HDLC SERPL-MCNC: 58 MG/DL (ref 40–75)
HDLC SERPL: 38.2 % (ref 20–50)
HGB BLD-MCNC: 13.2 G/DL (ref 12–16)
IMM GRANULOCYTES # BLD AUTO: 0.01 K/UL (ref 0–0.04)
IMM GRANULOCYTES NFR BLD AUTO: 0.2 % (ref 0–0.5)
LDLC SERPL CALC-MCNC: 83.2 MG/DL (ref 63–159)
LYMPHOCYTES # BLD AUTO: 1.6 K/UL (ref 1–4.8)
LYMPHOCYTES NFR BLD: 35.1 % (ref 18–48)
MAGNESIUM SERPL-MCNC: 2.2 MG/DL (ref 1.6–2.6)
MCH RBC QN AUTO: 30.7 PG (ref 27–31)
MCHC RBC AUTO-ENTMCNC: 32.5 G/DL (ref 32–36)
MCV RBC AUTO: 94 FL (ref 82–98)
MONOCYTES # BLD AUTO: 0.5 K/UL (ref 0.3–1)
MONOCYTES NFR BLD: 11.8 % (ref 4–15)
NEUTROPHILS # BLD AUTO: 2.1 K/UL (ref 1.8–7.7)
NEUTROPHILS NFR BLD: 47.7 % (ref 38–73)
NONHDLC SERPL-MCNC: 94 MG/DL
NRBC BLD-RTO: 0 /100 WBC
PLATELET # BLD AUTO: 216 K/UL (ref 150–350)
PMV BLD AUTO: 11 FL (ref 9.2–12.9)
POTASSIUM SERPL-SCNC: 4.5 MMOL/L (ref 3.5–5.1)
PROT SERPL-MCNC: 6.6 G/DL (ref 6–8.4)
RBC # BLD AUTO: 4.3 M/UL (ref 4–5.4)
SODIUM SERPL-SCNC: 141 MMOL/L (ref 136–145)
TRIGL SERPL-MCNC: 54 MG/DL (ref 30–150)
TSH SERPL DL<=0.005 MIU/L-ACNC: 1.89 UIU/ML (ref 0.4–4)
VIT B12 SERPL-MCNC: 840 PG/ML (ref 210–950)
WBC # BLD AUTO: 4.41 K/UL (ref 3.9–12.7)

## 2021-02-09 PROCEDURE — 36415 COLL VENOUS BLD VENIPUNCTURE: CPT

## 2021-02-09 PROCEDURE — 82607 VITAMIN B-12: CPT

## 2021-02-09 PROCEDURE — 80061 LIPID PANEL: CPT

## 2021-02-09 PROCEDURE — 83735 ASSAY OF MAGNESIUM: CPT

## 2021-02-09 PROCEDURE — 84443 ASSAY THYROID STIM HORMONE: CPT

## 2021-02-09 PROCEDURE — 80053 COMPREHEN METABOLIC PANEL: CPT

## 2021-02-09 PROCEDURE — 82306 VITAMIN D 25 HYDROXY: CPT

## 2021-02-09 PROCEDURE — 85025 COMPLETE CBC W/AUTO DIFF WBC: CPT

## 2021-02-09 PROCEDURE — 83036 HEMOGLOBIN GLYCOSYLATED A1C: CPT

## 2021-02-18 ENCOUNTER — PATIENT MESSAGE (OUTPATIENT)
Dept: FAMILY MEDICINE | Facility: CLINIC | Age: 76
End: 2021-02-18

## 2021-02-18 DIAGNOSIS — E03.9 ACQUIRED HYPOTHYROIDISM: ICD-10-CM

## 2021-02-18 RX ORDER — LEVOTHYROXINE SODIUM 75 UG/1
TABLET ORAL
Qty: 90 TABLET | Refills: 4 | Status: SHIPPED | OUTPATIENT
Start: 2021-02-18 | End: 2021-02-22 | Stop reason: SDUPTHER

## 2021-02-21 ENCOUNTER — PATIENT MESSAGE (OUTPATIENT)
Dept: FAMILY MEDICINE | Facility: CLINIC | Age: 76
End: 2021-02-21

## 2021-02-22 DIAGNOSIS — E03.9 ACQUIRED HYPOTHYROIDISM: ICD-10-CM

## 2021-02-22 RX ORDER — LEVOTHYROXINE SODIUM 75 UG/1
TABLET ORAL
Qty: 90 TABLET | Refills: 4 | Status: SHIPPED | OUTPATIENT
Start: 2021-02-22 | End: 2022-01-11

## 2021-04-13 ENCOUNTER — OFFICE VISIT (OUTPATIENT)
Dept: FAMILY MEDICINE | Facility: CLINIC | Age: 76
End: 2021-04-13
Payer: MEDICARE

## 2021-04-13 DIAGNOSIS — F43.22 ADJUSTMENT REACTION WITH ANXIETY: Primary | ICD-10-CM

## 2021-04-13 DIAGNOSIS — Z63.8 STRESS DUE TO FAMILY TENSION: ICD-10-CM

## 2021-04-13 DIAGNOSIS — E03.9 ACQUIRED HYPOTHYROIDISM: ICD-10-CM

## 2021-04-13 DIAGNOSIS — E78.5 HYPERLIPIDEMIA, UNSPECIFIED HYPERLIPIDEMIA TYPE: ICD-10-CM

## 2021-04-13 DIAGNOSIS — G47.00 INSOMNIA, UNSPECIFIED TYPE: ICD-10-CM

## 2021-04-13 PROCEDURE — 99213 PR OFFICE/OUTPT VISIT, EST, LEVL III, 20-29 MIN: ICD-10-PCS | Mod: 95,,, | Performed by: FAMILY MEDICINE

## 2021-04-13 PROCEDURE — 99213 OFFICE O/P EST LOW 20 MIN: CPT | Mod: 95,,, | Performed by: FAMILY MEDICINE

## 2021-04-13 RX ORDER — ESCITALOPRAM OXALATE 10 MG/1
10 TABLET ORAL DAILY
Qty: 90 TABLET | Refills: 4 | Status: SHIPPED | OUTPATIENT
Start: 2021-04-13 | End: 2022-02-11 | Stop reason: SDUPTHER

## 2021-04-13 SDOH — SOCIAL DETERMINANTS OF HEALTH (SDOH): OTHER SPECIFIED PROBLEMS RELATED TO PRIMARY SUPPORT GROUP: Z63.8

## 2021-04-16 ENCOUNTER — OFFICE VISIT (OUTPATIENT)
Dept: CARDIOLOGY | Facility: CLINIC | Age: 76
End: 2021-04-16
Payer: MEDICARE

## 2021-04-16 VITALS
BODY MASS INDEX: 22.29 KG/M2 | HEART RATE: 84 BPM | WEIGHT: 138.69 LBS | SYSTOLIC BLOOD PRESSURE: 109 MMHG | HEIGHT: 66 IN | DIASTOLIC BLOOD PRESSURE: 59 MMHG

## 2021-04-16 DIAGNOSIS — I65.22 ATHEROSCLEROSIS OF LEFT CAROTID ARTERY: Primary | ICD-10-CM

## 2021-04-16 DIAGNOSIS — E78.5 HYPERLIPIDEMIA, UNSPECIFIED HYPERLIPIDEMIA TYPE: ICD-10-CM

## 2021-04-16 PROCEDURE — 99213 PR OFFICE/OUTPT VISIT, EST, LEVL III, 20-29 MIN: ICD-10-PCS | Mod: S$PBB,,, | Performed by: INTERNAL MEDICINE

## 2021-04-16 PROCEDURE — 99999 PR PBB SHADOW E&M-EST. PATIENT-LVL III: CPT | Mod: PBBFAC,,, | Performed by: INTERNAL MEDICINE

## 2021-04-16 PROCEDURE — 99213 OFFICE O/P EST LOW 20 MIN: CPT | Mod: PBBFAC | Performed by: INTERNAL MEDICINE

## 2021-04-16 PROCEDURE — 99213 OFFICE O/P EST LOW 20 MIN: CPT | Mod: S$PBB,,, | Performed by: INTERNAL MEDICINE

## 2021-04-16 PROCEDURE — 99999 PR PBB SHADOW E&M-EST. PATIENT-LVL III: ICD-10-PCS | Mod: PBBFAC,,, | Performed by: INTERNAL MEDICINE

## 2021-04-28 ENCOUNTER — PES CALL (OUTPATIENT)
Dept: ADMINISTRATIVE | Facility: CLINIC | Age: 76
End: 2021-04-28

## 2021-10-11 DIAGNOSIS — G43.709 CHRONIC MIGRAINE WITHOUT AURA WITHOUT STATUS MIGRAINOSUS, NOT INTRACTABLE: ICD-10-CM

## 2021-10-11 DIAGNOSIS — F51.01 PRIMARY INSOMNIA: ICD-10-CM

## 2021-10-11 RX ORDER — AMITRIPTYLINE HYDROCHLORIDE 25 MG/1
TABLET, FILM COATED ORAL
Qty: 30 TABLET | Refills: 11 | Status: SHIPPED | OUTPATIENT
Start: 2021-10-11 | End: 2022-06-21

## 2021-11-08 ENCOUNTER — PES CALL (OUTPATIENT)
Dept: ADMINISTRATIVE | Facility: CLINIC | Age: 76
End: 2021-11-08
Payer: MEDICARE

## 2021-12-21 ENCOUNTER — TELEPHONE (OUTPATIENT)
Dept: ADMINISTRATIVE | Facility: CLINIC | Age: 76
End: 2021-12-21
Payer: MEDICARE

## 2022-01-03 NOTE — TELEPHONE ENCOUNTER
"[facility]    Hepatology Follow-up    CC: Confusion    Dx: Alcoholic hepatitis complicated by ascites                  Encephalopathy, suspect hepatic-resolving                  Acute on chronic macrocytic anemia                      24 hour events:   No acute events    Subjective:  Denied abd pain, no other concerns    Medications  Current Facility-Administered Medications   Medication Dose Route Frequency    cefTRIAXone  2 g Intravenous Q24H    lactulose  10 g Oral TID    multivitamin, therapeutic  1 tablet Oral Daily    pantoprazole  40 mg Oral BID    rifaximin  550 mg Oral BID    sodium chloride (PF)  3 mL Intracatheter Q8H       Review of systems  A 10-point review of systems was negative.    Examination  BP 92/49 (BP Location: Right arm)   Pulse 91   Temp 97.9  F (36.6  C) (Oral)   Resp 15   Ht 1.651 m (5' 5\")   Wt 67.5 kg (148 lb 13 oz)   SpO2 93%   BMI 24.76 kg/m      Intake/Output Summary (Last 24 hours) at 1/1/2022 0612  Last data filed at 1/1/2022 0521  Gross per 24 hour   Intake 1120 ml   Output 1090 ml   Net 30 ml       Constitutional: cooperative, pleasant  Eyes: Sclera icteric  Ears/nose/mouth/throat: mucus membranes moist  Neck: supple  CV: No edema  Respiratory: Unlabored breathing  Abd: distended, +bs, nontender, fluid thrill +  Skin: warm, perfused, jaundiced  Neuro: AAO x 3, + asterixis  MSK: No gross deformities    Laboratory  Lab Results   Component Value Date     12/31/2021     09/18/2020    POTASSIUM 4.1 12/31/2021    POTASSIUM 3.1 09/18/2020    CHLORIDE 95 12/31/2021    CHLORIDE 106 09/18/2020    CO2 21 12/31/2021    CO2 25 09/18/2020    BUN 62 12/31/2021    BUN 2 09/18/2020    CR 1.71 12/31/2021    CR 0.56 09/18/2020       Lab Results   Component Value Date    BILITOTAL 27.1 12/31/2021    BILITOTAL 1.1 09/18/2020    ALT 40 12/31/2021    ALT 57 09/18/2020    AST 71 12/31/2021     09/18/2020    ALKPHOS 77 12/31/2021    ALKPHOS 261 " Informed pt that the covering physician, Dr. Hoff resent the Levothyroxine with the new corrections.    09/18/2020       Lab Results   Component Value Date    WBC 14.8 12/31/2021    WBC 12.8 09/18/2020    HGB 7.7 12/31/2021    HGB 12.3 09/18/2020     12/31/2021     09/18/2020    PLT 58 12/31/2021     09/18/2020       Lab Results   Component Value Date    INR 2.57 12/31/2021    INR 1.9 10/07/2021       MELD-Na score: 33 at 1/3/2022  7:41 AM  MELD score: 32 at 1/3/2022  7:41 AM  Calculated from:  Serum Creatinine: 1.16 mg/dL at 1/3/2022  7:41 AM  Serum Sodium: 135 mmol/L at 1/3/2022  7:41 AM  Total Bilirubin: 18.6 mg/dL at 1/3/2022  7:41 AM  INR(ratio): 3.16 at 1/3/2022  7:33 AM  Age: 50 years    Radiology  US ABD 12/20/21:  Impression:   1. Cirrhotic morphology of liver with sequela of portal hypertension  including splenomegaly, ascites and to and fro flow in the main portal  and right portal vein.  2. Gallbladder sludge and stones. Gallbladder wall thickening  nonspecific in the setting of cirrhosis/ascites.      CTA 12/31/21:  IMPRESSION:   1. No acute or suspicious findings in the chest.   2. Incidental findings include anemic blood pool, small hiatal hernia,  and known cirrhosis with sequela of portal hypertension (ascites).  3. Please see cardiology report for detailed assessment of the cardiac  Findings.      ECHO 1/2/22:  Interpretation Summary  Left ventricular size, wall motion and function are normal. The ejection  fraction is 60-65%.  Right ventricular function, chamber size, wall motion, and thickness are  normal.  Right ventricular systolic pressure is 27mmHg above the right atrial pressure.  Pulmonary artery systolic pressure is normal.  IVC diameter <2.1 cm collapsing >50% with sniff suggests a normal RA pressure  of 3 mmHg.  No pericardial effusion is present.      Assessment  50 year old with recent alcoholic hepatitis complicated by ascites, last alcohol intake in September 2021, meld 33 undergoing liver transplantation.    Recent alcoholic hepatitis, in LT eval  Her meld is 35  mainly due to bilirubin and INR.  Patient is undergoing transplant evaluation.  ECHO was noted to be normal. Her WBC slightly uptrended, will plan infectious workup. 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 CXR from today was reviewed, pt is already on Abx for PNA. Pt is due for screening colonoscopy required before listing which will be performed tomorrow. Mammogram and colposcopy can be performed as outpatient.  Her case will be presented upon transplant conference tomorrow.    Acute on chronic macrocytic anemia  Patient had slight drop in her hemoglobin with no signs of bleeding her recent endoscopy was in October 2021 with small esophageal varices, portal hypertensive gastropathy and gastric erosions without bleeding.  At this point, would recommend to continue monitoring.    ROSSY-resolving  Pt has slightly worsened Cr from her normal baseline of 0.86, likely component of acute kidney injury, creatinine down trended in the last few days after getting the albumin challenge consistent with pre-renal ROSSY.    Recommendations  -- Awaiting presentation upon transplant conference tomorrow  -- Plan for colonoscopy tomorrow  -- Clear liquid diet today and start Golytely prep today with NPO after MN  -- Continue Abx as per primary team  -- Rifaximin 550 mg BID  -- Lactulose PO, titrate to 3-4 BMs per day  -- Monitor transaminases, bilirubin, INR  --Consider restarting low dose diuretics in next few days if infectious workup -ve  -- Ensure sodium restriction to 2000 mg per day  -- Physical therapy/OT  -- Adequate protein diet (1.2 - 1.5g/kg/day)      Thank you for involving us in this patient's care. Please do not hesitate to contact the GI service with any questions or concerns.      Pt care plan discussed with Dr. Steele, Hepatology staff physician.    This note was created with voice recognition software, and while reviewed for accuracy, typos may remain.    Niecy Dumas MD  Advance and Transplant  Hepatology Fellow  Pager: [phone]    ATTENDING NOTE, GASTROENTEROLOGY/HEPATOLOGY    I saw and discussed this patient with the fellow and participated in the decision making. I agree with the fellow's note. Zita Steele MD

## 2022-01-07 ENCOUNTER — OFFICE VISIT (OUTPATIENT)
Dept: INTERNAL MEDICINE | Facility: CLINIC | Age: 77
End: 2022-01-07
Payer: MEDICARE

## 2022-01-07 VITALS
HEIGHT: 66 IN | RESPIRATION RATE: 18 BRPM | BODY MASS INDEX: 22.47 KG/M2 | HEART RATE: 72 BPM | WEIGHT: 139.81 LBS | OXYGEN SATURATION: 98 % | SYSTOLIC BLOOD PRESSURE: 112 MMHG | DIASTOLIC BLOOD PRESSURE: 68 MMHG

## 2022-01-07 DIAGNOSIS — Z00.00 ENCOUNTER FOR PREVENTIVE HEALTH EXAMINATION: Primary | ICD-10-CM

## 2022-01-07 DIAGNOSIS — M85.80 OSTEOPENIA OF THE ELDERLY: ICD-10-CM

## 2022-01-07 DIAGNOSIS — F43.22 ADJUSTMENT REACTION WITH ANXIETY: ICD-10-CM

## 2022-01-07 DIAGNOSIS — E03.9 ACQUIRED HYPOTHYROIDISM: ICD-10-CM

## 2022-01-07 DIAGNOSIS — Z79.890 POSTMENOPAUSAL HRT (HORMONE REPLACEMENT THERAPY): ICD-10-CM

## 2022-01-07 DIAGNOSIS — H35.30 MACULAR DEGENERATION OF RIGHT EYE, UNSPECIFIED TYPE: ICD-10-CM

## 2022-01-07 DIAGNOSIS — E78.5 HYPERLIPIDEMIA, UNSPECIFIED HYPERLIPIDEMIA TYPE: ICD-10-CM

## 2022-01-07 DIAGNOSIS — G43.709 CHRONIC MIGRAINE WITHOUT AURA WITHOUT STATUS MIGRAINOSUS, NOT INTRACTABLE: ICD-10-CM

## 2022-01-07 DIAGNOSIS — G47.00 INSOMNIA, UNSPECIFIED TYPE: ICD-10-CM

## 2022-01-07 DIAGNOSIS — I65.22 ATHEROSCLEROSIS OF LEFT CAROTID ARTERY: ICD-10-CM

## 2022-01-07 DIAGNOSIS — Z80.3 FAMILY HISTORY OF BREAST CANCER IN MOTHER: ICD-10-CM

## 2022-01-07 PROCEDURE — 99999 PR PBB SHADOW E&M-EST. PATIENT-LVL V: ICD-10-PCS | Mod: PBBFAC,,, | Performed by: NURSE PRACTITIONER

## 2022-01-07 PROCEDURE — 99999 PR PBB SHADOW E&M-EST. PATIENT-LVL V: CPT | Mod: PBBFAC,,, | Performed by: NURSE PRACTITIONER

## 2022-01-07 PROCEDURE — G0439 PR MEDICARE ANNUAL WELLNESS SUBSEQUENT VISIT: ICD-10-PCS | Mod: ,,, | Performed by: NURSE PRACTITIONER

## 2022-01-07 PROCEDURE — 99215 OFFICE O/P EST HI 40 MIN: CPT | Mod: PBBFAC,PN | Performed by: NURSE PRACTITIONER

## 2022-01-07 PROCEDURE — G0439 PPPS, SUBSEQ VISIT: HCPCS | Mod: ,,, | Performed by: NURSE PRACTITIONER

## 2022-01-07 NOTE — PROGRESS NOTES
"  Prema Phillips presented for a  Medicare AWV and comprehensive Health Risk Assessment today. The following components were reviewed and updated:    · Medical history  · Family History  · Social history  · Allergies and Current Medications  · Health Risk Assessment  · Health Maintenance  · Care Team         ** See Completed Assessments for Annual Wellness Visit within the encounter summary.**         The following assessments were completed:  · Living Situation  · CAGE  · Depression Screening  · Timed Get Up and Go  · Whisper Test  · Cognitive Function Screening  · Nutrition Screening  · ADL Screening  · PAQ Screening        Vitals:    01/07/22 0859   BP: 112/68   BP Location: Left arm   Patient Position: Sitting   BP Method: Medium (Manual)   Pulse: 72   Resp: 18   SpO2: 98%   Weight: 63.4 kg (139 lb 12.8 oz)   Height: 5' 6" (1.676 m)     Body mass index is 22.56 kg/m².  Physical Exam  Vitals and nursing note reviewed.   Constitutional:       General: She is not in acute distress.     Appearance: She is well-developed, normal weight and well-nourished. She is not ill-appearing.   HENT:      Head: Normocephalic and atraumatic.      Ears:      Comments: Wearing bilateral hearing aids.   Eyes:      Extraocular Movements: EOM normal.      Pupils: Pupils are equal, round, and reactive to light.   Cardiovascular:      Rate and Rhythm: Normal rate and regular rhythm.      Pulses: Intact distal pulses.      Heart sounds: Normal heart sounds.   Pulmonary:      Effort: Pulmonary effort is normal. No respiratory distress.      Breath sounds: Normal breath sounds.   Musculoskeletal:         General: No edema. Normal range of motion.      Cervical back: Normal range of motion.   Skin:     General: Skin is warm and dry.   Neurological:      Mental Status: She is alert and oriented to person, place, and time.      Coordination: Coordination normal.   Psychiatric:         Mood and Affect: Mood and affect and mood normal.         " Behavior: Behavior normal.         Thought Content: Thought content normal.         Judgment: Judgment normal.               Diagnoses and health risks identified today and associated recommendations/orders:    1. Encounter for preventive health examination    2. Chronic migraine without aura without status migrainosus, not intractable  Chronic; stable. Continue current treatment plan as previously prescribed by PCP.     3. Adjustment reaction with anxiety  Chronic; stable. Continue current treatment plan as previously prescribed by PCP.    4. Macular degeneration of right eye, unspecified type  Chronic; stable. Continue current treatment plan as previously prescribed by PCP.    5. Hyperlipidemia, unspecified hyperlipidemia type  Chronic; stable. Continue current treatment plan as previously prescribed by PCP.    6. Atherosclerosis of left carotid artery  Chronic; stable. Continue current treatment plan as previously prescribed by Cardiology (Dr. Ash).     7. Postmenopausal HRT (hormone replacement therapy)  Chronic; stable. Continue current treatment plan as previously prescribed by OBGYN.    8. Acquired hypothyroidism  Chronic; stable. Continue current treatment plan as previously prescribed by PCP.    9. Osteopenia of the elderly  Chronic; stable. Continue current treatment plan as previously prescribed by PCP.    10. Insomnia, unspecified type  Chronic; stable. Continue current treatment plan as previously prescribed by PCP.    11. Family history of breast cancer in mother  Chronic; stable. Patient followed by PCP.      Provided Prema with a 5-10 year written screening schedule and personal prevention plan. Recommendations were developed using the USPSTF age appropriate recommendations. Education, counseling, and referrals were provided as needed. After Visit Summary printed and given to patient which includes a list of additional screenings\tests needed.    I offered to discuss advanced care planning, including  how to pick a person who would make decisions for you if you were unable to make them for yourself, called a health care power of , and what kind of decisions you might make such as use of life sustaining treatments such as ventilators and tube feeding when faced with a life limiting illness recorded on a living will that they will need to know. (How you want to be cared for as you near the end of your natural life)      X Patient is interested in learning more about how to make advanced directives.  I provided them paperwork and offered to discuss this with them.    Follow up for AWV in 1 year.    Hanna Lux NP

## 2022-01-07 NOTE — PATIENT INSTRUCTIONS
Counseling and Referral of Other Preventative  (Italic type indicates deductible and co-insurance are waived)    Patient Name: Prema Phillips  Today's Date: 1/7/2022    Health Maintenance       Date Due Completion Date    COVID-19 Vaccine (1) Never done ---    Shingles Vaccine (2 of 2) 01/05/2020 11/10/2019    Aspirin/Antiplatelet Therapy 02/08/2022 2/8/2021    Eye Exam 07/30/2022 7/30/2021    Override on 8/9/2019: Done    Override on 12/8/2017: Done ([facility] - see media)    Override on 12/5/2016: Done ([facility])    DEXA SCAN 11/05/2022 11/5/2019    Colonoscopy 08/27/2024 8/27/2019    Override on 5/5/2009: Done (repeat in 5 yrs-shalini cherry )    TETANUS VACCINE 06/01/2025 6/1/2015    Lipid Panel 02/09/2026 2/9/2021        No orders of the defined types were placed in this encounter.    The following information is provided to all patients.  This information is to help you find resources for any of the problems found today that may be affecting your health:                Living healthy guide: www.[facility].[place].gov      Understanding Diabetes: www.diabetes.org      Eating healthy: www.cdc.gov/healthyweight      CDC home safety checklist: www.cdc.gov/steadi/patient.html      Agency on Aging: www.goea.[place].[facility]      Alcoholics anonymous (AA): www.aa.org      Physical Activity: www.adal.nih.gov/cv6vfyq      Tobacco use: www.quitwithusla.org

## 2022-01-07 NOTE — PROGRESS NOTES
I offered to discuss advanced care planning, including how to pick a person who would make decisions for you if you were unable to make them for yourself, called a health care power of , and what kind of decisions you might make such as use of life sustaining treatments such as ventilators and tube feeding when faced with a life limiting illness recorded on a living will that they will need to know. (How you want to be cared for as you near the end of your natural life)     X Patient is interested in learning more about how to make advanced directives.  I provided them paperwork and offered to discuss this with them.

## 2022-01-11 DIAGNOSIS — E03.9 ACQUIRED HYPOTHYROIDISM: ICD-10-CM

## 2022-01-11 RX ORDER — LEVOTHYROXINE SODIUM 75 UG/1
TABLET ORAL
Qty: 90 TABLET | Refills: 0 | Status: SHIPPED | OUTPATIENT
Start: 2022-01-11 | End: 2022-02-11 | Stop reason: SDUPTHER

## 2022-01-11 NOTE — TELEPHONE ENCOUNTER
No new care gaps identified.  Powered by [employer] Reference number: [id number].   1/11/2022 5:03:20 PM CST

## 2022-01-11 NOTE — TELEPHONE ENCOUNTER
Refill Authorization Note   Prema Phillips  is requesting a refill authorization.  Brief Assessment and Rationale for Refill:  Approve     Medication Therapy Plan:       Medication Reconciliation Completed: No   Comments:   --->Care Gap information included below if applicable.   Orders Placed This Encounter    levothyroxine (SYNTHROID) 75 MCG tablet      Requested Prescriptions   Signed Prescriptions Disp Refills    levothyroxine (SYNTHROID) 75 MCG tablet 90 tablet 0     Sig: TAKE 1 TABLET DAILY BEFORE BREAKFAST       Endocrinology:  Hypothyroid Agents Failed - 1/11/2022  5:03 PM        Failed - T4 free within 1080 days     Free T4   Date Value Ref Range Status   03/09/2017 1.05 0.71 - 1.51 ng/dL Final   12/02/2016 0.80 0.71 - 1.51 ng/dL Final   08/12/2013 1.01 0.71 - 1.51 ng/dl Final              Passed - Patient is at least 18 years old        Passed - Valid encounter within last 15 months     Recent Visits  Date Type Provider Dept   04/13/21 Office Visit Odette Zhang MD [facility]   02/08/21 Office Visit Odette Zhang MD [facility] Medicine   Showing recent visits within past 720 days and meeting all other requirements  Future Appointments  No visits were found meeting these conditions.  Showing future appointments within next 150 days and meeting all other requirements      Future Appointments              In 1 month Odette Zhang MD [facility] Medicine, [facility]    In 1 month Odette Wilkinsno NP [facility] - Cardiology, [place]                Passed - Manual Review: FT4 is not required if last TSH is WNL.        Passed - TSH in normal range and within 360 days     TSH   Date Value Ref Range Status   02/09/2021 1.892 0.400 - 4.000 uIU/mL Final   11/05/2019 1.793 0.400 - 4.000 uIU/mL Final   06/05/2019 1.473 0.400 - 4.000 uIU/mL Final                  Appointments  past 12m or future 3m with PCP    Date Provider   Last Visit   4/13/2021 Odette Zhang MD   Next  Visit   2/11/2022 Odette Zhang MD   ED visits in past 90 days: 0     Note composed:5:53 PM 01/11/2022

## 2022-02-11 ENCOUNTER — OFFICE VISIT (OUTPATIENT)
Dept: FAMILY MEDICINE | Facility: CLINIC | Age: 77
End: 2022-02-11
Payer: MEDICARE

## 2022-02-11 VITALS
HEIGHT: 66 IN | HEART RATE: 83 BPM | DIASTOLIC BLOOD PRESSURE: 82 MMHG | WEIGHT: 140 LBS | SYSTOLIC BLOOD PRESSURE: 128 MMHG | BODY MASS INDEX: 22.5 KG/M2 | OXYGEN SATURATION: 100 %

## 2022-02-11 DIAGNOSIS — M19.042 PRIMARY OSTEOARTHRITIS OF BOTH HANDS: ICD-10-CM

## 2022-02-11 DIAGNOSIS — F43.22 ADJUSTMENT REACTION WITH ANXIETY: ICD-10-CM

## 2022-02-11 DIAGNOSIS — L85.3 DRY SKIN DERMATITIS: ICD-10-CM

## 2022-02-11 DIAGNOSIS — E78.5 HYPERLIPIDEMIA, UNSPECIFIED HYPERLIPIDEMIA TYPE: ICD-10-CM

## 2022-02-11 DIAGNOSIS — Z00.00 ROUTINE GENERAL MEDICAL EXAMINATION AT A HEALTH CARE FACILITY: Primary | ICD-10-CM

## 2022-02-11 DIAGNOSIS — Z90.710 HISTORY OF TOTAL HYSTERECTOMY WITH BILATERAL SALPINGO-OOPHORECTOMY (BSO): ICD-10-CM

## 2022-02-11 DIAGNOSIS — E03.9 ACQUIRED HYPOTHYROIDISM: ICD-10-CM

## 2022-02-11 DIAGNOSIS — Z63.8 STRESS DUE TO FAMILY TENSION: ICD-10-CM

## 2022-02-11 DIAGNOSIS — Z79.899 MEDICATION MANAGEMENT: ICD-10-CM

## 2022-02-11 DIAGNOSIS — J30.9 ALLERGIC RHINITIS WITH POSTNASAL DRIP: ICD-10-CM

## 2022-02-11 DIAGNOSIS — Z79.82 LONG-TERM USE OF ASPIRIN THERAPY: ICD-10-CM

## 2022-02-11 DIAGNOSIS — Z51.81 ENCOUNTER FOR MONITORING LONG-TERM PROTON PUMP INHIBITOR THERAPY: ICD-10-CM

## 2022-02-11 DIAGNOSIS — M85.80 OSTEOPENIA OF THE ELDERLY: ICD-10-CM

## 2022-02-11 DIAGNOSIS — Z78.0 POSTMENOPAUSAL: ICD-10-CM

## 2022-02-11 DIAGNOSIS — R09.82 ALLERGIC RHINITIS WITH POSTNASAL DRIP: ICD-10-CM

## 2022-02-11 DIAGNOSIS — H35.30 MACULAR DEGENERATION OF RIGHT EYE, UNSPECIFIED TYPE: ICD-10-CM

## 2022-02-11 DIAGNOSIS — K29.70 GASTRITIS, PRESENCE OF BLEEDING UNSPECIFIED, UNSPECIFIED CHRONICITY, UNSPECIFIED GASTRITIS TYPE: ICD-10-CM

## 2022-02-11 DIAGNOSIS — M19.041 PRIMARY OSTEOARTHRITIS OF BOTH HANDS: ICD-10-CM

## 2022-02-11 DIAGNOSIS — Z90.722 HISTORY OF TOTAL HYSTERECTOMY WITH BILATERAL SALPINGO-OOPHORECTOMY (BSO): ICD-10-CM

## 2022-02-11 DIAGNOSIS — I65.22 ATHEROSCLEROSIS OF LEFT CAROTID ARTERY: ICD-10-CM

## 2022-02-11 DIAGNOSIS — Z79.899 ENCOUNTER FOR MONITORING LONG-TERM PROTON PUMP INHIBITOR THERAPY: ICD-10-CM

## 2022-02-11 DIAGNOSIS — Z90.79 HISTORY OF TOTAL HYSTERECTOMY WITH BILATERAL SALPINGO-OOPHORECTOMY (BSO): ICD-10-CM

## 2022-02-11 DIAGNOSIS — Z23 NEED FOR SHINGLES VACCINE: ICD-10-CM

## 2022-02-11 DIAGNOSIS — G47.00 INSOMNIA, UNSPECIFIED TYPE: ICD-10-CM

## 2022-02-11 DIAGNOSIS — I45.10 RBBB: ICD-10-CM

## 2022-02-11 PROCEDURE — 99214 OFFICE O/P EST MOD 30 MIN: CPT | Mod: PBBFAC,PO | Performed by: FAMILY MEDICINE

## 2022-02-11 PROCEDURE — 99999 PR PBB SHADOW E&M-EST. PATIENT-LVL IV: CPT | Mod: PBBFAC,,, | Performed by: FAMILY MEDICINE

## 2022-02-11 PROCEDURE — 99999 PR PBB SHADOW E&M-EST. PATIENT-LVL IV: ICD-10-PCS | Mod: PBBFAC,,, | Performed by: FAMILY MEDICINE

## 2022-02-11 PROCEDURE — 99397 PER PM REEVAL EST PAT 65+ YR: CPT | Mod: S$PBB,,, | Performed by: FAMILY MEDICINE

## 2022-02-11 PROCEDURE — 99397 PR PREVENTIVE VISIT,EST,65 & OVER: ICD-10-PCS | Mod: S$PBB,,, | Performed by: FAMILY MEDICINE

## 2022-02-11 RX ORDER — TRIAMCINOLONE ACETONIDE 0.25 MG/G
OINTMENT TOPICAL 2 TIMES DAILY
Qty: 15 G | Refills: 2 | Status: ON HOLD | OUTPATIENT
Start: 2022-02-11 | End: 2024-03-13 | Stop reason: HOSPADM

## 2022-02-11 RX ORDER — FLUTICASONE PROPIONATE 50 MCG
SPRAY, SUSPENSION (ML) NASAL
Qty: 16 ML | Refills: 11 | Status: ON HOLD | OUTPATIENT
Start: 2022-02-11 | End: 2024-03-13 | Stop reason: HOSPADM

## 2022-02-11 RX ORDER — LEVOTHYROXINE SODIUM 75 UG/1
TABLET ORAL
Qty: 90 TABLET | Refills: 4 | Status: SHIPPED | OUTPATIENT
Start: 2022-02-11 | End: 2023-02-07

## 2022-02-11 RX ORDER — MELOXICAM 15 MG/1
15 TABLET ORAL DAILY
Qty: 90 TABLET | Refills: 4 | Status: SHIPPED | OUTPATIENT
Start: 2022-02-11 | End: 2023-02-13 | Stop reason: SDUPTHER

## 2022-02-11 RX ORDER — ESCITALOPRAM OXALATE 10 MG/1
10 TABLET ORAL DAILY
Qty: 90 TABLET | Refills: 4 | Status: SHIPPED | OUTPATIENT
Start: 2022-02-11 | End: 2023-01-25

## 2022-02-11 SDOH — SOCIAL DETERMINANTS OF HEALTH (SDOH): OTHER SPECIFIED PROBLEMS RELATED TO PRIMARY SUPPORT GROUP: Z63.8

## 2022-02-11 NOTE — PROGRESS NOTES
Office Visit    Patient Name: Prema Phillips    : 1945  MRN: [id number]    Subjective:  Prema is a 77 y.o. female who presents today for:    Annual Exam    Prema Phillips presents today for annual wellness exam and monitoring of chronic conditions including hyperlipidemia, nonocclusive bilateral carotid artery disease, acquired hypothyroidism, postmenopausal osteopenia-- s/p hormone replacement therapy per GYN, migraine headaches, allergic rhinitis, chronic constipation managed by outside GI- [facility].       She did not have labs for today's office visit-- prior labs have been unremarkable.     She is postmenopausal.    She has a gynecologist Dr. Sheppard but no longer needs paps due to history of TAYLER/BSO.   Saw cardiologist Dr Ash 2021 & has NP F/U 3/8/22-- moderate left internal carotid stenosis (40-49%) and mild right internal carotid stenosis (20-39%).  She remains asymptomatic.   Periodic Imaging--last 3/9/20     She has been feeling overall well and has kept up with some travelling.   She will be travelling to [place] soon.   Her bowel movements remain improved on regimen of MiraLax and fiber supplement p.r.n..   Physical therapy for Neck pain was helpful. MSK/Osteoarthritis stable and takes Meloxicam and doing better with taking daily Prilosec to protect against associated GERD.      Has some ongoing family stressors/ conflict and continues to benefit from Lexapro 10 mg daily for associated anxiety.      General lifestyle habits are as follows:  Diet is described as healthy-- fruits/veggies/some fish and stays hydrated, exercise is described as fair-- walking sometimes for exercise and working in yard and some neck exercises-- NEEDS TO BE MORE CONSISTENT-- and some pedal exercise, sleep is described as good- Ativan most nights helps (RX's by Dr Daniels)-- along with nightly Amitriptyline ofr sleep and migraines. Weight is overall stable at BMI 22.     Immunizations: TDaP 2015, Pneumovax 23  2/11/19 and Prevnar 13 5/31/2016 , Zostavax complete, YEARLY FLU COMPLETED, SHINGRIX 1/2 11/10/2019 -- 2/2 ADVISED, COVID-19 Vaccine completed w/ Moderna booster 11/18/21     Screening Tests: MAMMOGRAMS-- YEARLY IN January PER GYN [facility], DEXA-->11/5/19--> stable osteopenia and repeat 2 years-- ORDERED, Colonoscopy up to date per Dr. Watt with [facility] & most recently was 8/27/2019- 5 year repeat advised, EGD 8/27/2019- Hep C screening negative 8/16/2016     Eye/Dental: up to date, ophthalmologist-- macular degeneration-- seeing retina specialist-- [facility], dental UTD          PAST MEDICAL HISTORY, SURGICAL/SOCIAL/FAMILY HISTORY REVIEWED AS PER CHART, WITH PERTINENT FINDINGS INCLUDED IN HISTORY SECTION OF NOTE.     Current Medications    Medication List with Changes/Refills   New Medications    TRIAMCINOLONE ACETONIDE 0.025% (KENALOG) 0.025 % OINT    Apply topically 2 (two) times daily.   Current Medications    AMITRIPTYLINE (ELAVIL) 25 MG TABLET    1/2-1 tab nightly for insomnia. Ok to take with the low dose of ativan    ASPIRIN (ECOTRIN) 81 MG EC TABLET    Take 81 mg by mouth once daily.    ATORVASTATIN (LIPITOR) 20 MG TABLET    Take 1 tablet (20 mg total) by mouth once daily.    AZELASTINE (ASTELIN) 137 MCG (0.1 %) NASAL SPRAY    1 spray (137 mcg total) by Nasal route 2 (two) times daily.    CALCIUM-VITAMIN D3-VITAMIN K 500-100-40 MG-UNIT-MCG CHEW    Take by mouth once daily.    CHOLECALCIFEROL, VITAMIN D3, (VITAMIN D3) 50 MCG (2,000 UNIT) TAB    once daily.     COENZYME Q10 200 MG CAPSULE    Take 200 mg by mouth once daily.    ELETRIPTAN (RELPAX) 40 MG TABLET    Take 40 mg by mouth. 1 Tablet Oral As directed    LORAZEPAM (ATIVAN) 1 MG TABLET    Take 0.5 mg by mouth.    MULTIVITAMIN CAPSULE    Take 1 capsule by mouth once daily.    OMEPRAZOLE (PRILOSEC) 20 MG CAPSULE    TAKE 1 CAPSULE BY MOUTH EVERY DAY IN THE MORNING    TURMERIC, BULK, MISC    by Misc.(Non-Drug; Combo Route)  route once daily.    VIT A/VIT C/VIT E/ZINC/COPPER (PRESERVISION AREDS ORAL)    Take by mouth once daily.    Changed and/or Refilled Medications    Modified Medication Previous Medication    ESCITALOPRAM OXALATE (LEXAPRO) 10 MG TABLET EScitalopram oxalate (LEXAPRO) 10 MG tablet       Take 1 tablet (10 mg total) by mouth once daily.    Take 1 tablet (10 mg total) by mouth once daily.    FLUTICASONE PROPIONATE (FLONASE) 50 MCG/ACTUATION NASAL SPRAY fluticasone propionate (FLONASE) 50 mcg/actuation nasal spray       SPRAY TWICE IN EACH NOSTRIL ONCE DAILY    SPRAY TWICE IN EACH NOSTRIL ONCE DAILY    LEVOTHYROXINE (SYNTHROID) 75 MCG TABLET levothyroxine (SYNTHROID) 75 MCG tablet       TAKE 1 TABLET DAILY BEFORE BREAKFAST    TAKE 1 TABLET DAILY BEFORE BREAKFAST    MELOXICAM (MOBIC) 15 MG TABLET meloxicam (MOBIC) 15 MG tablet       Take 1 tablet (15 mg total) by mouth once daily.    Take 15 mg by mouth once daily.       Allergies   Review of patient's allergies indicates:   Allergen Reactions    Neomycin Other (See Comments) and Swelling     Other reaction(s): Unknown         Review of Systems (Pertinent positives)  Review of Systems   Constitutional: Negative for activity change and unexpected weight change.   HENT: Positive for hearing loss (stable with hearing aid). Negative for rhinorrhea and trouble swallowing.    Eyes: Positive for visual disturbance. Negative for discharge.   Respiratory: Negative for chest tightness and wheezing.    Cardiovascular: Negative for chest pain and palpitations.   Gastrointestinal: Negative for blood in stool, constipation, diarrhea and vomiting.   Endocrine: Negative for polydipsia and polyuria.   Genitourinary: Negative for difficulty urinating, dysuria, hematuria and menstrual problem.   Musculoskeletal: Positive for arthralgias (hands and knees). Negative for joint swelling and neck pain.   Skin: Positive for rash (dry skin patch, persisten on right upper forehead).  "  Allergic/Immunologic: Positive for environmental allergies.   Neurological: Negative for weakness and headaches.   Psychiatric/Behavioral: Negative for confusion and dysphoric mood.       /82 (BP Location: Right arm, Patient Position: Sitting)   Pulse 83   Ht 5' 6" (1.676 m)   Wt 63.5 kg (139 lb 15.9 oz)   SpO2 100%   BMI 22.60 kg/m²     Physical Exam  Vitals reviewed.   Constitutional:       General: She is not in acute distress.     Appearance: Normal appearance. She is well-developed.   HENT:      Head: Normocephalic and atraumatic.      Right Ear: Ear canal normal. Tympanic membrane is not erythematous or bulging.      Left Ear: Ear canal normal. Tympanic membrane is not erythematous or bulging.      Nose: Nose normal.      Mouth/Throat:      Mouth: Mucous membranes are moist.      Pharynx: No oropharyngeal exudate.   Eyes:      Extraocular Movements: Extraocular movements intact.      Conjunctiva/sclera: Conjunctivae normal.   Neck:      Thyroid: No thyroid mass or thyromegaly.      Vascular: No carotid bruit.   Cardiovascular:      Rate and Rhythm: Normal rate and regular rhythm.      Pulses:           Dorsalis pedis pulses are 2+ on the right side and 2+ on the left side.      Heart sounds: Normal heart sounds. No murmur heard.      Pulmonary:      Effort: Pulmonary effort is normal. No respiratory distress.      Breath sounds: Normal breath sounds.   Abdominal:      General: Bowel sounds are normal. There is no distension.      Palpations: Abdomen is soft. There is no mass.      Tenderness: There is no abdominal tenderness.   Musculoskeletal:         General: Normal range of motion.      Right lower leg: No edema.      Left lower leg: No edema.   Lymphadenopathy:      Cervical: No cervical adenopathy.   Skin:     General: Skin is warm and dry.      Findings: No rash.   Neurological:      General: No focal deficit present.      Mental Status: She is alert and oriented to person, place, and time. "   Psychiatric:         Mood and Affect: Mood normal.         Behavior: Behavior normal.           Assessment/Plan:  Prema Phillips is a 77 y.o. female who presents today for :        ICD-10-CM ICD-9-CM    1. Routine general medical examination at a health care facility  Z00.00 V70.0 Hemoglobin A1C      Comprehensive Metabolic Panel      Lipid Panel      CBC Auto Differential      TSH      Vitamin B12      Magnesium   2. History of total hysterectomy with bilateral salpingo-oophorectomy (BSO)  Z90.710 V15.29     Z90.722      Z90.79     3. Atherosclerosis of left carotid artery  I65.22 433.10    4. Osteopenia of the elderly  M85.80 733.90    5. BMI 22.0-22.9, adult  Z68.22 V85.1    6. Macular degeneration of right eye, unspecified type  H35.30 362.50    7. Long-term use of aspirin therapy  Z79.82 V58.66    8. Insomnia, unspecified type  G47.00 780.52    9. Hyperlipidemia, unspecified hyperlipidemia type  E78.5 272.4 Hemoglobin A1C      Comprehensive Metabolic Panel      Lipid Panel      CBC Auto Differential   10. Acquired hypothyroidism  E03.9 244.9 TSH      levothyroxine (SYNTHROID) 75 MCG tablet   11. RBBB  I45.10 426.4    12. Stress due to family tension  Z63.8 V61.8 EScitalopram oxalate (LEXAPRO) 10 MG tablet   13. Adjustment reaction with anxiety  F43.22 309.24 EScitalopram oxalate (LEXAPRO) 10 MG tablet   14. Gastritis, presence of bleeding unspecified, unspecified chronicity, unspecified gastritis type  K29.70 535.50    15. Encounter for monitoring long-term proton pump inhibitor therapy  Z51.81 V58.83 Comprehensive Metabolic Panel    Z79.899 V58.69 Vitamin B12      Magnesium   16. Medication management  Z79.899 V58.69 Hemoglobin A1C      Comprehensive Metabolic Panel      Lipid Panel      CBC Auto Differential      TSH      Vitamin B12      Magnesium   17. Acquired hypothyroidism  E03.9 244.9 TSH      levothyroxine (SYNTHROID) 75 MCG tablet    on synthroid, TSH normal range on recent labs   18. Primary  osteoarthritis of both hands  M19.041 715.14 meloxicam (MOBIC) 15 MG tablet    M19.042     19. Postmenopausal  Z78.0 V49.81 DXA Bone Density Spine And Hip   20. Allergic rhinitis with postnasal drip  J30.9 477.9 fluticasone propionate (FLONASE) 50 mcg/actuation nasal spray    R09.82 784.91    21. Dry skin dermatitis  L85.3 692.89 triamcinolone acetonide 0.025% (KENALOG) 0.025 % Oint     HEALTH MAINTENANCE: ADVISED ON DIET/EXERCISE/SLEEP, ROUTINE EYE/DENTAL EXAMS, AND THE IMPORTANCE OF KEEPING UP WITH APPROPRIATE SCREENING TESTS BASED ON AGE AND RISK FACTORS.  DEXA OVERDUE AND ORDERED. DUE FOR SHINGRIX  2/2 AND COVID -19 VACCINE COMPLETED INCLUDING MODERNA BOOSTER 11/18/21  COLONOSCOPY IS UP-TO-DATE PER DR. [facility]- REPEAT DUE 2024  [facility]     RIGHT BUNDLE-BRANCH BLOCK, CAROTID ARTERY STENOSIS, HLD:  SEES CARDIOLOGY DR. ELLISON YEARLY, HAS UPDATED CAROTID ARTERY ULTRASOUND 2/26/20 SHOWING LESS THAN 50% STENOSIS,   CONTINUE STATIN-LIPITOR 20, ASPIRIN 81 MG DAILY     CHRONIC MIGRAINES W/ INSOMINA:  FREQUENCY IS STABLE ON NIGHTLY ELAVIL- HELPS WITH HEADACHES AND SLEEP, TAKES RELPAX PRN     ALLERGIC RHINITIS:  STABLE ON CLARITIN AND FLONASE REFILL SENT     GERD WITH UNDERLYING CHRONIC CONSTIPATION:  COLONOSCOPY IS UP-TO-DATE PER DR. [facility]- REPEAT DUE 2024, MIRALAX/CITRUCEL FIBER SUPPLEMENT.  PPI LABS-- VITAMIN-D, B12, MAGNESIUM MONITORED WITH LABS.     OSTEOPENIA:  ON CALCIUM/VITAMIN-D, ADVISED WEIGHT-BEARING EXERCISE AND REPEAT DEXA SCAN IN NOVEMBER OF 2021--OVERDUE AND ORDERED.     HYPOTHYROIDISM:  HAS BEEN STABLE ON  LEVOTHYROXINE 75 MCG DAILY, CHECK LEVEL WITH LABS AND ADVISE BASED ON RESULTS     ANXIETY AND DEPRESSION:  CONTINUE ATIVAN P.R.N W/ LEXAPRO 10 MG FAUSTO.        Patient Instructions         There are no Patient Instructions on file for this visit.      Follow up for return as needed for new concerns, to follow up on lab results.

## 2022-02-14 ENCOUNTER — LAB VISIT (OUTPATIENT)
Dept: LAB | Facility: HOSPITAL | Age: 77
End: 2022-02-14
Attending: FAMILY MEDICINE
Payer: MEDICARE

## 2022-02-14 DIAGNOSIS — Z00.00 ROUTINE GENERAL MEDICAL EXAMINATION AT A HEALTH CARE FACILITY: ICD-10-CM

## 2022-02-14 DIAGNOSIS — E03.9 ACQUIRED HYPOTHYROIDISM: ICD-10-CM

## 2022-02-14 DIAGNOSIS — Z79.899 ENCOUNTER FOR MONITORING LONG-TERM PROTON PUMP INHIBITOR THERAPY: ICD-10-CM

## 2022-02-14 DIAGNOSIS — Z51.81 ENCOUNTER FOR MONITORING LONG-TERM PROTON PUMP INHIBITOR THERAPY: ICD-10-CM

## 2022-02-14 DIAGNOSIS — E78.5 HYPERLIPIDEMIA, UNSPECIFIED HYPERLIPIDEMIA TYPE: ICD-10-CM

## 2022-02-14 DIAGNOSIS — Z79.899 MEDICATION MANAGEMENT: ICD-10-CM

## 2022-02-14 LAB
ALBUMIN SERPL BCP-MCNC: 3.7 G/DL (ref 3.5–5.2)
ALP SERPL-CCNC: 64 U/L (ref 55–135)
ALT SERPL W/O P-5'-P-CCNC: 13 U/L (ref 10–44)
ANION GAP SERPL CALC-SCNC: 4 MMOL/L (ref 8–16)
AST SERPL-CCNC: 17 U/L (ref 10–40)
BASOPHILS # BLD AUTO: 0.05 K/UL (ref 0–0.2)
BASOPHILS NFR BLD: 1.2 % (ref 0–1.9)
BILIRUB SERPL-MCNC: 0.4 MG/DL (ref 0.1–1)
BUN SERPL-MCNC: 15 MG/DL (ref 8–23)
CALCIUM SERPL-MCNC: 9.1 MG/DL (ref 8.7–10.5)
CHLORIDE SERPL-SCNC: 103 MMOL/L (ref 95–110)
CHOLEST SERPL-MCNC: 154 MG/DL (ref 120–199)
CHOLEST/HDLC SERPL: 2.8 {RATIO} (ref 2–5)
CO2 SERPL-SCNC: 32 MMOL/L (ref 23–29)
CREAT SERPL-MCNC: 0.8 MG/DL (ref 0.5–1.4)
DIFFERENTIAL METHOD: NORMAL
EOSINOPHIL # BLD AUTO: 0.2 K/UL (ref 0–0.5)
EOSINOPHIL NFR BLD: 4.3 % (ref 0–8)
ERYTHROCYTE [DISTWIDTH] IN BLOOD BY AUTOMATED COUNT: 12.7 % (ref 11.5–14.5)
EST. GFR  (AFRICAN AMERICAN): >60 ML/MIN/1.73 M^2
EST. GFR  (NON AFRICAN AMERICAN): >60 ML/MIN/1.73 M^2
ESTIMATED AVG GLUCOSE: 120 MG/DL (ref 68–131)
GLUCOSE SERPL-MCNC: 90 MG/DL (ref 70–110)
HBA1C MFR BLD: 5.8 % (ref 4–5.6)
HCT VFR BLD AUTO: 37.9 % (ref 37–48.5)
HDLC SERPL-MCNC: 56 MG/DL (ref 40–75)
HDLC SERPL: 36.4 % (ref 20–50)
HGB BLD-MCNC: 12.4 G/DL (ref 12–16)
IMM GRANULOCYTES # BLD AUTO: 0.01 K/UL (ref 0–0.04)
IMM GRANULOCYTES NFR BLD AUTO: 0.2 % (ref 0–0.5)
LDLC SERPL CALC-MCNC: 82.8 MG/DL (ref 63–159)
LYMPHOCYTES # BLD AUTO: 1.4 K/UL (ref 1–4.8)
LYMPHOCYTES NFR BLD: 33.5 % (ref 18–48)
MAGNESIUM SERPL-MCNC: 2 MG/DL (ref 1.6–2.6)
MCH RBC QN AUTO: 30.5 PG (ref 27–31)
MCHC RBC AUTO-ENTMCNC: 32.7 G/DL (ref 32–36)
MCV RBC AUTO: 93 FL (ref 82–98)
MONOCYTES # BLD AUTO: 0.4 K/UL (ref 0.3–1)
MONOCYTES NFR BLD: 10.5 % (ref 4–15)
NEUTROPHILS # BLD AUTO: 2.1 K/UL (ref 1.8–7.7)
NEUTROPHILS NFR BLD: 50.3 % (ref 38–73)
NONHDLC SERPL-MCNC: 98 MG/DL
NRBC BLD-RTO: 0 /100 WBC
PLATELET # BLD AUTO: 191 K/UL (ref 150–450)
PMV BLD AUTO: 11.3 FL (ref 9.2–12.9)
POTASSIUM SERPL-SCNC: 4.4 MMOL/L (ref 3.5–5.1)
PROT SERPL-MCNC: 6.6 G/DL (ref 6–8.4)
RBC # BLD AUTO: 4.06 M/UL (ref 4–5.4)
SODIUM SERPL-SCNC: 139 MMOL/L (ref 136–145)
TRIGL SERPL-MCNC: 76 MG/DL (ref 30–150)
TSH SERPL DL<=0.005 MIU/L-ACNC: 1.23 UIU/ML (ref 0.4–4)
VIT B12 SERPL-MCNC: 806 PG/ML (ref 210–950)
WBC # BLD AUTO: 4.21 K/UL (ref 3.9–12.7)

## 2022-02-14 PROCEDURE — 83735 ASSAY OF MAGNESIUM: CPT | Performed by: FAMILY MEDICINE

## 2022-02-14 PROCEDURE — 82607 VITAMIN B-12: CPT | Performed by: FAMILY MEDICINE

## 2022-02-14 PROCEDURE — 80061 LIPID PANEL: CPT | Performed by: FAMILY MEDICINE

## 2022-02-14 PROCEDURE — 83036 HEMOGLOBIN GLYCOSYLATED A1C: CPT | Performed by: FAMILY MEDICINE

## 2022-02-14 PROCEDURE — 84443 ASSAY THYROID STIM HORMONE: CPT | Performed by: FAMILY MEDICINE

## 2022-02-14 PROCEDURE — 85025 COMPLETE CBC W/AUTO DIFF WBC: CPT | Performed by: FAMILY MEDICINE

## 2022-02-14 PROCEDURE — 36415 COLL VENOUS BLD VENIPUNCTURE: CPT | Performed by: FAMILY MEDICINE

## 2022-02-14 PROCEDURE — 80053 COMPREHEN METABOLIC PANEL: CPT | Performed by: FAMILY MEDICINE

## 2022-03-07 ENCOUNTER — PATIENT OUTREACH (OUTPATIENT)
Dept: ADMINISTRATIVE | Facility: OTHER | Age: 77
End: 2022-03-07
Payer: MEDICARE

## 2022-03-08 ENCOUNTER — OFFICE VISIT (OUTPATIENT)
Dept: CARDIOLOGY | Facility: CLINIC | Age: 77
End: 2022-03-08
Payer: MEDICARE

## 2022-03-08 VITALS
BODY MASS INDEX: 22.68 KG/M2 | SYSTOLIC BLOOD PRESSURE: 141 MMHG | DIASTOLIC BLOOD PRESSURE: 80 MMHG | HEIGHT: 66 IN | HEART RATE: 78 BPM | WEIGHT: 141.13 LBS

## 2022-03-08 DIAGNOSIS — E78.5 HYPERLIPIDEMIA, UNSPECIFIED HYPERLIPIDEMIA TYPE: ICD-10-CM

## 2022-03-08 DIAGNOSIS — E03.9 ACQUIRED HYPOTHYROIDISM: ICD-10-CM

## 2022-03-08 DIAGNOSIS — I65.23 BILATERAL CAROTID ARTERY STENOSIS: Primary | ICD-10-CM

## 2022-03-08 DIAGNOSIS — R73.03 PREDIABETES: ICD-10-CM

## 2022-03-08 PROCEDURE — 93005 ELECTROCARDIOGRAM TRACING: CPT | Mod: PBBFAC,PO | Performed by: INTERNAL MEDICINE

## 2022-03-08 PROCEDURE — 93010 ELECTROCARDIOGRAM REPORT: CPT | Mod: S$PBB,,, | Performed by: INTERNAL MEDICINE

## 2022-03-08 PROCEDURE — 99999 PR PBB SHADOW E&M-EST. PATIENT-LVL IV: ICD-10-PCS | Mod: PBBFAC,,, | Performed by: NURSE PRACTITIONER

## 2022-03-08 PROCEDURE — 99214 PR OFFICE/OUTPT VISIT, EST, LEVL IV, 30-39 MIN: ICD-10-PCS | Mod: S$PBB,,, | Performed by: NURSE PRACTITIONER

## 2022-03-08 PROCEDURE — 99999 PR PBB SHADOW E&M-EST. PATIENT-LVL IV: CPT | Mod: PBBFAC,,, | Performed by: NURSE PRACTITIONER

## 2022-03-08 PROCEDURE — 93010 EKG 12-LEAD: ICD-10-PCS | Mod: S$PBB,,, | Performed by: INTERNAL MEDICINE

## 2022-03-08 PROCEDURE — 99214 OFFICE O/P EST MOD 30 MIN: CPT | Mod: PBBFAC,PO | Performed by: NURSE PRACTITIONER

## 2022-03-08 PROCEDURE — 99214 OFFICE O/P EST MOD 30 MIN: CPT | Mod: S$PBB,,, | Performed by: NURSE PRACTITIONER

## 2022-03-08 NOTE — PROGRESS NOTES
Ms. Phillips is a former patient of Dr. Ash and was last seen in [facility] Cardiology Visit 4/16/21.      Subjective:   Patient ID:  Prema Phillips is a 77 y.o. female who presents for follow-up of Atherosclerosis of left carotid artery and Follow-up    Medical History  Bilateral carotid artery disease L>R  HLD  Hypothyroidism    HPI  Ms. Phillips is in clinic today for routine follow up.  She is active and can tolerate heavy house cleaning.  She does have a cubie that she uses several days a week.  Patient denies chest pain with exertion or at rest, palpitations, SOB, TRONCOSO, dizziness, syncope, edema, orthopnea, PND, or claudication.  Patient is taking atorvastatin 20 mg and LDL is 82.  Her BP typically is 110-120s.  She gets her eyes checked by a retinal specialist every 3 months and she goes to the [facility] biannually.      Review of Systems   Constitutional: Negative for decreased appetite, diaphoresis, malaise/fatigue, weight gain and weight loss.   Eyes: Negative for visual disturbance.   Cardiovascular: Negative for chest pain, claudication, dyspnea on exertion, irregular heartbeat, leg swelling, near-syncope, orthopnea, palpitations, paroxysmal nocturnal dyspnea and syncope.        Denies chest pressure   Respiratory: Negative for cough, hemoptysis, shortness of breath, sleep disturbances due to breathing and snoring.    Endocrine: Negative for cold intolerance and heat intolerance.   Hematologic/Lymphatic: Negative for bleeding problem. Does not bruise/bleed easily.   Musculoskeletal: Positive for arthritis. Negative for myalgias.   Gastrointestinal: Negative for bloating, abdominal pain, anorexia, change in bowel habit, constipation, diarrhea, nausea and vomiting.   Neurological: Negative for difficulty with concentration, disturbances in coordination, excessive daytime sleepiness, dizziness, headaches, light-headedness, loss of balance, numbness and weakness.   Psychiatric/Behavioral: The patient does not have  "insomnia.        Allergies and current medications updated and reviewed:  Review of patient's allergies indicates:   Allergen Reactions    Neomycin Other (See Comments) and Swelling     Other reaction(s): Unknown     Current Outpatient Medications   Medication Sig    amitriptyline (ELAVIL) 25 MG tablet 1/2-1 tab nightly for insomnia. Ok to take with the low dose of ativan    aspirin (ECOTRIN) 81 MG EC tablet Take 81 mg by mouth once daily.    atorvastatin (LIPITOR) 20 MG tablet Take 1 tablet (20 mg total) by mouth once daily.    azelastine (ASTELIN) 137 mcg (0.1 %) nasal spray 1 spray (137 mcg total) by Nasal route 2 (two) times daily.    calcium-vitamin D3-vitamin K 500-100-40 mg-unit-mcg Chew Take by mouth once daily.    cholecalciferol, vitamin D3, (VITAMIN D3) 50 mcg (2,000 unit) Tab once daily.     coenzyme Q10 200 mg capsule Take 200 mg by mouth once daily.    eletriptan (RELPAX) 40 MG tablet Take 40 mg by mouth as needed. 1 Tablet Oral As directed    EScitalopram oxalate (LEXAPRO) 10 MG tablet Take 1 tablet (10 mg total) by mouth once daily.    fluticasone propionate (FLONASE) 50 mcg/actuation nasal spray SPRAY TWICE IN EACH NOSTRIL ONCE DAILY    levothyroxine (SYNTHROID) 75 MCG tablet TAKE 1 TABLET DAILY BEFORE BREAKFAST    LORazepam (ATIVAN) 1 MG tablet Take 0.5 mg by mouth.    meloxicam (MOBIC) 15 MG tablet Take 1 tablet (15 mg total) by mouth once daily.    multivitamin capsule Take 1 capsule by mouth once daily.    omeprazole (PRILOSEC) 20 MG capsule TAKE 1 CAPSULE BY MOUTH EVERY DAY IN THE MORNING    triamcinolone acetonide 0.025% (KENALOG) 0.025 % Oint Apply topically 2 (two) times daily.    TURMERIC, BULK, MISC by Misc.(Non-Drug; Combo Route) route once daily.    VIT A/VIT C/VIT E/ZINC/COPPER (PRESERVISION AREDS ORAL) Take by mouth once daily.      No current facility-administered medications for this visit.       Objective:        BP (!) 141/80   Pulse 78   Ht 5' 6" (1.676 m)   " Wt 64 kg (141 lb 1.5 oz)   BMI 22.77 kg/m²       Physical Exam  Vitals and nursing note reviewed.   Constitutional:       General: She is not in acute distress.     Appearance: Normal appearance. She is well-developed. She is not diaphoretic.   HENT:      Head: Normocephalic and atraumatic.   Eyes:      General: Lids are normal. No scleral icterus.     Conjunctiva/sclera: Conjunctivae normal.   Neck:      Vascular: Normal carotid pulses. No carotid bruit, hepatojugular reflux or JVD.   Cardiovascular:      Rate and Rhythm: Normal rate and regular rhythm.      Chest Wall: PMI is not displaced.      Pulses: Intact distal pulses.           Carotid pulses are 2+ on the right side and 2+ on the left side.       Radial pulses are 2+ on the right side and 2+ on the left side.        Dorsalis pedis pulses are 2+ on the right side and 2+ on the left side.        Posterior tibial pulses are 1+ on the right side and 1+ on the left side.      Heart sounds: S1 normal and S2 normal. No murmur heard.    No friction rub. No gallop.   Pulmonary:      Effort: Pulmonary effort is normal. No respiratory distress.      Breath sounds: Normal breath sounds. No decreased breath sounds, wheezing, rhonchi or rales.   Chest:      Chest wall: No tenderness.   Abdominal:      General: Bowel sounds are normal. There is no distension or abdominal bruit.      Palpations: Abdomen is soft. There is no fluid wave or pulsatile mass.      Tenderness: There is no abdominal tenderness.   Musculoskeletal:      Cervical back: Neck supple.   Skin:     General: Skin is warm and dry.      Findings: No rash.      Nails: There is no clubbing.   Neurological:      Mental Status: She is alert and oriented to person, place, and time.      Gait: Gait normal.   Psychiatric:         Speech: Speech normal.         Behavior: Behavior normal.         Thought Content: Thought content normal.         Judgment: Judgment normal.         Chemistry        Component Value  Date/Time     02/14/2022 0810    K 4.4 02/14/2022 0810     02/14/2022 0810    CO2 32 (H) 02/14/2022 0810    BUN 15 02/14/2022 0810    CREATININE 0.8 02/14/2022 0810    GLU 90 02/14/2022 0810        Component Value Date/Time    CALCIUM 9.1 02/14/2022 0810    ALKPHOS 64 02/14/2022 0810    AST 17 02/14/2022 0810    ALT 13 02/14/2022 0810    BILITOT 0.4 02/14/2022 0810    ESTGFRAFRICA >60 02/14/2022 0810    EGFRNONAA >60 02/14/2022 0810        Lab Results   Component Value Date    HGBA1C 5.8 (H) 02/14/2022     Recent Labs   Lab 02/14/22  0810   WBC 4.21   Hemoglobin 12.4   Hematocrit 37.9   MCV 93   Platelets 191   TSH 1.232   Cholesterol 154   HDL 56   LDL Cholesterol 82.8   Triglycerides 76   HDL/Cholesterol Ratio 36.4     Lab Results   Component Value Date    NAOKPNWE43 806 02/14/2022              Test(s) Reviewed  I have reviewed the following in detail:  [] Stress test   [] Angiography   [x] Echocardiogram   [x] Labs   [] Other:         Assessment/Plan:   1. Bilateral carotid artery stenosis  Asymptomatic.  Last ultrasound in 2020 with mild to moderate disease.  LDL not <70.  Repeat ultrasound.     - CV Ultrasound Bilateral Doppler Carotid; Future    2. Hyperlipidemia, unspecified hyperlipidemia type  LDL not at goal <70. ECG today shows SR with incomplete RBBB which we discussed. We discussed mediterranean diet and exercise.  If LDL remains above goal at next visit or if change in ultrasound findings of carotid disease, we discussed increasing her statin therapy to atorvastatin 40 mg.      3. Acquired hypothyroidism  Lab Results   Component Value Date    TSH 1.232 02/14/2022    FREET4 1.05 03/09/2017       4. Prediabetes  Lab Results   Component Value Date    HGBA1C 5.8 (H) 02/14/2022          A copy of this note will be forwarded to Dr. Kline and Dr. Zhang.     Follow up in about 1 year (around 3/8/2023).

## 2022-03-09 ENCOUNTER — TELEPHONE (OUTPATIENT)
Dept: CARDIOLOGY | Facility: CLINIC | Age: 77
End: 2022-03-09
Payer: MEDICARE

## 2022-03-09 DIAGNOSIS — R00.2 PALPITATIONS: Primary | ICD-10-CM

## 2022-03-09 NOTE — TELEPHONE ENCOUNTER
----- Message from Birgit Bates sent at 3/9/2022  1:21 PM CST -----  Regarding: EKG ORDER  Please place and link EKG order to the EKG or Doctor appointment scheduled on 03/08/22 thanks. Birgit [id number]

## 2022-03-14 ENCOUNTER — HOSPITAL ENCOUNTER (OUTPATIENT)
Dept: RADIOLOGY | Facility: HOSPITAL | Age: 77
Discharge: HOME | End: 2022-03-14
Attending: FAMILY MEDICINE
Payer: MEDICARE

## 2022-03-14 DIAGNOSIS — Z78.0 POSTMENOPAUSAL: ICD-10-CM

## 2022-03-14 PROBLEM — R73.03 PREDIABETES: Status: ACTIVE | Noted: 2022-03-14

## 2022-03-14 PROCEDURE — 77080 DXA BONE DENSITY AXIAL: CPT | Mod: 26,,, | Performed by: RADIOLOGY

## 2022-03-14 PROCEDURE — 77080 DEXA BONE DENSITY SPINE HIP: ICD-10-PCS | Mod: 26,,, | Performed by: RADIOLOGY

## 2022-03-14 PROCEDURE — 77080 DXA BONE DENSITY AXIAL: CPT | Mod: TC

## 2022-03-15 ENCOUNTER — PATIENT MESSAGE (OUTPATIENT)
Dept: FAMILY MEDICINE | Facility: CLINIC | Age: 77
End: 2022-03-15
Payer: MEDICARE

## 2022-03-22 ENCOUNTER — PATIENT MESSAGE (OUTPATIENT)
Dept: CARDIOLOGY | Facility: CLINIC | Age: 77
End: 2022-03-22
Payer: MEDICARE

## 2022-03-22 ENCOUNTER — HOSPITAL ENCOUNTER (OUTPATIENT)
Dept: CARDIOLOGY | Facility: HOSPITAL | Age: 77
Discharge: HOME | End: 2022-03-22
Attending: NURSE PRACTITIONER
Payer: MEDICARE

## 2022-03-22 DIAGNOSIS — I65.23 BILATERAL CAROTID ARTERY STENOSIS: ICD-10-CM

## 2022-03-22 LAB
LEFT ARM DIASTOLIC BLOOD PRESSURE: 70 MMHG
LEFT ARM SYSTOLIC BLOOD PRESSURE: 110 MMHG
LEFT CBA DIAS: 15 CM/S
LEFT CBA SYS: 72 CM/S
LEFT CCA DIST DIAS: 13 CM/S
LEFT CCA DIST SYS: 63 CM/S
LEFT CCA MID DIAS: 19 CM/S
LEFT CCA MID SYS: 88 CM/S
LEFT CCA PROX DIAS: 19 CM/S
LEFT CCA PROX SYS: 86 CM/S
LEFT ECA DIAS: 13 CM/S
LEFT ECA SYS: 86 CM/S
LEFT ICA DIST DIAS: 44 CM/S
LEFT ICA DIST SYS: 139 CM/S
LEFT ICA MID DIAS: 78 CM/S
LEFT ICA MID SYS: 178 CM/S
LEFT ICA PROX DIAS: 18 CM/S
LEFT ICA PROX SYS: 52 CM/S
LEFT VERTEBRAL DIAS: 15 CM/S
LEFT VERTEBRAL SYS: 118 CM/S
OHS CV CAROTID RIGHT ICA EDV HIGHEST: 44
OHS CV CAROTID ULTRASOUND LEFT ICA/CCA RATIO: 2.83
OHS CV CAROTID ULTRASOUND RIGHT ICA/CCA RATIO: 1.47
OHS CV PV CAROTID LEFT HIGHEST CCA: 88
OHS CV PV CAROTID LEFT HIGHEST ICA: 178
OHS CV PV CAROTID RIGHT HIGHEST CCA: 89
OHS CV PV CAROTID RIGHT HIGHEST ICA: 116
OHS CV US CAROTID LEFT HIGHEST EDV: 78
RIGHT ARM DIASTOLIC BLOOD PRESSURE: 70 MMHG
RIGHT ARM SYSTOLIC BLOOD PRESSURE: 110 MMHG
RIGHT CBA DIAS: 24 CM/S
RIGHT CBA SYS: 79 CM/S
RIGHT CCA DIST DIAS: 23 CM/S
RIGHT CCA DIST SYS: 79 CM/S
RIGHT CCA MID DIAS: 24 CM/S
RIGHT CCA MID SYS: 89 CM/S
RIGHT CCA PROX DIAS: 23 CM/S
RIGHT CCA PROX SYS: 89 CM/S
RIGHT ECA DIAS: 21 CM/S
RIGHT ECA SYS: 90 CM/S
RIGHT ICA DIST DIAS: 44 CM/S
RIGHT ICA DIST SYS: 116 CM/S
RIGHT ICA MID DIAS: 31 CM/S
RIGHT ICA MID SYS: 99 CM/S
RIGHT ICA PROX DIAS: 28 CM/S
RIGHT ICA PROX SYS: 76 CM/S
RIGHT VERTEBRAL DIAS: 12 CM/S
RIGHT VERTEBRAL SYS: 41 CM/S

## 2022-03-22 PROCEDURE — 93880 EXTRACRANIAL BILAT STUDY: CPT | Mod: 26,,, | Performed by: INTERNAL MEDICINE

## 2022-03-22 PROCEDURE — 93880 CV US DOPPLER CAROTID (CUPID ONLY): ICD-10-PCS | Mod: 26,,, | Performed by: INTERNAL MEDICINE

## 2022-03-22 PROCEDURE — 93880 EXTRACRANIAL BILAT STUDY: CPT

## 2022-06-20 DIAGNOSIS — F51.01 PRIMARY INSOMNIA: ICD-10-CM

## 2022-06-20 DIAGNOSIS — G43.709 CHRONIC MIGRAINE WITHOUT AURA WITHOUT STATUS MIGRAINOSUS, NOT INTRACTABLE: ICD-10-CM

## 2022-06-20 NOTE — TELEPHONE ENCOUNTER
No new care gaps identified.  [facility] Embedded Care Gaps. Reference number: [id number]. 6/20/2022   5:07:51 PM CDT

## 2022-06-21 RX ORDER — AMITRIPTYLINE HYDROCHLORIDE 25 MG/1
TABLET, FILM COATED ORAL
Qty: 90 TABLET | Refills: 4 | Status: SHIPPED | OUTPATIENT
Start: 2022-06-21 | End: 2023-07-10

## 2022-06-21 NOTE — TELEPHONE ENCOUNTER
Refill Routing Note   Medication(s) are not appropriate for processing by [facility] for the following reason(s):      - Indication is outside of scope for [facility]    [facility] action(s):  Route       Medication Therapy Plan: Amitriptyline for insomnia is outside of [facility] protocol  Medication reconciliation completed: No     Appointments  past 12m or future 3m with PCP    Date Provider   Last Visit   2/11/2022 Odette Zhang MD   Next Visit   Visit date not found Odette Zhang MD   ED visits in past 90 days: 0        Note composed:10:44 AM 06/21/2022

## 2022-09-30 ENCOUNTER — OFFICE VISIT (OUTPATIENT)
Dept: ORTHOPEDICS | Facility: CLINIC | Age: 77
End: 2022-09-30
Payer: MEDICARE

## 2022-09-30 ENCOUNTER — HOSPITAL ENCOUNTER (OUTPATIENT)
Dept: RADIOLOGY | Facility: HOSPITAL | Age: 77
Discharge: HOME | End: 2022-09-30
Attending: PHYSICIAN ASSISTANT
Payer: MEDICARE

## 2022-09-30 VITALS — DIASTOLIC BLOOD PRESSURE: 72 MMHG | SYSTOLIC BLOOD PRESSURE: 121 MMHG | HEART RATE: 91 BPM

## 2022-09-30 DIAGNOSIS — M25.562 PAIN IN BOTH KNEES, UNSPECIFIED CHRONICITY: ICD-10-CM

## 2022-09-30 DIAGNOSIS — M25.561 PAIN IN BOTH KNEES, UNSPECIFIED CHRONICITY: ICD-10-CM

## 2022-09-30 DIAGNOSIS — M25.562 PAIN IN BOTH KNEES, UNSPECIFIED CHRONICITY: Primary | ICD-10-CM

## 2022-09-30 DIAGNOSIS — M25.561 PAIN IN BOTH KNEES, UNSPECIFIED CHRONICITY: Primary | ICD-10-CM

## 2022-09-30 PROCEDURE — 73562 X-RAY EXAM OF KNEE 3: CPT | Mod: TC,50

## 2022-09-30 PROCEDURE — 73562 X-RAY EXAM OF KNEE 3: CPT | Mod: 26,50,, | Performed by: RADIOLOGY

## 2022-09-30 PROCEDURE — 99214 OFFICE O/P EST MOD 30 MIN: CPT | Mod: PBBFAC,25 | Performed by: PHYSICIAN ASSISTANT

## 2022-09-30 PROCEDURE — 20610 DRAIN/INJ JOINT/BURSA W/O US: CPT | Mod: PBBFAC,50 | Performed by: PHYSICIAN ASSISTANT

## 2022-09-30 PROCEDURE — 99203 PR OFFICE/OUTPT VISIT, NEW, LEVL III, 30-44 MIN: ICD-10-PCS | Mod: S$PBB,25,, | Performed by: PHYSICIAN ASSISTANT

## 2022-09-30 PROCEDURE — 73562 XR KNEE ORTHO BILAT: ICD-10-PCS | Mod: 26,50,, | Performed by: RADIOLOGY

## 2022-09-30 PROCEDURE — 99999 PR PBB SHADOW E&M-EST. PATIENT-LVL IV: CPT | Mod: PBBFAC,,, | Performed by: PHYSICIAN ASSISTANT

## 2022-09-30 PROCEDURE — 99203 OFFICE O/P NEW LOW 30 MIN: CPT | Mod: S$PBB,25,, | Performed by: PHYSICIAN ASSISTANT

## 2022-09-30 PROCEDURE — 99999 PR PBB SHADOW E&M-EST. PATIENT-LVL IV: ICD-10-PCS | Mod: PBBFAC,,, | Performed by: PHYSICIAN ASSISTANT

## 2022-09-30 PROCEDURE — 20610 DRAIN/INJ JOINT/BURSA W/O US: CPT | Mod: S$PBB,50,, | Performed by: PHYSICIAN ASSISTANT

## 2022-09-30 PROCEDURE — 20610 PR DRAIN/INJECT LARGE JOINT/BURSA: ICD-10-PCS | Mod: S$PBB,50,, | Performed by: PHYSICIAN ASSISTANT

## 2022-09-30 RX ORDER — BETAMETHASONE SODIUM PHOSPHATE AND BETAMETHASONE ACETATE 3; 3 MG/ML; MG/ML
6 INJECTION, SUSPENSION INTRA-ARTICULAR; INTRALESIONAL; INTRAMUSCULAR; SOFT TISSUE
Status: COMPLETED | OUTPATIENT
Start: 2022-09-30 | End: 2022-09-30

## 2022-09-30 RX ADMIN — BETAMETHASONE SODIUM PHOSPHATE AND BETAMETHASONE ACETATE 6 MG: 3; 3 INJECTION, SUSPENSION INTRA-ARTICULAR; INTRALESIONAL; INTRAMUSCULAR at 04:09

## 2022-09-30 NOTE — PROGRESS NOTES
SUBJECTIVE:     Chief Complaint & History of Present Illness:  Prema Phillips is a New patient 77 y.o. female who is seen here today with a complaint of    Chief Complaint   Patient presents with    Left Knee - Pain    Right Knee - Pain    .  Patient is here today for evaluation treatment sudden onset bilateral knee pain right much more than left does not remember specific trauma or injury to the knee but is quite active interspace and regular exercise activities.  Had slow insidious onset over the past few days is now having difficulty with rising seated position start-up pain in periods of prolonged standing and ambulation  On a scale of 1-10, with 10 being worst pain imaginable, he rates this pain as 3 on good days and 7 on bad days.  she describes the pain as sore and achy.    Review of patient's allergies indicates:   Allergen Reactions    Neomycin Other (See Comments) and Swelling     Other reaction(s): Unknown         Current Outpatient Medications   Medication Sig Dispense Refill    amitriptyline (ELAVIL) 25 MG tablet TAKE 1/2-1 TABLET BY MOUTH NIGHTLY FOR INSOMNIA. OK TO TAKE WITH THE LOW DOSE OF ATIVAN 90 tablet 4    aspirin (ECOTRIN) 81 MG EC tablet Take 81 mg by mouth once daily.      atorvastatin (LIPITOR) 20 MG tablet Take 1 tablet (20 mg total) by mouth once daily. 90 tablet 3    azelastine (ASTELIN) 137 mcg (0.1 %) nasal spray 1 spray (137 mcg total) by Nasal route 2 (two) times daily. 30 mL 11    calcium-vitamin D3-vitamin K 500-100-40 mg-unit-mcg Chew Take by mouth once daily.      cholecalciferol, vitamin D3, (VITAMIN D3) 50 mcg (2,000 unit) Tab once daily.       coenzyme Q10 200 mg capsule Take 200 mg by mouth once daily.      eletriptan (RELPAX) 40 MG tablet Take 40 mg by mouth as needed. 1 Tablet Oral As directed      fluticasone propionate (FLONASE) 50 mcg/actuation nasal spray SPRAY TWICE IN EACH NOSTRIL ONCE DAILY 16 mL 11    levothyroxine (SYNTHROID) 75 MCG tablet TAKE 1 TABLET DAILY  BEFORE BREAKFAST 90 tablet 4    LORazepam (ATIVAN) 1 MG tablet Take 0.5 mg by mouth.      meloxicam (MOBIC) 15 MG tablet Take 1 tablet (15 mg total) by mouth once daily. 90 tablet 4    multivitamin capsule Take 1 capsule by mouth once daily.      omeprazole (PRILOSEC) 20 MG capsule TAKE 1 CAPSULE BY MOUTH EVERY DAY IN THE MORNING  3    triamcinolone acetonide 0.025% (KENALOG) 0.025 % Oint Apply topically 2 (two) times daily. 15 g 2    TURMERIC, BULK, MISC by Misc.(Non-Drug; Combo Route) route once daily.      VIT A/VIT C/VIT E/ZINC/COPPER (PRESERVISION AREDS ORAL) Take by mouth once daily.       EScitalopram oxalate (LEXAPRO) 10 MG tablet Take 1 tablet (10 mg total) by mouth once daily. 90 tablet 4     No current facility-administered medications for this visit.       Past Medical History:   Diagnosis Date    Allergic rhinitis 5/31/2016    Carotid artery plaque 11/18/2013    History of total hysterectomy with bilateral salpingo-oophorectomy (BSO) 5/31/2016    Hyperlipidemia 7/17/2012    Hypothyroid     Insomnia     Macular degeneration, right eye 5/31/2016    Migraine headache     Osteopenia of the elderly 10/20/2015    Postmenopausal HRT (hormone replacement therapy)     RBBB 7/17/2012       Past Surgical History:   Procedure Laterality Date    APPENDECTOMY      Catatact surgery Right 06/19/2019    without any complications    COLONOSCOPY  08/27/2019    repeat in 5 years    EYE SURGERY Bilateral     cataracts extraction    HYSTERECTOMY      STAPEDES SURGERY  2006    TOTAL ABDOMINAL HYSTERECTOMY W/ BILATERAL SALPINGOOPHORECTOMY      TUBAL LIGATION         Vital Signs (Most Recent)  Vitals:    09/30/22 1609   BP: 121/72   Pulse: 91           Review of Systems:  ROS:  Constitutional: no fever or chills, insomnia  Eyes: no visual changes  ENT: no nasal congestion or sore throat  Respiratory: no cough or shortness of breath  Cardiovascular: no chest pain or palpitations, positive right bundle-branch block, CAD,  bilateral carotid artery stenosis  Gastrointestinal: no nausea or vomiting, tolerating diet  Genitourinary: no hematuria or dysuria  Integument/Breast: no rash or pruritis  Hematologic/Lymphatic: no easy bruising or lymphadenopathy  Musculoskeletal: no arthralgias or myalgias, osteopenia  Neurological: no seizures or tremors, positive for migraines  Behavioral/Psych: no auditory or visual hallucinations, positive adjustment reaction with anxiety  Endocrine: no heat or cold intolerance, positive hypothyroidism                OBJECTIVE:     PHYSICAL EXAM:     , General Appearance: Well nourished, well developed, in no acute distress.  Neurological: Mood & affect are normal.    left  Knee Exam:  Knee Range of Motion:0-125 degrees flexion   Effusion:none  Condition of skin:intact  Location of tenderness:Medial joint line   Strength:5 of 5  Stability:  Lachman: stable, LCL: stable, MCL: stable, PCL: stable, and posteromedial (dial): stable  Varus /Valgus stress:  normal  Peterson:   negative/negative    right  Knee Exam:  Knee Range of Motion:0*125 degrees flexion   Effusion:none  Condition of skin:intact  Location of tenderness:Medial joint line   Strength:5 of 5  Stability:  Lachman: stable, LCL: stable, MCL: stable, PCL: stable, and posteromedial (dial): stable  Varus /Valgus stress:  normal  Peterson:   negative/negative      Hip Examination:  full painless range of motion, without tenderness    RADIOGRAPHS:  X-rays taken today films reviewed by me demonstrate mild to moderate arthritic changes throughout both knees mild medial joint space narrowing no marked osteophytic spurring sclerotic changes her fracture dislocation or other bony abnormality    ASSESSMENT/PLAN:       ICD-10-CM ICD-9-CM   1. Pain in both knees, unspecified chronicity  M25.561 719.46    M25.562        Plan: We discussed with the patient at length all the different treatment options available for  the knee including anti-inflammatories,  acetaminophen, rest, ice, knee strengthening exercise, occasional cortisone injections for temporary relief, Viscosupplimentation injections, arthroscopic debridement osteotomy, and finally knee arthroplasty.   Will proceed cortisone injection of the left knee     The injection site was identified and the skin was prepared with a betadine solution. The   left  knee was injected with 1 ml of Celestone and 5 ml Lidocaine under sterile technique. Prema Phillips tolerated the procedure well, she was advised to rest the knee today, ice and elevation. she did receive immediate relief of the pain in and about his knee she was told this would be short lived and is secondary to the lidocaine. she may have an increase in his discomfort tonight followed by steady improvement over the next several days. I may take 1-3 weeks following the injection to get the full benefit of the medication.  I will see her back in 4-6 months. Sooner if he has any problems or concerns.

## 2022-10-25 ENCOUNTER — OFFICE VISIT (OUTPATIENT)
Dept: FAMILY MEDICINE | Facility: CLINIC | Age: 77
End: 2022-10-25
Payer: MEDICARE

## 2022-10-25 VITALS
TEMPERATURE: 98 F | DIASTOLIC BLOOD PRESSURE: 70 MMHG | OXYGEN SATURATION: 99 % | HEIGHT: 66 IN | BODY MASS INDEX: 21.79 KG/M2 | WEIGHT: 135.56 LBS | SYSTOLIC BLOOD PRESSURE: 116 MMHG | HEART RATE: 88 BPM

## 2022-10-25 DIAGNOSIS — R05.9 COUGH, UNSPECIFIED TYPE: ICD-10-CM

## 2022-10-25 DIAGNOSIS — U07.1 COVID-19: Primary | ICD-10-CM

## 2022-10-25 LAB
CTP QC/QA: YES
CTP QC/QA: YES
FLUAV AG NPH QL: NEGATIVE
FLUBV AG NPH QL: NEGATIVE
SARS-COV-2 RDRP RESP QL NAA+PROBE: POSITIVE

## 2022-10-25 PROCEDURE — 99214 PR OFFICE/OUTPT VISIT, EST, LEVL IV, 30-39 MIN: ICD-10-PCS | Mod: 25,S$PBB,CR, | Performed by: FAMILY MEDICINE

## 2022-10-25 PROCEDURE — 87804 INFLUENZA ASSAY W/OPTIC: CPT | Mod: PBBFAC,PO | Performed by: FAMILY MEDICINE

## 2022-10-25 PROCEDURE — 99214 OFFICE O/P EST MOD 30 MIN: CPT | Mod: PBBFAC,PO | Performed by: FAMILY MEDICINE

## 2022-10-25 PROCEDURE — 99214 OFFICE O/P EST MOD 30 MIN: CPT | Mod: 25,S$PBB,CR, | Performed by: FAMILY MEDICINE

## 2022-10-25 PROCEDURE — 99999 PR PBB SHADOW E&M-EST. PATIENT-LVL IV: CPT | Mod: PBBFAC,CR,, | Performed by: FAMILY MEDICINE

## 2022-10-25 PROCEDURE — 99999 PR PBB SHADOW E&M-EST. PATIENT-LVL IV: ICD-10-PCS | Mod: PBBFAC,CR,, | Performed by: FAMILY MEDICINE

## 2022-10-25 PROCEDURE — 87635 SARS-COV-2 COVID-19 AMP PRB: CPT | Mod: PBBFAC,PO | Performed by: FAMILY MEDICINE

## 2022-10-25 RX ORDER — ONDANSETRON 4 MG/1
4 TABLET, ORALLY DISINTEGRATING ORAL EVERY 8 HOURS PRN
Qty: 30 TABLET | Refills: 0 | Status: ON HOLD | OUTPATIENT
Start: 2022-10-25 | End: 2024-03-13 | Stop reason: HOSPADM

## 2022-10-25 NOTE — PROGRESS NOTES
(Portions of this note were dictated using voice recognition software and may contain dictation related errors in spelling/grammar/syntax not found on text review)    CC:   Chief Complaint   Patient presents with    Cough     X3 days;  and daughter both positive COVID and flu    Nausea    Sinus Problem       HPI: 77 y.o. female pt of Odette Zhang MD  New to me  Here for UC visit for URI symptoms  +covid  and dtr.  Having cough, congestion. Sore throat, nasal drip.  Feeling nauseated.  No vomiting or diarrhea.  No body aches but just drained of energy.  Has taken Tylenol, was using some prescription nasal spray (Astelin, Flonase) but no significant help.    Past Medical History:   Diagnosis Date    Allergic rhinitis 5/31/2016    Carotid artery plaque 11/18/2013    History of total hysterectomy with bilateral salpingo-oophorectomy (BSO) 5/31/2016    Hyperlipidemia 7/17/2012    Hypothyroid     Insomnia     Macular degeneration, right eye 5/31/2016    Migraine headache     Osteopenia of the elderly 10/20/2015    Postmenopausal HRT (hormone replacement therapy)     RBBB 7/17/2012       Past Surgical History:   Procedure Laterality Date    APPENDECTOMY      Catatact surgery Right 06/19/2019    without any complications    COLONOSCOPY  08/27/2019    repeat in 5 years    EYE SURGERY Bilateral     cataracts extraction    HYSTERECTOMY      STAPEDES SURGERY  2006    TOTAL ABDOMINAL HYSTERECTOMY W/ BILATERAL SALPINGOOPHORECTOMY      TUBAL LIGATION         Family History   Problem Relation Age of Onset    Heart disease Father     COPD Father     Heart attack Father     Alzheimer's disease Mother     Cancer Sister         breast cancer    Breast cancer Sister     Alzheimer's disease Sister     Other Brother         TIA    Hyperlipidemia Brother     Rheumatic fever Brother         as a child    Nephrolithiasis Daughter     Depression Son     Post-traumatic stress disorder Son     Cancer Maternal Aunt          "breast cancer    Cancer Maternal Grandmother         breast cancer    Cancer Cousin         colon cancer       Social History     Tobacco Use    Smoking status: Never    Smokeless tobacco: Never   Substance Use Topics    Alcohol use: Yes     Comment: occasionally    Drug use: No       Lab Results   Component Value Date    WBC 4.21 02/14/2022    HGB 12.4 02/14/2022    HCT 37.9 02/14/2022    MCV 93 02/14/2022     02/14/2022    CHOL 154 02/14/2022    TRIG 76 02/14/2022    HDL 56 02/14/2022    ALT 13 02/14/2022    AST 17 02/14/2022    BILITOT 0.4 02/14/2022    ALKPHOS 64 02/14/2022     02/14/2022    K 4.4 02/14/2022     02/14/2022    CREATININE 0.8 02/14/2022    ESTGFRAFRICA >60 02/14/2022    EGFRNONAA >60 02/14/2022    CALCIUM 9.1 02/14/2022    ALBUMIN 3.7 02/14/2022    BUN 15 02/14/2022    CO2 32 (H) 02/14/2022    TSH 1.232 02/14/2022    HGBA1C 5.8 (H) 02/14/2022    LDLCALC 82.8 02/14/2022    GLU 90 02/14/2022    UZBMIVDE39YI 52 02/09/2021                 Vital signs reviewed  Vitals:    10/25/22 1342   BP: 116/70   BP Location: Left arm   Patient Position: Sitting   BP Method: Medium (Manual)   Pulse: 88   Temp: 98.2 °F (36.8 °C)   TempSrc: Oral   SpO2: 99%   Weight: 61.5 kg (135 lb 9.3 oz)   Height: 5' 6" (1.676 m)       Wt Readings from Last 4 Encounters:   10/25/22 61.5 kg (135 lb 9.3 oz)   03/08/22 64 kg (141 lb 1.5 oz)   02/11/22 63.5 kg (139 lb 15.9 oz)   01/07/22 63.4 kg (139 lb 12.8 oz)       PE:   APPEARANCE: Well nourished, well developed, in no acute distress.    HEAD: Normocephalic, atraumatic.  EYES: PERRL. EOMI.   Conjunctivae noninjected.  EARS: TM's intact. Light reflex normal. No retraction or perforation  NOSE: Mucosa pink. Airway clear.  MOUTH & THROAT: No tonsillar enlargement. No pharyngeal erythema or exudate.   NECK: Supple with no cervical lymphadenopathy.    CHEST: Good inspiratory effort. Lungs clear to auscultation with no wheezes or crackles.  CARDIOVASCULAR: Normal S1, " S2. No rubs, murmurs, or gallops.  ABDOMEN: Bowel sounds normal. Not distended. Soft. No tenderness or masses. No organomegaly.  EXTREMITIES: No edema      IMPRESSION  1. COVID-19    2. Cough, unspecified type            PLAN  Orders Placed This Encounter   Procedures    POCT COVID-19 Rapid Screening    POCT Influenza A/B     Medications Ordered This Encounter   Medications    ondansetron (ZOFRAN-ODT) 4 MG TbDL     Sig: Take 1 tablet (4 mg total) by mouth every 8 (eight) hours as needed (nausea).     Dispense:  30 tablet     Refill:  0      Flu negative   COVID positive   Symptomatic management.  Mucinex DM for cough.  Zofran p.r.n. nausea.  Offered Paxlovid but she does not feel like she needs it.  counseling on seeking urgent evaluation for more severe symptoms including sudden weakness, confusion, chest pain, shortness of breath

## 2022-12-16 ENCOUNTER — PATIENT MESSAGE (OUTPATIENT)
Dept: FAMILY MEDICINE | Facility: CLINIC | Age: 77
End: 2022-12-16
Payer: MEDICARE

## 2022-12-16 DIAGNOSIS — F43.22 ADJUSTMENT REACTION WITH ANXIETY: Primary | ICD-10-CM

## 2022-12-16 RX ORDER — LORAZEPAM 1 MG/1
1 TABLET ORAL DAILY PRN
Qty: 30 TABLET | Refills: 5 | Status: SHIPPED | OUTPATIENT
Start: 2022-12-16 | End: 2023-06-16

## 2023-01-25 DIAGNOSIS — F43.22 ADJUSTMENT REACTION WITH ANXIETY: ICD-10-CM

## 2023-01-25 DIAGNOSIS — Z63.8 STRESS DUE TO FAMILY TENSION: ICD-10-CM

## 2023-01-25 RX ORDER — ESCITALOPRAM OXALATE 10 MG/1
TABLET ORAL
Qty: 90 TABLET | Refills: 0 | Status: SHIPPED | OUTPATIENT
Start: 2023-01-25 | End: 2023-01-25 | Stop reason: SDUPTHER

## 2023-01-25 RX ORDER — ESCITALOPRAM OXALATE 10 MG/1
10 TABLET ORAL DAILY
Qty: 90 TABLET | Refills: 4 | Status: SHIPPED | OUTPATIENT
Start: 2023-01-25 | End: 2023-12-29 | Stop reason: SDUPTHER

## 2023-01-25 SDOH — SOCIAL DETERMINANTS OF HEALTH (SDOH): OTHER SPECIFIED PROBLEMS RELATED TO PRIMARY SUPPORT GROUP: Z63.8

## 2023-01-26 NOTE — TELEPHONE ENCOUNTER
No new care gaps identified.  [facility] Embedded Care Gaps. Reference number: [id number]. 1/25/2023   7:59:25 PM CST

## 2023-01-26 NOTE — TELEPHONE ENCOUNTER
Refill Decision Note   Prema Phillips  is requesting a refill authorization.  Brief Assessment and Rationale for Refill:  Approve     Medication Therapy Plan:       Medication Reconciliation Completed: No   Comments:     No Care Gaps recommended.     Note composed:8:59 PM 01/25/2023

## 2023-02-06 DIAGNOSIS — E03.9 ACQUIRED HYPOTHYROIDISM: ICD-10-CM

## 2023-02-07 RX ORDER — LEVOTHYROXINE SODIUM 75 UG/1
TABLET ORAL
Qty: 90 TABLET | Refills: 0 | Status: SHIPPED | OUTPATIENT
Start: 2023-02-07 | End: 2023-05-04

## 2023-02-07 NOTE — TELEPHONE ENCOUNTER
Refill Decision Note   Prema Phillips  is requesting a refill authorization.  Brief Assessment and Rationale for Refill:  Approve     Medication Therapy Plan:       Medication Reconciliation Completed: No   Comments:     No Care Gaps recommended.     Note composed:8:55 AM 02/07/2023

## 2023-02-07 NOTE — TELEPHONE ENCOUNTER
No new care gaps identified.  [facility] Embedded Care Gaps. Reference number: [id number]. 2/06/2023   7:22:26 PM CST

## 2023-02-13 ENCOUNTER — OFFICE VISIT (OUTPATIENT)
Dept: FAMILY MEDICINE | Facility: CLINIC | Age: 78
End: 2023-02-13
Payer: MEDICARE

## 2023-02-13 VITALS
BODY MASS INDEX: 21.93 KG/M2 | TEMPERATURE: 98 F | HEART RATE: 77 BPM | WEIGHT: 136.44 LBS | HEIGHT: 66 IN | SYSTOLIC BLOOD PRESSURE: 98 MMHG | OXYGEN SATURATION: 99 % | DIASTOLIC BLOOD PRESSURE: 58 MMHG

## 2023-02-13 DIAGNOSIS — R73.03 PREDIABETES: ICD-10-CM

## 2023-02-13 DIAGNOSIS — J30.2 SEASONAL ALLERGIC RHINITIS, UNSPECIFIED TRIGGER: ICD-10-CM

## 2023-02-13 DIAGNOSIS — E03.9 ACQUIRED HYPOTHYROIDISM: ICD-10-CM

## 2023-02-13 DIAGNOSIS — Z79.82 LONG-TERM USE OF ASPIRIN THERAPY: ICD-10-CM

## 2023-02-13 DIAGNOSIS — Z79.899 LONG-TERM CURRENT USE OF PROTON PUMP INHIBITOR THERAPY: ICD-10-CM

## 2023-02-13 DIAGNOSIS — F43.22 ADJUSTMENT REACTION WITH ANXIETY: ICD-10-CM

## 2023-02-13 DIAGNOSIS — Z00.00 ROUTINE GENERAL MEDICAL EXAMINATION AT A HEALTH CARE FACILITY: Primary | ICD-10-CM

## 2023-02-13 DIAGNOSIS — E78.5 HYPERLIPIDEMIA, UNSPECIFIED HYPERLIPIDEMIA TYPE: ICD-10-CM

## 2023-02-13 DIAGNOSIS — K29.70 GASTRITIS, PRESENCE OF BLEEDING UNSPECIFIED, UNSPECIFIED CHRONICITY, UNSPECIFIED GASTRITIS TYPE: ICD-10-CM

## 2023-02-13 DIAGNOSIS — Z90.79 HISTORY OF TOTAL HYSTERECTOMY WITH BILATERAL SALPINGO-OOPHORECTOMY (BSO): ICD-10-CM

## 2023-02-13 DIAGNOSIS — M85.80 OSTEOPENIA OF THE ELDERLY: ICD-10-CM

## 2023-02-13 DIAGNOSIS — Z79.899 MEDICATION MANAGEMENT: ICD-10-CM

## 2023-02-13 DIAGNOSIS — I45.10 RBBB: ICD-10-CM

## 2023-02-13 DIAGNOSIS — I65.23 BILATERAL CAROTID ARTERY STENOSIS: ICD-10-CM

## 2023-02-13 DIAGNOSIS — G47.00 INSOMNIA, UNSPECIFIED TYPE: ICD-10-CM

## 2023-02-13 DIAGNOSIS — Z90.722 HISTORY OF TOTAL HYSTERECTOMY WITH BILATERAL SALPINGO-OOPHORECTOMY (BSO): ICD-10-CM

## 2023-02-13 DIAGNOSIS — M19.042 PRIMARY OSTEOARTHRITIS OF BOTH HANDS: ICD-10-CM

## 2023-02-13 DIAGNOSIS — Z90.710 HISTORY OF TOTAL HYSTERECTOMY WITH BILATERAL SALPINGO-OOPHORECTOMY (BSO): ICD-10-CM

## 2023-02-13 DIAGNOSIS — M19.041 PRIMARY OSTEOARTHRITIS OF BOTH HANDS: ICD-10-CM

## 2023-02-13 PROCEDURE — 99214 OFFICE O/P EST MOD 30 MIN: CPT | Mod: PBBFAC,PO | Performed by: FAMILY MEDICINE

## 2023-02-13 PROCEDURE — 99397 PR PREVENTIVE VISIT,EST,65 & OVER: ICD-10-PCS | Mod: S$PBB,GZ,, | Performed by: FAMILY MEDICINE

## 2023-02-13 PROCEDURE — 99999 PR PBB SHADOW E&M-EST. PATIENT-LVL IV: ICD-10-PCS | Mod: PBBFAC,,, | Performed by: FAMILY MEDICINE

## 2023-02-13 PROCEDURE — 99999 PR PBB SHADOW E&M-EST. PATIENT-LVL IV: CPT | Mod: PBBFAC,,, | Performed by: FAMILY MEDICINE

## 2023-02-13 PROCEDURE — 99397 PER PM REEVAL EST PAT 65+ YR: CPT | Mod: S$PBB,GZ,, | Performed by: FAMILY MEDICINE

## 2023-02-13 RX ORDER — MELOXICAM 15 MG/1
15 TABLET ORAL DAILY
Qty: 90 TABLET | Refills: 4 | Status: SHIPPED | OUTPATIENT
Start: 2023-02-13 | End: 2024-02-21 | Stop reason: ALTCHOICE

## 2023-02-13 RX ORDER — ATORVASTATIN CALCIUM 20 MG/1
20 TABLET, FILM COATED ORAL DAILY
Qty: 90 TABLET | Refills: 3 | Status: ON HOLD | OUTPATIENT
Start: 2023-02-13 | End: 2024-03-13 | Stop reason: HOSPADM

## 2023-02-13 RX ORDER — OMEPRAZOLE 20 MG/1
20 CAPSULE, DELAYED RELEASE ORAL EVERY MORNING
Qty: 90 CAPSULE | Refills: 3 | Status: ON HOLD | OUTPATIENT
Start: 2023-02-13 | End: 2024-03-13 | Stop reason: HOSPADM

## 2023-02-13 RX ORDER — AZELASTINE 1 MG/ML
1 SPRAY, METERED NASAL 2 TIMES DAILY
Qty: 30 ML | Refills: 11 | Status: ON HOLD | OUTPATIENT
Start: 2023-02-13 | End: 2024-03-13 | Stop reason: HOSPADM

## 2023-02-13 NOTE — PROGRESS NOTES
Office Visit    Patient Name: Prema Phillips    : 1945  MRN: [id number]    Subjective:  Prema is a 78 y.o. female who presents today for:    Annual Exam    Most recently seen by me for annual physical 2022   Had COVID-19 10/25/22    Prema Phillips presents today for annual wellness exam and monitoring of chronic conditions including hyperlipidemia, nonocclusive bilateral carotid artery disease, acquired hypothyroidism, postmenopausal osteopenia-- s/p hormone replacement therapy per GYN, h/o prior migraine headaches, allergic rhinitis, chronic constipation managed by outside GI- [facility].       She did not have labs for today's office visit-- prior labs have been unremarkable.      She is postmenopausal.    She has a gynecologist Dr. Sheppard but no longer needs paps due to history of TAYLER/BSO.   Saw cardiologist Dr Ash 2021 & Cardiology NP3/8/22-- moderate left internal carotid stenosis (40-49%) and mild right internal carotid stenosis (20-39%).  She remains asymptomatic.   Periodic Imaging--last 3/9/20      She has been feeling overall well and has kept up with some travelling.   Her bowel movements remain improved on regimen of MiraLax and fiber supplement p.r.n..   Physical therapy for Neck pain was helpful.   MSK/Osteoarthritis stable and takes Meloxicam and doing better with taking daily Prilosec to protect against associated GERD.    Mood stable on Lexapro 10-- helping her cope with multipole stressors.      Has some ongoing family stressors/ conflict and continues to benefit from Lexapro 10 mg daily for associated anxiety.      General lifestyle habits are as follows:    Diet is described as healthy-- fruits/veggies/some fish and stays hydrated  Exercise is described as fair-- walking sometimes for exercise and working in yard and some neck exercises-- NEEDS TO BE MORE CONSISTENT-- and some pedal exercise  Sleep is described as good- Ativan most nights helps (RX's by Dr Daniels)-- along with  nightly Amitriptyline ofr sleep and migraines.   Weight is overall stable at BMI 22.     Immunizations: TDaP 6/1/2015, Pneumovax 23 2/11/19 and Prevnar 13 5/31/2016 ,  YEARLY FLU COMPLETED, SHINGRIX 1/2 11/10/2019 -- 2/2 ADVISED, COVID-19 Vaccine completed w/ Moderna booster 11/18/21     Screening Tests: MAMMOGRAMS-- YEARLY IN January PER GYN [facility], DEXA 3/14/22--> stable osteopenia and repeat 2-3 years, Colonoscopy up to date per Dr. Watt with EJ & most recently was 8/27/2019- 5 year repeat advised, EGD 8/27/2019- Hep C screening negative 8/16/2016     Eye/Dental: up to date, ophthalmologist-- macular degeneration-- seeing retina specialist-- [facility], dental UTD       PAST MEDICAL HISTORY, SURGICAL/SOCIAL/FAMILY HISTORY REVIEWED AS PER CHART, WITH PERTINENT FINDINGS INCLUDED IN HISTORY SECTION OF NOTE.     Current Medications    Medication List with Changes/Refills   Current Medications    AMITRIPTYLINE (ELAVIL) 25 MG TABLET    TAKE 1/2-1 TABLET BY MOUTH NIGHTLY FOR INSOMNIA. OK TO TAKE WITH THE LOW DOSE OF ATIVAN    ASPIRIN (ECOTRIN) 81 MG EC TABLET    Take 81 mg by mouth once daily.    CALCIUM-VITAMIN D3-VITAMIN K 500-100-40 MG-UNIT-MCG CHEW    Take by mouth once daily.    CHOLECALCIFEROL, VITAMIN D3, (VITAMIN D3) 50 MCG (2,000 UNIT) TAB    once daily.     COENZYME Q10 200 MG CAPSULE    Take 200 mg by mouth once daily.    ESCITALOPRAM OXALATE (LEXAPRO) 10 MG TABLET    Take 1 tablet (10 mg total) by mouth once daily.    FLUTICASONE PROPIONATE (FLONASE) 50 MCG/ACTUATION NASAL SPRAY    SPRAY TWICE IN EACH NOSTRIL ONCE DAILY    LEVOTHYROXINE (SYNTHROID) 75 MCG TABLET    TAKE 1 TABLET DAILY BEFORE BREAKFAST    LORAZEPAM (ATIVAN) 1 MG TABLET    Take 1 tablet (1 mg total) by mouth daily as needed for Anxiety.    MULTIVITAMIN CAPSULE    Take 1 capsule by mouth once daily.    ONDANSETRON (ZOFRAN-ODT) 4 MG TBDL    Take 1 tablet (4 mg total) by mouth every 8 (eight) hours as needed  (nausea).    TRIAMCINOLONE ACETONIDE 0.025% (KENALOG) 0.025 % OINT    Apply topically 2 (two) times daily.    TURMERIC, BULK, MISC    by Misc.(Non-Drug; Combo Route) route once daily.    VIT A/VIT C/VIT E/ZINC/COPPER (PRESERVISION AREDS ORAL)    Take by mouth once daily.    Changed and/or Refilled Medications    Modified Medication Previous Medication    ATORVASTATIN (LIPITOR) 20 MG TABLET atorvastatin (LIPITOR) 20 MG tablet       Take 1 tablet (20 mg total) by mouth once daily.    TAKE 1 TABLET BY MOUTH EVERY DAY    AZELASTINE (ASTELIN) 137 MCG (0.1 %) NASAL SPRAY azelastine (ASTELIN) 137 mcg (0.1 %) nasal spray       1 spray (137 mcg total) by Nasal route 2 (two) times daily.    1 spray (137 mcg total) by Nasal route 2 (two) times daily.    MELOXICAM (MOBIC) 15 MG TABLET meloxicam (MOBIC) 15 MG tablet       Take 1 tablet (15 mg total) by mouth once daily.    Take 1 tablet (15 mg total) by mouth once daily.    OMEPRAZOLE (PRILOSEC) 20 MG CAPSULE omeprazole (PRILOSEC) 20 MG capsule       Take 1 capsule (20 mg total) by mouth every morning.    TAKE 1 CAPSULE BY MOUTH EVERY DAY IN THE MORNING   Discontinued Medications    ELETRIPTAN (RELPAX) 40 MG TABLET    Take 40 mg by mouth as needed. 1 Tablet Oral As directed       Allergies   Review of patient's allergies indicates:   Allergen Reactions    Neomycin Other (See Comments) and Swelling     Other reaction(s): Unknown         Review of Systems (Pertinent positives)  Review of Systems   Constitutional:  Negative for activity change and unexpected weight change.   HENT:  Positive for hearing loss. Negative for rhinorrhea and trouble swallowing.    Eyes:  Positive for visual disturbance. Negative for discharge.   Respiratory:  Negative for chest tightness and wheezing.    Cardiovascular:  Negative for chest pain and palpitations.   Gastrointestinal:  Negative for blood in stool, constipation, diarrhea and vomiting.   Endocrine: Negative for polydipsia and polyuria.  "  Genitourinary:  Negative for difficulty urinating, dysuria, hematuria and menstrual problem.   Musculoskeletal:  Positive for arthralgias. Negative for joint swelling and neck pain.   Neurological:  Negative for weakness and headaches.   Psychiatric/Behavioral:  Negative for confusion and dysphoric mood.      BP (!) 98/58 (BP Location: Right arm, Patient Position: Sitting, BP Method: Medium (Manual))   Pulse 77   Temp 97.7 °F (36.5 °C) (Oral)   Ht 5' 6" (1.676 m)   Wt 61.9 kg (136 lb 7.4 oz)   SpO2 99%   BMI 22.03 kg/m²     Physical Exam  Vitals reviewed.   Constitutional:       General: She is not in acute distress.     Appearance: Normal appearance. She is well-developed.   HENT:      Head: Normocephalic and atraumatic.      Right Ear: Ear canal normal. Tympanic membrane is not erythematous or bulging.      Left Ear: Ear canal normal. Tympanic membrane is not erythematous or bulging.      Nose: Nose normal.      Mouth/Throat:      Mouth: Mucous membranes are moist.      Pharynx: No oropharyngeal exudate.   Eyes:      Extraocular Movements: Extraocular movements intact.      Conjunctiva/sclera: Conjunctivae normal.   Neck:      Thyroid: No thyroid mass or thyromegaly.      Vascular: No carotid bruit.   Cardiovascular:      Rate and Rhythm: Normal rate and regular rhythm.      Pulses:           Dorsalis pedis pulses are 2+ on the right side and 2+ on the left side.      Heart sounds: Normal heart sounds. No murmur heard.  Pulmonary:      Effort: Pulmonary effort is normal. No respiratory distress.      Breath sounds: Normal breath sounds.   Abdominal:      General: Bowel sounds are normal. There is no distension.      Palpations: Abdomen is soft. There is no mass.      Tenderness: There is no abdominal tenderness.   Musculoskeletal:         General: Normal range of motion.      Right lower leg: No edema.      Left lower leg: No edema.   Lymphadenopathy:      Cervical: No cervical adenopathy.   Skin:     " General: Skin is warm and dry.      Findings: No rash.   Neurological:      General: No focal deficit present.      Mental Status: She is alert and oriented to person, place, and time.   Psychiatric:         Mood and Affect: Mood normal.         Behavior: Behavior normal.         Assessment/Plan:  Prema Phillips is a 78 y.o. female who presents today for :        ICD-10-CM ICD-9-CM    1. Routine general medical examination at a health care facility  Z00.00 V70.0       2. History of total hysterectomy with bilateral salpingo-oophorectomy (BSO)  Z90.710 V15.29     Z90.722      Z90.79        3. BMI 22.0-22.9, adult  Z68.22 V85.1       4. Prediabetes  R73.03 790.29 Hemoglobin A1C      Comprehensive Metabolic Panel      Lipid Panel      CBC Auto Differential      TSH      Vitamin D      Magnesium      Vitamin B12      5. Long-term use of aspirin therapy  Z79.82 V58.66       6. Hyperlipidemia, unspecified hyperlipidemia type  E78.5 272.4 atorvastatin (LIPITOR) 20 MG tablet      Hemoglobin A1C      Comprehensive Metabolic Panel      Lipid Panel      CBC Auto Differential      TSH      Vitamin D      Magnesium      Vitamin B12      7. Acquired hypothyroidism  E03.9 244.9       8. Insomnia, unspecified type  G47.00 780.52       9. Osteopenia of the elderly  M85.80 733.90       10. Adjustment reaction with anxiety  F43.22 309.24       11. Gastritis, presence of bleeding unspecified, unspecified chronicity, unspecified gastritis type  K29.70 535.50 omeprazole (PRILOSEC) 20 MG capsule      12. Seasonal allergic rhinitis, unspecified trigger  J30.2 477.9 azelastine (ASTELIN) 137 mcg (0.1 %) nasal spray      13. Primary osteoarthritis of both hands  M19.041 715.14 meloxicam (MOBIC) 15 MG tablet    M19.042        14. Long-term current use of proton pump inhibitor therapy  Z79.899 V58.69 Comprehensive Metabolic Panel      Vitamin D      Magnesium      Vitamin B12      15. Medication management  Z79.899 V58.69 Hemoglobin A1C       Comprehensive Metabolic Panel      Lipid Panel      CBC Auto Differential      TSH      Vitamin D      Magnesium      Vitamin B12        HEALTH MAINTENANCE: ADVISED ON DIET/EXERCISE/SLEEP, ROUTINE EYE/DENTAL EXAMS, AND THE IMPORTANCE OF KEEPING UP WITH APPROPRIATE SCREENING TESTS BASED ON AGE AND RISK FACTORS.  Yearly mammograms in January UTD,  DEXA NEXT DUE 3/2025, REPEAT COLONOSCOPY DUE 2024  DUE FOR SHINGRIX  2/2   COVID -19 VACCINE COMPLETED INCLUDING MODERNA BOOSTER 11/18/21-- 0 CHRONIC ROOSTER DEFERRED AS SHE RECENTLY HAD COVID 10/25/22     RIGHT BUNDLE-BRANCH BLOCK, CAROTID ARTERY STENOSIS, HLD:  SEES CARDIOLOGY DR. ELLISON YEARLY, HAS UPDATED CAROTID ARTERY ULTRASOUND 2/26/20 SHOWING LESS THAN 50% STENOSIS,   CONTINUE STATIN-LIPITOR 20, ASPIRIN 81 MG DAILY.  WILL RECHECK ULTRASOUND OF CAROTIDS-IF STABLE DOES NOT NEED TO SEE CARDIOLOGY.     CHRONIC MIGRAINES W/ INSOMINA:  FREQUENCY IS STABLE ON NIGHTLY ELAVIL- HELPS WITH HEADACHES AND SLEEP, TAKES RELPAX PRN-- HAS NOT NEEDED A LONG TIME.     ALLERGIC RHINITIS:  Well controlled with Astelin, Flonase p.r.n., takes Claritin daily.     GERD WITH UNDERLYING CHRONIC CONSTIPATION:  COLONOSCOPY UTD per GI  DR. [facility]- REPEAT DUE 2024.  Continue MIRALAX/CITRUCEL FIBER SUPPLEMENT.  PPI LABS-- VITAMIN-D, B12, MAGNESIUM MONITORED WITH LABS.     OSTEOPENIA: CONTINUE CALCIUM/VITAMIN-D, WB Exercises and Repeat 3/2025.      HYPOTHYROIDISM:  STABLE on LEVOTHYROXINE 75 MCG DAILY, CHECK LEVEL WITH LABS AND ADVISE BASED ON RESULTS     ANXIETY AND DEPRESSION:  CONTINUE ATIVAN P.R.N W/ LEXAPRO 10 MG DAILY          There are no Patient Instructions on file for this visit.      Follow up for to follow up on lab results, return as needed for new concerns.

## 2023-02-24 ENCOUNTER — TELEPHONE (OUTPATIENT)
Dept: CARDIOLOGY | Facility: CLINIC | Age: 78
End: 2023-02-24
Payer: MEDICARE

## 2023-04-24 NOTE — PATIENT INSTRUCTIONS
She takes a daily baby aspirin and meloxicam as needed for pain-advised to stop these at least 10 days prior to surgery.   Hemigard Intro: Due to skin fragility and wound tension, it was decided to use HEMIGARD adhesive retention suture devices to permit a linear closure. The skin was cleaned and dried for a 6cm distance away from the wound. Excessive hair, if present, was removed to allow for adhesion.

## 2023-05-03 DIAGNOSIS — E03.9 ACQUIRED HYPOTHYROIDISM: ICD-10-CM

## 2023-05-04 RX ORDER — LEVOTHYROXINE SODIUM 75 UG/1
TABLET ORAL
Qty: 90 TABLET | Refills: 3 | Status: ON HOLD | OUTPATIENT
Start: 2023-05-04 | End: 2024-03-13 | Stop reason: HOSPADM

## 2023-05-04 NOTE — TELEPHONE ENCOUNTER
No care due was identified.  [facility] Embedded Care Due Messages. Reference number: [id number].   5/03/2023 8:34:30 PM CDT

## 2023-05-04 NOTE — TELEPHONE ENCOUNTER
Refill Decision Note   Prema Alan  is requesting a refill authorization.  Brief Assessment and Rationale for Refill:  Approve     Medication Therapy Plan:         Comments:     Note composed:1:51 PM 05/04/2023

## 2023-06-15 ENCOUNTER — PATIENT MESSAGE (OUTPATIENT)
Dept: FAMILY MEDICINE | Facility: CLINIC | Age: 78
End: 2023-06-15
Payer: MEDICARE

## 2023-06-15 DIAGNOSIS — F43.22 ADJUSTMENT REACTION WITH ANXIETY: ICD-10-CM

## 2023-06-15 NOTE — TELEPHONE ENCOUNTER
No care due was identified.  [facility] Embedded Care Due Messages. Reference number: [id number].   6/15/2023 9:20:36 AM CDT

## 2023-06-16 RX ORDER — LORAZEPAM 1 MG/1
TABLET ORAL
Qty: 30 TABLET | Refills: 2 | Status: SHIPPED | OUTPATIENT
Start: 2023-06-16 | End: 2023-10-31 | Stop reason: SDUPTHER

## 2023-07-10 DIAGNOSIS — F51.01 PRIMARY INSOMNIA: ICD-10-CM

## 2023-07-10 DIAGNOSIS — G43.709 CHRONIC MIGRAINE WITHOUT AURA WITHOUT STATUS MIGRAINOSUS, NOT INTRACTABLE: ICD-10-CM

## 2023-07-10 RX ORDER — AMITRIPTYLINE HYDROCHLORIDE 25 MG/1
TABLET, FILM COATED ORAL
Qty: 90 TABLET | Refills: 4 | Status: SHIPPED | OUTPATIENT
Start: 2023-07-10 | End: 2024-02-21 | Stop reason: SDUPTHER

## 2023-07-10 NOTE — TELEPHONE ENCOUNTER
----- Message from Cara Tate sent at 7/10/2023  9:54 AM CDT -----  Regarding: Medication Refil  Contact: Patient  Type:  RX Refill Request    Who Called: Patient   Refill or New Rx: Refill   RX Name and Strength:amitriptyline (ELAVIL) 25 MG tablet  How is the patient currently taking it? (ex. 1XDay):Sig: TAKE 1/2-1 TABLET BY MOUTH NIGHTLY FOR INSOMNIA. OK TO TAKE WITH THE LOW DOSE OF ATIVAN  Is this a 30 day or 90 day RX: 30 day   Preferred Pharmacy with phone number:[facility]/pharmacy #[id number] - [place] - [address] [facility]  Phone:  [phone]  Local or Mail Order: Local   Ordering Provider: Leatha   Would the patient rather a call back or a response via [facility]? Call back   Best Call Back Number: [phone]  Additional Information: Pt stated she is going out of town on 07/12/2023 and would like to have medication to take with her Patient did call pharmacy for refill and was advised their were none left for medication please assist

## 2023-07-10 NOTE — TELEPHONE ENCOUNTER
No care due was identified.  [facility] Embedded Care Due Messages. Reference number: [id number].   7/10/2023 9:48:31 AM CDT

## 2023-10-02 ENCOUNTER — OFFICE VISIT (OUTPATIENT)
Dept: FAMILY MEDICINE | Facility: CLINIC | Age: 78
End: 2023-10-02
Payer: MEDICARE

## 2023-10-02 VITALS
BODY MASS INDEX: 22 KG/M2 | OXYGEN SATURATION: 99 % | HEIGHT: 66 IN | DIASTOLIC BLOOD PRESSURE: 72 MMHG | SYSTOLIC BLOOD PRESSURE: 116 MMHG | TEMPERATURE: 98 F | WEIGHT: 136.88 LBS | HEART RATE: 93 BPM

## 2023-10-02 DIAGNOSIS — Z00.00 ENCOUNTER FOR PREVENTIVE HEALTH EXAMINATION: Primary | ICD-10-CM

## 2023-10-02 DIAGNOSIS — E03.9 ACQUIRED HYPOTHYROIDISM: ICD-10-CM

## 2023-10-02 DIAGNOSIS — I65.23 BILATERAL CAROTID ARTERY STENOSIS: ICD-10-CM

## 2023-10-02 DIAGNOSIS — G43.709 CHRONIC MIGRAINE WITHOUT AURA WITHOUT STATUS MIGRAINOSUS, NOT INTRACTABLE: ICD-10-CM

## 2023-10-02 DIAGNOSIS — E78.5 HYPERLIPIDEMIA, UNSPECIFIED HYPERLIPIDEMIA TYPE: ICD-10-CM

## 2023-10-02 DIAGNOSIS — Z23 NEED FOR VACCINATION: ICD-10-CM

## 2023-10-02 DIAGNOSIS — H35.30 MACULAR DEGENERATION OF RIGHT EYE, UNSPECIFIED TYPE: ICD-10-CM

## 2023-10-02 DIAGNOSIS — I45.10 RBBB: ICD-10-CM

## 2023-10-02 DIAGNOSIS — M85.80 OSTEOPENIA OF THE ELDERLY: ICD-10-CM

## 2023-10-02 DIAGNOSIS — G47.00 INSOMNIA, UNSPECIFIED TYPE: ICD-10-CM

## 2023-10-02 PROCEDURE — 99215 OFFICE O/P EST HI 40 MIN: CPT | Mod: PBBFAC,PN,25 | Performed by: PEDIATRICS

## 2023-10-02 PROCEDURE — 99999 PR PBB SHADOW E&M-EST. PATIENT-LVL V: ICD-10-PCS | Mod: PBBFAC,,, | Performed by: PEDIATRICS

## 2023-10-02 PROCEDURE — G0439 PPPS, SUBSEQ VISIT: HCPCS | Mod: ,,, | Performed by: PEDIATRICS

## 2023-10-02 PROCEDURE — 99999PBSHW FLU VACCINE - QUADRIVALENT - ADJUVANTED: Mod: PBBFAC,,,

## 2023-10-02 PROCEDURE — G0439 PR MEDICARE ANNUAL WELLNESS SUBSEQUENT VISIT: ICD-10-PCS | Mod: ,,, | Performed by: PEDIATRICS

## 2023-10-02 PROCEDURE — 99999PBSHW FLU VACCINE - QUADRIVALENT - ADJUVANTED: ICD-10-PCS | Mod: PBBFAC,,,

## 2023-10-02 PROCEDURE — G0008 ADMIN INFLUENZA VIRUS VAC: HCPCS | Mod: PBBFAC,PN

## 2023-10-02 PROCEDURE — 99999 PR PBB SHADOW E&M-EST. PATIENT-LVL V: CPT | Mod: PBBFAC,,, | Performed by: PEDIATRICS

## 2023-10-02 NOTE — PROGRESS NOTES
"  Prema Phillips presented for a  Medicare AWV and comprehensive Health Risk Assessment today. The following components were reviewed and updated:    Medical history  Family History  Social history  Allergies and Current Medications  Health Risk Assessment  Health Maintenance  Care Team         ** See Completed Assessments for Annual Wellness Visit within the encounter summary.**         The following assessments were completed:  Living Situation  CAGE  Depression Screening  Timed Get Up and Go  Whisper Test  Cognitive Function Screening  Nutrition Screening  ADL Screening  PAQ Screening    Patient does not have Rx for Opioids  Patient does not use substance     Vitals:    10/02/23 1108   BP: 116/72   Pulse: 93   Temp: 98.2 °F (36.8 °C)   TempSrc: Temporal   SpO2: 99%   Weight: 62.1 kg (136 lb 14.5 oz)   Height: 5' 6" (1.676 m)     Body mass index is 22.1 kg/m².  Physical Exam  Constitutional:       General: She is not in acute distress.     Appearance: Normal appearance.   HENT:      Head: Normocephalic and atraumatic.      Right Ear: External ear normal.      Left Ear: External ear normal.      Nose: Nose normal.      Mouth/Throat:      Mouth: Mucous membranes are moist.      Pharynx: Oropharynx is clear.   Eyes:      Extraocular Movements: Extraocular movements intact.      Conjunctiva/sclera: Conjunctivae normal.      Pupils: Pupils are equal, round, and reactive to light.   Cardiovascular:      Rate and Rhythm: Normal rate and regular rhythm.      Pulses: Normal pulses.      Heart sounds: Normal heart sounds.   Pulmonary:      Effort: Pulmonary effort is normal. No respiratory distress.      Breath sounds: Normal breath sounds. No wheezing.   Abdominal:      General: Abdomen is flat.   Musculoskeletal:         General: No swelling. Normal range of motion.      Cervical back: Normal range of motion and neck supple.   Skin:     General: Skin is warm and dry.      Findings: No rash.   Neurological:      Mental " Status: She is alert and oriented to person, place, and time.      Gait: Gait normal.   Psychiatric:         Mood and Affect: Mood normal.         Behavior: Behavior normal.               Diagnoses and health risks identified today and associated recommendations/orders:    1. Encounter for preventive health examination  Chart reviewed. Problem list updated. Discussed current medical diagnosis, current medications, medical/surgical/family/social history; updated provider list; documented vital signs; identified any cognitive impairment; and updated risk factor list. Addressed any outstanding health maintenance. Provided patient with personalized health advice. Continue to follow up with PCP and any specialists.       2. Hyperlipidemia, unspecified hyperlipidemia type  Chronic; stable on current treatment plan; follow up as scheduled.  Lipitor  Managed by pcp    3. Acquired hypothyroidism  Chronic; stable on current treatment plan; follow up as scheduled.  Synthroid  Managed by pcp    4. Need for vaccination  Chronic; stable on current treatment plan; follow up as scheduled.  - Influenza (FLUAD) - Quadrivalent (Adjuvanted) *Preferred* (65+) (PF)    5. Chronic migraine without aura without status migrainosus, not intractable  Chronic; stable on current treatment plan; follow up as scheduled.    6. Macular degeneration of right eye, unspecified type  Chronic; stable on current treatment plan; follow up as scheduled.    7. Bilateral carotid artery stenosis  Chronic; stable on current treatment plan; follow up as scheduled.    8. RBBB  Chronic; stable on current treatment plan; follow up as scheduled.    9. Osteopenia of the elderly  Chronic; stable on current treatment plan; follow up as scheduled.    10. Insomnia, unspecified type  Chronic; stable on current treatment plan; follow up as scheduled.  Ativan, amitriptyline  Managed by pcp    Provided Prema with a 5-10 year written screening schedule and personal prevention  plan. Recommendations were developed using the USPSTF age appropriate recommendations. Education, counseling, and referrals were provided as needed. After Visit Summary printed and given to patient which includes a list of additional screenings\tests needed.    Follow up in about 1 year (around 10/2/2024).      Charles Arechiga NP   [facility]  10/2/23           I offered to discuss advanced care planning, including how to pick a person who would make decisions for you if you were unable to make them for yourself, called a health care power of , and what kind of decisions you might make such as use of life sustaining treatments such as ventilators and tube feeding when faced with a life limiting illness recorded on a living will that they will need to know. (How you want to be cared for as you near the end of your natural life)     X Patient is interested in learning more about how to make advanced directives.  I provided them paperwork and offered to discuss this with them.

## 2023-10-02 NOTE — PATIENT INSTRUCTIONS
Counseling and Referral of Other Preventative  (Italic type indicates deductible and co-insurance are waived)    Patient Name: Prema Phillips  Today's Date: 10/2/2023    Health Maintenance       Date Due Completion Date    Shingles Vaccine (2 of 2) 01/05/2020 11/10/2019    Influenza Vaccine (1) 09/01/2023 10/20/2022    Eye Exam 12/30/2023 12/30/2022    Override on 8/9/2019: Done    Override on 12/8/2017: Done ([facility] - see media)    Override on 12/5/2016: Done ([facility])    COVID-19 Vaccine (6 - Moderna series) 10/02/2024 (Originally 1/13/2022) 11/18/2021    Aspirin/Antiplatelet Therapy 02/13/2024 2/13/2023    Hemoglobin A1c (Prediabetes) 02/14/2024 2/14/2023    Colonoscopy 08/27/2024 8/27/2019    Override on 5/5/2009: Done (repeat in 5 yrs-shalini cherry )    DEXA Scan 03/14/2025 3/14/2022    TETANUS VACCINE 06/01/2025 6/1/2015    Lipid Panel 02/14/2028 2/14/2023        Orders Placed This Encounter   Procedures    Influenza (FLUAD) - Quadrivalent (Adjuvanted) *Preferred* (65+) (PF)     The following information is provided to all patients.  This information is to help you find resources for any of the problems found today that may be affecting your health:                Living healthy guide: www.[facility].[place].gov      Understanding Diabetes: www.diabetes.org      Eating healthy: www.cdc.gov/healthyweight      CDC home safety checklist: www.cdc.gov/steadi/patient.html      Agency on Aging: www.goea.[place].gov      Alcoholics anonymous (AA): www.aa.org      Physical Activity: www.adal.nih.gov/ts2idwu      Tobacco use: www.quitwithusla.org

## 2023-10-05 ENCOUNTER — PATIENT MESSAGE (OUTPATIENT)
Dept: FAMILY MEDICINE | Facility: CLINIC | Age: 78
End: 2023-10-05

## 2023-10-31 DIAGNOSIS — F43.22 ADJUSTMENT REACTION WITH ANXIETY: ICD-10-CM

## 2023-10-31 RX ORDER — LORAZEPAM 1 MG/1
TABLET ORAL
Qty: 30 TABLET | Refills: 2 | Status: SHIPPED | OUTPATIENT
Start: 2023-10-31 | End: 2023-11-15 | Stop reason: CLARIF

## 2023-10-31 NOTE — TELEPHONE ENCOUNTER
No care due was identified.  [facility] Embedded Care Due Messages. Reference number: [id number].   10/31/2023 4:32:53 PM CDT

## 2023-11-14 ENCOUNTER — PATIENT MESSAGE (OUTPATIENT)
Dept: FAMILY MEDICINE | Facility: CLINIC | Age: 78
End: 2023-11-14

## 2023-11-14 DIAGNOSIS — F43.22 ADJUSTMENT REACTION WITH ANXIETY: Primary | ICD-10-CM

## 2023-11-15 RX ORDER — LORAZEPAM 0.5 MG/1
TABLET ORAL
Qty: 60 TABLET | Refills: 1 | Status: ON HOLD | OUTPATIENT
Start: 2023-11-15 | End: 2024-03-13 | Stop reason: HOSPADM

## 2023-12-27 ENCOUNTER — PATIENT MESSAGE (OUTPATIENT)
Dept: FAMILY MEDICINE | Facility: CLINIC | Age: 78
End: 2023-12-27

## 2023-12-29 ENCOUNTER — TELEPHONE (OUTPATIENT)
Dept: FAMILY MEDICINE | Facility: CLINIC | Age: 78
End: 2023-12-29

## 2023-12-29 DIAGNOSIS — Z63.8 STRESS DUE TO FAMILY TENSION: ICD-10-CM

## 2023-12-29 DIAGNOSIS — F43.22 ADJUSTMENT REACTION WITH ANXIETY: ICD-10-CM

## 2023-12-29 RX ORDER — ESCITALOPRAM OXALATE 10 MG/1
TABLET ORAL
Qty: 90 TABLET | Refills: 4
Start: 2023-12-29 | End: 2024-02-21

## 2023-12-29 SDOH — SOCIAL DETERMINANTS OF HEALTH (SDOH): OTHER SPECIFIED PROBLEMS RELATED TO PRIMARY SUPPORT GROUP: Z63.8

## 2024-01-08 ENCOUNTER — OFFICE VISIT (OUTPATIENT)
Dept: URGENT CARE | Facility: CLINIC | Age: 79
End: 2024-01-08
Payer: MEDICARE

## 2024-01-08 VITALS
HEART RATE: 99 BPM | HEIGHT: 66 IN | RESPIRATION RATE: 17 BRPM | BODY MASS INDEX: 21.86 KG/M2 | DIASTOLIC BLOOD PRESSURE: 68 MMHG | WEIGHT: 136 LBS | TEMPERATURE: 98 F | OXYGEN SATURATION: 95 % | SYSTOLIC BLOOD PRESSURE: 116 MMHG

## 2024-01-08 DIAGNOSIS — S93.492A SPRAIN OF OTHER LIGAMENT OF LEFT ANKLE, INITIAL ENCOUNTER: Primary | ICD-10-CM

## 2024-01-08 DIAGNOSIS — J30.89 SEASONAL ALLERGIC RHINITIS DUE TO OTHER ALLERGIC TRIGGER: ICD-10-CM

## 2024-01-08 DIAGNOSIS — H65.03 NON-RECURRENT ACUTE SEROUS OTITIS MEDIA OF BOTH EARS: ICD-10-CM

## 2024-01-08 PROBLEM — N95.2 ATROPHIC VAGINITIS: Status: ACTIVE | Noted: 2020-07-23

## 2024-01-08 PROBLEM — N60.12 DIFFUSE CYSTIC MASTOPATHY OF BOTH BREASTS: Status: ACTIVE | Noted: 2020-07-23

## 2024-01-08 PROBLEM — N60.11 DIFFUSE CYSTIC MASTOPATHY OF BOTH BREASTS: Status: ACTIVE | Noted: 2020-07-23

## 2024-01-08 PROBLEM — Z01.419 WELL WOMAN EXAM WITH ROUTINE GYNECOLOGICAL EXAM: Status: ACTIVE | Noted: 2020-07-23

## 2024-01-08 PROCEDURE — 73610 X-RAY EXAM OF ANKLE: CPT | Mod: FY,LT,S$GLB, | Performed by: RADIOLOGY

## 2024-01-08 PROCEDURE — 99213 OFFICE O/P EST LOW 20 MIN: CPT | Mod: S$GLB,,, | Performed by: PHYSICIAN ASSISTANT

## 2024-01-08 RX ORDER — IBUPROFEN 800 MG/1
800 TABLET ORAL EVERY 6 HOURS PRN
Qty: 30 TABLET | Refills: 0 | Status: SHIPPED | OUTPATIENT
Start: 2024-01-09 | End: 2024-01-19

## 2024-01-08 NOTE — PROGRESS NOTES
"Subjective:      Patient ID: Prema Phillips is a 78 y.o. female.    Vitals:  height is 5' 6" (1.676 m) and weight is 61.7 kg (136 lb). Her temperature is 98.3 °F (36.8 °C). Her blood pressure is 116/68 and her pulse is 99. Her respiration is 17 and oxygen saturation is 95%.     Chief Complaint: Ankle Injury    Prema Phillips is a 78 y.o. female who complains of  left ankle injury for 2 days. Pt stated walked down stairs at a restaurant and missed a step and twisted her ankle Saturday night. Pain is 6/10. She has tried to ice and elevate left ankle. Patient doesn't think it is broken because she is able to walk.     Ankle Injury   The incident occurred 2 days ago. Incident location: restaurant. The injury mechanism was an inversion injury. The pain is present in the left ankle. The quality of the pain is described as aching. The pain is at a severity of 6/10. The pain is mild. The pain has been Constant since onset. Pertinent negatives include no inability to bear weight, loss of motion, loss of sensation, muscle weakness, numbness or tingling. Associated symptoms comments: swelling. She reports no foreign bodies present. The symptoms are aggravated by movement and weight bearing. She has tried ice, elevation, rest and non-weight bearing for the symptoms. The treatment provided mild relief.       Constitution: Negative for chills and fever.   HENT:  Positive for hearing loss and postnasal drip. Negative for ear pain, ear discharge, congestion, sore throat, trouble swallowing and voice change.    Cardiovascular:  Negative for chest pain, leg swelling, palpitations and sob on exertion.   Eyes:  Negative for eye discharge and eye redness.   Respiratory:  Negative for cough, shortness of breath, wheezing and asthma.    Gastrointestinal:  Negative for abdominal pain, nausea and vomiting.   Musculoskeletal:  Positive for pain, trauma, joint pain, joint swelling and muscle ache. Negative for abnormal ROM of joint and history " of spine disorder.   Skin:  Positive for bruising.   Allergic/Immunologic: Positive for environmental allergies and seasonal allergies. Negative for asthma and recurrent sinus infections.   Neurological:  Negative for numbness and tingling.   Psychiatric/Behavioral:  Negative for nervous/anxious. The patient is not nervous/anxious.       Objective:     Physical Exam   Constitutional: She is oriented to person, place, and time. No distress.      Comments:Patient is awake and alert, sitting up in exam chair, speaking and answering in complete sentences   normal  HENT:   Head: Normocephalic and atraumatic.      Comments: Patient observed sniffling, and frequently clearing her throat.    Ears:   Right Ear: External ear and ear canal normal. A middle ear effusion is present.   Left Ear: External ear and ear canal normal. A middle ear effusion is present.   Nose: Congestion present. No rhinorrhea.   Mouth/Throat: Mucous membranes are moist. No oropharyngeal exudate or posterior oropharyngeal erythema. Oropharynx is clear.      Comments:  postnasal discharge noted on the posterior pharyngeal wall    Eyes: Conjunctivae are normal. Pupils are equal, round, and reactive to light. Extraocular movement intact   Neck: Neck supple.   Cardiovascular: Normal rate, regular rhythm, normal heart sounds and normal pulses.   Pulmonary/Chest: Effort normal and breath sounds normal. No respiratory distress. She has no wheezes. She has no rhonchi. She has no rales.   Abdominal: Normal appearance.   Musculoskeletal: Normal range of motion.         General: Swelling and tenderness present. Normal range of motion.      Cervical back: She exhibits no tenderness.      Comments: There is swelling and tenderness over the lateral malleolus. No tenderness over the medial aspect of the ankle. The fifth metatarsal is not tender.      Lymphadenopathy:     She has no cervical adenopathy.   Neurological: She is alert and oriented to person, place, and  time.   Skin: Skin is warm. Capillary refill takes less than 2 seconds. bruising   Psychiatric: Her behavior is normal. Mood, judgment and thought content normal.   Nursing note and vitals reviewed.      X-Ray Ankle Complete 3 View Left    Result Date: 1/8/2024  EXAMINATION: XR ANKLE COMPLETE 3 VIEW LEFT CLINICAL HISTORY: Unspecified injury of left ankle, initial encounter TECHNIQUE: AP, lateral and oblique views of the left ankle were performed. COMPARISON: None FINDINGS: 1 x 2 mm radiodensity projects subjacent to the distal fibula on the AP image and could relate to tiny avulsion injury or focus of remote dystrophic soft tissue calcification.  1 x 3 mm radiodensity seen on the lateral exam adjacent to the base of the 5th metatarsal and felt likely related to an area of dystrophic soft tissue calcification less likely avulsion injury.  No other findings to suggest acute fractures.  Preserved tibiotalar articulation and talar dome.  Bimalleolar soft tissue swelling.     As above Electronically signed by: Carlos Gregorio Date:    01/08/2024 Time:    10:11     Assessment:     1. Sprain of other ligament of left ankle, initial encounter    2. Seasonal allergic rhinitis due to other allergic trigger    3. Non-recurrent acute serous otitis media of both ears      Patient presents with clinical exam findings and history consistent with above.      On exam, patient is nontoxic appearing and vitals are stable.     Diagnostic testing results were reviewed and discussed with patient/guardian.   Tests ordered in clinic: X-ray left ankle negative for acute fracture.  Previous progress notes/admissions/labs and medications were reviewed. Reviewed GFR >60 from CMP on 2/14/23.    Plan:   Patient declined toradol injection for pain.     Sprain of other ligament of left ankle, initial encounter  -     X-Ray Ankle Complete 3 View Left; Future; Expected date: 01/08/2024  -     Ambulatory referral/consult to Podiatry  -     BANDAGE  "ELASTIC 4IN ACE NS  -     ibuprofen (ADVIL,MOTRIN) 800 MG tablet; Take 1 tablet (800 mg total) by mouth every 6 (six) hours as needed for Pain.  Dispense: 30 tablet; Refill: 0    Seasonal allergic rhinitis due to other allergic trigger  -     Ambulatory referral/consult to Family Practice    Non-recurrent acute serous otitis media of both ears  -     Ambulatory referral/consult to Family Practice                    1) See orders for this visit as documented in the electronic medical record.  2) Symptomatic therapy suggested: use acetaminophen/ibuprofen every 6-8 hours prn pain or fever, push fluids.   3) Call or return to clinic prn if these symptoms worsen or fail to improve as anticipated.    Discussed results/diagnosis/plan with patient in clinic.  We had shared decision making for patient's treatment. Patient verbalized understanding and in agreement with current treatment plan.     Patient was instructed to return for re-evaluation with urgent care or PCP for continued outpatient workup and management if symptoms do not improve/worsening symptoms. Strict ED versus clinic precautions given in depth.    Discharge and follow-up instructions given verbally/printed with the patient who expressed understanding. The instructions and results are also available on [facility]              Prisca "Maria Dolores" LORETTA Cramer          Patient Instructions   Recommend oral antihistamine (Claritin/zyrtec) +/- oral decongestant (pseudoephedrine) for rhinorrhea, steroid nasal spray (flonase) +/- antihistamine nasal spray (azelastine),.    For nose bleeds; recommend nasal irrigation with a saline spray/gel (AYR nasal gel) or Netti Pot like device per their directions is also recommended.  Please drink plenty of fluids.  Please get plenty of rest.  If you  smoke, please stop smoking.    Discussed prescriptions and over-the-counter medicines to help with patient's symptoms:  A steroid nose spray (flonase) can help with a stuffy nose. It can " also help with drainage down the back of your throat.  An antihistamine (loratadine,zyrtec,allegra, xyzal) can help with itching, sneezing, or runny nose.  An antihistamine eye drop can help with itchy eyes.  A decongestant (pseudoephedrine,  Phenylephrine) can help with a stuffy nose. Take <10 days for congestion and rhinorrhea. Once symptoms improve, proceed with loratadine/zyrtec once a day. These ingredients can keep you up all night, decrease appetite, feel jittery, and raise blood pressure with long term use.    _______________________________________________________  A sprain is an injury to the ligaments and capsule at a joint in the body. A strain is an injury to muscles or tendons. Immediate treatment of sprains or strains includes RICE - Rest, ice, compression and elevation to the affected joint or limb as needed.    Rest. Allow your injury to heal before you do slow movements.  Place an ice pack or a bag of frozen peas wrapped in a towel over the painful part. Never put ice right on the skin. Do not leave the ice on more than 10 to 15 minutes at a time. Ice after activity may help decrease pain and swelling. Never ice before stretching.  Prop your knee on pillows to help with swelling.  Use a splint/ brace if the doctor tells you to do this.    Please drink plenty of fluids.  Please get plenty of rest.      Pain Control  If not allergic, please take over the counter Tylenol (Acetaminophen) 650 mg every 4-6 hours and/or Motrin (Ibuprofen) 600-800 mg every 6-8 hours as directed for control of pain and/or fever.    If you were prescribed a narcotic medication, do not drive or operate heavy equipment or machinery while taking these medications.    If you were not prescribed an anti-inflammatory medication, and if you do not have any history of stomach/intestinal ulcers, or kidney disease, or are not taking a blood thinner such as Coumadin, Plavix, Pradaxa, Eloquis, or Xaralta for example, it is OK to take  over the counter Ibuprofen or Advil or Motrin or Aleve as directed.  Do not take these medications on an empty stomach.    Please remember that you have received care at an urgent care today. Urgent cares are not emergency rooms and are not equipped to handle life threatening emergencies and cannot rule in or out certain medical conditions and you may be released before all of your medical problems are known or treated. Please arrange follow up with your primary care physician or speciality clinic   (orthopedics) within 2-5 days if your signs and symptoms have not resolved or worsen. Patient can call our Referral Hotline at [phone] to make an appointment.    Please return here or go to the Emergency Department for any concerns or worsening of condition.Patient was educated on signs/symptoms that would warrant emergent medical attention. Patient verbalized understanding.  More trouble getting up from a chair, going up and down stairs, or walking  Pain, swelling, warmth, numbness, tingling, or discoloration in the calf below the injured or sore knee  You are not feeling better in 2 or 3 days or you are feeling worse

## 2024-01-08 NOTE — PATIENT INSTRUCTIONS
Recommend oral antihistamine (Claritin/zyrtec) +/- oral decongestant (pseudoephedrine) for rhinorrhea, steroid nasal spray (flonase) +/- antihistamine nasal spray (azelastine),.    For nose bleeds; recommend nasal irrigation with a saline spray/gel (AYR nasal gel) or Netti Pot like device per their directions is also recommended.  Please drink plenty of fluids.  Please get plenty of rest.  If you  smoke, please stop smoking.    Discussed prescriptions and over-the-counter medicines to help with patient's symptoms:  A steroid nose spray (flonase) can help with a stuffy nose. It can also help with drainage down the back of your throat.  An antihistamine (loratadine,zyrtec,allegra, xyzal) can help with itching, sneezing, or runny nose.  An antihistamine eye drop can help with itchy eyes.  A decongestant (pseudoephedrine,  Phenylephrine) can help with a stuffy nose. Take <10 days for congestion and rhinorrhea. Once symptoms improve, proceed with loratadine/zyrtec once a day. These ingredients can keep you up all night, decrease appetite, feel jittery, and raise blood pressure with long term use.    _______________________________________________________  A sprain is an injury to the ligaments and capsule at a joint in the body. A strain is an injury to muscles or tendons. Immediate treatment of sprains or strains includes RICE - Rest, ice, compression and elevation to the affected joint or limb as needed.    Rest. Allow your injury to heal before you do slow movements.  Place an ice pack or a bag of frozen peas wrapped in a towel over the painful part. Never put ice right on the skin. Do not leave the ice on more than 10 to 15 minutes at a time. Ice after activity may help decrease pain and swelling. Never ice before stretching.  Prop your knee on pillows to help with swelling.  Use a splint/ brace if the doctor tells you to do this.    Please drink plenty of fluids.  Please get plenty of rest.      Pain Control  If not  allergic, please take over the counter Tylenol (Acetaminophen) 650 mg every 4-6 hours and/or Motrin (Ibuprofen) 600-800 mg every 6-8 hours as directed for control of pain and/or fever.    If you were prescribed a narcotic medication, do not drive or operate heavy equipment or machinery while taking these medications.    If you were not prescribed an anti-inflammatory medication, and if you do not have any history of stomach/intestinal ulcers, or kidney disease, or are not taking a blood thinner such as Coumadin, Plavix, Pradaxa, Eloquis, or Xaralta for example, it is OK to take over the counter Ibuprofen or Advil or Motrin or Aleve as directed.  Do not take these medications on an empty stomach.    Please remember that you have received care at an urgent care today. Urgent cares are not emergency rooms and are not equipped to handle life threatening emergencies and cannot rule in or out certain medical conditions and you may be released before all of your medical problems are known or treated. Please arrange follow up with your primary care physician or speciality clinic   (orthopedics) within 2-5 days if your signs and symptoms have not resolved or worsen. Patient can call our Referral Hotline at [phone] to make an appointment.    Please return here or go to the Emergency Department for any concerns or worsening of condition.Patient was educated on signs/symptoms that would warrant emergent medical attention. Patient verbalized understanding.  More trouble getting up from a chair, going up and down stairs, or walking  Pain, swelling, warmth, numbness, tingling, or discoloration in the calf below the injured or sore knee  You are not feeling better in 2 or 3 days or you are feeling worse

## 2024-02-12 ENCOUNTER — OFFICE VISIT (OUTPATIENT)
Dept: URGENT CARE | Facility: CLINIC | Age: 79
End: 2024-02-12
Payer: MEDICARE

## 2024-02-12 VITALS
TEMPERATURE: 98 F | OXYGEN SATURATION: 97 % | HEART RATE: 102 BPM | HEIGHT: 66 IN | BODY MASS INDEX: 21.62 KG/M2 | DIASTOLIC BLOOD PRESSURE: 76 MMHG | SYSTOLIC BLOOD PRESSURE: 120 MMHG | WEIGHT: 134.5 LBS | RESPIRATION RATE: 18 BRPM

## 2024-02-12 DIAGNOSIS — R09.82 ALLERGIC RHINITIS WITH POSTNASAL DRIP: Primary | ICD-10-CM

## 2024-02-12 DIAGNOSIS — J30.2 SEASONAL ALLERGIC RHINITIS, UNSPECIFIED TRIGGER: ICD-10-CM

## 2024-02-12 DIAGNOSIS — J01.00 ACUTE NON-RECURRENT MAXILLARY SINUSITIS: ICD-10-CM

## 2024-02-12 DIAGNOSIS — J30.9 ALLERGIC RHINITIS WITH POSTNASAL DRIP: Primary | ICD-10-CM

## 2024-02-12 PROCEDURE — 99213 OFFICE O/P EST LOW 20 MIN: CPT | Mod: S$GLB,,, | Performed by: FAMILY MEDICINE

## 2024-02-12 RX ORDER — PREDNISONE 20 MG/1
40 TABLET ORAL DAILY
Qty: 10 TABLET | Refills: 0 | Status: SHIPPED | OUTPATIENT
Start: 2024-02-12 | End: 2024-02-17

## 2024-02-12 RX ORDER — LEVOCETIRIZINE DIHYDROCHLORIDE 5 MG/1
5 TABLET, FILM COATED ORAL NIGHTLY
Qty: 30 TABLET | Refills: 11 | Status: ON HOLD | OUTPATIENT
Start: 2024-02-12 | End: 2024-03-13 | Stop reason: HOSPADM

## 2024-02-12 RX ORDER — AMOXICILLIN AND CLAVULANATE POTASSIUM 875; 125 MG/1; MG/1
1 TABLET, FILM COATED ORAL EVERY 12 HOURS
Qty: 14 TABLET | Refills: 0 | Status: SHIPPED | OUTPATIENT
Start: 2024-02-12 | End: 2024-02-19

## 2024-02-12 NOTE — PROGRESS NOTES
"Subjective:      Patient ID: Prema Phillips is a 79 y.o. female.    Vitals:  height is 5' 6" (1.676 m) and weight is 61 kg (134 lb 7.7 oz). Her oral temperature is 97.8 °F (36.6 °C). Her blood pressure is 120/76 and her pulse is 102. Her respiration is 18 and oxygen saturation is 97%.     Chief Complaint: Jaw Pain    Pt present with jaw pain. Started few days ago.     Pain  This is a new problem. The current episode started in the past 7 days. The problem occurs constantly. The problem has been gradually worsening. The symptoms are aggravated by eating. She has tried nothing for the symptoms. The treatment provided no relief.   ROS   Objective:     Physical Exam   Constitutional: She is oriented to person, place, and time. She appears well-developed. She is cooperative.  Non-toxic appearance. She does not appear ill. No distress.   HENT:   Head: Normocephalic and atraumatic.   Ears:   Right Ear: Hearing, tympanic membrane, external ear and ear canal normal.   Left Ear: Hearing, tympanic membrane, external ear and ear canal normal.   Nose: No mucosal edema, rhinorrhea or nasal deformity. No epistaxis. Right sinus exhibits maxillary sinus tenderness. Right sinus exhibits no frontal sinus tenderness. Left sinus exhibits no maxillary sinus tenderness and no frontal sinus tenderness.   Mouth/Throat: Uvula is midline, oropharynx is clear and moist and mucous membranes are normal. No trismus in the jaw. Normal dentition. No uvula swelling. No oropharyngeal exudate, posterior oropharyngeal edema or posterior oropharyngeal erythema.   Eyes: Conjunctivae and lids are normal. No scleral icterus.   Neck: Trachea normal and phonation normal. Neck supple. No edema present. No erythema present. No neck rigidity present.   Cardiovascular: Normal rate, regular rhythm, normal heart sounds and normal pulses.   Pulmonary/Chest: Effort normal and breath sounds normal. No respiratory distress. She has no decreased breath sounds. She has " no rhonchi.   Abdominal: Normal appearance.   Musculoskeletal: Normal range of motion.         General: No deformity. Normal range of motion.   Neurological: She is alert and oriented to person, place, and time. She exhibits normal muscle tone. Coordination normal.   Skin: Skin is warm, dry, intact, not diaphoretic and not pale.   Psychiatric: Her speech is normal and behavior is normal. Judgment and thought content normal.   Nursing note and vitals reviewed.      Assessment:     1. Allergic rhinitis with postnasal drip    2. Acute non-recurrent maxillary sinusitis    3. Seasonal allergic rhinitis, unspecified trigger        Plan:       Allergic rhinitis with postnasal drip  -     predniSONE (DELTASONE) 20 MG tablet; Take 2 tablets (40 mg total) by mouth once daily. for 5 days  Dispense: 10 tablet; Refill: 0    Acute non-recurrent maxillary sinusitis  -     amoxicillin-clavulanate 875-125mg (AUGMENTIN) 875-125 mg per tablet; Take 1 tablet by mouth every 12 (twelve) hours. for 7 days  Dispense: 14 tablet; Refill: 0    Seasonal allergic rhinitis, unspecified trigger  -     levocetirizine (XYZAL) 5 MG tablet; Take 1 tablet (5 mg total) by mouth every evening.  Dispense: 30 tablet; Refill: 11    Thank you for choosing [facility]!     Our goal in the Urgent Care is to always provide outstanding medical care. You may receive a survey by mail or e-mail in the next week regarding your experience today. We would greatly appreciate you completing and returning the survey. Your feedback provides us with a way to recognize our staff who provide very good care, and it helps us learn how to improve when your experience was below our aspiration of excellence.       We appreciate you trusting us with your medical care. We hope you feel better soon. We will be happy to take care of you for all of your future medical needs.  You must understand that you've received an Urgent Care treatment only and that you may be released  before all your medical problems are known or treated. You, the patient, will arrange for follow up care as instructed.  Follow up with your PCP or specialty clinic as directed in the next 1-2 weeks if not improved or as needed.  You can call [phone] to schedule an appointment with the appropriate provider.  Another option is to follow up with [facility] ([email or web address]) virtually for quick simple medical advice.  If your condition worsens we recommend that you receive another evaluation at the emergency room immediately or contact your primary medical clinics after hours call service to discuss your concerns.  Please return here or go to the Emergency Department for any concerns or worsening of condition.      *If you were prescribed a narcotic or controlled medication, do not drive or operate heavy equipment or machinery while taking these medications.

## 2024-02-16 ENCOUNTER — PATIENT MESSAGE (OUTPATIENT)
Dept: FAMILY MEDICINE | Facility: CLINIC | Age: 79
End: 2024-02-16
Payer: MEDICARE

## 2024-02-19 ENCOUNTER — OFFICE VISIT (OUTPATIENT)
Dept: NEUROLOGY | Facility: CLINIC | Age: 79
End: 2024-02-19
Payer: MEDICARE

## 2024-02-19 DIAGNOSIS — G50.0 TRIGEMINAL NEURALGIA: ICD-10-CM

## 2024-02-19 DIAGNOSIS — G50.0 TRIGEMINAL NEURALGIA OF RIGHT SIDE OF FACE: Primary | ICD-10-CM

## 2024-02-19 PROCEDURE — 99204 OFFICE O/P NEW MOD 45 MIN: CPT | Mod: 95,,, | Performed by: PSYCHIATRY & NEUROLOGY

## 2024-02-19 RX ORDER — CARBAMAZEPINE 100 MG/1
100-200 TABLET, EXTENDED RELEASE ORAL 2 TIMES DAILY
Qty: 120 TABLET | Refills: 5 | Status: ON HOLD | OUTPATIENT
Start: 2024-02-19 | End: 2024-03-13 | Stop reason: HOSPADM

## 2024-02-19 NOTE — PROGRESS NOTES
Neurology Telemedicine Note     Name: Prema Phillips  MRN: [id number]   CSN: [id number]      Date: 02/19/2024    The patient location is: Home  The chief complaint leading to consultation is: facial pain  Visit type: Virtual visit with synchronous audio and video    TOTAL TIME SPENT: 35 mins    Each patient to whom I provide medical services by telemedicine is:  (1) informed of the relationship between the physician and patient and the respective role of any other health care provider with respect to management of the patient; and (2) notified that they may decline to receive medical services by telemedicine and may withdraw from such care at any time.    History of Present Illness (HPI):  Patient is a very pleasant 79-year-old female with past medical history as below who presents with sharp right-sided facial pain that started February 4th of this year.  Patient states that the pain occurs on a daily basis if feels like a fire cracker is going off on her face.  She describes the pain as sharp, severe it was located in the right V2 distribution of the trigeminal nerve.  She is tried anti-inflammatories and other medications but it does not seem to help.  It can be aggravated by eating and sometimes prohibit her from eating and even talking.  She has not had any brain imaging.  Patient denies any jaw pain.  There is sensitivity over the area subjectively.  Patient denies a personal history of cancer.    Nonmotor/Premotor ROS: as per HPI, and all other systems are negative    Past Medical History: The patient  has a past medical history of Allergic rhinitis (5/31/2016), Carotid artery plaque (11/18/2013), History of total hysterectomy with bilateral salpingo-oophorectomy (BSO) (5/31/2016), Hyperlipidemia (7/17/2012), Hypothyroid, Insomnia, Macular degeneration, right eye (5/31/2016), Migraine headache, Osteopenia of the elderly (10/20/2015), Postmenopausal HRT (hormone replacement therapy), and RBBB  (7/17/2012).    Social History: The patient  reports that she has never smoked. She has never used smokeless tobacco. She reports current alcohol use. She reports that she does not use drugs.    Family History: Their family history includes Alzheimer's disease in her mother and sister; Breast cancer in her sister; COPD in her father; Cancer in her cousin, maternal aunt, maternal grandmother, and sister; Depression in her son; Heart attack in her father; Heart disease in her father; Hyperlipidemia in her brother; Nephrolithiasis in her daughter; Other in her brother; Post-traumatic stress disorder in her son; Rheumatic fever in her brother.    Allergies: Neomycin     Meds:   Current Outpatient Medications on File Prior to Visit   Medication Sig Dispense Refill    amitriptyline (ELAVIL) 25 MG tablet TAKE 1/2-1 TABLET BY MOUTH NIGHTLY FOR INSOMNIA. OK TO TAKE WITH THE LOW DOSE OF ATIVAN 90 tablet 4    amoxicillin-clavulanate 875-125mg (AUGMENTIN) 875-125 mg per tablet Take 1 tablet by mouth every 12 (twelve) hours. for 7 days 14 tablet 0    aspirin (ECOTRIN) 81 MG EC tablet Take 81 mg by mouth once daily.      atorvastatin (LIPITOR) 20 MG tablet Take 1 tablet (20 mg total) by mouth once daily. 90 tablet 3    azelastine (ASTELIN) 137 mcg (0.1 %) nasal spray 1 spray (137 mcg total) by Nasal route 2 (two) times daily. 30 mL 11    calcium-vitamin D3-vitamin K 500-100-40 mg-unit-mcg Chew Take by mouth once daily.      cholecalciferol, vitamin D3, (VITAMIN D3) 50 mcg (2,000 unit) Tab once daily.       coenzyme Q10 200 mg capsule Take 200 mg by mouth once daily.      diclofenac (VOLTAREN) 25 MG TbEC Take 1 tablet (25 mg total) by mouth 3 (three) times daily as needed (pain). 15 tablet 0    EScitalopram oxalate (LEXAPRO) 10 MG tablet Take 3 10 mg tablets or a 10 mg tablet with a 20 mg tablet for a total of 30 mg of Lexapro daily. 90 tablet 4    fluticasone propionate (FLONASE) 50 mcg/actuation nasal spray SPRAY TWICE IN EACH  NOSTRIL ONCE DAILY 16 mL 11    levocetirizine (XYZAL) 5 MG tablet Take 1 tablet (5 mg total) by mouth every evening. 30 tablet 11    levothyroxine (SYNTHROID) 75 MCG tablet TAKE 1 TABLET DAILY BEFORE BREAKFAST 90 tablet 3    LORazepam (ATIVAN) 0.5 MG tablet Take 1-2 tablets by mouth daily as needed for increased anxiety/panic attack. 60 tablet 1    meloxicam (MOBIC) 15 MG tablet Take 1 tablet (15 mg total) by mouth once daily. 90 tablet 4    multivitamin capsule Take 1 capsule by mouth once daily.      omeprazole (PRILOSEC) 20 MG capsule Take 1 capsule (20 mg total) by mouth every morning. 90 capsule 3    ondansetron (ZOFRAN-ODT) 4 MG TbDL Take 1 tablet (4 mg total) by mouth every 8 (eight) hours as needed (nausea). 30 tablet 0    triamcinolone acetonide 0.025% (KENALOG) 0.025 % Oint Apply topically 2 (two) times daily. 15 g 2    TURMERIC, BULK, MISC by Misc.(Non-Drug; Combo Route) route once daily.      VIT A/VIT C/VIT E/ZINC/COPPER (PRESERVISION AREDS ORAL) Take by mouth once daily.        No current facility-administered medications on file prior to visit.       Exam:  Vital Signs deferred with home visit    Constitutional  Well-developed, well-nourished, appears stated age   Eyes  No scleral icterus   ENT  Moist oral mucosa   Cardiovascular  No lower extremity edema    Respiratory  No labored breathing    Skin  No rashes   Hematologic  No bruising   Psychiatric  Normal mood and affect   Other  GI/ deferred    Neurological     * Mental status  Alert and oriented to person, place, time, and situation; no dysarthria; no aphasia; normal recent and remote memory; follows commands   * Cranial nerves     - CN II  Pupils midposition and symmetric   - CN III, IV, VI  Extraocular movements full, no nystagmus visualized   - CN V  Unable to test   - CN VII  Face strong and symmetric bilaterally    - CN VIII  Hearing grossly intact with conversation    - CN IX, X  Palate raises midline and symmetric    - CN XI  Strong  shoulder shrug B    - CN XII  Tongue appears midline    * Motor  Normal bulk by appearance, no drift    * Sensory  Not tested objectively, no changes described by the patient   * Deep tendon reflexes  Not tested   * Coord/Movemt/Gait No hypophonic speech.  No facial masking.  No B bradykinesia.  No tremor with rest, posture, kinesis, or intention.   No chorea, athetosis, dystonia, myoclonus, or tics.   No motor impersistence.  Gait is deferred for safety.       Medical Record Review:  - Lab Results:  Admission on 02/15/2024, Discharged on 02/15/2024   Component Date Value Ref Range Status    Influenza A, Molecular 02/15/2024 Negative  Negative Final    Influenza B, Molecular 02/15/2024 Negative  Negative Final    Flu A & B Source 02/15/2024 Nasal swab   Final    SARS-CoV-2 RNA, Amplification, Qual 02/15/2024 Negative  Negative Final       Diagnoses:   1) right-sided facial pain suspicious for trigeminal neuralgia      Medical Decision Makin) start Tegretol 100 mg twice a day and can titrate up to 100/200 in 1 week in 200/200 a week after if needed for pain.  Titration schedule explained in detail patient understands  2) MRI of the brain and MRA of the brain to rule out trigeminal nerve structural etiology and vascular compression    Follow-up after imaging complete in 3-4 weeks      I spent 35 minutes with the patient with >50% of the time spent with counseling and education regarding:    This is a consult performed through audio-visual using [employer] carlos a. Pt and provider are in different locations. History and physical exam are limited due to the nature of this encounter.       Sean Figueroa MD        80.7

## 2024-02-20 NOTE — PROGRESS NOTES
Office Visit    Patient Name: Prema Phillips    : 1945  MRN: [id number]    Subjective:  Prema is a 79 y.o. female who presents today for:    Annual Exam    Most recently seen by me for annual physical 23  Had COVID-19 10/25/22  1/8/24:  Visit for Ankle Sprain-- some residual pain and sees podiatry tomorrow, can walk w/o instability symptoms  DX with suspected Trigeminal Neuralgia and seen by Neuro Dr Figueroa 24 for VV:   sharp right-sided facial pain- started  of this year.  pain occurs on a daily basis if feels like a fire cracker is going off on her face. Describes the pain as sharp, severe it was located in the right V2 distribution of the trigeminal nerve.   ADVISED:   1) start Tegretol 100 mg twice a day and can titrate up to 100/200 in 1 week in 200/200 a week after if needed for pain.  Titration schedule explained in detail patient understands  2) MRI of the brain and MRA of the brain to rule out trigeminal nerve structural etiology and vascular compression     Prema Phillips presents today for annual wellness exam and monitoring of chronic conditions including hyperlipidemia, nonocclusive bilateral carotid artery disease, acquired hypothyroidism, postmenopausal osteopenia-- s/p hormone replacement therapy per GYN, h/o prior migraine headaches, allergic rhinitis, chronic constipation managed by outside GI- [facility].    She is under evaluation as per above by Neurology for potential Trigeminal Neuralgia.      She did not have labs for today's office visit-- prior labs have been unremarkable.   She has a gynecologist Dr. Sheppard but no longer needs paps due to history of TAYLER/BSO.   Saw cardiologist Dr Ash 2021 & Cardiology NP 3/8/22-- moderate left internal carotid stenosis (40-49%) and mild right internal carotid stenosis (20-39%).  She remains asymptomatic.     She has been feeling overall well apart from the recent trigeminal neuralgia symptoms and has kept up with some  travelling-- in May 2024 plans to go to [facility], family weddings coming up.    Her bowel movements remain improved on regimen of MiraLax and fiber supplement p.r.n..   Physical therapy for Neck pain was helpful.   MSK/Osteoarthritis stable and takes Meloxicam and doing better with taking daily Prilosec to protect against associated GERD.    Mood stable on Lexapro 20-- helping her cope with multiple stressors.   Ativan 0.5-1 mg prn  Amitriptyline 25 for sleep and HA prevention-- work     General lifestyle habits are as follows:    Diet: healthy-- fruits/veggies/some fish and stays hydrated  Exercise  fair-- walking sometimes for exercise and working in yard and some neck exercises-- NEEDS TO BE MORE CONSISTENT  Sleep: good- Ativan most nights helps (RX's by Dr Daniels)-- along with nightly Amitriptyline ofr sleep and migraines.   Weight: down a few lbs recently to BMI 21.4     Immunizations: TDaP 6/1/2015, Pneumovax 23 2/11/19 and Prevnar 13 5/31/2016 ,  YEARLY FLU COMPLETED 10/2/23, SHINGRIX 1/2 11/10/2019 -- 2/2 ADVISED, COVID-19 Vaccine completed w/ Moderna booster 11/18/21-- SEASONAL UPDATE ADVISED     Screening Tests: MAMMOGRAMS-- YEARLY IN January PER GYN [facility], DEXA 3/14/22--> stable osteopenia and repeat 2-3 years(3/2025), Colonoscopy up to date per Dr. Watt with [facility] & most recently was 8/27/2019- 5 year repeat advised(8/2024), EGD 8/27/2019- Hep C screening negative 8/16/2016     Eye/Dental: up to date, ophthalmologist-- macular degeneration-- seeing retina specialist-- [facility], dental UTD       Carotid Artery US 3/22/22:  There is 20-39% right Internal Carotid Stenosis.  There is 40-49% left Internal Carotid Stenosis. Elevated flow velocities noted in area of vessel tortuosity, may lead to overestimate of stenosis.  Similar findings noted on report 3/9/20.    EKG 3/11/22:  Sinus rhythm with short MI   Incomplete right bundle branch block   Borderline Abnormal  ECG   When compared with ECG of 13-JAN-2020 15:11,   No significant change was found        PAST MEDICAL HISTORY, SURGICAL/SOCIAL/FAMILY HISTORY REVIEWED AS PER CHART, WITH PERTINENT FINDINGS INCLUDED IN HISTORY SECTION OF NOTE.     Current Medications    Medication List with Changes/Refills   Current Medications    AMITRIPTYLINE (ELAVIL) 25 MG TABLET    TAKE 1/2-1 TABLET BY MOUTH NIGHTLY FOR INSOMNIA. OK TO TAKE WITH THE LOW DOSE OF ATIVAN    ASPIRIN (ECOTRIN) 81 MG EC TABLET    Take 81 mg by mouth once daily.    ATORVASTATIN (LIPITOR) 20 MG TABLET    Take 1 tablet (20 mg total) by mouth once daily.    AZELASTINE (ASTELIN) 137 MCG (0.1 %) NASAL SPRAY    1 spray (137 mcg total) by Nasal route 2 (two) times daily.    CALCIUM-VITAMIN D3-VITAMIN K 500-100-40 MG-UNIT-MCG CHEW    Take by mouth once daily.    CARBAMAZEPINE (TEGRETOL XR) 100 MG 12 HR TABLET    Take 1-2 tablets (100-200 mg total) by mouth 2 (two) times daily.    CHOLECALCIFEROL, VITAMIN D3, (VITAMIN D3) 50 MCG (2,000 UNIT) TAB    once daily.     COENZYME Q10 200 MG CAPSULE    Take 200 mg by mouth once daily.    DICLOFENAC (VOLTAREN) 25 MG TBEC    Take 1 tablet (25 mg total) by mouth 3 (three) times daily as needed (pain).    ESCITALOPRAM OXALATE (LEXAPRO) 10 MG TABLET    Take 3 10 mg tablets or a 10 mg tablet with a 20 mg tablet for a total of 30 mg of Lexapro daily.    FLUTICASONE PROPIONATE (FLONASE) 50 MCG/ACTUATION NASAL SPRAY    SPRAY TWICE IN EACH NOSTRIL ONCE DAILY    LEVOCETIRIZINE (XYZAL) 5 MG TABLET    Take 1 tablet (5 mg total) by mouth every evening.    LEVOTHYROXINE (SYNTHROID) 75 MCG TABLET    TAKE 1 TABLET DAILY BEFORE BREAKFAST    LORAZEPAM (ATIVAN) 0.5 MG TABLET    Take 1-2 tablets by mouth daily as needed for increased anxiety/panic attack.    MELOXICAM (MOBIC) 15 MG TABLET    Take 1 tablet (15 mg total) by mouth once daily.    MULTIVITAMIN CAPSULE    Take 1 capsule by mouth once daily.    OMEPRAZOLE (PRILOSEC) 20 MG CAPSULE    Take 1  "capsule (20 mg total) by mouth every morning.    ONDANSETRON (ZOFRAN-ODT) 4 MG TBDL    Take 1 tablet (4 mg total) by mouth every 8 (eight) hours as needed (nausea).    TRIAMCINOLONE ACETONIDE 0.025% (KENALOG) 0.025 % OINT    Apply topically 2 (two) times daily.    TURMERIC, BULK, MISC    by Misc.(Non-Drug; Combo Route) route once daily.    VIT A/VIT C/VIT E/ZINC/COPPER (PRESERVISION AREDS ORAL)    Take by mouth once daily.        Allergies   Review of patient's allergies indicates:   Allergen Reactions    Neomycin Other (See Comments) and Swelling     Other reaction(s): Unknown         Review of Systems (Pertinent positives)  Review of Systems   Constitutional:  Negative for activity change and unexpected weight change.   HENT:  Positive for hearing loss. Negative for facial swelling (facial pain), rhinorrhea and trouble swallowing.    Eyes:  Positive for visual disturbance. Negative for discharge.   Respiratory:  Negative for chest tightness and wheezing.    Cardiovascular:  Negative for chest pain and palpitations.   Gastrointestinal:  Negative for blood in stool, constipation, diarrhea and vomiting.   Endocrine: Negative for polydipsia and polyuria.   Genitourinary:  Negative for difficulty urinating, dysuria, hematuria and menstrual problem.   Musculoskeletal:  Positive for arthralgias. Negative for joint swelling and neck pain.   Neurological:  Negative for weakness and headaches.   Psychiatric/Behavioral:  Positive for dysphoric mood. Negative for confusion.        BP 98/66 (BP Location: Left arm, Patient Position: Sitting, BP Method: Medium (Manual))   Pulse 79   Temp 97.9 °F (36.6 °C)   Ht 5' 6" (1.676 m)   Wt 60.3 kg (132 lb 15 oz)   SpO2 100%   BMI 21.46 kg/m²     Physical Exam  Vitals reviewed.   Constitutional:       General: She is not in acute distress.     Appearance: Normal appearance. She is well-developed.   HENT:      Head: Normocephalic and atraumatic.      Right Ear: Tympanic membrane and " ear canal normal. Tympanic membrane is not erythematous or bulging.      Left Ear: Tympanic membrane and ear canal normal. Tympanic membrane is not erythematous or bulging.      Nose: Nose normal.      Mouth/Throat:      Mouth: Mucous membranes are moist.      Pharynx: No oropharyngeal exudate or posterior oropharyngeal erythema.   Eyes:      Extraocular Movements: Extraocular movements intact.      Conjunctiva/sclera: Conjunctivae normal.   Neck:      Thyroid: No thyroid mass or thyromegaly.      Vascular: No carotid bruit.   Cardiovascular:      Rate and Rhythm: Normal rate and regular rhythm.      Pulses:           Dorsalis pedis pulses are 2+ on the right side and 2+ on the left side.      Heart sounds: Normal heart sounds. No murmur heard.  Pulmonary:      Effort: Pulmonary effort is normal. No respiratory distress.      Breath sounds: Normal breath sounds.   Abdominal:      General: Bowel sounds are normal. There is no distension.      Palpations: Abdomen is soft. There is no mass.      Tenderness: There is no abdominal tenderness.   Musculoskeletal:         General: Normal range of motion.      Right lower leg: No edema.      Left lower leg: No edema.   Lymphadenopathy:      Cervical: No cervical adenopathy.   Skin:     General: Skin is warm and dry.      Findings: No rash.   Neurological:      General: No focal deficit present.      Mental Status: She is alert and oriented to person, place, and time.   Psychiatric:         Mood and Affect: Mood normal.         Behavior: Behavior normal.           Assessment/Plan:  Prema Phillips is a 79 y.o. female who presents today for :        ICD-10-CM ICD-9-CM    1. Routine general medical examination at a health care facility  Z00.00 V70.0       2. BMI 22.0-22.9, adult  Z68.22 V85.1       3. Mixed hyperlipidemia  E78.2 272.2 Hemoglobin A1C      Comprehensive Metabolic Panel      Lipid Panel      CBC Auto Differential      TSH      4. Bilateral carotid artery stenosis   I65.23 433.10      433.30       5. Long-term use of aspirin therapy  Z79.82 V58.66       6. RBBB  I45.10 426.4       7. Acquired hypothyroidism  E03.9 244.9 TSH      8. Adjustment reaction with anxiety  F43.22 309.24       9. Insomnia, unspecified type  G47.00 780.52       10. Stress due to family tension  Z63.8 V61.8       11. Prediabetes  R73.03 790.29 Hemoglobin A1C      Comprehensive Metabolic Panel      Lipid Panel      CBC Auto Differential      TSH      Vitamin D      Vitamin B12      Magnesium      Ferritin      12. Gastritis, presence of bleeding unspecified, unspecified chronicity, unspecified gastritis type  K29.70 535.50       13. Encounter for monitoring long-term proton pump inhibitor therapy  Z51.81 V58.83 Hemoglobin A1C    Z79.899 V58.69 Comprehensive Metabolic Panel      Lipid Panel      CBC Auto Differential      TSH      Vitamin D      Vitamin B12      Magnesium      Ferritin      14. Right sided facial pain  R51.9 784.0       15. Chronic migraine without aura without status migrainosus, not intractable  G43.709 346.70       16. Seasonal allergic rhinitis, unspecified trigger  J30.2 477.9       17. Osteopenia of the elderly  M85.80 733.90 Comprehensive Metabolic Panel      TSH      Vitamin D      Magnesium      18. History of total hysterectomy with bilateral salpingo-oophorectomy (BSO)  Z90.710 V15.29     Z90.722      Z90.79        19. Medication management  Z79.899 V58.69 Hemoglobin A1C      Comprehensive Metabolic Panel      Lipid Panel      CBC Auto Differential      TSH      Vitamin D      Vitamin B12      Magnesium      Ferritin      20. Abnormal finding of blood chemistry, unspecified  R79.9 790.6 Ferritin        HEALTH MAINTENANCE: ADVISED ON DIET/EXERCISE/SLEEP, ROUTINE EYE/DENTAL EXAMS, AND THE IMPORTANCE OF KEEPING UP W/ APPROPRIATE SCREENINGS BASED ON AGE & RISK FACTORS.  Yearly mammograms in January UTD  DEXA NEXT DUE 3/2025  REPEAT COLONOSCOPY DUE 8/2024  DUE FOR SHINGRIX  2/2    COVID -19 VACCINE COMPLETED INCLUDING MODERNA BOOSTER 11/18/21-- HAD COVID 10/25/22. SEASONAL COVID VACCINE & RSV VACCINE     RIGHT BUNDLE-BRANCH BLOCK, CAROTID ARTERY STENOSIS, HLD:  SEES CARDIOLOGY YEARLY, updated Carotid Artery US 3/22/22 with stable mild stenosis < 50%.    CONTINUE STATIN-LIPITOR 20, ASPIRIN 81 MG DAILY.      CHRONIC MIGRAINES W/ INSOMINA:  FREQUENCY IS STABLE ON NIGHTLY ELAVIL 25 - helps with HAs and sleep & takes Relpax PRN    R Facial Pain: under eval by Neurology for potential TRIGEMINAL NEURALGIA. MRI/ MRA brain ordered but not scheduled started Tegretol for pain.      ALLERGIC RHINITIS:  Well controlled with Astelin, Flonase p.r.n., takes Claritin daily.     GERD WITH UNDERLYING CHRONIC CONSTIPATION:  COLONOSCOPY UTD per GI  DR. [facility]- REPEAT DUE 8/2024.  Continue MIRALAX/CITRUCEL FIBER SUPPLEMENT.  PPI LABS-- VITAMIN-D, B12, MAGNESIUM MONITORED WITH LABS.    H/o PREDIABETES: most Recent A1c improved to 5.6, recheck with labs     OSTEOPENIA: CONTINUE CALCIUM/VITAMIN-D, WB Exercises and Repeat DEXA 3/2025.      HYPOTHYROIDISM:  STABLE on LEVOTHYROXINE 75 MCG DAILY, CHECK LEVEL WITH LABS AND ADVISE BASED ON RESULTS     ANXIETY AND DEPRESSION:  CONTINUE ATIVAN P.R.N W/ LEXAPRO 10 MG DAILY       There are no Patient Instructions on file for this visit.      Follow up for to follow up on lab results, return as needed for new concerns.

## 2024-02-21 ENCOUNTER — OFFICE VISIT (OUTPATIENT)
Dept: FAMILY MEDICINE | Facility: CLINIC | Age: 79
End: 2024-02-21
Payer: MEDICARE

## 2024-02-21 ENCOUNTER — LAB VISIT (OUTPATIENT)
Dept: LAB | Facility: HOSPITAL | Age: 79
End: 2024-02-21
Attending: FAMILY MEDICINE
Payer: MEDICARE

## 2024-02-21 VITALS
DIASTOLIC BLOOD PRESSURE: 66 MMHG | SYSTOLIC BLOOD PRESSURE: 98 MMHG | OXYGEN SATURATION: 100 % | BODY MASS INDEX: 21.36 KG/M2 | TEMPERATURE: 98 F | HEART RATE: 79 BPM | WEIGHT: 132.94 LBS | HEIGHT: 66 IN

## 2024-02-21 DIAGNOSIS — Z00.00 ROUTINE GENERAL MEDICAL EXAMINATION AT A HEALTH CARE FACILITY: Primary | ICD-10-CM

## 2024-02-21 DIAGNOSIS — Z51.81 ENCOUNTER FOR MONITORING LONG-TERM PROTON PUMP INHIBITOR THERAPY: ICD-10-CM

## 2024-02-21 DIAGNOSIS — Z90.722 HISTORY OF TOTAL HYSTERECTOMY WITH BILATERAL SALPINGO-OOPHORECTOMY (BSO): ICD-10-CM

## 2024-02-21 DIAGNOSIS — Z79.899 ENCOUNTER FOR MONITORING LONG-TERM PROTON PUMP INHIBITOR THERAPY: ICD-10-CM

## 2024-02-21 DIAGNOSIS — M85.80 OSTEOPENIA OF THE ELDERLY: ICD-10-CM

## 2024-02-21 DIAGNOSIS — G47.00 INSOMNIA, UNSPECIFIED TYPE: ICD-10-CM

## 2024-02-21 DIAGNOSIS — F43.22 ADJUSTMENT REACTION WITH ANXIETY: ICD-10-CM

## 2024-02-21 DIAGNOSIS — G43.709 CHRONIC MIGRAINE WITHOUT AURA WITHOUT STATUS MIGRAINOSUS, NOT INTRACTABLE: ICD-10-CM

## 2024-02-21 DIAGNOSIS — K29.70 GASTRITIS, PRESENCE OF BLEEDING UNSPECIFIED, UNSPECIFIED CHRONICITY, UNSPECIFIED GASTRITIS TYPE: ICD-10-CM

## 2024-02-21 DIAGNOSIS — Z90.710 HISTORY OF TOTAL HYSTERECTOMY WITH BILATERAL SALPINGO-OOPHORECTOMY (BSO): ICD-10-CM

## 2024-02-21 DIAGNOSIS — E03.9 ACQUIRED HYPOTHYROIDISM: ICD-10-CM

## 2024-02-21 DIAGNOSIS — R73.03 PREDIABETES: ICD-10-CM

## 2024-02-21 DIAGNOSIS — R79.9 ABNORMAL FINDING OF BLOOD CHEMISTRY, UNSPECIFIED: ICD-10-CM

## 2024-02-21 DIAGNOSIS — E78.2 MIXED HYPERLIPIDEMIA: ICD-10-CM

## 2024-02-21 DIAGNOSIS — Z79.899 MEDICATION MANAGEMENT: ICD-10-CM

## 2024-02-21 DIAGNOSIS — Z79.82 LONG-TERM USE OF ASPIRIN THERAPY: ICD-10-CM

## 2024-02-21 DIAGNOSIS — J30.2 SEASONAL ALLERGIC RHINITIS, UNSPECIFIED TRIGGER: ICD-10-CM

## 2024-02-21 DIAGNOSIS — Z90.79 HISTORY OF TOTAL HYSTERECTOMY WITH BILATERAL SALPINGO-OOPHORECTOMY (BSO): ICD-10-CM

## 2024-02-21 DIAGNOSIS — R51.9 RIGHT SIDED FACIAL PAIN: ICD-10-CM

## 2024-02-21 DIAGNOSIS — Z63.8 STRESS DUE TO FAMILY TENSION: ICD-10-CM

## 2024-02-21 DIAGNOSIS — I65.23 BILATERAL CAROTID ARTERY STENOSIS: ICD-10-CM

## 2024-02-21 DIAGNOSIS — I45.10 RBBB: ICD-10-CM

## 2024-02-21 LAB
25(OH)D3+25(OH)D2 SERPL-MCNC: 64 NG/ML (ref 30–96)
ALBUMIN SERPL BCP-MCNC: 3.8 G/DL (ref 3.5–5.2)
ALP SERPL-CCNC: 64 U/L (ref 55–135)
ALT SERPL W/O P-5'-P-CCNC: 12 U/L (ref 10–44)
ANION GAP SERPL CALC-SCNC: 9 MMOL/L (ref 8–16)
AST SERPL-CCNC: 18 U/L (ref 10–40)
BASOPHILS # BLD AUTO: 0.03 K/UL (ref 0–0.2)
BASOPHILS NFR BLD: 0.6 % (ref 0–1.9)
BILIRUB SERPL-MCNC: 0.4 MG/DL (ref 0.1–1)
BUN SERPL-MCNC: 15 MG/DL (ref 8–23)
CALCIUM SERPL-MCNC: 9.3 MG/DL (ref 8.7–10.5)
CHLORIDE SERPL-SCNC: 104 MMOL/L (ref 95–110)
CHOLEST SERPL-MCNC: 160 MG/DL (ref 120–199)
CHOLEST/HDLC SERPL: 2.9 {RATIO} (ref 2–5)
CO2 SERPL-SCNC: 25 MMOL/L (ref 23–29)
CREAT SERPL-MCNC: 0.9 MG/DL (ref 0.5–1.4)
DIFFERENTIAL METHOD BLD: NORMAL
EOSINOPHIL # BLD AUTO: 0.2 K/UL (ref 0–0.5)
EOSINOPHIL NFR BLD: 3.1 % (ref 0–8)
ERYTHROCYTE [DISTWIDTH] IN BLOOD BY AUTOMATED COUNT: 12.9 % (ref 11.5–14.5)
EST. GFR  (NO RACE VARIABLE): >60 ML/MIN/1.73 M^2
ESTIMATED AVG GLUCOSE: 117 MG/DL (ref 68–131)
FERRITIN SERPL-MCNC: 69 NG/ML (ref 20–300)
GLUCOSE SERPL-MCNC: 98 MG/DL (ref 70–110)
HBA1C MFR BLD: 5.7 % (ref 4–5.6)
HCT VFR BLD AUTO: 38.6 % (ref 37–48.5)
HDLC SERPL-MCNC: 55 MG/DL (ref 40–75)
HDLC SERPL: 34.4 % (ref 20–50)
HGB BLD-MCNC: 13 G/DL (ref 12–16)
IMM GRANULOCYTES # BLD AUTO: 0.01 K/UL (ref 0–0.04)
IMM GRANULOCYTES NFR BLD AUTO: 0.2 % (ref 0–0.5)
LDLC SERPL CALC-MCNC: 89.2 MG/DL (ref 63–159)
LYMPHOCYTES # BLD AUTO: 1.6 K/UL (ref 1–4.8)
LYMPHOCYTES NFR BLD: 31.2 % (ref 18–48)
MAGNESIUM SERPL-MCNC: 2.1 MG/DL (ref 1.6–2.6)
MCH RBC QN AUTO: 30.9 PG (ref 27–31)
MCHC RBC AUTO-ENTMCNC: 33.7 G/DL (ref 32–36)
MCV RBC AUTO: 92 FL (ref 82–98)
MONOCYTES # BLD AUTO: 0.6 K/UL (ref 0.3–1)
MONOCYTES NFR BLD: 12 % (ref 4–15)
NEUTROPHILS # BLD AUTO: 2.7 K/UL (ref 1.8–7.7)
NEUTROPHILS NFR BLD: 52.9 % (ref 38–73)
NONHDLC SERPL-MCNC: 105 MG/DL
NRBC BLD-RTO: 0 /100 WBC
PLATELET # BLD AUTO: 216 K/UL (ref 150–450)
PMV BLD AUTO: 10.9 FL (ref 9.2–12.9)
POTASSIUM SERPL-SCNC: 4.6 MMOL/L (ref 3.5–5.1)
PROT SERPL-MCNC: 6.9 G/DL (ref 6–8.4)
RBC # BLD AUTO: 4.21 M/UL (ref 4–5.4)
SODIUM SERPL-SCNC: 138 MMOL/L (ref 136–145)
TRIGL SERPL-MCNC: 79 MG/DL (ref 30–150)
TSH SERPL DL<=0.005 MIU/L-ACNC: 1.33 UIU/ML (ref 0.4–4)
VIT B12 SERPL-MCNC: 905 PG/ML (ref 210–950)
WBC # BLD AUTO: 5.16 K/UL (ref 3.9–12.7)

## 2024-02-21 PROCEDURE — 1126F AMNT PAIN NOTED NONE PRSNT: CPT | Mod: CPTII,S$GLB,, | Performed by: FAMILY MEDICINE

## 2024-02-21 PROCEDURE — 1100F PTFALLS ASSESS-DOCD GE2>/YR: CPT | Mod: CPTII,S$GLB,, | Performed by: FAMILY MEDICINE

## 2024-02-21 PROCEDURE — 3074F SYST BP LT 130 MM HG: CPT | Mod: CPTII,S$GLB,, | Performed by: FAMILY MEDICINE

## 2024-02-21 PROCEDURE — 83735 ASSAY OF MAGNESIUM: CPT | Performed by: FAMILY MEDICINE

## 2024-02-21 PROCEDURE — 3288F FALL RISK ASSESSMENT DOCD: CPT | Mod: CPTII,S$GLB,, | Performed by: FAMILY MEDICINE

## 2024-02-21 PROCEDURE — 99999 PR PBB SHADOW E&M-EST. PATIENT-LVL IV: CPT | Mod: PBBFAC,,, | Performed by: FAMILY MEDICINE

## 2024-02-21 PROCEDURE — 84443 ASSAY THYROID STIM HORMONE: CPT | Performed by: FAMILY MEDICINE

## 2024-02-21 PROCEDURE — 36415 COLL VENOUS BLD VENIPUNCTURE: CPT | Performed by: FAMILY MEDICINE

## 2024-02-21 PROCEDURE — 1159F MED LIST DOCD IN RCRD: CPT | Mod: CPTII,S$GLB,, | Performed by: FAMILY MEDICINE

## 2024-02-21 PROCEDURE — 82306 VITAMIN D 25 HYDROXY: CPT | Performed by: FAMILY MEDICINE

## 2024-02-21 PROCEDURE — 80053 COMPREHEN METABOLIC PANEL: CPT | Performed by: FAMILY MEDICINE

## 2024-02-21 PROCEDURE — 82728 ASSAY OF FERRITIN: CPT | Performed by: FAMILY MEDICINE

## 2024-02-21 PROCEDURE — 3078F DIAST BP <80 MM HG: CPT | Mod: CPTII,S$GLB,, | Performed by: FAMILY MEDICINE

## 2024-02-21 PROCEDURE — 99397 PER PM REEVAL EST PAT 65+ YR: CPT | Mod: S$GLB,,, | Performed by: FAMILY MEDICINE

## 2024-02-21 PROCEDURE — 85025 COMPLETE CBC W/AUTO DIFF WBC: CPT | Performed by: FAMILY MEDICINE

## 2024-02-21 PROCEDURE — 82607 VITAMIN B-12: CPT | Performed by: FAMILY MEDICINE

## 2024-02-21 PROCEDURE — 83036 HEMOGLOBIN GLYCOSYLATED A1C: CPT | Performed by: FAMILY MEDICINE

## 2024-02-21 PROCEDURE — 80061 LIPID PANEL: CPT | Performed by: FAMILY MEDICINE

## 2024-02-21 RX ORDER — AMITRIPTYLINE HYDROCHLORIDE 25 MG/1
TABLET, FILM COATED ORAL
Qty: 90 TABLET | Refills: 4 | Status: SHIPPED | OUTPATIENT
Start: 2024-02-21

## 2024-02-21 RX ORDER — IBUPROFEN 800 MG/1
800 TABLET ORAL 2 TIMES DAILY PRN
Qty: 60 TABLET | Refills: 3 | Status: ON HOLD | OUTPATIENT
Start: 2024-02-21 | End: 2024-03-13 | Stop reason: HOSPADM

## 2024-02-21 RX ORDER — ESCITALOPRAM OXALATE 20 MG/1
20 TABLET ORAL DAILY
Qty: 90 TABLET | Refills: 3 | Status: SHIPPED | OUTPATIENT
Start: 2024-02-21 | End: 2025-02-20

## 2024-02-21 SDOH — SOCIAL DETERMINANTS OF HEALTH (SDOH): OTHER SPECIFIED PROBLEMS RELATED TO PRIMARY SUPPORT GROUP: Z63.8

## 2024-02-22 ENCOUNTER — OFFICE VISIT (OUTPATIENT)
Dept: PODIATRY | Facility: CLINIC | Age: 79
End: 2024-02-22
Payer: MEDICARE

## 2024-02-22 ENCOUNTER — HOSPITAL ENCOUNTER (OUTPATIENT)
Dept: RADIOLOGY | Facility: HOSPITAL | Age: 79
Discharge: HOME | End: 2024-02-22
Attending: STUDENT IN AN ORGANIZED HEALTH CARE EDUCATION/TRAINING PROGRAM
Payer: MEDICARE

## 2024-02-22 VITALS
DIASTOLIC BLOOD PRESSURE: 69 MMHG | WEIGHT: 132 LBS | HEART RATE: 77 BPM | HEIGHT: 66 IN | BODY MASS INDEX: 21.21 KG/M2 | SYSTOLIC BLOOD PRESSURE: 130 MMHG

## 2024-02-22 DIAGNOSIS — S93.492D SPRAIN OF ANTERIOR TALOFIBULAR LIGAMENT OF LEFT ANKLE, SUBSEQUENT ENCOUNTER: ICD-10-CM

## 2024-02-22 DIAGNOSIS — L60.9 DISEASE OF NAIL: ICD-10-CM

## 2024-02-22 DIAGNOSIS — S93.492D SPRAIN OF ANTERIOR TALOFIBULAR LIGAMENT OF LEFT ANKLE, SUBSEQUENT ENCOUNTER: Primary | ICD-10-CM

## 2024-02-22 PROCEDURE — 1125F AMNT PAIN NOTED PAIN PRSNT: CPT | Mod: CPTII,S$GLB,, | Performed by: STUDENT IN AN ORGANIZED HEALTH CARE EDUCATION/TRAINING PROGRAM

## 2024-02-22 PROCEDURE — 99999 PR PBB SHADOW E&M-EST. PATIENT-LVL V: CPT | Mod: PBBFAC,,, | Performed by: STUDENT IN AN ORGANIZED HEALTH CARE EDUCATION/TRAINING PROGRAM

## 2024-02-22 PROCEDURE — 3075F SYST BP GE 130 - 139MM HG: CPT | Mod: CPTII,S$GLB,, | Performed by: STUDENT IN AN ORGANIZED HEALTH CARE EDUCATION/TRAINING PROGRAM

## 2024-02-22 PROCEDURE — 1100F PTFALLS ASSESS-DOCD GE2>/YR: CPT | Mod: CPTII,S$GLB,, | Performed by: STUDENT IN AN ORGANIZED HEALTH CARE EDUCATION/TRAINING PROGRAM

## 2024-02-22 PROCEDURE — 73610 X-RAY EXAM OF ANKLE: CPT | Mod: TC,PN,LT

## 2024-02-22 PROCEDURE — 3288F FALL RISK ASSESSMENT DOCD: CPT | Mod: CPTII,S$GLB,, | Performed by: STUDENT IN AN ORGANIZED HEALTH CARE EDUCATION/TRAINING PROGRAM

## 2024-02-22 PROCEDURE — 99203 OFFICE O/P NEW LOW 30 MIN: CPT | Mod: S$GLB,,, | Performed by: STUDENT IN AN ORGANIZED HEALTH CARE EDUCATION/TRAINING PROGRAM

## 2024-02-22 PROCEDURE — 73610 X-RAY EXAM OF ANKLE: CPT | Mod: 26,LT,, | Performed by: RADIOLOGY

## 2024-02-22 PROCEDURE — 1159F MED LIST DOCD IN RCRD: CPT | Mod: CPTII,S$GLB,, | Performed by: STUDENT IN AN ORGANIZED HEALTH CARE EDUCATION/TRAINING PROGRAM

## 2024-02-22 PROCEDURE — 3078F DIAST BP <80 MM HG: CPT | Mod: CPTII,S$GLB,, | Performed by: STUDENT IN AN ORGANIZED HEALTH CARE EDUCATION/TRAINING PROGRAM
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
## 2024-02-26 ENCOUNTER — TELEPHONE (OUTPATIENT)
Dept: NEUROLOGY | Facility: CLINIC | Age: 79
End: 2024-02-26
Payer: MEDICARE

## 2024-02-26 NOTE — TELEPHONE ENCOUNTER
----- Message from Kala Hunt sent at 2/23/2024 10:08 AM CST -----  Regarding: Needs to sche LABS  Name of Who is Calling:RICHIE MANCILLA [[id number]]        What is the request in detail:Pt called states she needs to sche LABS states she thinks the Basic Metablic panel was just done for [facility]  But that the other 2 LABS need to be sche. Please advise         Can the clinic reply by [facility]:No        What Number to Call Back if not in [facility]: Telephone Information:  Mobile          [phone]

## 2024-02-26 NOTE — TELEPHONE ENCOUNTER
Scheduled patient for blood work and MRI - phones currently down, please call patient when possible to verify appointments

## 2024-02-27 PROBLEM — M25.60 DECREASED RANGE OF MOTION WITH DECREASED STRENGTH: Status: ACTIVE | Noted: 2024-02-27

## 2024-02-27 PROBLEM — R26.89 IMPAIRED GAIT AND MOBILITY: Status: ACTIVE | Noted: 2024-02-27

## 2024-02-27 PROBLEM — R53.1 DECREASED RANGE OF MOTION WITH DECREASED STRENGTH: Status: ACTIVE | Noted: 2024-02-27

## 2024-03-02 ENCOUNTER — NURSE TRIAGE (OUTPATIENT)
Dept: ADMINISTRATIVE | Facility: CLINIC | Age: 79
End: 2024-03-02
Payer: MEDICARE

## 2024-03-02 NOTE — TELEPHONE ENCOUNTER
Pt had recent VV with neurologist. Was prescribed tegretol which she has been taking daily since. Yesterday, she noticed she is breaking out in a rash. Rash is on her legs and breasts. A couple of the spots itch, pt put hydrocortisone on those spots. Took benadryl this morning. Pt has more rash spots today than yesterday. Denies hives. States just red and itchy spots. Does not want to stop the tegretol because she says it is helping with her nerve pain.     Dispo- see physician within 24 hours. Pt advised of UC or ODVV. Plans on doing ODVV. Further care advice given, pt VU.  Reason for Disposition   Hives or itching    Additional Information   Negative: [1] Life-threatening reaction (anaphylaxis) in the past to the same drug AND [2] < 2 hours since exposure   Negative: Difficulty breathing or wheezing   Negative: [1] Hoarseness or cough AND [2] started soon after 1st dose of drug   Negative: [1] Swollen tongue AND [2] started soon after 1st dose of drug   Negative: [1] Purple or blood-colored rash (spots or dots) AND [2] fever   Negative: Sounds like a life-threatening emergency to the triager   Negative: Swollen tongue   Negative: [1] Widespread hives AND [2] onset < 2 hours of exposure to 1st dose of drug   Negative: Fever   Negative: Patient sounds very sick or weak to the triager   Negative: [1] Purple or blood-colored rash (spots or dots) AND [2] no fever AND [3] sounds well to triager   Negative: [1] Taking new prescription medication AND [2] rash within 4 hours of 1st dose   Negative: Large or small blisters on skin (i.e., fluid filled bubbles or sacs)   Negative: Bloody crusts on lips or sores in mouth   Negative: Face or lip swelling    Protocols used: Rash - Widespread On Drugs-A-AH

## 2024-03-07 ENCOUNTER — ANESTHESIA (OUTPATIENT)
Dept: INTERVENTIONAL RADIOLOGY/VASCULAR | Facility: HOSPITAL | Age: 79
DRG: 023 | End: 2024-03-07
Payer: MEDICARE

## 2024-03-07 ENCOUNTER — HOSPITAL ENCOUNTER (EMERGENCY)
Facility: HOSPITAL | Age: 79
Discharge: ANOTHER HOSPITAL | End: 2024-03-07
Attending: EMERGENCY MEDICINE
Payer: MEDICARE

## 2024-03-07 ENCOUNTER — HOSPITAL ENCOUNTER (INPATIENT)
Facility: HOSPITAL | Age: 79
LOS: 6 days | Discharge: NURSING, REHAB OR LONG-TERM CARE | DRG: 023 | End: 2024-03-13
Attending: EMERGENCY MEDICINE | Admitting: NEUROLOGICAL SURGERY
Payer: MEDICARE

## 2024-03-07 VITALS
RESPIRATION RATE: 20 BRPM | WEIGHT: 135 LBS | DIASTOLIC BLOOD PRESSURE: 67 MMHG | BODY MASS INDEX: 21.79 KG/M2 | TEMPERATURE: 98 F | SYSTOLIC BLOOD PRESSURE: 151 MMHG | OXYGEN SATURATION: 100 % | HEART RATE: 79 BPM

## 2024-03-07 DIAGNOSIS — R29.818 ACUTE FOCAL NEUROLOGICAL DEFICIT: ICD-10-CM

## 2024-03-07 DIAGNOSIS — I63.9 CEREBRAL INFARCTION, UNSPECIFIED MECHANISM: ICD-10-CM

## 2024-03-07 DIAGNOSIS — R47.01 APHASIA: ICD-10-CM

## 2024-03-07 DIAGNOSIS — I63.412 CEREBROVASCULAR ACCIDENT (CVA) DUE TO EMBOLISM OF LEFT MIDDLE CEREBRAL ARTERY: ICD-10-CM

## 2024-03-07 DIAGNOSIS — I63.9 STROKE: ICD-10-CM

## 2024-03-07 DIAGNOSIS — I63.412 CEREBROVASCULAR ACCIDENT (CVA) DUE TO EMBOLISM OF LEFT MIDDLE CEREBRAL ARTERY: Primary | ICD-10-CM

## 2024-03-07 PROBLEM — I63.512 ACUTE ISCHEMIC LEFT MCA STROKE: Status: ACTIVE | Noted: 2024-03-07

## 2024-03-07 LAB
ABO + RH BLD: NORMAL
ALBUMIN SERPL BCP-MCNC: 3.8 G/DL (ref 3.5–5.2)
ALLENS TEST: NORMAL
ALP SERPL-CCNC: 76 U/L (ref 55–135)
ALT SERPL W/O P-5'-P-CCNC: 16 U/L (ref 10–44)
ANION GAP SERPL CALC-SCNC: 11 MMOL/L (ref 8–16)
AST SERPL-CCNC: 23 U/L (ref 10–40)
BASOPHILS # BLD AUTO: 0.04 K/UL (ref 0–0.2)
BASOPHILS NFR BLD: 0.6 % (ref 0–1.9)
BILIRUB SERPL-MCNC: 0.3 MG/DL (ref 0.1–1)
BLD GP AB SCN CELLS X3 SERPL QL: NORMAL
BUN SERPL-MCNC: 13 MG/DL (ref 8–23)
CALCIUM SERPL-MCNC: 9.2 MG/DL (ref 8.7–10.5)
CHLORIDE SERPL-SCNC: 100 MMOL/L (ref 95–110)
CHOLEST SERPL-MCNC: 171 MG/DL (ref 120–199)
CHOLEST SERPL-MCNC: 181 MG/DL (ref 120–199)
CHOLEST/HDLC SERPL: 2.9 {RATIO} (ref 2–5)
CHOLEST/HDLC SERPL: 3.1 {RATIO} (ref 2–5)
CO2 SERPL-SCNC: 23 MMOL/L (ref 23–29)
CREAT SERPL-MCNC: 0.6 MG/DL (ref 0.5–1.4)
CREAT SERPL-MCNC: 0.8 MG/DL (ref 0.5–1.4)
DIFFERENTIAL METHOD BLD: ABNORMAL
EOSINOPHIL # BLD AUTO: 0 K/UL (ref 0–0.5)
EOSINOPHIL NFR BLD: 0.3 % (ref 0–8)
ERYTHROCYTE [DISTWIDTH] IN BLOOD BY AUTOMATED COUNT: 12.4 % (ref 11.5–14.5)
EST. GFR  (NO RACE VARIABLE): >60 ML/MIN/1.73 M^2
GLUCOSE SERPL-MCNC: 110 MG/DL (ref 70–110)
HCT VFR BLD AUTO: 38.2 % (ref 37–48.5)
HCV AB SERPL QL IA: NORMAL
HDLC SERPL-MCNC: 58 MG/DL (ref 40–75)
HDLC SERPL-MCNC: 59 MG/DL (ref 40–75)
HDLC SERPL: 32.6 % (ref 20–50)
HDLC SERPL: 33.9 % (ref 20–50)
HGB BLD-MCNC: 13 G/DL (ref 12–16)
HIV 1+2 AB+HIV1 P24 AG SERPL QL IA: NORMAL
IMM GRANULOCYTES # BLD AUTO: 0.02 K/UL (ref 0–0.04)
IMM GRANULOCYTES NFR BLD AUTO: 0.3 % (ref 0–0.5)
INR PPP: 1 (ref 0.8–1.2)
LDLC SERPL CALC-MCNC: 104.6 MG/DL (ref 63–159)
LDLC SERPL CALC-MCNC: 110.6 MG/DL (ref 63–159)
LYMPHOCYTES # BLD AUTO: 0.5 K/UL (ref 1–4.8)
LYMPHOCYTES NFR BLD: 7.9 % (ref 18–48)
MCH RBC QN AUTO: 30.8 PG (ref 27–31)
MCHC RBC AUTO-ENTMCNC: 34 G/DL (ref 32–36)
MCV RBC AUTO: 91 FL (ref 82–98)
MONOCYTES # BLD AUTO: 0.4 K/UL (ref 0.3–1)
MONOCYTES NFR BLD: 5.4 % (ref 4–15)
NEUTROPHILS # BLD AUTO: 5.7 K/UL (ref 1.8–7.7)
NEUTROPHILS NFR BLD: 85.5 % (ref 38–73)
NONHDLC SERPL-MCNC: 113 MG/DL
NONHDLC SERPL-MCNC: 122 MG/DL
NRBC BLD-RTO: 0 /100 WBC
PLATELET # BLD AUTO: 182 K/UL (ref 150–450)
PMV BLD AUTO: 10.5 FL (ref 9.2–12.9)
POTASSIUM SERPL-SCNC: 4.3 MMOL/L (ref 3.5–5.1)
PROT SERPL-MCNC: 7.2 G/DL (ref 6–8.4)
PROTHROMBIN TIME: 10.9 SEC (ref 9–12.5)
RBC # BLD AUTO: 4.22 M/UL (ref 4–5.4)
SAMPLE: NORMAL
SITE: NORMAL
SODIUM SERPL-SCNC: 134 MMOL/L (ref 136–145)
SPECIMEN OUTDATE: NORMAL
TRIGL SERPL-MCNC: 42 MG/DL (ref 30–150)
TRIGL SERPL-MCNC: 57 MG/DL (ref 30–150)
TSH SERPL DL<=0.005 MIU/L-ACNC: 0.48 UIU/ML (ref 0.4–4)
TSH SERPL DL<=0.005 MIU/L-ACNC: 0.58 UIU/ML (ref 0.4–4)
WBC # BLD AUTO: 6.62 K/UL (ref 3.9–12.7)

## 2024-03-07 PROCEDURE — 84443 ASSAY THYROID STIM HORMONE: CPT | Performed by: EMERGENCY MEDICINE

## 2024-03-07 PROCEDURE — 36415 COLL VENOUS BLD VENIPUNCTURE: CPT | Performed by: PSYCHIATRY & NEUROLOGY

## 2024-03-07 PROCEDURE — 25000003 PHARM REV CODE 250: Performed by: STUDENT IN AN ORGANIZED HEALTH CARE EDUCATION/TRAINING PROGRAM

## 2024-03-07 PROCEDURE — 86803 HEPATITIS C AB TEST: CPT | Performed by: PHYSICIAN ASSISTANT

## 2024-03-07 PROCEDURE — 85610 PROTHROMBIN TIME: CPT | Performed by: EMERGENCY MEDICINE

## 2024-03-07 PROCEDURE — G0508 CRIT CARE TELEHEA CONSULT 60: HCPCS | Mod: 95,,, | Performed by: STUDENT IN AN ORGANIZED HEALTH CARE EDUCATION/TRAINING PROGRAM

## 2024-03-07 PROCEDURE — 82565 ASSAY OF CREATININE: CPT | Mod: 91

## 2024-03-07 PROCEDURE — 99223 1ST HOSP IP/OBS HIGH 75: CPT | Mod: GC,,, | Performed by: PSYCHIATRY & NEUROLOGY

## 2024-03-07 PROCEDURE — 85610 PROTHROMBIN TIME: CPT

## 2024-03-07 PROCEDURE — 25000003 PHARM REV CODE 250: Performed by: NURSE ANESTHETIST, CERTIFIED REGISTERED

## 2024-03-07 PROCEDURE — 85025 COMPLETE CBC W/AUTO DIFF WBC: CPT | Performed by: EMERGENCY MEDICINE

## 2024-03-07 PROCEDURE — 63600175 PHARM REV CODE 636 W HCPCS: Performed by: NURSE ANESTHETIST, CERTIFIED REGISTERED

## 2024-03-07 PROCEDURE — D9220A PRA ANESTHESIA: Mod: ANES,,, | Performed by: ANESTHESIOLOGY

## 2024-03-07 PROCEDURE — 94761 N-INVAS EAR/PLS OXIMETRY MLT: CPT

## 2024-03-07 PROCEDURE — 99285 EMERGENCY DEPT VISIT HI MDM: CPT | Mod: 25

## 2024-03-07 PROCEDURE — 63600175 PHARM REV CODE 636 W HCPCS: Performed by: STUDENT IN AN ORGANIZED HEALTH CARE EDUCATION/TRAINING PROGRAM

## 2024-03-07 PROCEDURE — 80053 COMPREHEN METABOLIC PANEL: CPT | Performed by: EMERGENCY MEDICINE

## 2024-03-07 PROCEDURE — B317YZZ FLUOROSCOPY OF LEFT INTERNAL CAROTID ARTERY USING OTHER CONTRAST: ICD-10-PCS | Performed by: NEUROLOGICAL SURGERY

## 2024-03-07 PROCEDURE — 63600175 PHARM REV CODE 636 W HCPCS: Performed by: NEUROLOGICAL SURGERY

## 2024-03-07 PROCEDURE — 93005 ELECTROCARDIOGRAM TRACING: CPT

## 2024-03-07 PROCEDURE — 99285 EMERGENCY DEPT VISIT HI MDM: CPT | Mod: 25,27

## 2024-03-07 PROCEDURE — 25000003 PHARM REV CODE 250: Performed by: EMERGENCY MEDICINE

## 2024-03-07 PROCEDURE — 86850 RBC ANTIBODY SCREEN: CPT

## 2024-03-07 PROCEDURE — D9220A PRA ANESTHESIA: Mod: CRNA,,, | Performed by: STUDENT IN AN ORGANIZED HEALTH CARE EDUCATION/TRAINING PROGRAM

## 2024-03-07 PROCEDURE — 25000003 PHARM REV CODE 250: Performed by: NEUROLOGICAL SURGERY

## 2024-03-07 PROCEDURE — 63600175 PHARM REV CODE 636 W HCPCS: Mod: JZ,JG

## 2024-03-07 PROCEDURE — 63600175 PHARM REV CODE 636 W HCPCS

## 2024-03-07 PROCEDURE — 27000221 HC OXYGEN, UP TO 24 HOURS

## 2024-03-07 PROCEDURE — 03CG3Z7 EXTIRPATION OF MATTER FROM INTRACRANIAL ARTERY USING STENT RETRIEVER, PERCUTANEOUS APPROACH: ICD-10-PCS | Performed by: NEUROLOGICAL SURGERY

## 2024-03-07 PROCEDURE — 96373 THER/PROPH/DIAG INJ IA: CPT

## 2024-03-07 PROCEDURE — B314YZZ FLUOROSCOPY OF LEFT COMMON CAROTID ARTERY USING OTHER CONTRAST: ICD-10-PCS | Performed by: NEUROLOGICAL SURGERY

## 2024-03-07 PROCEDURE — 80061 LIPID PANEL: CPT | Mod: 91

## 2024-03-07 PROCEDURE — 93010 ELECTROCARDIOGRAM REPORT: CPT | Mod: ,,, | Performed by: INTERNAL MEDICINE

## 2024-03-07 PROCEDURE — 20000000 HC ICU ROOM

## 2024-03-07 PROCEDURE — 87389 HIV-1 AG W/HIV-1&-2 AB AG IA: CPT | Performed by: PHYSICIAN ASSISTANT

## 2024-03-07 PROCEDURE — 84443 ASSAY THYROID STIM HORMONE: CPT | Mod: 91

## 2024-03-07 PROCEDURE — 86900 BLOOD TYPING SEROLOGIC ABO: CPT

## 2024-03-07 PROCEDURE — 99900035 HC TECH TIME PER 15 MIN (STAT)

## 2024-03-07 PROCEDURE — 25500020 PHARM REV CODE 255: Performed by: EMERGENCY MEDICINE

## 2024-03-07 PROCEDURE — 25500020 PHARM REV CODE 255: Performed by: INTERNAL MEDICINE

## 2024-03-07 PROCEDURE — 80061 LIPID PANEL: CPT | Performed by: EMERGENCY MEDICINE

## 2024-03-07 RX ORDER — HEPARIN SODIUM 5000 [USP'U]/ML
5000 INJECTION, SOLUTION INTRAVENOUS; SUBCUTANEOUS EVERY 8 HOURS
Status: DISCONTINUED | OUTPATIENT
Start: 2024-03-07 | End: 2024-03-11

## 2024-03-07 RX ORDER — ONDANSETRON HYDROCHLORIDE 2 MG/ML
INJECTION, SOLUTION INTRAVENOUS
Status: DISCONTINUED | OUTPATIENT
Start: 2024-03-07 | End: 2024-03-07

## 2024-03-07 RX ORDER — ASPIRIN 81 MG/1
81 TABLET ORAL DAILY
Status: DISCONTINUED | OUTPATIENT
Start: 2024-03-08 | End: 2024-03-11

## 2024-03-07 RX ORDER — PROPOFOL 10 MG/ML
VIAL (ML) INTRAVENOUS
Status: DISCONTINUED | OUTPATIENT
Start: 2024-03-07 | End: 2024-03-07

## 2024-03-07 RX ORDER — LABETALOL HCL 20 MG/4 ML
10 SYRINGE (ML) INTRAVENOUS EVERY 4 HOURS PRN
Status: DISCONTINUED | OUTPATIENT
Start: 2024-03-07 | End: 2024-03-08

## 2024-03-07 RX ORDER — ASPIRIN 300 MG/1
300 SUPPOSITORY RECTAL
Status: COMPLETED | OUTPATIENT
Start: 2024-03-07 | End: 2024-03-07

## 2024-03-07 RX ORDER — PHENYLEPHRINE HYDROCHLORIDE 10 MG/ML
INJECTION INTRAVENOUS
Status: DISCONTINUED | OUTPATIENT
Start: 2024-03-07 | End: 2024-03-07

## 2024-03-07 RX ORDER — ESCITALOPRAM OXALATE 20 MG/1
20 TABLET ORAL DAILY
Status: DISCONTINUED | OUTPATIENT
Start: 2024-03-08 | End: 2024-03-13 | Stop reason: HOSPADM

## 2024-03-07 RX ORDER — BISACODYL 10 MG/1
10 SUPPOSITORY RECTAL DAILY PRN
Status: DISCONTINUED | OUTPATIENT
Start: 2024-03-07 | End: 2024-03-09

## 2024-03-07 RX ORDER — SUCCINYLCHOLINE CHLORIDE 20 MG/ML
INJECTION INTRAMUSCULAR; INTRAVENOUS
Status: DISCONTINUED | OUTPATIENT
Start: 2024-03-07 | End: 2024-03-07

## 2024-03-07 RX ORDER — ATORVASTATIN CALCIUM 40 MG/1
40 TABLET, FILM COATED ORAL DAILY
Status: DISCONTINUED | OUTPATIENT
Start: 2024-03-08 | End: 2024-03-13 | Stop reason: HOSPADM

## 2024-03-07 RX ORDER — FENTANYL CITRATE 50 UG/ML
INJECTION, SOLUTION INTRAMUSCULAR; INTRAVENOUS
Status: DISCONTINUED | OUTPATIENT
Start: 2024-03-07 | End: 2024-03-07

## 2024-03-07 RX ORDER — CLOPIDOGREL BISULFATE 75 MG/1
75 TABLET ORAL DAILY
Status: DISCONTINUED | OUTPATIENT
Start: 2024-03-08 | End: 2024-03-11

## 2024-03-07 RX ORDER — LIDOCAINE HYDROCHLORIDE 20 MG/ML
INJECTION INTRAVENOUS
Status: DISCONTINUED | OUTPATIENT
Start: 2024-03-07 | End: 2024-03-07

## 2024-03-07 RX ORDER — LABETALOL HCL 20 MG/4 ML
10 SYRINGE (ML) INTRAVENOUS ONCE
Status: COMPLETED | OUTPATIENT
Start: 2024-03-07 | End: 2024-03-07

## 2024-03-07 RX ORDER — IODIXANOL 320 MG/ML
70 INJECTION, SOLUTION INTRAVASCULAR
Status: COMPLETED | OUTPATIENT
Start: 2024-03-07 | End: 2024-03-07

## 2024-03-07 RX ORDER — CARBAMAZEPINE 100 MG/1
100 CAPSULE, EXTENDED RELEASE ORAL 2 TIMES DAILY
Status: DISCONTINUED | OUTPATIENT
Start: 2024-03-07 | End: 2024-03-07

## 2024-03-07 RX ORDER — SODIUM CHLORIDE 0.9 % (FLUSH) 0.9 %
10 SYRINGE (ML) INJECTION
Status: DISCONTINUED | OUTPATIENT
Start: 2024-03-07 | End: 2024-03-13 | Stop reason: HOSPADM

## 2024-03-07 RX ORDER — LEVOTHYROXINE SODIUM 75 UG/1
75 TABLET ORAL
Status: DISCONTINUED | OUTPATIENT
Start: 2024-03-08 | End: 2024-03-13 | Stop reason: HOSPADM

## 2024-03-07 RX ORDER — ROCURONIUM BROMIDE 10 MG/ML
INJECTION, SOLUTION INTRAVENOUS
Status: DISCONTINUED | OUTPATIENT
Start: 2024-03-07 | End: 2024-03-07

## 2024-03-07 RX ORDER — DEXAMETHASONE SODIUM PHOSPHATE 4 MG/ML
INJECTION, SOLUTION INTRA-ARTICULAR; INTRALESIONAL; INTRAMUSCULAR; INTRAVENOUS; SOFT TISSUE
Status: DISCONTINUED | OUTPATIENT
Start: 2024-03-07 | End: 2024-03-07

## 2024-03-07 RX ADMIN — SODIUM CHLORIDE: 0.9 INJECTION, SOLUTION INTRAVENOUS at 03:03

## 2024-03-07 RX ADMIN — SUGAMMADEX 200 MG: 100 INJECTION, SOLUTION INTRAVENOUS at 04:03

## 2024-03-07 RX ADMIN — ROCURONIUM BROMIDE 5 MG: 10 INJECTION INTRAVENOUS at 03:03

## 2024-03-07 RX ADMIN — ROCURONIUM BROMIDE 45 MG: 10 INJECTION INTRAVENOUS at 03:03

## 2024-03-07 RX ADMIN — LABETALOL HYDROCHLORIDE 10 MG: 5 INJECTION, SOLUTION INTRAVENOUS at 07:03

## 2024-03-07 RX ADMIN — FENTANYL CITRATE 25 MCG: 50 INJECTION, SOLUTION INTRAMUSCULAR; INTRAVENOUS at 03:03

## 2024-03-07 RX ADMIN — IOHEXOL 100 ML: 350 INJECTION, SOLUTION INTRAVENOUS at 01:03

## 2024-03-07 RX ADMIN — FENTANYL CITRATE 25 MCG: 50 INJECTION, SOLUTION INTRAMUSCULAR; INTRAVENOUS at 04:03

## 2024-03-07 RX ADMIN — DEXAMETHASONE SODIUM PHOSPHATE 4 MG: 4 INJECTION, SOLUTION INTRAMUSCULAR; INTRAVENOUS at 03:03

## 2024-03-07 RX ADMIN — IODIXANOL 70 ML: 320 INJECTION, SOLUTION INTRAVASCULAR at 04:03

## 2024-03-07 RX ADMIN — HEPARIN SODIUM 5000 UNITS: 5000 INJECTION INTRAVENOUS; SUBCUTANEOUS at 10:03

## 2024-03-07 RX ADMIN — ASPIRIN 300 MG: 300 SUPPOSITORY RECTAL at 01:03

## 2024-03-07 RX ADMIN — PHENYLEPHRINE HYDROCHLORIDE 100 MCG: 10 INJECTION INTRAVENOUS at 03:03

## 2024-03-07 RX ADMIN — PROPOFOL 120 MG: 10 INJECTION, EMULSION INTRAVENOUS at 03:03

## 2024-03-07 RX ADMIN — ONDANSETRON 4 MG: 2 INJECTION INTRAMUSCULAR; INTRAVENOUS at 04:03

## 2024-03-07 RX ADMIN — LIDOCAINE HYDROCHLORIDE 70 MG: 20 INJECTION INTRAVENOUS at 03:03

## 2024-03-07 RX ADMIN — SUCCINYLCHOLINE CHLORIDE 140 MG: 20 INJECTION, SOLUTION INTRAMUSCULAR; INTRAVENOUS at 03:03

## 2024-03-07 RX ADMIN — HEPARIN SODIUM 5000 ML: 1000 INJECTION, SOLUTION INTRAVENOUS; SUBCUTANEOUS at 03:03

## 2024-03-07 NOTE — Clinical Note
Bilateral: Groin.   Scrubbed with Chlorohexidine.    Hair: N/A.  Skin prep dry before draping.  Prepped by: Andre Garcia, RT.

## 2024-03-07 NOTE — SUBJECTIVE & OBJECTIVE
Past Medical History:   Diagnosis Date    Allergic rhinitis 5/31/2016    Carotid artery plaque 11/18/2013    History of total hysterectomy with bilateral salpingo-oophorectomy (BSO) 5/31/2016    Hyperlipidemia 7/17/2012    Hypothyroid     Insomnia     Macular degeneration, right eye 5/31/2016    Migraine headache     Osteopenia of the elderly 10/20/2015    Postmenopausal HRT (hormone replacement therapy)     RBBB 7/17/2012     Past Surgical History:   Procedure Laterality Date    APPENDECTOMY      Catatact surgery Right 06/19/2019    without any complications    COLONOSCOPY  08/27/2019    repeat in 5 years    EYE SURGERY Bilateral     cataracts extraction    HYSTERECTOMY      STAPEDES SURGERY  2006    TOTAL ABDOMINAL HYSTERECTOMY W/ BILATERAL SALPINGOOPHORECTOMY      TUBAL LIGATION       Social History     Tobacco Use    Smoking status: Never    Smokeless tobacco: Never   Substance Use Topics    Alcohol use: Yes     Comment: occasionally    Drug use: No     Review of patient's allergies indicates:   Allergen Reactions    Neomycin Other (See Comments) and Swelling     Other reaction(s): Unknown       Medications: I have reviewed the current medication administration record.    (Not in a hospital admission)      Review of Systems   Unable to perform ROS: Acuity of condition     Objective:     Vital Signs (Most Recent):  Temp: 98 °F (36.7 °C) (03/07/24 1518)  Pulse: 81 (03/07/24 1518)  Resp: 16 (03/07/24 1518)  BP: 134/67 (03/07/24 1518)  SpO2: 95 % (03/07/24 1518)    Vital Signs Range (Last 24H):  Temp:  [98 °F (36.7 °C)-98.2 °F (36.8 °C)]   Pulse:  [79-82]   Resp:  [16-20]   BP: (134-151)/(62-67)   SpO2:  [95 %-100 %]        Physical Exam  Vitals reviewed.   Constitutional:       General: She is not in acute distress.     Appearance: She is not toxic-appearing or diaphoretic.   Pulmonary:      Effort: Pulmonary effort is normal. No respiratory distress.   Neurological:      Mental Status: She is alert.         "      Neurological Exam:   LOC: alert  Attention Span: poor  Language: Global aphasia  Articulation: Untestable due to severe aphasia   Orientation: Untestable due to severe aphasia   Visual Fields: Blinks to threat bilaterally  EOM (CN III, IV, VI): Follows provider during exam  Pupils (CN II, III): PERRL  Facial Sensation (CN V): Normal  Facial Movement (CN VII): Unable to test due to aphasia; symmetric grimace  Motor: 4 extremities anti-gravity; mild right upper extremity drift  Sensation: withdraws to painful stimuli in all 4 extremities      Laboratory:  CMP:   Recent Labs   Lab 03/07/24  1303   CALCIUM 9.2   ALBUMIN 3.8   PROT 7.2   *   K 4.3   CO2 23      BUN 13   CREATININE 0.8   ALKPHOS 76   ALT 16   AST 23   BILITOT 0.3     CBC:   Recent Labs   Lab 03/07/24  1303   WBC 6.62   RBC 4.22   HGB 13.0   HCT 38.2      MCV 91   MCH 30.8   MCHC 34.0     Lipid Panel:   Recent Labs   Lab 03/07/24  1303   CHOL 181   LDLCALC 110.6   HDL 59   TRIG 57     Coagulation:   Recent Labs   Lab 03/07/24  1303   INR 1.0     Hgb A1C: No results for input(s): "HGBA1C" in the last 168 hours.  TSH:   Recent Labs   Lab 03/07/24  1303   TSH 0.481       Diagnostic Results:      Brain/Vessel imaging:  CTA Head and Neck (xpd) 03/07/2024    Narrative  EXAMINATION:  CTA HEAD AND NECK (XPD)    CLINICAL HISTORY:  Neuro deficit, acute, stroke suspected;    TECHNIQUE:  Axial CT images obtained throughout the region of the head before and after the administration of intravenous contrast.  CT angiogram was performed through the cervical and intracranial vasculature during the IV bolus administration of 100mL of Omnipaque 350.  Multiplanar MPR and MIP reformats were performed.    COMPARISON:  Head CT 03/07/2024, MRI 02/28/2024    FINDINGS:  Head CT without and with contrast:    The ventricles are normal in size without evidence of hydrocephalus.  Subtle area of hypoattenuation right cerebellar hemisphere consistent with small " area of remote infarction, in retrospect unchanged from 02/28/2024 MRI examination.  No area of definite acute infarction seen.  No parenchymal mass, hemorrhage, edema or major vascular distribution infarct.  No extra-axial blood or fluid collections.    The cranium appears intact. Mastoid air cells and paranasal sinuses are essentially clear.      CTA NECK:  ARCH: Normal arch anatomy.  COMMON CAROTIDS: Normal.  EXTERNAL CAROTIDS: Normal.  INTERNAL CAROTIDS: Less than 50% stenosis right ICA.  Less than 50% stenosis left ICA  VERTEBRALS: Normal    CTA HEAD:  INTERNAL CAROTIDS: Normal.  ANTERIOR CEREBRALS: Normal.  ANTERIOR COMMUNICATING: Normal.  MIDDLE CEREBRALS: Normal.  POSTERIOR COMMUNICATING ARTERIES: Robust bilaterally.  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  VERTEBROBASILAR AND BRANCH VESSELS: Within normal limits.    The major venous sinuses are patent.    Impression  No CT evidence of acute infarction.  Note that MRI has greater sensitivity to detect small acute infarction and could be done if this is clinically warranted.    No large vessel occlusion in the intracranial circulation.    No hemodynamically significant stenosis of the neck vessels.      Electronically signed by: Linda Landers MD  Date:    03/07/2024  Time:    14:14      CT Head Without Contrast 03/07/2024    Narrative  EXAMINATION:  CT HEAD WITHOUT CONTRAST    CLINICAL HISTORY:  Neuro deficit, acute, stroke suspected;    TECHNIQUE:  Low dose axial images were obtained through the head.  Coronal and sagittal reformations were also performed. Contrast was not administered.    COMPARISON:  MRI of the brain performed 02/28/2024.    FINDINGS:  Blood: No acute intracranial hemorrhage.    Parenchyma: No definite loss of gray-white differentiation to suggest acute or subacute transcortical infarct. Generalized pattern of age-related parenchymal volume loss.  Nonspecific areas of white matter  hypoattenuation may reflect sequela of chronic small vessel ischemic disease.    Ventricles/Extra-axial spaces: No abnormal extra-axial fluid collection. Basal cisterns are patent.    Vessels: Mild atherosclerotic calcifications.    Orbits: Status post bilateral lens replacements.    Scalp: Unremarkable.    Skull: There are no depressed skull fractures or destructive bone lesions.    Sinuses and mastoids: Essentially clear.    Other findings: None    Impression  No definite acute intracranial findings by noncontrast CT.    MRI could be performed for more sensitive and specific assessment.      Electronically signed by: John Paul Gutierrez  Date:    03/07/2024  Time:    13:09      Cardiac Evaluation:   No recent studies available to review

## 2024-03-07 NOTE — SUBJECTIVE & OBJECTIVE
HPI:  79 y.o. female w/ PMHx of HLD, HTN, b/l Carotid stenosis, RBBB who presents w/ reported aphasia.   LKW 06:30 AM   found patient as above at 12:30 after coming home.      Images personally reviewed and interpreted:  Study: Head CT and CTA Head & Neck  Study Interpretation: Hyperdense L M2 in the sylvian fissure.   CTA w/ L M2 occlusion.     Assessment and plan:  79 y.o. female w/ PMHx of HLD, HTN, b/l Carotid stenosis, RBBB who presents w/ reported aphasia.   NIHSS 10.   OOW for TNK.   L M2 occlusion on CTA H/N.   [place] [facility] [place]    Recommendations:  MRI brain to evaluate for presence and/or extent of ischemic injury  Allow for permissive HTNsion up to 220/110 until AIS is r/o w/ imaging   Lipid profile, A1c, TSH  ECHO w/o bubble study  PT/OT/SLP eval and Rx  IVF to allow for maximum cerebral perfusion  HOB flat   Load aspirin 325 mg & clopidogrel 300 mg x 1 now, followed by daily aspirin 81 mg /  clopidogrel 75 mg x 30 days followed by monotherapy thereafter or RECTAL  mg if not tolerating PO  High intensity statin for LDL goal <70 long term     Recommendations discussed w/ Dr. Rivera.       Lytics recommendation: Thrombolytic therapy not recommended due to Outside of treatment window   Thrombectomy recommendation: Yes  Placement recommendation: pending further studies  transfer to [facility] by  ground  stat

## 2024-03-07 NOTE — ED NOTES
Attempted to call report again to [phone], again transferred with no answer, pt in route to main campus with janak

## 2024-03-07 NOTE — ANESTHESIA PROCEDURE NOTES
Intubation    Date/Time: 3/7/2024 3:45 PM    Performed by: Wiley Aviles CRNA  Authorized by: Robert Shell MD    Intubation:     Induction:  Rapid sequence induction    Intubated:  Postinduction    Mask Ventilation:  Not attempted    Attempts:  1    Attempted By:  CRNA    Method of Intubation:  Video laryngoscopy    Blade:  Peña 3    Laryngeal View Grade: Grade I - full view of cords      Difficult Airway Encountered?: No      Complications:  None    Airway Device:  Oral endotracheal tube    Airway Device Size:  7.0    Style/Cuff Inflation:  Cuffed    Inflation Amount (mL):  5    Tube secured:  21    Secured at:  The lips    Placement Verified By:  Capnometry    Complicating Factors:  None    Findings Post-Intubation:  BS equal bilateral and atraumatic/condition of teeth unchanged

## 2024-03-07 NOTE — ED PROVIDER NOTES
Encounter Date: 3/7/2024       History     Chief Complaint   Patient presents with    Cerebrovascular Accident     Arrives via [facility] ems with reports of LKW by  at 0630.      80 yo F w PMHx of HLD, HTN, b/l Carotid stenosis, RBBB brought in by EMS after she was found altered.  Patient's  reports that he last saw her normal at 6:30 a.m. today.  He returned home at noon and found her want around the house but no longer talking or interactive.  Said there was some blood on the floor and suspects that she fell.  Her  reports that patient normally ambulates without difficulty and speaks in interacts normally.  She does not have a prior history of stroke.        Review of patient's allergies indicates:   Allergen Reactions    Neomycin Other (See Comments) and Swelling     Other reaction(s): Unknown     Past Medical History:   Diagnosis Date    Allergic rhinitis 5/31/2016    Carotid artery plaque 11/18/2013    History of total hysterectomy with bilateral salpingo-oophorectomy (BSO) 5/31/2016    Hyperlipidemia 7/17/2012    Hypothyroid     Insomnia     Macular degeneration, right eye 5/31/2016    Migraine headache     Osteopenia of the elderly 10/20/2015    Postmenopausal HRT (hormone replacement therapy)     RBBB 7/17/2012     Past Surgical History:   Procedure Laterality Date    APPENDECTOMY      Catatact surgery Right 06/19/2019    without any complications    COLONOSCOPY  08/27/2019    repeat in 5 years    EYE SURGERY Bilateral     cataracts extraction    HYSTERECTOMY      STAPEDES SURGERY  2006    TOTAL ABDOMINAL HYSTERECTOMY W/ BILATERAL SALPINGOOPHORECTOMY      TUBAL LIGATION       Family History   Problem Relation Age of Onset    Heart disease Father     COPD Father     Heart attack Father     Alzheimer's disease Mother     Cancer Sister         breast cancer    Breast cancer Sister     Alzheimer's disease Sister     Other Brother         TIA    Hyperlipidemia Brother     Rheumatic fever Brother          as a child    Nephrolithiasis Daughter     Depression Son     Post-traumatic stress disorder Son     Cancer Maternal Aunt         breast cancer    Cancer Maternal Grandmother         breast cancer    Cancer Cousin         colon cancer     Social History     Tobacco Use    Smoking status: Never    Smokeless tobacco: Never   Substance Use Topics    Alcohol use: Yes     Comment: occasionally    Drug use: No     Review of Systems    Physical Exam     Initial Vitals   BP Pulse Resp Temp SpO2   03/07/24 1310 03/07/24 1310 03/07/24 1310 03/07/24 1349 03/07/24 1310   (!) 141/62 82 20 98.2 °F (36.8 °C) 98 %      MAP       --                Physical Exam    Nursing note and vitals reviewed.  Constitutional: She appears well-developed. She is not diaphoretic. No distress.   HENT:   Head: Normocephalic.   Contusion over R forehead   Eyes: EOM are normal. Pupils are equal, round, and reactive to light.   Neck: Neck supple.   Normal range of motion.  Cardiovascular:  Normal rate.           Pulmonary/Chest: No respiratory distress.   Abdominal: Abdomen is soft. She exhibits no distension. There is no abdominal tenderness.   Musculoskeletal:         General: Normal range of motion.      Cervical back: Normal range of motion and neck supple.     Neurological: She is alert.   Not alert, but arousable by minor stimulation. Mute, global aphasia. 5/5 strength bilateral upper extremites and LLE. Effort against gravity on RLE. Unable to assess sensation due to aphasia. No visible facial droop. Not following commands.    Skin: Skin is warm and dry.   Abrasions over R dorsal hand   Psychiatric: She has a normal mood and affect.         ED Course   Critical Care    Date/Time: 3/7/2024 2:02 PM    Performed by: Ashley Rivera MD  Authorized by: Ashley Rivera MD  Total critical care time (exclusive of procedural time) : 45 minutes  Critical care time was exclusive of separately billable procedures and treating other  patients.  Critical care was necessary to treat or prevent imminent or life-threatening deterioration of the following conditions: CNS failure or compromise.  Critical care was time spent personally by me on the following activities: development of treatment plan with patient or surrogate, interpretation of cardiac output measurements, evaluation of patient's response to treatment, examination of patient, obtaining history from patient or surrogate, ordering and performing treatments and interventions, ordering and review of laboratory studies, ordering and review of radiographic studies, pulse oximetry, re-evaluation of patient's condition, review of old charts and discussions with consultants.        Labs Reviewed   CBC W/ AUTO DIFFERENTIAL - Abnormal; Notable for the following components:       Result Value    Lymph # 0.5 (*)     Gran % 85.5 (*)     Lymph % 7.9 (*)     All other components within normal limits   PROTIME-INR   COMPREHENSIVE METABOLIC PANEL   TSH   LIPID PANEL   POCT GLUCOSE, HAND-HELD DEVICE   ISTAT CREATININE     EKG Readings: (Independently Interpreted)   Initial Reading: No STEMI. Rhythm: Normal Sinus Rhythm. Heart Rate: 83. Ectopy: No Ectopy. Axis: Right Axis Deviation. Clinical Impression: Normal Sinus Rhythm       Imaging Results              CTA Head and Neck (xpd) (In process)                      CT Head Without Contrast (Final result)  Result time 03/07/24 13:09:06      Final result by John Paul Gutierrez MD (03/07/24 13:09:06)                   Impression:      No definite acute intracranial findings by noncontrast CT.    MRI could be performed for more sensitive and specific assessment.      Electronically signed by: John Paul Gutierrez  Date:    03/07/2024  Time:    13:09               Narrative:    EXAMINATION:  CT HEAD WITHOUT CONTRAST    CLINICAL HISTORY:  Neuro deficit, acute, stroke suspected;    TECHNIQUE:  Low dose axial images were obtained through the head.  Coronal and sagittal  reformations were also performed. Contrast was not administered.    COMPARISON:  MRI of the brain performed 02/28/2024.    FINDINGS:  Blood: No acute intracranial hemorrhage.    Parenchyma: No definite loss of gray-white differentiation to suggest acute or subacute transcortical infarct. Generalized pattern of age-related parenchymal volume loss.  Nonspecific areas of white matter hypoattenuation may reflect sequela of chronic small vessel ischemic disease.    Ventricles/Extra-axial spaces: No abnormal extra-axial fluid collection. Basal cisterns are patent.    Vessels: Mild atherosclerotic calcifications.    Orbits: Status post bilateral lens replacements.    Scalp: Unremarkable.    Skull: There are no depressed skull fractures or destructive bone lesions.    Sinuses and mastoids: Essentially clear.    Other findings: None                                       Medications   aspirin suppository 300 mg (300 mg Rectal Given 3/7/24 1347)   iohexoL (OMNIPAQUE 350) injection 100 mL (100 mLs Intravenous Given 3/7/24 1334)     Medical Decision Making  79-year-old female past medical history as above presents after she was found altered with a last known normal 6:30 a.m..  Differential includes but isn't limited to CVA, intracranial hemorrhage, electrolyte abnormality, hypoglycemia.  Patient stroke alerted upon arrival.  CT head without acute pathology.  CTA notable for a L M2 occlusion.  Discussed with Dr. Thakur, neurology, patient to be transferred ED to ED for further management and workup, thrombectomy candidate. Outside of window for TNK. NIH of 10. Given rectal ASA. Cannot give oral medications/plavix at this time. HOB kept flat.     Amount and/or Complexity of Data Reviewed  Independent Historian:      Details:  at bedside   External Data Reviewed: notes.     Details: Seen outpatient by neurology for trigeminal neuralgia   Labs: ordered. Decision-making details documented in ED Course.  Radiology: ordered and  independent interpretation performed.  ECG/medicine tests: ordered and independent interpretation performed.    Risk  OTC drugs.  Prescription drug management.  Decision regarding hospitalization.               ED Course as of 03/07/24 1432   Thu Mar 07, 2024   1244 Attempted to evaluate patient after she was stroke alerted by triage, she had already been taken to CT [AT]   1302  reports he last saw pt at 6:30 AM. Returned at noon and found her down on the ground.  [AT]   1310 Spoke with Dr. Thakur on telestroke consult. Will obtain CTA.  [AT]   1310 CTH Impression:   No definite acute intracranial findings by noncontrast CT.   [AT]   1338 Spoke with Dr. Thakur. She reports that on her review of CTA pt has a left M2 occlusion on CTA. Needs stat ED to ED transfer for consideration of thrombectomy.  [AT]   1359 WBC: 6.62  Normal  [AT]   1359 INR: 1.0  Normal  [AT]   1404 Creatinine: 0.8  Normal  [AT]   1431 CTA Impression:   No CT evidence of acute infarction.  Note that MRI has greater sensitivity to detect small acute infarction and could be done if this is clinically warranted.     No large vessel occlusion in the intracranial circulation.     No hemodynamically significant stenosis of the neck vessels.   [AT]      ED Course User Index  [AT] Ashley Rivera MD                           Clinical Impression:  Final diagnoses:  [R29.818] Acute focal neurological deficit          ED Disposition Condition    Transfer to Another Facility Stable                Ashley Rivera MD  03/07/24 [id number]

## 2024-03-07 NOTE — ED NOTES
Pt transferred to [facility] and left ER in stable condition. Head of bed remained flat. Pt's  took belongings with him.

## 2024-03-07 NOTE — HPI
Prema Phillips is a 79 y.o. woman with past medical history of hypertension, hyperlipidemia, bilateral carotid stenosis, and RBBB who presents as an acute stroke transfer after initial presentation to OSH with acute onset aphasia and diagnostics consistent with left M2 occlusion. Patient transported to IR suite shortly after [place] [facility]

## 2024-03-07 NOTE — ED NOTES
Patient straight to IR on EMS stretcher.  No assessments able to be completed.  Assessed and cleared by Dr. White on admission.

## 2024-03-07 NOTE — ANESTHESIA PREPROCEDURE EVALUATION
[facility]  Anesthesia Pre-Operative Evaluation         Patient Name/: Prema Phillips, 1945  MRN: [id number]    SUBJECTIVE:     Pre-operative evaluation for * No procedures listed *     2024    Prema Phillips is a 79 y.o. female     Patient now presents for the above procedure(s).    ________________________________________  No results found for this or any previous visit.    ________________________________________    LDA:        Drips:       Patient Active Problem List   Diagnosis    RBBB    Hyperlipidemia    Hypothyroid    Bilateral carotid artery stenosis    Insomnia    Osteopenia of the elderly    Allergic rhinitis    History of total hysterectomy with bilateral salpingo-oophorectomy (BSO)    BMI 22.0-22.9, adult    Macular degeneration, right eye    Long-term use of aspirin therapy    Family history of breast cancer in mother    Ear ringing    Migraine headache    Gastritis    Adjustment reaction with anxiety    Stress due to family tension    Prediabetes    Atrophic vaginitis    Diffuse cystic mastopathy of both breasts    Impaired gait and mobility    Decreased range of motion with decreased strength       Review of patient's allergies indicates:   Allergen Reactions    Neomycin Other (See Comments) and Swelling     Other reaction(s): Unknown       Current Inpatient Medications:       No current facility-administered medications on file prior to encounter.     Current Outpatient Medications on File Prior to Encounter   Medication Sig Dispense Refill    amitriptyline (ELAVIL) 25 MG tablet TAKE 1/2-1 TABLET BY MOUTH NIGHTLY FOR INSOMNIA. OK TO TAKE WITH THE LOW DOSE OF ATIVAN 90 tablet 4    aspirin (ECOTRIN) 81 MG EC tablet Take 81 mg by mouth once daily.      atorvastatin (LIPITOR) 20 MG tablet Take 1 tablet (20 mg total) by mouth once daily. 90 tablet 3    azelastine (ASTELIN) 137 mcg (0.1 %) nasal spray 1 spray (137 mcg total) by Nasal route 2 (two) times daily. 30 mL 11     calcium-vitamin D3-vitamin K 500-100-40 mg-unit-mcg Chew Take by mouth once daily.      carBAMazepine (TEGRETOL XR) 100 MG 12 hr tablet Take 1-2 tablets (100-200 mg total) by mouth 2 (two) times daily. 120 tablet 5    cholecalciferol, vitamin D3, (VITAMIN D3) 50 mcg (2,000 unit) Tab once daily.       coenzyme Q10 200 mg capsule Take 200 mg by mouth once daily.      EScitalopram oxalate (LEXAPRO) 20 MG tablet Take 1 tablet (20 mg total) by mouth once daily. 90 tablet 3    fluticasone propionate (FLONASE) 50 mcg/actuation nasal spray SPRAY TWICE IN EACH NOSTRIL ONCE DAILY 16 mL 11    ibuprofen (ADVIL,MOTRIN) 800 MG tablet Take 1 tablet (800 mg total) by mouth 2 (two) times daily as needed for Pain. 60 tablet 3    levocetirizine (XYZAL) 5 MG tablet Take 1 tablet (5 mg total) by mouth every evening. 30 tablet 11    levothyroxine (SYNTHROID) 75 MCG tablet TAKE 1 TABLET DAILY BEFORE BREAKFAST 90 tablet 3    LORazepam (ATIVAN) 0.5 MG tablet Take 1-2 tablets by mouth daily as needed for increased anxiety/panic attack. 60 tablet 1    multivitamin capsule Take 1 capsule by mouth once daily.      omeprazole (PRILOSEC) 20 MG capsule Take 1 capsule (20 mg total) by mouth every morning. 90 capsule 3    ondansetron (ZOFRAN-ODT) 4 MG TbDL Take 1 tablet (4 mg total) by mouth every 8 (eight) hours as needed (nausea). 30 tablet 0    triamcinolone acetonide 0.025% (KENALOG) 0.025 % Oint Apply topically 2 (two) times daily. 15 g 2    TURMERIC, BULK, MISC by Misc.(Non-Drug; Combo Route) route once daily.      VIT A/VIT C/VIT E/ZINC/COPPER (PRESERVISION AREDS ORAL) Take by mouth once daily.          Past Surgical History:   Procedure Laterality Date    APPENDECTOMY      Catatact surgery Right 06/19/2019    without any complications    COLONOSCOPY  08/27/2019    repeat in 5 years    EYE SURGERY Bilateral     cataracts extraction    HYSTERECTOMY      STAPEDES SURGERY  2006    TOTAL ABDOMINAL HYSTERECTOMY W/ BILATERAL SALPINGOOPHORECTOMY       TUBAL LIGATION         Social History:  Tobacco Use: Low Risk  (2/21/2024)    Patient History     Smoking Tobacco Use: Never     Smokeless Tobacco Use: Never     Passive Exposure: Not on file       Alcohol Use: Not At Risk (2/19/2024)    AUDIT-C     Frequency of Alcohol Consumption: Monthly or less     Average Number of Drinks: Patient does not drink     Frequency of Binge Drinking: Never       OBJECTIVE:     Vital Signs Range:  BMI Readings from Last 1 Encounters:   03/07/24 21.78 kg/m²       Temp:  [36.7 °C (98 °F)-36.8 °C (98.2 °F)]   Pulse:  [79-82]   Resp:  [16-20]   BP: (134-151)/(62-67)   SpO2:  [95 %-100 %]        Significant Labs:        Component Value Date/Time    WBC 6.62 03/07/2024 1303    HGB 13.0 03/07/2024 1303    HCT 38.2 03/07/2024 1303     03/07/2024 1303     (L) 03/07/2024 1303    K 4.3 03/07/2024 1303     03/07/2024 1303    CO2 23 03/07/2024 1303     03/07/2024 1303    BUN 13 03/07/2024 1303    CREATININE 0.8 03/07/2024 1303    MG 2.1 02/21/2024 0856    CALCIUM 9.2 03/07/2024 1303    ALBUMIN 3.8 03/07/2024 1303    PROT 7.2 03/07/2024 1303    ALKPHOS 76 03/07/2024 1303    BILITOT 0.3 03/07/2024 1303    AST 23 03/07/2024 1303    ALT 16 03/07/2024 1303    INR 1.0 03/07/2024 1303    HGBA1C 5.7 (H) 02/21/2024 0856        Please see Results Review for additional labs.     Diagnostic Studies: No relevant studies.    EKG:   Results for orders placed or performed during the hospital encounter of 03/07/24   ECG 12 lead    Collection Time: 03/07/24  1:47 PM   Result Value Ref Range    QRS Duration 116 ms    OHS QTC Calculation 472 ms    Narrative    Test Reason : R29.818,    Vent. Rate : 083 BPM     Atrial Rate : 083 BPM     P-R Int : 162 ms          QRS Dur : 116 ms      QT Int : 402 ms       P-R-T Axes : 084 095 058 degrees     QTc Int : 472 ms    Normal sinus rhythm  Rightward axis  Low voltage QRS  Incomplete right bundle branch block  Borderline Abnormal ECG  When compared  with ECG of 08-MAR-2022 10:16,  DC interval has increased    Referred By: [email or web address]   SELF           Confirmed By:        ECHO:  See subjective, if available.   1 - Normal left ventricular function (EF 60%).     2 - Normal diastolic function.     3 - Mild tricuspid regurgitation.     4 - No evidence of intracardiac shunt.     ASSESSMENT/PLAN:                                                                                                                  03/07/2024  Prema Phillips is a 79 y.o., female.      Pre-op Assessment          Review of Systems  Hematology/Oncology:                   Hematology Comments: Long-term use of aspirin therapy                    Cardiovascular:         Dysrhythmias (RBBB)      PVD hyperlipidemia    Bilateral carotid artery stenosis                         Neurological:   CVA   Headaches     Impaired gait and mobility  Aphasia                                   Anesthesia Plan  Type of Anesthesia, risks & benefits discussed:    Anesthesia Type: Gen ETT  Intra-op Monitoring Plan: Standard ASA Monitors  Induction:  IV  ASA Score: 4  Day of Surgery Review of History & Physical: H&P Update referred to the surgeon/provider.    Ready For Surgery From Anesthesia Perspective.     .

## 2024-03-07 NOTE — PROVIDER PROGRESS NOTES - EMERGENCY DEPT.
"Encounter Date: 3/7/2024    ED Physician PROVIDER Note        ED STAFF ATTENDING PHYSICIAN HAND-OFF NOTE:  3:26 PM 3/7/2024  Prema Phillips is a 79 y.o. female who presented to the ED on 3/7/2024 via transfer from [place] for emergent neurovascular intervention.  On ED evaluation, patient is awake with obvious global aphasia.  Patient unable to expand on history.    On my evaluation, Prema Phillips appears well, hemodynamically stable and in NAD. Thus far, Prema Phillips has received:  Medications - No data to display    On my exam, I appreciate:  /67   Pulse 81   Temp 98 °F (36.7 °C) (Oral)   Resp 16   Ht 5' 6" (1.676 m)   Wt 61.2 kg (134 lb 14.7 oz)   SpO2 95%   BMI 21.78 kg/m²   Constitutional:  Normal cardiac, normotensive and euglycemic.  Well-Nourished; Non-Toxic-appearing; no appreciated drooling stridor or airway instability.  Head/Face: AT/NC & No Facial asymmetry.  Oropharynx: No drooling or slurring of speech.  Cardiovascular: Reg Rate; Reg Rhythm; No Murmurs.  Pulmonary/Chest: AT Thorax with Lungs CTA B/L with no wheezing, rales or rhonchi.  No tachypnea or appreciated work of breathing.  Abdominal: Soft, ND, NT.  Neuro/Psych: Calm; awake; eyes open; not following commands.         Disposition:  Admission for intra vascular treatment by neurovascular team  ______________________  Jorje Wihte MD, FAAEM  Emergency Medicine Staff  3:26 PM 3/7/2024        "

## 2024-03-07 NOTE — ED NOTES
Pt arrived from home via EMS. Pt was last seen this morning by her  at 0630. He returned home around 12 and found her roaming around the house and non verbal. Pt has an abrasion to the right hand and a bruise to the right forehead. Pt appears to be weaker on the right side but she is not following all commands at this time. Pt continues to be non verbal but is able to smile when spoken to. Pt went immediately to CT on arrival to ER.

## 2024-03-07 NOTE — H&P
Interventional Neuroradiology Pre-procedure Note    Procedure: Diagnostic cerebral angiogram and VENICE    History of Present Illness:  Prema Phillips is a 79 y.o. female who is transferred to [facility] [address] presents for HLD, HTN, b/l Carotid stenosis, RBBB who presents w/ reported aphasia. LKW is reported at 6.30 AM. No TNK. CT demostrates questionable left M2 occlusion.       Scheduled Meds:   Current Meds: No current facility-administered medications for this encounter.    Current Outpatient Medications:     amitriptyline (ELAVIL) 25 MG tablet, TAKE 1/2-1 TABLET BY MOUTH NIGHTLY FOR INSOMNIA. OK TO TAKE WITH THE LOW DOSE OF ATIVAN, Disp: 90 tablet, Rfl: 4    aspirin (ECOTRIN) 81 MG EC tablet, Take 81 mg by mouth once daily., Disp: , Rfl:     atorvastatin (LIPITOR) 20 MG tablet, Take 1 tablet (20 mg total) by mouth once daily., Disp: 90 tablet, Rfl: 3    azelastine (ASTELIN) 137 mcg (0.1 %) nasal spray, 1 spray (137 mcg total) by Nasal route 2 (two) times daily., Disp: 30 mL, Rfl: 11    calcium-vitamin D3-vitamin K 500-100-40 mg-unit-mcg Chew, Take by mouth once daily., Disp: , Rfl:     carBAMazepine (TEGRETOL XR) 100 MG 12 hr tablet, Take 1-2 tablets (100-200 mg total) by mouth 2 (two) times daily., Disp: 120 tablet, Rfl: 5    cholecalciferol, vitamin D3, (VITAMIN D3) 50 mcg (2,000 unit) Tab, once daily. , Disp: , Rfl:     coenzyme Q10 200 mg capsule, Take 200 mg by mouth once daily., Disp: , Rfl:     EScitalopram oxalate (LEXAPRO) 20 MG tablet, Take 1 tablet (20 mg total) by mouth once daily., Disp: 90 tablet, Rfl: 3    fluticasone propionate (FLONASE) 50 mcg/actuation nasal spray, SPRAY TWICE IN EACH NOSTRIL ONCE DAILY, Disp: 16 mL, Rfl: 11    ibuprofen (ADVIL,MOTRIN) 800 MG tablet, Take 1 tablet (800 mg total) by mouth 2 (two) times daily as needed for Pain., Disp: 60 tablet, Rfl: 3    levocetirizine (XYZAL) 5 MG tablet, Take 1 tablet (5 mg total) by mouth every evening., Disp: 30 tablet, Rfl: 11    levothyroxine  "(SYNTHROID) 75 MCG tablet, TAKE 1 TABLET DAILY BEFORE BREAKFAST, Disp: 90 tablet, Rfl: 3    LORazepam (ATIVAN) 0.5 MG tablet, Take 1-2 tablets by mouth daily as needed for increased anxiety/panic attack., Disp: 60 tablet, Rfl: 1    multivitamin capsule, Take 1 capsule by mouth once daily., Disp: , Rfl:     omeprazole (PRILOSEC) 20 MG capsule, Take 1 capsule (20 mg total) by mouth every morning., Disp: 90 capsule, Rfl: 3    ondansetron (ZOFRAN-ODT) 4 MG TbDL, Take 1 tablet (4 mg total) by mouth every 8 (eight) hours as needed (nausea)., Disp: 30 tablet, Rfl: 0    triamcinolone acetonide 0.025% (KENALOG) 0.025 % Oint, Apply topically 2 (two) times daily., Disp: 15 g, Rfl: 2    TURMERIC, BULK, MISC, by Misc.(Non-Drug; Combo Route) route once daily., Disp: , Rfl:     VIT A/VIT C/VIT E/ZINC/COPPER (PRESERVISION AREDS ORAL), Take by mouth once daily. , Disp: , Rfl:    Continuous Infusions:   PRN Meds:    Allergies:   Review of patient's allergies indicates:   Allergen Reactions    Neomycin Other (See Comments) and Swelling     Other reaction(s): Unknown     Sedation Hx: No adverse events.    Labs:  PT/INR/PTT:  10.9/1.0/-- (03/07 1303)  WBC/Hgb/Hct/Plts:  6.62/13.0/38.2/182 (03/07 1303)  BUN/Cr/glu/ALT/AST/amyl/lip:  13/0.8/--/16/23/--/-- (03/07 1303)     Objective:  Vitals: Blood pressure 134/67, pulse 81, temperature 98 °F (36.7 °C), temperature source Oral, resp. rate 16, height 5' 6" (1.676 m), weight 61.2 kg (134 lb 14.7 oz), SpO2 95 %.     Physical Exam:  General: well developed, well nourished, no distress.   Head: normocephalic, atraumatic  Neck: No tracheal deviation. No palpable masses. Full ROM.   Neurologic: Alert but mute   GCS: E4 V5 M6; Total: 15  Language: Global aphasia  Cranial nerves: face symmetric, tongue midline, CN II-XII grossly intact.   Eyes: pupils equal, round, reactive to light with accomodation, EOMI.   Pulmonary: normal respirations, no signs of respiratory distress  Abdomen: soft, " non-distended, not tender to palpation  Vascular: Pulses 2+ and symmetric radial and dorsalis pedis. No LE edema.   Skin: Skin is warm, dry and intact.  Sensory: Withdraws in all four extremities  Motor Strength: RUE 3/5, RLE 4/5, LUE and LLE 5/5     ASA: 3  MAL: 2    Plan:  -Plan for cerebral angiogram with possible VENICE  -Sedation Plan: General anesthesia  -All diagnostics and imaging reviewed  -Risks & benefits of procedure explained in detail; patient consented and all questions answered  -Further reccs to follow procedure          Ike Peterson MD, MHA  Fellow, NeuroEndovascular Surgery, [facility]  Neurologist, [facility] [place], [place]

## 2024-03-07 NOTE — ASSESSMENT & PLAN NOTE
79 year old woman with history of hyperlipidemia, hypertension, bilateral carotid stenosis, and RBBB presenting as an acute stroke transfer for IR intervention after initial OSH presentation with aphasia and diagnostics consistent with L M2 occlusion. NIHSS 10 on arrival. Patient urgently transported to IR suite. Plan for Neurocritical Care admission thereafter.     Antithrombotics for secondary stroke prevention:  aspirin 325 mg & clopidogrel 300 mg x 1 at OSH  - daily aspirin 81 mg /  clopidogrel 75 mg x 30 days followed by monotherapy thereafter     Statins for secondary stroke prevention and hyperlipidemia, if present:   Statins: Atorvastatin- 40 mg daily    Aggressive risk factor modification: HTN, HLD, Diet, Exercise, CAD     Rehab efforts: The patient has been evaluated by a stroke team provider and the therapy needs have been fully considered based off the presenting complaints and exam findings. The following therapy evaluations are needed: PT evaluate and treat, OT evaluate and treat, SLP evaluate and treat, PM&R evaluate for appropriate placement    Diagnostics ordered/pending: MRI head without contrast to assess brain parenchyma, TTE to assess cardiac function/status     VTE prophylaxis: Heparin 5000 units SQ every 8 hours    BP parameters: Infarct: Post sucessful thrombectomy, SBP <140  Post unsucessful thrombectomy, SBP <220

## 2024-03-07 NOTE — TELEMEDICINE CONSULT
[facility]  Vascular Neurology  Comprehensive Stroke Center  TeleVascular Neurology Acute Consultation Note        Consult Information  Consult to Telemedicine - Acute Stroke  Consult performed by: Chanel Thakur MD  Consult ordered by: Antony Ferguson MD          Consulting Provider: ANTONY FERGUSON   Current Providers  No providers found    Patient Location:  [facility] EMERGENCY DEPARTMENT Emergency Department    Spoke hospital nurse at bedside with patient assisting consultant.  Patient information was obtained from spouse/SO, past medical records, and ER records.       Stroke Documentation  Acute Stroke Times   Last Known Normal Date: 03/07/24  Last Known Normal Time: 0630  Symptom Onset Date: 03/07/24  Unknown Symptom Onset Time: Unknown Time  Stroke Team Called Date: 03/07/24  Stroke Team Called Time: 1259  Stroke Team Arrival Date: 03/07/24  Stroke Team Arrival Time: 1309  CT Interpretation Time: 1309 (Hyperdense L M2)  CTA Interpretation Time: 1330  Thrombectomy Recommended: Yes  Decision to Treat Time for IR: 1330    NIH Scale:  1a. Level of Consciousness: 1-->Not alert, but arousable by minor stimulation to obey, answer, or respond  1b. LOC Questions: 2-->Answers neither question correctly  1c. LOC Commands: 2-->Performs neither task correctly  2. Best Gaze: 0-->Normal  3. Visual: 0-->No visual loss  4. Facial Palsy: 0-->Normal symmetrical movements  5a. Motor Arm, Left: 0-->No drift, limb holds 90 (or 45) degrees for full 10 secs  5b. Motor Arm, Right: 2-->Some effort against gravity, limb cannot get to or maintain (if cued) 90 (or 45) degrees, drifts down to bed, but has some effort against gravity  6a. Motor Leg, Left: 0-->No drift, leg holds 30 degree position for full 5 secs  6b. Motor Leg, Right: 0-->No drift, leg holds 30 degree position for full 5 secs  7. Limb Ataxia: 0-->Absent  8. Sensory: 0-->Normal, no sensory loss  9. Best Language: 3-->Mute, global aphasia, no  usable speech or auditory comprehension  10. Dysarthria: 0-->Normal  11. Extinction and Inattention (formerly Neglect): 0-->No abnormality  Total (NIH Stroke Scale): 10      Modified [place]: Score: 0  [place] Coma Scale:     ABCD2 Score:    [id number] Score:    HAS -BLED Score:    ICH Score:    Hunt & Goodrich Classification:      Blood pressure (!) 141/62, pulse 82, resp. rate 20, SpO2 98 %.    Van Positive    Medical Decision Making  HPI:  79 y.o. female w/ PMHx of HLD, HTN, b/l Carotid stenosis, RBBB who presents w/ reported aphasia.   LKW 06:30 AM   found patient as above at 12:30 after coming home.      Images personally reviewed and interpreted:  Study: Head CT and CTA Head & Neck  Study Interpretation: Hyperdense L M2 in the sylvian fissure.   CTA w/ L M2 occlusion.     Assessment and plan:  79 y.o. female w/ PMHx of HLD, HTN, b/l Carotid stenosis, RBBB who presents w/ reported aphasia.   NIHSS 10.   OOW for TNK.   L M2 occlusion on CTA H/N.   STAT transfer to [facility] for IR    Recommendations:  MRI brain to evaluate for presence and/or extent of ischemic injury  Allow for permissive HTNsion up to 220/110 until AIS is r/o w/ imaging   Lipid profile, A1c, TSH  ECHO w/o bubble study  PT/OT/SLP eval and Rx  IVF to allow for maximum cerebral perfusion  HOB flat   Load aspirin 325 mg & clopidogrel 300 mg x 1 now, followed by daily aspirin 81 mg /  clopidogrel 75 mg x 30 days followed by monotherapy thereafter or RECTAL  mg if not tolerating PO  High intensity statin for LDL goal <70 long term     Recommendations discussed w/ Dr. Rivera.       Lytics recommendation: Thrombolytic therapy not recommended due to Outside of treatment window   Thrombectomy recommendation: Yes  Placement recommendation: pending further studies  transfer to [facility] by  ground  stat             Globally aphasic  Doesn't follow commands  Non verbal   RLE drift and falls to bed   Past Medical History:   Diagnosis Date     Allergic rhinitis 5/31/2016    Carotid artery plaque 11/18/2013    History of total hysterectomy with bilateral salpingo-oophorectomy (BSO) 5/31/2016    Hyperlipidemia 7/17/2012    Hypothyroid     Insomnia     Macular degeneration, right eye 5/31/2016    Migraine headache     Osteopenia of the elderly 10/20/2015    Postmenopausal HRT (hormone replacement therapy)     RBBB 7/17/2012     Past Surgical History:   Procedure Laterality Date    APPENDECTOMY      Catatact surgery Right 06/19/2019    without any complications    COLONOSCOPY  08/27/2019    repeat in 5 years    EYE SURGERY Bilateral     cataracts extraction    HYSTERECTOMY      STAPEDES SURGERY  2006    TOTAL ABDOMINAL HYSTERECTOMY W/ BILATERAL SALPINGOOPHORECTOMY      TUBAL LIGATION       Family History   Problem Relation Age of Onset    Heart disease Father     COPD Father     Heart attack Father     Alzheimer's disease Mother     Cancer Sister         breast cancer    Breast cancer Sister     Alzheimer's disease Sister     Other Brother         TIA    Hyperlipidemia Brother     Rheumatic fever Brother         as a child    Nephrolithiasis Daughter     Depression Son     Post-traumatic stress disorder Son     Cancer Maternal Aunt         breast cancer    Cancer Maternal Grandmother         breast cancer    Cancer Cousin         colon cancer       Diagnoses  No problems updated.    Chanel Thakur MD      Emergent/Acute neurological consultation requested by spoke provider due to critical concerns for possible cerebrovascular event that could result in permanent loss of neurologic/bodily function, severe disability or death of this patient.  Immediate/timely evaluation by a highly prepared expert is paramount for optimal outcomes  High risk for neurological deterioration if not properly diagnosed  High risk for neurological deterioration if not treated promplty/as soon as possible  Complex diagnostic evaluation may be required (advanced imaging)  High risk  treatment options (thrombolytics and/or thrombectomy)    Patient care was coordinated with spoke provider, including but not limted to    Discussing likely diagnosis/etiology of symptoms  Making recommendations for further diagnostic studies  Making recommendations for intravenous thrombolytics or other advanced therapies  Making recommendations for disposition (admission/transfer for higher level of care)

## 2024-03-07 NOTE — PLAN OF CARE
Pt arrived to IR room 4(200)  for thrombectomy. Pt oriented to unit and staff. Plan of care reviewed with patient, patient verbalizes understanding. Comfort measures utilized. Pt safely transferred from stretcher to procedural table. Fall risk reviewed with patient, fall risk interventions maintained. Positioner pillows utilized to minimize pressure points. Blankets applied. Pt prepped and draped utilizing standard sterile technique. Timeouts completed utilizing standard universal time-out, per department and facility policy.  Anesthesia at bedside; Refer to anesthesia record regarding sedation and vital signs.

## 2024-03-07 NOTE — CONSULTS
[facility] - Emergency Dept  Vascular Neurology  Comprehensive Stroke Center  Consult Note    Consults  Assessment/Plan:     Patient is a 79 y.o. year old female with:    * Acute ischemic left MCA stroke  79 year old woman with history of hyperlipidemia, hypertension, bilateral carotid stenosis, and RBBB presenting as an acute stroke transfer for IR intervention after initial OSH presentation with aphasia and diagnostics consistent with L M2 occlusion. NIHSS 10 on arrival. Patient urgently transported to IR suite. Plan for Neurocritical Care admission thereafter.     Antithrombotics for secondary stroke prevention:  aspirin 325 mg & clopidogrel 300 mg x 1 at OSH  - daily aspirin 81 mg /  clopidogrel 75 mg x 30 days followed by monotherapy thereafter     Statins for secondary stroke prevention and hyperlipidemia, if present:   Statins: Atorvastatin- 40 mg daily    Aggressive risk factor modification: HTN, HLD, Diet, Exercise, CAD     Rehab efforts: The patient has been evaluated by a stroke team provider and the therapy needs have been fully considered based off the presenting complaints and exam findings. The following therapy evaluations are needed: PT evaluate and treat, OT evaluate and treat, SLP evaluate and treat, PM&R evaluate for appropriate placement    Diagnostics ordered/pending: MRI head without contrast to assess brain parenchyma, TTE to assess cardiac function/status     VTE prophylaxis: Heparin 5000 units SQ every 8 hours    BP parameters: Infarct: Post sucessful thrombectomy, SBP <140  Post unsucessful thrombectomy, SBP <220        Prediabetes  - stroke risk factor    Bilateral carotid artery stenosis  History of.    Hyperlipidemia  - stroke risk factor  - LDL goal < 70  - high intensity statin        STROKE DOCUMENTATION     Acute Stroke Times   Last Known Normal Date: 03/07/24  Last Known Normal Time: 0630  Symptom Onset Date: 03/07/24  Unknown Symptom Onset Time: Unknown Time  Stroke Team Called  Date: 03/07/24  Stroke Team Called Time: 1525  Stroke Team Arrival Date: 03/07/24  Stroke Team Arrival Time: 1529  Thrombolytic Therapy Recommended: No  Thrombectomy Recommended: Yes  Decision to Treat Time for IR:  (1330 at OSH; 15:30 after re-evaluation on arrival to [facility])    NIH Scale:  1a. Level of Consciousness: 0-->Alert, keenly responsive  1b. LOC Questions: 2-->Answers neither question correctly  1c. LOC Commands: 2-->Performs neither task correctly  2. Best Gaze: 0-->Normal  3. Visual: 0-->No visual loss  4. Facial Palsy: 0-->Normal symmetrical movements  5a. Motor Arm, Left: 0-->No drift, limb holds 90 (or 45) degrees for full 10 secs  5b. Motor Arm, Right: 1-->Drift, limb holds 90 (or 45) degrees, but drifts down before full 10 secs, does not hit bed or other support  6a. Motor Leg, Left: 0-->No drift, leg holds 30 degree position for full 5 secs  6b. Motor Leg, Right: 0-->No drift, leg holds 30 degree position for full 5 secs  7. Limb Ataxia: 0-->Absent  8. Sensory: 0-->Normal, no sensory loss  9. Best Language: 3-->Mute, global aphasia, no usable speech or auditory comprehension  10. Dysarthria: 2-->Severe dysarthria, patients speech is so slurred as to be unintelligible in the absence of or out of proportion to any dysphasia, or is mute/anarthric  11. Extinction and Inattention (formerly Neglect): 0-->No abnormality  Total (NIH Stroke Scale): 10    Modified [place] Score: 0  [place] Coma Scale:11   ABCD2 Score:    [id number] Score:   HAS -BLED Score:   ICH Score:   Hunt & Goodrich Classification:       Thrombolysis Candidate? No, Out of window - Symptom onset > 4.5 hours    Delays to Thrombolysis?  Not Applicable    Interventional Revascularization Candidate?   Is the patient eligible for mechanical endovascular reperfusion (VENICE)?  Yes    Delays to Thrombectomy? Not Applicable    Hemorrhagic change of an Ischemic Stroke: Does this patient have an ischemic stroke with hemorrhagic changes? No      Subjective:     History of Present Illness:  Prema Phillips is a 79 y.o. woman with past medical history of hypertension, hyperlipidemia, bilateral carotid stenosis, and RBBB who presents as an acute stroke transfer after initial presentation to OSH with acute onset aphasia and diagnostics consistent with left M2 occlusion. Patient transported to IR suite shortly after arrival to [place] ED.           Past Medical History:   Diagnosis Date    Allergic rhinitis 5/31/2016    Carotid artery plaque 11/18/2013    History of total hysterectomy with bilateral salpingo-oophorectomy (BSO) 5/31/2016    Hyperlipidemia 7/17/2012    Hypothyroid     Insomnia     Macular degeneration, right eye 5/31/2016    Migraine headache     Osteopenia of the elderly 10/20/2015    Postmenopausal HRT (hormone replacement therapy)     RBBB 7/17/2012     Past Surgical History:   Procedure Laterality Date    APPENDECTOMY      Catatact surgery Right 06/19/2019    without any complications    COLONOSCOPY  08/27/2019    repeat in 5 years    EYE SURGERY Bilateral     cataracts extraction    HYSTERECTOMY      STAPEDES SURGERY  2006    TOTAL ABDOMINAL HYSTERECTOMY W/ BILATERAL SALPINGOOPHORECTOMY      TUBAL LIGATION       Social History     Tobacco Use    Smoking status: Never    Smokeless tobacco: Never   Substance Use Topics    Alcohol use: Yes     Comment: occasionally    Drug use: No     Review of patient's allergies indicates:   Allergen Reactions    Neomycin Other (See Comments) and Swelling     Other reaction(s): Unknown       Medications: I have reviewed the current medication administration record.    (Not in a hospital admission)      Review of Systems   Unable to perform ROS: Acuity of condition     Objective:     Vital Signs (Most Recent):  Temp: 98 °F (36.7 °C) (03/07/24 1518)  Pulse: 81 (03/07/24 1518)  Resp: 16 (03/07/24 1518)  BP: 134/67 (03/07/24 1518)  SpO2: 95 % (03/07/24 1518)    Vital Signs Range (Last 24H):  Temp:  [98 °F (36.7  "°C)-98.2 °F (36.8 °C)]   Pulse:  [79-82]   Resp:  [16-20]   BP: (134-151)/(62-67)   SpO2:  [95 %-100 %]        Physical Exam  Vitals reviewed.   Constitutional:       General: She is not in acute distress.     Appearance: She is not toxic-appearing or diaphoretic.   Pulmonary:      Effort: Pulmonary effort is normal. No respiratory distress.   Neurological:      Mental Status: She is alert.              Neurological Exam:   LOC: alert  Attention Span: poor  Language: Global aphasia  Articulation: Untestable due to severe aphasia   Orientation: Untestable due to severe aphasia   Visual Fields: Blinks to threat bilaterally  EOM (CN III, IV, VI): Follows provider during exam  Pupils (CN II, III): PERRL  Facial Sensation (CN V): Normal  Facial Movement (CN VII): Unable to test due to aphasia; symmetric grimace  Motor: 4 extremities anti-gravity; mild right upper extremity drift  Sensation: withdraws to painful stimuli in all 4 extremities      Laboratory:  CMP:   Recent Labs   Lab 03/07/24  1303   CALCIUM 9.2   ALBUMIN 3.8   PROT 7.2   *   K 4.3   CO2 23      BUN 13   CREATININE 0.8   ALKPHOS 76   ALT 16   AST 23   BILITOT 0.3     CBC:   Recent Labs   Lab 03/07/24  1303   WBC 6.62   RBC 4.22   HGB 13.0   HCT 38.2      MCV 91   MCH 30.8   MCHC 34.0     Lipid Panel:   Recent Labs   Lab 03/07/24  1303   CHOL 181   LDLCALC 110.6   HDL 59   TRIG 57     Coagulation:   Recent Labs   Lab 03/07/24  1303   INR 1.0     Hgb A1C: No results for input(s): "HGBA1C" in the last 168 hours.  TSH:   Recent Labs   Lab 03/07/24  1303   TSH 0.481       Diagnostic Results:      Brain/Vessel imaging:  CTA Head and Neck (xpd) 03/07/2024    Narrative  EXAMINATION:  CTA HEAD AND NECK (XPD)    CLINICAL HISTORY:  Neuro deficit, acute, stroke suspected;    TECHNIQUE:  Axial CT images obtained throughout the region of the head before and after the administration of intravenous contrast.  CT angiogram was performed through the " cervical and intracranial vasculature during the IV bolus administration of 100mL of Omnipaque 350.  Multiplanar MPR and MIP reformats were performed.    COMPARISON:  Head CT 03/07/2024, MRI 02/28/2024    FINDINGS:  Head CT without and with contrast:    The ventricles are normal in size without evidence of hydrocephalus.  Subtle area of hypoattenuation right cerebellar hemisphere consistent with small area of remote infarction, in retrospect unchanged from 02/28/2024 MRI examination.  No area of definite acute infarction seen.  No parenchymal mass, hemorrhage, edema or major vascular distribution infarct.  No extra-axial blood or fluid collections.    The cranium appears intact. Mastoid air cells and paranasal sinuses are essentially clear.      CTA NECK:  ARCH: Normal arch anatomy.  COMMON CAROTIDS: Normal.  EXTERNAL CAROTIDS: Normal.  INTERNAL CAROTIDS: Less than 50% stenosis right ICA.  Less than 50% stenosis left ICA  VERTEBRALS: Normal    CTA HEAD:  INTERNAL CAROTIDS: Normal.  ANTERIOR CEREBRALS: Normal.  ANTERIOR COMMUNICATING: Normal.  MIDDLE CEREBRALS: Normal.  POSTERIOR COMMUNICATING ARTERIES: Robust bilaterally.  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  VERTEBROBASILAR AND BRANCH VESSELS: Within normal limits.    The major venous sinuses are patent.    Impression  No CT evidence of acute infarction.  Note that MRI has greater sensitivity to detect small acute infarction and could be done if this is clinically warranted.    No large vessel occlusion in the intracranial circulation.    No hemodynamically significant stenosis of the neck vessels.      Electronically signed by: Linda Landers MD  Date:    03/07/2024  Time:    14:14      CT Head Without Contrast 03/07/2024    Narrative  EXAMINATION:  CT HEAD WITHOUT CONTRAST    CLINICAL HISTORY:  Neuro deficit, acute, stroke suspected;    TECHNIQUE:  Low dose axial images were obtained through the head.  Coronal  and sagittal reformations were also performed. Contrast was not administered.    COMPARISON:  MRI of the brain performed 02/28/2024.    FINDINGS:  Blood: No acute intracranial hemorrhage.    Parenchyma: No definite loss of gray-white differentiation to suggest acute or subacute transcortical infarct. Generalized pattern of age-related parenchymal volume loss.  Nonspecific areas of white matter hypoattenuation may reflect sequela of chronic small vessel ischemic disease.    Ventricles/Extra-axial spaces: No abnormal extra-axial fluid collection. Basal cisterns are patent.    Vessels: Mild atherosclerotic calcifications.    Orbits: Status post bilateral lens replacements.    Scalp: Unremarkable.    Skull: There are no depressed skull fractures or destructive bone lesions.    Sinuses and mastoids: Essentially clear.    Other findings: None    Impression  No definite acute intracranial findings by noncontrast CT.    MRI could be performed for more sensitive and specific assessment.      Electronically signed by: John Paul Gutierrez  Date:    03/07/2024  Time:    13:09      Cardiac Evaluation:   No recent studies available to review      Vick Ely MD  [facility]  Department of Vascular Neurology   [facility] - Emergency Dept

## 2024-03-07 NOTE — PLAN OF CARE
Thrombectomy procedure completed. Vascade closure device deployed in right femoral artery; hemostasis achieved at 1635. Patient to lay flat for 2 hours until 1825 time on 3/7 date per MD. Right groin site clean, dry, and intact; no bleeding or hematoma noted. Patient to be transferred to PACU for post-procedural recovery until ICU bed is ready per MD. Report to be given at bedside to RN.

## 2024-03-07 NOTE — PROCEDURES
Interventional Neuroradiology Post-Thrombectomy Note    Pre Op Diagnosis: Occlusion of left inferior M1 division    Post Op Diagnosis: Refer to the full angio report     Procedure: Cerebral angiogram and mechanical thrombectomy    Procedure performed by:  Vanessa Gong MD and Ike Peterson MD    Written Informed Consent Obtained: Yes    Specimen Removed: NO    Estimated Blood Loss: Minimal    Procedure report:     A 6F sheath was placed into the right femoral artery and an 088 Neuron Max was advanced over a 5F Francisco catheter through the aortic arch and into the affected vessel.  The left ICA  was subselected and angiography of the brain was performed after contrast injection into the vessel that revealed occlusion of the inferior division of the left M2 segment. Using 5F Cecilia, Velocity 2F microcatheter over Kennedy 18 microwire, aspiration was applied that was unsuccessful. Following this, Embotrap stent retriever (5mm X 37 mm) was used and combination of aspiration and stent retrieving was applied resulting in total restoration.  Please see Imaging report for full details.     A right femoral artery angiogram was performed, the sheath removed and hemostasis achieved using 6F Vascade.  No hematoma was present at the time of hemostasis.     The patient tolerated the procedure well.      Pre-VENICE TICI stgstrstastdstest:st st1st Post-VENICE TICI thgthrthathdtheth:th th4th Number of passes: 2    The patient tolerated the procedure well.      Plan:  -To [employer] for monitoring  -Goal SBP<140  -Bed rest for 2h  -Groin check and pulse check q2h                       Ike Peterson MD, A  Fellow, NeuroEndovascular Surgery, [facility]  Neurologist, [facility], [place]

## 2024-03-08 PROBLEM — I63.412 CEREBROVASCULAR ACCIDENT (CVA) DUE TO EMBOLISM OF LEFT MIDDLE CEREBRAL ARTERY: Status: ACTIVE | Noted: 2024-03-07

## 2024-03-08 LAB
ALBUMIN SERPL BCP-MCNC: 3.3 G/DL (ref 3.5–5.2)
ALP SERPL-CCNC: 71 U/L (ref 55–135)
ALT SERPL W/O P-5'-P-CCNC: 11 U/L (ref 10–44)
ANION GAP SERPL CALC-SCNC: 10 MMOL/L (ref 8–16)
ASCENDING AORTA: 2.89 CM
AST SERPL-CCNC: 18 U/L (ref 10–40)
AV INDEX (PROSTH): 0.9
AV MEAN GRADIENT: 5 MMHG
AV PEAK GRADIENT: 8 MMHG
AV VALVE AREA BY VELOCITY RATIO: 2.6 CM²
AV VALVE AREA: 2.61 CM²
AV VELOCITY RATIO: 0.9
BASOPHILS # BLD AUTO: 0.02 K/UL (ref 0–0.2)
BASOPHILS NFR BLD: 0.3 % (ref 0–1.9)
BILIRUB SERPL-MCNC: 0.3 MG/DL (ref 0.1–1)
BILIRUB UR QL STRIP: NEGATIVE
BSA FOR ECHO PROCEDURE: 1.75 M2
BUN SERPL-MCNC: 11 MG/DL (ref 8–23)
CALCIUM SERPL-MCNC: 8.8 MG/DL (ref 8.7–10.5)
CHLORIDE SERPL-SCNC: 104 MMOL/L (ref 95–110)
CLARITY UR REFRACT.AUTO: CLEAR
CO2 SERPL-SCNC: 22 MMOL/L (ref 23–29)
COLOR UR AUTO: YELLOW
CREAT SERPL-MCNC: 0.7 MG/DL (ref 0.5–1.4)
CV ECHO LV RWT: 0.53 CM
DIFFERENTIAL METHOD BLD: ABNORMAL
DOP CALC AO PEAK VEL: 1.38 M/S
DOP CALC AO VTI: 26.55 CM
DOP CALC LVOT AREA: 2.9 CM2
DOP CALC LVOT DIAMETER: 1.92 CM
DOP CALC LVOT PEAK VEL: 1.24 M/S
DOP CALC LVOT STROKE VOLUME: 69.19 CM3
DOP CALCLVOT PEAK VEL VTI: 23.91 CM
E WAVE DECELERATION TIME: 229.1 MSEC
E/A RATIO: 0.85
E/E' RATIO: 7.68 M/S
ECHO LV POSTERIOR WALL: 0.98 CM (ref 0.6–1.1)
EOSINOPHIL # BLD AUTO: 0 K/UL (ref 0–0.5)
EOSINOPHIL NFR BLD: 0 % (ref 0–8)
ERYTHROCYTE [DISTWIDTH] IN BLOOD BY AUTOMATED COUNT: 12.6 % (ref 11.5–14.5)
EST. GFR  (NO RACE VARIABLE): >60 ML/MIN/1.73 M^2
FRACTIONAL SHORTENING: 31 % (ref 28–44)
GLUCOSE SERPL-MCNC: 93 MG/DL (ref 70–110)
GLUCOSE UR QL STRIP: NEGATIVE
HCT VFR BLD AUTO: 34.5 % (ref 37–48.5)
HGB BLD-MCNC: 11.4 G/DL (ref 12–16)
HGB UR QL STRIP: NEGATIVE
IMM GRANULOCYTES # BLD AUTO: 0.01 K/UL (ref 0–0.04)
IMM GRANULOCYTES NFR BLD AUTO: 0.2 % (ref 0–0.5)
INTERVENTRICULAR SEPTUM: 0.7 CM (ref 0.6–1.1)
KETONES UR QL STRIP: ABNORMAL
LA MAJOR: 4.35 CM
LA MINOR: 3.65 CM
LA WIDTH: 3.09 CM
LEFT ATRIUM SIZE: 2.84 CM
LEFT ATRIUM VOLUME INDEX: 17 ML/M2
LEFT ATRIUM VOLUME: 29.61 CM3
LEFT INTERNAL DIMENSION IN SYSTOLE: 2.58 CM (ref 2.1–4)
LEFT VENTRICLE DIASTOLIC VOLUME INDEX: 33.8 ML/M2
LEFT VENTRICLE DIASTOLIC VOLUME: 58.81 ML
LEFT VENTRICLE MASS INDEX: 51 G/M2
LEFT VENTRICLE SYSTOLIC VOLUME INDEX: 13.9 ML/M2
LEFT VENTRICLE SYSTOLIC VOLUME: 24.12 ML
LEFT VENTRICULAR INTERNAL DIMENSION IN DIASTOLE: 3.72 CM (ref 3.5–6)
LEFT VENTRICULAR MASS: 88.78 G
LEUKOCYTE ESTERASE UR QL STRIP: NEGATIVE
LV LATERAL E/E' RATIO: 7.3 M/S
LV SEPTAL E/E' RATIO: 8.11 M/S
LYMPHOCYTES # BLD AUTO: 0.9 K/UL (ref 1–4.8)
LYMPHOCYTES NFR BLD: 14.3 % (ref 18–48)
MAGNESIUM SERPL-MCNC: 2 MG/DL (ref 1.6–2.6)
MCH RBC QN AUTO: 30.6 PG (ref 27–31)
MCHC RBC AUTO-ENTMCNC: 33 G/DL (ref 32–36)
MCV RBC AUTO: 93 FL (ref 82–98)
MONOCYTES # BLD AUTO: 0.4 K/UL (ref 0.3–1)
MONOCYTES NFR BLD: 6.5 % (ref 4–15)
MV PEAK A VEL: 0.86 M/S
MV PEAK E VEL: 0.73 M/S
MV STENOSIS PRESSURE HALF TIME: 66.44 MS
MV VALVE AREA P 1/2 METHOD: 3.31 CM2
NEUTROPHILS # BLD AUTO: 4.7 K/UL (ref 1.8–7.7)
NEUTROPHILS NFR BLD: 78.7 % (ref 38–73)
NITRITE UR QL STRIP: NEGATIVE
NRBC BLD-RTO: 0 /100 WBC
PH UR STRIP: 7 [PH] (ref 5–8)
PHOSPHATE SERPL-MCNC: 3.9 MG/DL (ref 2.7–4.5)
PISA TR MAX VEL: 2.34 M/S
PLATELET # BLD AUTO: 176 K/UL (ref 150–450)
PMV BLD AUTO: 10.7 FL (ref 9.2–12.9)
POCT GLUCOSE: 101 MG/DL (ref 70–110)
POCT GLUCOSE: 109 MG/DL (ref 70–110)
POCT GLUCOSE: 81 MG/DL (ref 70–110)
POCT GLUCOSE: 94 MG/DL (ref 70–110)
POTASSIUM SERPL-SCNC: 4.4 MMOL/L (ref 3.5–5.1)
PROT SERPL-MCNC: 6.1 G/DL (ref 6–8.4)
PROT UR QL STRIP: NEGATIVE
RA MAJOR: 4.42 CM
RA PRESSURE ESTIMATED: 3 MMHG
RA WIDTH: 2.93 CM
RBC # BLD AUTO: 3.73 M/UL (ref 4–5.4)
RIGHT VENTRICULAR END-DIASTOLIC DIMENSION: 2.97 CM
RV TB RVSP: 5 MMHG
SINUS: 2.67 CM
SODIUM SERPL-SCNC: 136 MMOL/L (ref 136–145)
SP GR UR STRIP: >1.03 (ref 1–1.03)
STJ: 2.58 CM
TDI LATERAL: 0.1 M/S
TDI SEPTAL: 0.09 M/S
TDI: 0.1 M/S
TR MAX PG: 22 MMHG
TRICUSPID ANNULAR PLANE SYSTOLIC EXCURSION: 2.08 CM
TV REST PULMONARY ARTERY PRESSURE: 25 MMHG
URN SPEC COLLECT METH UR: ABNORMAL
WBC # BLD AUTO: 6.02 K/UL (ref 3.9–12.7)
Z-SCORE OF LEFT VENTRICULAR DIMENSION IN END DIASTOLE: -2.58
Z-SCORE OF LEFT VENTRICULAR DIMENSION IN END SYSTOLE: -1.14

## 2024-03-08 PROCEDURE — 25000003 PHARM REV CODE 250

## 2024-03-08 PROCEDURE — 80053 COMPREHEN METABOLIC PANEL: CPT

## 2024-03-08 PROCEDURE — 81003 URINALYSIS AUTO W/O SCOPE: CPT

## 2024-03-08 PROCEDURE — 97165 OT EVAL LOW COMPLEX 30 MIN: CPT

## 2024-03-08 PROCEDURE — 97530 THERAPEUTIC ACTIVITIES: CPT

## 2024-03-08 PROCEDURE — 84100 ASSAY OF PHOSPHORUS: CPT

## 2024-03-08 PROCEDURE — 94761 N-INVAS EAR/PLS OXIMETRY MLT: CPT

## 2024-03-08 PROCEDURE — 20600001 HC STEP DOWN PRIVATE ROOM

## 2024-03-08 PROCEDURE — 99233 SBSQ HOSP IP/OBS HIGH 50: CPT | Mod: GC,,, | Performed by: PSYCHIATRY & NEUROLOGY

## 2024-03-08 PROCEDURE — 85025 COMPLETE CBC W/AUTO DIFF WBC: CPT

## 2024-03-08 PROCEDURE — 63600175 PHARM REV CODE 636 W HCPCS

## 2024-03-08 PROCEDURE — 83735 ASSAY OF MAGNESIUM: CPT

## 2024-03-08 PROCEDURE — 97535 SELF CARE MNGMENT TRAINING: CPT

## 2024-03-08 PROCEDURE — 97162 PT EVAL MOD COMPLEX 30 MIN: CPT

## 2024-03-08 PROCEDURE — 92523 SPEECH SOUND LANG COMPREHEN: CPT

## 2024-03-08 PROCEDURE — 92610 EVALUATE SWALLOWING FUNCTION: CPT

## 2024-03-08 PROCEDURE — 51701 INSERT BLADDER CATHETER: CPT

## 2024-03-08 RX ORDER — LABETALOL HCL 20 MG/4 ML
10 SYRINGE (ML) INTRAVENOUS EVERY 4 HOURS PRN
Status: DISCONTINUED | OUTPATIENT
Start: 2024-03-08 | End: 2024-03-13 | Stop reason: HOSPADM

## 2024-03-08 RX ADMIN — HEPARIN SODIUM 5000 UNITS: 5000 INJECTION INTRAVENOUS; SUBCUTANEOUS at 05:03

## 2024-03-08 RX ADMIN — ATORVASTATIN CALCIUM 40 MG: 40 TABLET, FILM COATED ORAL at 08:03

## 2024-03-08 RX ADMIN — HEPARIN SODIUM 5000 UNITS: 5000 INJECTION INTRAVENOUS; SUBCUTANEOUS at 09:03

## 2024-03-08 RX ADMIN — ASPIRIN 81 MG: 81 TABLET, COATED ORAL at 08:03

## 2024-03-08 RX ADMIN — CLOPIDOGREL BISULFATE 75 MG: 75 TABLET ORAL at 08:03

## 2024-03-08 RX ADMIN — ESCITALOPRAM OXALATE 20 MG: 20 TABLET ORAL at 08:03

## 2024-03-08 RX ADMIN — HEPARIN SODIUM 5000 UNITS: 5000 INJECTION INTRAVENOUS; SUBCUTANEOUS at 01:03

## 2024-03-08 NOTE — ASSESSMENT & PLAN NOTE
Antithrombotics for secondary stroke prevention:  aspirin 325 mg & clopidogrel 300 mg x 1 at OSH  - daily aspirin 81 mg /  clopidogrel 75 mg x 30 days followed by monotherapy thereafter      Statins for secondary stroke prevention and hyperlipidemia, if present:   Statins: Atorvastatin- 40 mg daily     Aggressive risk factor modification: HTN, HLD, Diet, Exercise, CAD     Rehab efforts: The patient has been evaluated by a stroke team provider and the therapy needs have been fully considered based off the presenting complaints and exam findings. The following therapy evaluations are needed: PT evaluate and treat, OT evaluate and treat, SLP evaluate and treat, PM&R evaluate for appropriate placement     Diagnostics ordered/pending: MRI head without contrast to assess brain parenchyma, TTE to assess cardiac function/status      VTE prophylaxis: Heparin 5000 units SQ every 8 hours     BP parameters: Infarct: Post sucessful thrombectomy, SBP <140  Post unsucessful thrombectomy, SBP <220    - Q1h neuro checks  - MRI today  - Regular diet

## 2024-03-08 NOTE — SUBJECTIVE & OBJECTIVE
Neurologic Chief Complaint: L M2 occlusion and infarct    Subjective:     Interval History: Patient is seen for follow-up neurological assessment and treatment recommendations:     On post-angio pathway with exam NIH 9 with improving with patient still globally aphasic but able to mimic/comprehend some of speech/commands. RUE with improving strength, no drift. Remainder of exam non-focal. Awaiting MRI brain today and further eval per PT/SLP/OT. If continued stability, tentative plans to step down to floor later this evening.    HPI, Past Medical, Family, and Social History remains the same as documented in the initial encounter.     Review of Systems   Unable to perform ROS: Patient nonverbal   All other systems reviewed and are negative.    Scheduled Meds:   aspirin  81 mg Oral Daily    atorvastatin  40 mg Oral Daily    clopidogreL  75 mg Oral Daily    EScitalopram oxalate  20 mg Oral Daily    heparin (porcine)  5,000 Units Subcutaneous Q8H    levothyroxine  75 mcg Oral Before breakfast     Continuous Infusions:  PRN Meds:bisacodyL, labetalol, sodium chloride 0.9%    Objective:     Vital Signs (Most Recent):  Temp: 97.6 °F (36.4 °C) (03/08/24 1101)  Pulse: 85 (03/08/24 1135)  Resp: 17 (03/08/24 1135)  BP: (!) 127/59 (03/08/24 1135)  SpO2: 100 % (03/08/24 1135)  BP Location: Right arm    Vital Signs Range (Last 24H):  Temp:  [97.6 °F (36.4 °C)-98.2 °F (36.8 °C)]   Pulse:  []   Resp:  [10-26]   BP: (119-162)/(55-74)   SpO2:  [81 %-100 %]   BP Location: Right arm       Physical Exam  Constitutional:       Appearance: Normal appearance.   HENT:      Head: Normocephalic and atraumatic.      Nose: No congestion or rhinorrhea.      Mouth/Throat:      Mouth: Mucous membranes are moist.      Pharynx: Oropharynx is clear.   Eyes:      Extraocular Movements: Extraocular movements intact.      Pupils: Pupils are equal, round, and reactive to light.   Cardiovascular:      Rate and Rhythm: Normal rate and regular rhythm.  "     Pulses: Normal pulses.   Pulmonary:      Effort: Pulmonary effort is normal. No respiratory distress.   Abdominal:      General: Abdomen is flat. There is no distension.      Palpations: Abdomen is soft.   Musculoskeletal:      Right lower leg: No edema.      Left lower leg: No edema.   Skin:     General: Skin is warm.      Capillary Refill: Capillary refill takes less than 2 seconds.      Findings: No rash.   Neurological:      Mental Status: She is alert.              Neurological Exam:   LOC: alert  Language: Global aphasia  Articulation: Mute/Anarthric  Visual Fields: Full  EOM (CN III, IV, VI): Full/intact  Facial Movement (CN VII): Symmetric facial expression    Reflexes: 2+ throughout  Motor: Arm left  Normal 5/5  Leg left  Normal 5/5  Arm right  Paresis: 4/5  Leg right Normal 5/5  Cerebellum: No evidence of appendicular or axial ataxia    Laboratory:  CMP:   Recent Labs   Lab 03/08/24  0056   CALCIUM 8.8   ALBUMIN 3.3*   PROT 6.1      K 4.4   CO2 22*      BUN 11   CREATININE 0.7   ALKPHOS 71   ALT 11   AST 18   BILITOT 0.3     CBC:   Recent Labs   Lab 03/08/24  0056   WBC 6.02   RBC 3.73*   HGB 11.4*   HCT 34.5*      MCV 93   MCH 30.6   MCHC 33.0     Lipid Panel:   Recent Labs   Lab 03/07/24  1835   CHOL 171   LDLCALC 104.6   HDL 58   TRIG 42     Hgb A1C: No results for input(s): "HGBA1C" in the last 168 hours.  TSH:   Recent Labs   Lab 03/07/24  1835   TSH 0.581       Diagnostic Results     Brain Imaging   CTH 3/7:  No definite acute intracranial findings by noncontrast CT.     Vessel Imaging   CTA 3/7: No large vessel occlusion in the intracranial circulation.     No hemodynamically significant stenosis of the neck vessels.    Cardiac Imaging     TTE 3/7:     Left Ventricle: The left ventricle is normal in size. Normal wall thickness. Normal wall motion. There is normal systolic function with a visually estimated ejection fraction of 55 - 60%. There is normal diastolic function.    " Right Ventricle: Normal right ventricular cavity size. Systolic function is normal.    Pulmonary Artery: The estimated pulmonary artery systolic pressure is 25 mmHg.    IVC/SVC: Normal venous pressure at 3 mmHg.

## 2024-03-08 NOTE — SUBJECTIVE & OBJECTIVE
Interval History: NAEON. Still with global aphasia, SLP cleared for regular diet.    Past Medical History:   Diagnosis Date    Acute ischemic left MCA stroke 3/7/2024    Allergic rhinitis 5/31/2016    Carotid artery plaque 11/18/2013    History of total hysterectomy with bilateral salpingo-oophorectomy (BSO) 5/31/2016    Hyperlipidemia 7/17/2012    Hypothyroid     Insomnia     Macular degeneration, right eye 5/31/2016    Migraine headache     Osteopenia of the elderly 10/20/2015    Postmenopausal HRT (hormone replacement therapy)     RBBB 7/17/2012     Past Surgical History:   Procedure Laterality Date    APPENDECTOMY      Catatact surgery Right 06/19/2019    without any complications    COLONOSCOPY  08/27/2019    repeat in 5 years    EYE SURGERY Bilateral     cataracts extraction    HYSTERECTOMY      STAPEDES SURGERY  2006    TOTAL ABDOMINAL HYSTERECTOMY W/ BILATERAL SALPINGOOPHORECTOMY      TUBAL LIGATION        Current Facility-Administered Medications on File Prior to Encounter   Medication Dose Route Frequency Provider Last Rate Last Admin    [COMPLETED] aspirin suppository 300 mg  300 mg Rectal ED 1 Time Ashley Rivera MD   300 mg at 03/07/24 1347    [COMPLETED] iohexoL (OMNIPAQUE 350) injection 100 mL  100 mL Intravenous ONCE PRN Ashley Rivera MD   100 mL at 03/07/24 1334     Current Outpatient Medications on File Prior to Encounter   Medication Sig Dispense Refill    amitriptyline (ELAVIL) 25 MG tablet TAKE 1/2-1 TABLET BY MOUTH NIGHTLY FOR INSOMNIA. OK TO TAKE WITH THE LOW DOSE OF ATIVAN 90 tablet 4    aspirin (ECOTRIN) 81 MG EC tablet Take 81 mg by mouth once daily.      atorvastatin (LIPITOR) 20 MG tablet Take 1 tablet (20 mg total) by mouth once daily. 90 tablet 3    azelastine (ASTELIN) 137 mcg (0.1 %) nasal spray 1 spray (137 mcg total) by Nasal route 2 (two) times daily. 30 mL 11    calcium-vitamin D3-vitamin K 500-100-40 mg-unit-mcg Chew Take by mouth once daily.      carBAMazepine  (TEGRETOL XR) 100 MG 12 hr tablet Take 1-2 tablets (100-200 mg total) by mouth 2 (two) times daily. 120 tablet 5    cholecalciferol, vitamin D3, (VITAMIN D3) 50 mcg (2,000 unit) Tab once daily.       coenzyme Q10 200 mg capsule Take 200 mg by mouth once daily.      EScitalopram oxalate (LEXAPRO) 20 MG tablet Take 1 tablet (20 mg total) by mouth once daily. 90 tablet 3    fluticasone propionate (FLONASE) 50 mcg/actuation nasal spray SPRAY TWICE IN EACH NOSTRIL ONCE DAILY 16 mL 11    ibuprofen (ADVIL,MOTRIN) 800 MG tablet Take 1 tablet (800 mg total) by mouth 2 (two) times daily as needed for Pain. 60 tablet 3    levocetirizine (XYZAL) 5 MG tablet Take 1 tablet (5 mg total) by mouth every evening. 30 tablet 11    levothyroxine (SYNTHROID) 75 MCG tablet TAKE 1 TABLET DAILY BEFORE BREAKFAST 90 tablet 3    LORazepam (ATIVAN) 0.5 MG tablet Take 1-2 tablets by mouth daily as needed for increased anxiety/panic attack. 60 tablet 1    multivitamin capsule Take 1 capsule by mouth once daily.      omeprazole (PRILOSEC) 20 MG capsule Take 1 capsule (20 mg total) by mouth every morning. 90 capsule 3    ondansetron (ZOFRAN-ODT) 4 MG TbDL Take 1 tablet (4 mg total) by mouth every 8 (eight) hours as needed (nausea). 30 tablet 0    triamcinolone acetonide 0.025% (KENALOG) 0.025 % Oint Apply topically 2 (two) times daily. 15 g 2    TURMERIC, BULK, MISC by Misc.(Non-Drug; Combo Route) route once daily.      VIT A/VIT C/VIT E/ZINC/COPPER (PRESERVISION AREDS ORAL) Take by mouth once daily.         Allergies: Neomycin  Family History   Problem Relation Age of Onset    Heart disease Father     COPD Father     Heart attack Father     Alzheimer's disease Mother     Cancer Sister         breast cancer    Breast cancer Sister     Alzheimer's disease Sister     Other Brother         TIA    Hyperlipidemia Brother     Rheumatic fever Brother         as a child    Nephrolithiasis Daughter     Depression Son     Post-traumatic stress disorder Son      Cancer Maternal Aunt         breast cancer    Cancer Maternal Grandmother         breast cancer    Cancer Cousin         colon cancer     Social History     Tobacco Use    Smoking status: Never    Smokeless tobacco: Never   Substance Use Topics    Alcohol use: Yes     Comment: occasionally    Drug use: No     Review of Systems   Unable to perform ROS: Patient nonverbal     Objective:     Vitals:    Temp: 97.6 °F (36.4 °C)  Pulse: 85  Rhythm: normal sinus rhythm  BP: (!) 127/59  MAP (mmHg): 85  Resp: 17  SpO2: 100 %    Temp  Min: 97.6 °F (36.4 °C)  Max: 98.2 °F (36.8 °C)  Pulse  Min: 64  Max: 102  BP  Min: 119/59  Max: 162/68  MAP (mmHg)  Min: 80  Max: 107  Resp  Min: 10  Max: 26  SpO2  Min: 81 %  Max: 100 %  Oxygen Concentration (%)  Min: 28  Max: 28    03/07 0701 - 03/08 0700  In: 1062 [I.V.:12]  Out: 1450 [Urine:1450]            Physical Exam  Vitals and nursing note reviewed.   Constitutional:       General: She is not in acute distress.     Appearance: Normal appearance. She is not ill-appearing, toxic-appearing or diaphoretic.   HENT:      Head: Normocephalic.   Eyes:      Extraocular Movements: Extraocular movements intact.      Pupils: Pupils are equal, round, and reactive to light.   Cardiovascular:      Rate and Rhythm: Normal rate and regular rhythm.      Pulses: Normal pulses.      Heart sounds: Normal heart sounds.   Pulmonary:      Effort: Pulmonary effort is normal. No respiratory distress.      Breath sounds: Normal breath sounds. No stridor. No wheezing, rhonchi or rales.   Chest:      Chest wall: No tenderness.   Abdominal:      General: Abdomen is flat.      Palpations: Abdomen is soft.      Tenderness: There is no abdominal tenderness. There is no guarding or rebound.   Musculoskeletal:         General: No swelling or tenderness.      Cervical back: Neck supple.      Right lower leg: No edema.      Left lower leg: No edema.      Comments: R femoral closure device intact, pulses 2+ in all 4  extremities   Skin:     General: Skin is warm and dry.      Capillary Refill: Capillary refill takes less than 2 seconds.      Coloration: Skin is not jaundiced or pale.      Findings: No bruising.   Neurological:      Mental Status: She is alert.      Comments: Pt is alert, globally aphasic but mimics  No pronator drift, ROSARIO purposefully   Psychiatric:      Comments: KELSIE       Unable to test orientation, language, memory, judgment, insight, fund of knowledge, hearing, shoulder shrug, tongue protrusion, coordination, gait due to level of consciousness.       Today I personally reviewed pertinent medications, imaging, laboratory results, notably:

## 2024-03-08 NOTE — PLAN OF CARE
Problem: Occupational Therapy  Goal: Occupational Therapy Goal  Description: Goals to be met by: 03-22-24     Patient will increase functional independence with ADLs by performing:    UE Dressing with Supervision.  LE Dressing with Supervision.  Grooming while standing at sink with Supervision.  Toileting from toilet with Supervision for hygiene and clothing management.   Supine to sit with Supervision.  Stand pivot transfers with Supervision.  Toilet transfer to toilet with Supervision.  Pt. To follow 4/4 simple commands    3/8/2024 0840 by Latoya Melendez LOTR  Outcome: Ongoing, Progressing  3/8/2024 0840 by Latoya Melendez LOTR  Outcome: Ongoing, Progressing

## 2024-03-08 NOTE — PROGRESS NOTES
[facility] - Neuro Critical Care  Vascular Neurology  [facility]  Progress Note    Assessment/Plan:     * Cerebrovascular accident (CVA) due to embolism of left middle cerebral artery  79 year old woman with history of hyperlipidemia, hypertension, bilateral carotid stenosis, and RBBB presenting as an acute stroke transfer for IR intervention after initial OSH presentation with aphasia and diagnostics consistent with L M2 occlusion. NIHSS 10 on arrival. Patient urgently transported to IR suite. Plan for Neurocritical Care admission thereafter.     On post-angio pathway with exam NIH 9 with improving with patient still globally aphasic but able to mimic/comprehend some of speech/commands. RUE with improving strength, no drift. Remainder of exam non-focal. Awaiting MRI brain today and further eval per PT/SLP/OT. If continued stability, tentative plans to step down to floor later this evening.       Antithrombotics for secondary stroke prevention:  aspirin 325 mg & clopidogrel 300 mg x 1 at OSH  - daily aspirin 81 mg /  clopidogrel 75 mg x 30 days followed by monotherapy thereafter      Statins for secondary stroke prevention and hyperlipidemia, if present:   Statins: Atorvastatin- 40 mg daily     Aggressive risk factor modification: HTN, HLD, Diet, Exercise, CAD     Rehab efforts: The patient has been evaluated by a stroke team provider and the therapy needs have been fully considered based off the presenting complaints and exam findings. The following therapy evaluations are needed: PT evaluate and treat, OT evaluate and treat, SLP evaluate and treat, PM&R evaluate for appropriate placement     Diagnostics ordered/pending: MRI head without contrast to assess brain parenchyma     VTE prophylaxis: Heparin 5000 units SQ every 8 hours     BP parameters: Infarct: Post sucessful thrombectomy, SBP <140          Prediabetes  CTM with PCP    Hyperlipidemia  LDL 61; continue statin         3/7/24: Transfer for L  M2 occlusion on CTA after NIH 10 w/ RUE weakness, global aphasia, AMS ([facility] 0630 3/7) OOW for TNK. ASA+Plavix loaded and taken with IR for TICI3C    3/8/24: On post-angio pathway with exam NIH 8 with improving with patient still globally aphasic but able to mimic/comprehend some of speech/commands. RUE with improving strength, no drift. Remainder of exam non-focal. Awaiting MRI brain today and further eval per PT/SLP/OT. If continued stability, tentative plans to step down to floor later this evening.    STROKE DOCUMENTATION   Acute Stroke Times   Last Known Normal Date: 03/07/24  Last Known Normal Time: 0630  Symptom Onset Date: 03/07/24  Unknown Symptom Onset Time: Unknown Time  Stroke Team Called Date: 03/07/24  Stroke Team Called Time: 1525  Stroke Team Arrival Date: 03/07/24  Stroke Team Arrival Time: 1529  Thrombolytic Therapy Recommended: No  Thrombectomy Recommended: Yes  Decision to Treat Time for IR:  (1330 at OSH; 15:30 after re-evaluation on arrival to [facility])    NIH Scale:  Interval: baseline  1a. Level of Consciousness: 0-->Alert, keenly responsive  1b. LOC Questions: 2-->Answers neither question correctly  1c. LOC Commands: 2-->Performs neither task correctly  2. Best Gaze: 0-->Normal  3. Visual: 0-->No visual loss  4. Facial Palsy: 0-->Normal symmetrical movements  5a. Motor Arm, Left: 0-->No drift, limb holds 90 (or 45) degrees for full 10 secs  5b. Motor Arm, Right: 0-->No drift, limb holds 90 (or 45) degrees for full 10 secs  6a. Motor Leg, Left: 0-->No drift, leg holds 30 degree position for full 5 secs  6b. Motor Leg, Right: 0-->No drift, leg holds 30 degree position for full 5 secs  7. Limb Ataxia: 0-->Absent  8. Sensory: 0-->Normal, no sensory loss  9. Best Language: 2-->Severe aphasia, all communication is through fragmentary expression, great need for inference, questioning, and guessing by the listener. Range of information that can be exchanged is limited, listener carries burden of. . .  (see row details)  10. Dysarthria: 2-->Severe dysarthria, patients speech is so slurred as to be unintelligible in the absence of or out of proportion to any dysphasia, or is mute/anarthric  11. Extinction and Inattention (formerly Neglect): 0-->No abnormality  Total (NIH Stroke Scale): 8       Modified Torreon Score: 0  [place] Coma Scale:    ABCD2 Score:    [id number] Score:   HAS -BLED Score:   ICH Score:   Hunt & Goodrich Classification:      Hemorrhagic change of an Ischemic Stroke: Does this patient have an ischemic stroke with hemorrhagic changes? No     Neurologic Chief Complaint: L M2 occlusion and infarct    Subjective:     Interval History: Patient is seen for follow-up neurological assessment and treatment recommendations:     On post-angio pathway with exam NIH 9 with improving with patient still globally aphasic but able to mimic/comprehend some of speech/commands. RUE with improving strength, no drift. Remainder of exam non-focal. Awaiting MRI brain today and further eval per PT/SLP/OT. If continued stability, tentative plans to step down to floor later this evening.    HPI, Past Medical, Family, and Social History remains the same as documented in the initial encounter.     Review of Systems   Unable to perform ROS: Patient nonverbal   All other systems reviewed and are negative.    Scheduled Meds:   aspirin  81 mg Oral Daily    atorvastatin  40 mg Oral Daily    clopidogreL  75 mg Oral Daily    EScitalopram oxalate  20 mg Oral Daily    heparin (porcine)  5,000 Units Subcutaneous Q8H    levothyroxine  75 mcg Oral Before breakfast     Continuous Infusions:  PRN Meds:bisacodyL, labetalol, sodium chloride 0.9%    Objective:     Vital Signs (Most Recent):  Temp: 97.6 °F (36.4 °C) (03/08/24 1101)  Pulse: 85 (03/08/24 1135)  Resp: 17 (03/08/24 1135)  BP: (!) 127/59 (03/08/24 1135)  SpO2: 100 % (03/08/24 1135)  BP Location: Right arm    Vital Signs Range (Last 24H):  Temp:  [97.6 °F (36.4 °C)-98.2 °F (36.8 °C)]  "  Pulse:  []   Resp:  [10-26]   BP: (119-162)/(55-74)   SpO2:  [81 %-100 %]   BP Location: Right arm       Physical Exam  Constitutional:       Appearance: Normal appearance.   HENT:      Head: Normocephalic and atraumatic.      Nose: No congestion or rhinorrhea.      Mouth/Throat:      Mouth: Mucous membranes are moist.      Pharynx: Oropharynx is clear.   Eyes:      Extraocular Movements: Extraocular movements intact.      Pupils: Pupils are equal, round, and reactive to light.   Cardiovascular:      Rate and Rhythm: Normal rate and regular rhythm.      Pulses: Normal pulses.   Pulmonary:      Effort: Pulmonary effort is normal. No respiratory distress.   Abdominal:      General: Abdomen is flat. There is no distension.      Palpations: Abdomen is soft.   Musculoskeletal:      Right lower leg: No edema.      Left lower leg: No edema.   Skin:     General: Skin is warm.      Capillary Refill: Capillary refill takes less than 2 seconds.      Findings: No rash.   Neurological:      Mental Status: She is alert.              Neurological Exam:   LOC: alert  Language: Global aphasia  Articulation: Mute/Anarthric  Visual Fields: Full  EOM (CN III, IV, VI): Full/intact  Facial Movement (CN VII): Symmetric facial expression    Reflexes: 2+ throughout  Motor: Arm left  Normal 5/5  Leg left  Normal 5/5  Arm right  Paresis: 4/5  Leg right Normal 5/5  Cerebellum: No evidence of appendicular or axial ataxia    Laboratory:  CMP:   Recent Labs   Lab 03/08/24  0056   CALCIUM 8.8   ALBUMIN 3.3*   PROT 6.1      K 4.4   CO2 22*      BUN 11   CREATININE 0.7   ALKPHOS 71   ALT 11   AST 18   BILITOT 0.3     CBC:   Recent Labs   Lab 03/08/24  0056   WBC 6.02   RBC 3.73*   HGB 11.4*   HCT 34.5*      MCV 93   MCH 30.6   MCHC 33.0     Lipid Panel:   Recent Labs   Lab 03/07/24  1835   CHOL 171   LDLCALC 104.6   HDL 58   TRIG 42     Hgb A1C: No results for input(s): "HGBA1C" in the last 168 hours.  TSH:   Recent Labs "   Lab 03/07/24  1835   TSH 0.581       Diagnostic Results     Brain Imaging   CTH 3/7:  No definite acute intracranial findings by noncontrast CT.     Vessel Imaging   CTA 3/7: No large vessel occlusion in the intracranial circulation.     No hemodynamically significant stenosis of the neck vessels.    Cardiac Imaging     TTE 3/7:     Left Ventricle: The left ventricle is normal in size. Normal wall thickness. Normal wall motion. There is normal systolic function with a visually estimated ejection fraction of 55 - 60%. There is normal diastolic function.    Right Ventricle: Normal right ventricular cavity size. Systolic function is normal.    Pulmonary Artery: The estimated pulmonary artery systolic pressure is 25 mmHg.    IVC/SVC: Normal venous pressure at 3 mmHg.    Mariano Ervin MD  [facility]  Department of Vascular Neurology   [facility] - [facility]

## 2024-03-08 NOTE — ASSESSMENT & PLAN NOTE
79 year old woman with history of hyperlipidemia, hypertension, bilateral carotid stenosis, and RBBB presenting as an acute stroke transfer for IR intervention after initial OSH presentation with aphasia and diagnostics consistent with L M2 occlusion. NIHSS 10 on arrival. Patient urgently transported to IR suite. Plan for Neurocritical Care admission thereafter.     On post-angio pathway with exam NIH 9 with improving with patient still globally aphasic but able to mimic/comprehend some of speech/commands. RUE with improving strength, no drift. Remainder of exam non-focal. Awaiting MRI brain today and further eval per PT/SLP/OT. If continued stability, tentative plans to step down to floor later this evening.       Antithrombotics for secondary stroke prevention:  aspirin 325 mg & clopidogrel 300 mg x 1 at OSH  - daily aspirin 81 mg /  clopidogrel 75 mg x 30 days followed by monotherapy thereafter      Statins for secondary stroke prevention and hyperlipidemia, if present:   Statins: Atorvastatin- 40 mg daily     Aggressive risk factor modification: HTN, HLD, Diet, Exercise, CAD     Rehab efforts: The patient has been evaluated by a stroke team provider and the therapy needs have been fully considered based off the presenting complaints and exam findings. The following therapy evaluations are needed: PT evaluate and treat, OT evaluate and treat, SLP evaluate and treat, PM&R evaluate for appropriate placement     Diagnostics ordered/pending: MRI head without contrast to assess brain parenchyma     VTE prophylaxis: Heparin 5000 units SQ every 8 hours     BP parameters: Infarct: Post sucessful thrombectomy, SBP <140         galindo all pertinent systems normal

## 2024-03-08 NOTE — ASSESSMENT & PLAN NOTE
Antithrombotics for secondary stroke prevention:  aspirin 325 mg & clopidogrel 300 mg x 1 at OSH  - daily aspirin 81 mg /  clopidogrel 75 mg x 30 days followed by monotherapy thereafter      Statins for secondary stroke prevention and hyperlipidemia, if present:   Statins: Atorvastatin- 40 mg daily     Aggressive risk factor modification: HTN, HLD, Diet, Exercise, CAD     Rehab efforts: The patient has been evaluated by a stroke team provider and the therapy needs have been fully considered based off the presenting complaints and exam findings. The following therapy evaluations are needed: PT evaluate and treat, OT evaluate and treat, SLP evaluate and treat, PM&R evaluate for appropriate placement     Diagnostics ordered/pending: MRI head without contrast to assess brain parenchyma, TTE to assess cardiac function/status      VTE prophylaxis: Heparin 5000 units SQ every 8 hours     BP parameters: Infarct: Post sucessful thrombectomy, SBP <140  Post unsucessful thrombectomy, SBP <220    - Q1h neuro checks

## 2024-03-08 NOTE — HPI
79 year old woman with history of hyperlipidemia, hypertension, bilateral carotid stenosis, and RBBB presenting as an acute stroke transfer for IR intervention after initial OSH presentation. Patient's  reports that he last saw her normal at 6:30 a.m. today. He returned home at noon and found her want around the house but no longer talking or interactive. Said there was some blood on the floor and suspects that she fell. Diagnostics consistent with L M2 occlusion, but pt was outside TPA window at presentation. NIHSS 10 on arrival, with left pronator drift and global aphasia prior to intervention. Patient urgently transported to IR suite, underwent 2 pass TICI 3, now admitted to [facility] for close monitoring.

## 2024-03-08 NOTE — PT/OT/SLP EVAL
Occupational Therapy   Evaluation/tx    Name: Prema Phillips  MRN: [id number]  Admitting Diagnosis: Acute ischemic left MCA stroke  Recent Surgery: * No surgery found *      Recommendations:     Discharge Recommendations: High Intensity Therapy  Discharge Equipment Recommendations:   (continue to assess)  Barriers to discharge:  None    Assessment:     Prema Phillips is a 79 y.o. female with a medical diagnosis of Acute ischemic left MCA stroke.  She presents with deficits particularly with following commands and overall communication. Pt. Appeared to be a little tearful at times during session. Pt. Noted to have deficits with ADL tasks on this date involving task performance. Patient would benefit from continued OT services to maximize level of safety and independence with self-care tasks.    . Performance deficits affecting function: weakness, impaired endurance, impaired self care skills, impaired functional mobility, gait instability, impaired cognition, decreased safety awareness.      Rehab Prognosis: Good; patient would benefit from acute skilled OT services to address these deficits and reach maximum level of function.       Plan:     Patient to be seen 4 x/week to address the above listed problems via self-care/home management, therapeutic activities, therapeutic exercises, neuromuscular re-education  Plan of Care Expires: 03/22/24  Plan of Care Reviewed with: patient, spouse    Subjective     Chief Complaint: pt. Mostly nonverbal throughout session ; 1 or 2 attempts made to verbalize but unable to understand  Patient/Family Comments/goals: Spouse would like to see pt. Get better    Occupational Profile:  Living Environment: pt. Resides with spouse in  house with 1 step to enter. Pt. Has a WIS with 2 grab bars but no seat  Previous level of function: pt. 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  Patient found supine with telemetry, pulse ox (continuous), PureWick, blood pressure cuff upon OT entry to room. Spouse present    General Precautions: Standard, aspiration, fall, aphasia (global) Koi wears hearing aids  Orthopedic Precautions: N/A  Braces: N/A  Respiratory Status: Room air    Occupational Performance:    Bed Mobility:    Patient completed Supine to Sit with contact guard assistance    Functional Mobility/Transfers:  Patient completed Sit <> Stand Transfer with minimum assistance  with  hand-held assist   Patient completed Bed <> Chair Transfer using Stand Pivot technique with minimum assistance with hand-held assist  Functional Mobility: pt. Took ~ 6 small steps with Min A (HHA) to bedside chair    Activities of Daily Living:  Upper Body Dressing: moderate assistance to don gown like robe seated EOB  Lower Body Dressing: moderate assistance to don socks in long sit to don socks    Cognitive/Visual Perceptual:  Cognitive/Psychosocial Skills:     -       Oriented to: unable to accurately assess  -       Follows Commands/attention:inconsistently followed demonstrated commands (~ 50%) ; unable to follow verbal commands (not demonstrated)  -       Communication: global aphasia  -       Memory: unable to assess  -       Safety awareness/insight to disability: impaired   -       Mood/Affect/Coping skills/emotional control: somewhat emotional  Visual/Perceptual:  unable to assess left eye redness noted    Physical Exam:  Balance: -       sit: SBA; stand Min A/HHA  Postural examination/scapula alignment:    -       Rounded shoulders  -       Posterior pelvic tilt  Skin integrity: Bruising of right  forehead  Dominant hand: -       right  Upper Extremity Range of Motion:     -       Right Upper Extremity: appears to be WFL but did require some assist for full range  -       Left Upper Extremity: WNL   Strength:    -       Right Upper Extremity: WFL  -       Left Upper Extremity: WNL  Fine Motor Coordination: unable to follow demonstration for finger to thumb opposition    AMPAC 6 Click ADL:  AMPA Total Score: 14    Treatment & Education:  Pt. And spouse educated on role of OT and POC    Patient left up in chair with all lines intact, chair alarm on, nurse notified, and spouse present    GOALS:   Multidisciplinary Problems       Occupational Therapy Goals          Problem: Occupational Therapy    Goal Priority Disciplines Outcome Interventions   Occupational Therapy Goal     OT, PT/OT Ongoing, Progressing    Description: Goals to be met by: 03-22-24     Patient will increase functional independence with ADLs by performing:    UE Dressing with Supervision.  LE Dressing with Supervision.  Grooming while standing at sink with Supervision.  Toileting from toilet with Supervision for hygiene and clothing management.   Supine to sit with Supervision.  Stand pivot transfers with Supervision.  Toilet transfer to toilet with Supervision.  Pt. To follow 4/4 simple commands                         History:     Past Medical History:   Diagnosis Date    Acute ischemic left MCA stroke 3/7/2024    Allergic rhinitis 5/31/2016    Carotid artery plaque 11/18/2013    History of total hysterectomy with bilateral salpingo-oophorectomy (BSO) 5/31/2016    Hyperlipidemia 7/17/2012    Hypothyroid     Insomnia     Macular degeneration, right eye 5/31/2016    Migraine headache     Osteopenia of the elderly 10/20/2015    Postmenopausal HRT (hormone replacement therapy)     RBBB 7/17/2012         Past Surgical History:   Procedure Laterality Date    APPENDECTOMY      Catatact surgery Right 06/19/2019    without any complications     COLONOSCOPY  08/27/2019    repeat in 5 years    EYE SURGERY Bilateral     cataracts extraction    HYSTERECTOMY      STAPEDES SURGERY  2006    TOTAL ABDOMINAL HYSTERECTOMY W/ BILATERAL SALPINGOOPHORECTOMY      TUBAL LIGATION         Time Tracking:     OT Date of Treatment: 03/08/24  OT Start Time: 0807  OT Stop Time: 0832  OT Total Time (min): 25 min    Billable Minutes:Evaluation 15  Self Care/Home Management 10    3/8/2024

## 2024-03-08 NOTE — PROGRESS NOTES
[facility] - Neuro Critical Care  Neurocritical Care  Progress Note    Admit Date: 3/7/2024  Service Date: 03/08/2024  Length of Stay: 1    Subjective:     Chief Complaint: Cerebrovascular accident (CVA) due to embolism of left middle cerebral artery    History of Present Illness: 79 year old woman with history of hyperlipidemia, hypertension, bilateral carotid stenosis, and RBBB presenting as an acute stroke transfer for IR intervention after initial OSH presentation. Patient's  reports that he last saw her normal at 6:30 a.m. today. He returned home at noon and found her want around the house but no longer talking or interactive. Said there was some blood on the floor and suspects that she fell. Diagnostics consistent with L M2 occlusion, but pt was outside TPA window at presentation. NIHSS 10 on arrival, with left pronator drift and global aphasia prior to intervention. Patient urgently transported to IR suite, underwent 2 pass TICI 3, now admitted to [facility] for close monitoring.     Hospital Course: No notes on file    Interval History: NAEON. Still with global aphasia, SLP cleared for regular diet.    Past Medical History:   Diagnosis Date    Acute ischemic left MCA stroke 3/7/2024    Allergic rhinitis 5/31/2016    Carotid artery plaque 11/18/2013    History of total hysterectomy with bilateral salpingo-oophorectomy (BSO) 5/31/2016    Hyperlipidemia 7/17/2012    Hypothyroid     Insomnia     Macular degeneration, right eye 5/31/2016    Migraine headache     Osteopenia of the elderly 10/20/2015    Postmenopausal HRT (hormone replacement therapy)     RBBB 7/17/2012     Past Surgical History:   Procedure Laterality Date    APPENDECTOMY      Catatact surgery Right 06/19/2019    without any complications    COLONOSCOPY  08/27/2019    repeat in 5 years    EYE SURGERY Bilateral     cataracts extraction    HYSTERECTOMY      STAPEDES SURGERY  2006    TOTAL ABDOMINAL HYSTERECTOMY W/ BILATERAL SALPINGOOPHORECTOMY       TUBAL LIGATION        Current Facility-Administered Medications on File Prior to Encounter   Medication Dose Route Frequency Provider Last Rate Last Admin    [COMPLETED] aspirin suppository 300 mg  300 mg Rectal ED 1 Time Ashley Rivera MD   300 mg at 03/07/24 1347    [COMPLETED] iohexoL (OMNIPAQUE 350) injection 100 mL  100 mL Intravenous ONCE PRN Ashley Rivera MD   100 mL at 03/07/24 1334     Current Outpatient Medications on File Prior to Encounter   Medication Sig Dispense Refill    amitriptyline (ELAVIL) 25 MG tablet TAKE 1/2-1 TABLET BY MOUTH NIGHTLY FOR INSOMNIA. OK TO TAKE WITH THE LOW DOSE OF ATIVAN 90 tablet 4    aspirin (ECOTRIN) 81 MG EC tablet Take 81 mg by mouth once daily.      atorvastatin (LIPITOR) 20 MG tablet Take 1 tablet (20 mg total) by mouth once daily. 90 tablet 3    azelastine (ASTELIN) 137 mcg (0.1 %) nasal spray 1 spray (137 mcg total) by Nasal route 2 (two) times daily. 30 mL 11    calcium-vitamin D3-vitamin K 500-100-40 mg-unit-mcg Chew Take by mouth once daily.      carBAMazepine (TEGRETOL XR) 100 MG 12 hr tablet Take 1-2 tablets (100-200 mg total) by mouth 2 (two) times daily. 120 tablet 5    cholecalciferol, vitamin D3, (VITAMIN D3) 50 mcg (2,000 unit) Tab once daily.       coenzyme Q10 200 mg capsule Take 200 mg by mouth once daily.      EScitalopram oxalate (LEXAPRO) 20 MG tablet Take 1 tablet (20 mg total) by mouth once daily. 90 tablet 3    fluticasone propionate (FLONASE) 50 mcg/actuation nasal spray SPRAY TWICE IN EACH NOSTRIL ONCE DAILY 16 mL 11    ibuprofen (ADVIL,MOTRIN) 800 MG tablet Take 1 tablet (800 mg total) by mouth 2 (two) times daily as needed for Pain. 60 tablet 3    levocetirizine (XYZAL) 5 MG tablet Take 1 tablet (5 mg total) by mouth every evening. 30 tablet 11    levothyroxine (SYNTHROID) 75 MCG tablet TAKE 1 TABLET DAILY BEFORE BREAKFAST 90 tablet 3    LORazepam (ATIVAN) 0.5 MG tablet Take 1-2 tablets by mouth daily as needed for increased  anxiety/panic attack. 60 tablet 1    multivitamin capsule Take 1 capsule by mouth once daily.      omeprazole (PRILOSEC) 20 MG capsule Take 1 capsule (20 mg total) by mouth every morning. 90 capsule 3    ondansetron (ZOFRAN-ODT) 4 MG TbDL Take 1 tablet (4 mg total) by mouth every 8 (eight) hours as needed (nausea). 30 tablet 0    triamcinolone acetonide 0.025% (KENALOG) 0.025 % Oint Apply topically 2 (two) times daily. 15 g 2    TURMERIC, BULK, MISC by Misc.(Non-Drug; Combo Route) route once daily.      VIT A/VIT C/VIT E/ZINC/COPPER (PRESERVISION AREDS ORAL) Take by mouth once daily.         Allergies: Neomycin  Family History   Problem Relation Age of Onset    Heart disease Father     COPD Father     Heart attack Father     Alzheimer's disease Mother     Cancer Sister         breast cancer    Breast cancer Sister     Alzheimer's disease Sister     Other Brother         TIA    Hyperlipidemia Brother     Rheumatic fever Brother         as a child    Nephrolithiasis Daughter     Depression Son     Post-traumatic stress disorder Son     Cancer Maternal Aunt         breast cancer    Cancer Maternal Grandmother         breast cancer    Cancer Cousin         colon cancer     Social History     Tobacco Use    Smoking status: Never    Smokeless tobacco: Never   Substance Use Topics    Alcohol use: Yes     Comment: occasionally    Drug use: No     Review of Systems   Unable to perform ROS: Patient nonverbal     Objective:     Vitals:    Temp: 97.6 °F (36.4 °C)  Pulse: 85  Rhythm: normal sinus rhythm  BP: (!) 127/59  MAP (mmHg): 85  Resp: 17  SpO2: 100 %    Temp  Min: 97.6 °F (36.4 °C)  Max: 98.2 °F (36.8 °C)  Pulse  Min: 64  Max: 102  BP  Min: 119/59  Max: 162/68  MAP (mmHg)  Min: 80  Max: 107  Resp  Min: 10  Max: 26  SpO2  Min: 81 %  Max: 100 %  Oxygen Concentration (%)  Min: 28  Max: 28    03/07 0701 - 03/08 0700  In: 1062 [I.V.:12]  Out: 1450 [Urine:1450]            Physical Exam  Vitals and nursing note reviewed.    Constitutional:       General: She is not in acute distress.     Appearance: Normal appearance. She is not ill-appearing, toxic-appearing or diaphoretic.   HENT:      Head: Normocephalic.   Eyes:      Extraocular Movements: Extraocular movements intact.      Pupils: Pupils are equal, round, and reactive to light.   Cardiovascular:      Rate and Rhythm: Normal rate and regular rhythm.      Pulses: Normal pulses.      Heart sounds: Normal heart sounds.   Pulmonary:      Effort: Pulmonary effort is normal. No respiratory distress.      Breath sounds: Normal breath sounds. No stridor. No wheezing, rhonchi or rales.   Chest:      Chest wall: No tenderness.   Abdominal:      General: Abdomen is flat.      Palpations: Abdomen is soft.      Tenderness: There is no abdominal tenderness. There is no guarding or rebound.   Musculoskeletal:         General: No swelling or tenderness.      Cervical back: Neck supple.      Right lower leg: No edema.      Left lower leg: No edema.      Comments: R femoral closure device intact, pulses 2+ in all 4 extremities   Skin:     General: Skin is warm and dry.      Capillary Refill: Capillary refill takes less than 2 seconds.      Coloration: Skin is not jaundiced or pale.      Findings: No bruising.   Neurological:      Mental Status: She is alert.      Comments: Pt is alert, globally aphasic but mimics  No pronator drift, ROSARIO purposefully   Psychiatric:      Comments: KELSIE       Unable to test orientation, language, memory, judgment, insight, fund of knowledge, hearing, shoulder shrug, tongue protrusion, coordination, gait due to level of consciousness.       Today I personally reviewed pertinent medications, imaging, laboratory results, notably:        Assessment/Plan:     Neuro  * Cerebrovascular accident (CVA) due to embolism of left middle cerebral artery  Antithrombotics for secondary stroke prevention:  aspirin 325 mg & clopidogrel 300 mg x 1 at OSH  - daily aspirin 81 mg /   clopidogrel 75 mg x 30 days followed by monotherapy thereafter      Statins for secondary stroke prevention and hyperlipidemia, if present:   Statins: Atorvastatin- 40 mg daily     Aggressive risk factor modification: HTN, HLD, Diet, Exercise, CAD     Rehab efforts: The patient has been evaluated by a stroke team provider and the therapy needs have been fully considered based off the presenting complaints and exam findings. The following therapy evaluations are needed: PT evaluate and treat, OT evaluate and treat, SLP evaluate and treat, PM&R evaluate for appropriate placement     Diagnostics ordered/pending: MRI head without contrast to assess brain parenchyma, TTE to assess cardiac function/status      VTE prophylaxis: Heparin 5000 units SQ every 8 hours     BP parameters: Infarct: Post sucessful thrombectomy, SBP <140  Post unsucessful thrombectomy, SBP <220    - Q1h neuro checks  - MRI today  - Regular diet                The patient is being Prophylaxed for:  Venous Thromboembolism with: Chemical  Stress Ulcer with: Not Applicable   Ventilator Pneumonia with: not applicable    Activity Orders            Progressive Mobility Protocol (mobilize patient to their highest level of functioning at least twice daily) starting at 03/07 2000    Turn patient starting at 03/07 1800    Elevate HOB starting at 03/07 1749    Diet NPO: NPO starting at 03/07 1749          Full Code    Justin House MD  Neurocritical Care  [facility] - Neuro Critical Care

## 2024-03-08 NOTE — PLAN OF CARE
"[facility] Care Plan    POC reviewed with Prema Phillips and family at 0300. Pt verbalized understanding. Questions and concerns addressed. No acute events overnight. Pt progressing toward goals. Will continue to monitor. See below and flowsheets for full assessment and VS info.   -neuro exam unchanged overnight   - Straight cath x1 overnight   - BP WDL, Labetalol x1         Is this a stroke patient? yes- Stroke booklet reviewed with patient, risk factors identified for patient and stroke booklet remains at bedside for ongoing education.     Care individualization: pt likes several blankets as normally cold natured  Restraints: n/a       Neuro:  [place] Coma Scale  Best Eye Response: 4-->(E4) spontaneous  Best Motor Response: 5-->(M5) localizes pain  Best Verbal Response: 1-->(V1) none (pt shruggs to verbal communication)  [place] Coma Scale Score: 10  Assessment Qualifiers: patient not sedated/intubated, no eye obstruction present  Pupil PERRLA: yes     24hr Temp:  [97.8 °F (36.6 °C)-98.2 °F (36.8 °C)]     CV:   Rhythm: normal sinus rhythm  BP goals:   SBP < 140  MAP > 65    Resp:      Oxygen Concentration (%): 28    Plan: N/A    GI/:        Last Bowel Movement: 03/06/24  Voiding Characteristics: external catheter    Intake/Output Summary (Last 24 hours) at 3/8/2024 0707  Last data filed at 3/8/2024 0505  Gross per 24 hour   Intake 1062 ml   Output 1450 ml   Net -388 ml          Labs/Accuchecks:  Recent Labs   Lab 03/08/24  0056   WBC 6.02   RBC 3.73*   HGB 11.4*   HCT 34.5*         Recent Labs   Lab 03/08/24  0056      K 4.4   CO2 22*      BUN 11   CREATININE 0.7   ALKPHOS 71   ALT 11   AST 18   BILITOT 0.3      Recent Labs   Lab 03/07/24  1303   INR 1.0    No results for input(s): "CPK", "CPKMB", "TROPONINI", "MB" in the last 168 hours.    Electrolytes: N/A - electrolytes WDL  Accuchecks: Q6H    Gtts:      LDA/Wounds:    Nurses Note -- 4 Eyes    Is there altered skin present? no     Prevention " Measures Documented    Second RN/Staff Member:  TRAMAINE Elliott   Problem: Bowel Elimination Impaired (Stroke, Ischemic/Transient Ischemic Attack)  Goal: Effective Bowel Elimination  Outcome: Ongoing, Progressing     Problem: Cerebral Tissue Perfusion (Stroke, Ischemic/Transient Ischemic Attack)  Goal: Optimal Cerebral Tissue Perfusion  Outcome: Ongoing, Progressing     Problem: Cognitive Impairment (Stroke, Ischemic/Transient Ischemic Attack)  Goal: Optimal Cognitive Function  Outcome: Ongoing, Progressing     Problem: Communication Impairment (Stroke, Ischemic/Transient Ischemic Attack)  Goal: Improved Communication Skills  Outcome: Ongoing, Progressing

## 2024-03-08 NOTE — PLAN OF CARE
03/08/24 1253   Post-Acute Status   Post-Acute Authorization Placement   Post-Acute Placement Status Referrals Sent   Coverage [employer] PPO   Patient choice form signed by patient/caregiver List with quality metrics by geographic area provided;List from System Post-Acute Care   Discharge Delays None known at this time   Discharge Plan   Discharge Plan A Rehab   Discharge Plan B Skilled Nursing Facility         Met with patient and  to review discharge recommendation of Inpatient Rehab and is agreeable to plan    [employer] rehab list provided for choices.  Only 3 local choices near patient's home available on [employer] list.  Per , he would like to start with the 3 local choices and will consider expanding if none of the 3 facilities can accept patient.     Patient/family provided list of facilities in-network with patient's payor plan. Providers that are owned, operated, or affiliated with [facility] are included on the list.     Notified that referral sent to below listed facilities from in-network list based on proximity to home/family support:   [facility]  2.   [facility]  3.   [facility]  4.  5.     Patient/family instructed to identify preference.    Preferred Facility: (if more than 1, listed in order of descending preference)   [facility]    If an additional preferred facility not listed above is identified, additional referral to be sent. If above facilities unable to accept, will send additional referrals to in-network providers.        Discharge Plan A and Plan B have been determined by review of patient's clinical status, future medical and therapeutic needs, and coverage/benefits for post-acute care in coordination with multidisciplinary team members.      Nidia Fernandez RN, CCRN-K, [facility]  Neuro-Critical Care   [phone]

## 2024-03-08 NOTE — SUBJECTIVE & OBJECTIVE
Past Medical History:   Diagnosis Date    Acute ischemic left MCA stroke 3/7/2024    Allergic rhinitis 5/31/2016    Carotid artery plaque 11/18/2013    History of total hysterectomy with bilateral salpingo-oophorectomy (BSO) 5/31/2016    Hyperlipidemia 7/17/2012    Hypothyroid     Insomnia     Macular degeneration, right eye 5/31/2016    Migraine headache     Osteopenia of the elderly 10/20/2015    Postmenopausal HRT (hormone replacement therapy)     RBBB 7/17/2012     Past Surgical History:   Procedure Laterality Date    APPENDECTOMY      Catatact surgery Right 06/19/2019    without any complications    COLONOSCOPY  08/27/2019    repeat in 5 years    EYE SURGERY Bilateral     cataracts extraction    HYSTERECTOMY      STAPEDES SURGERY  2006    TOTAL ABDOMINAL HYSTERECTOMY W/ BILATERAL SALPINGOOPHORECTOMY      TUBAL LIGATION        Current Facility-Administered Medications on File Prior to Encounter   Medication Dose Route Frequency Provider Last Rate Last Admin    [COMPLETED] aspirin suppository 300 mg  300 mg Rectal ED 1 Time Ashley Rivera MD   300 mg at 03/07/24 1347    [COMPLETED] iohexoL (OMNIPAQUE 350) injection 100 mL  100 mL Intravenous ONCE PRN Ashley Rivera MD   100 mL at 03/07/24 1334     Current Outpatient Medications on File Prior to Encounter   Medication Sig Dispense Refill    amitriptyline (ELAVIL) 25 MG tablet TAKE 1/2-1 TABLET BY MOUTH NIGHTLY FOR INSOMNIA. OK TO TAKE WITH THE LOW DOSE OF ATIVAN 90 tablet 4    aspirin (ECOTRIN) 81 MG EC tablet Take 81 mg by mouth once daily.      atorvastatin (LIPITOR) 20 MG tablet Take 1 tablet (20 mg total) by mouth once daily. 90 tablet 3    azelastine (ASTELIN) 137 mcg (0.1 %) nasal spray 1 spray (137 mcg total) by Nasal route 2 (two) times daily. 30 mL 11    calcium-vitamin D3-vitamin K 500-100-40 mg-unit-mcg Chew Take by mouth once daily.      carBAMazepine (TEGRETOL XR) 100 MG 12 hr tablet Take 1-2 tablets (100-200 mg total) by mouth 2 (two)  times daily. 120 tablet 5    cholecalciferol, vitamin D3, (VITAMIN D3) 50 mcg (2,000 unit) Tab once daily.       coenzyme Q10 200 mg capsule Take 200 mg by mouth once daily.      EScitalopram oxalate (LEXAPRO) 20 MG tablet Take 1 tablet (20 mg total) by mouth once daily. 90 tablet 3    fluticasone propionate (FLONASE) 50 mcg/actuation nasal spray SPRAY TWICE IN EACH NOSTRIL ONCE DAILY 16 mL 11    ibuprofen (ADVIL,MOTRIN) 800 MG tablet Take 1 tablet (800 mg total) by mouth 2 (two) times daily as needed for Pain. 60 tablet 3    levocetirizine (XYZAL) 5 MG tablet Take 1 tablet (5 mg total) by mouth every evening. 30 tablet 11    levothyroxine (SYNTHROID) 75 MCG tablet TAKE 1 TABLET DAILY BEFORE BREAKFAST 90 tablet 3    LORazepam (ATIVAN) 0.5 MG tablet Take 1-2 tablets by mouth daily as needed for increased anxiety/panic attack. 60 tablet 1    multivitamin capsule Take 1 capsule by mouth once daily.      omeprazole (PRILOSEC) 20 MG capsule Take 1 capsule (20 mg total) by mouth every morning. 90 capsule 3    ondansetron (ZOFRAN-ODT) 4 MG TbDL Take 1 tablet (4 mg total) by mouth every 8 (eight) hours as needed (nausea). 30 tablet 0    triamcinolone acetonide 0.025% (KENALOG) 0.025 % Oint Apply topically 2 (two) times daily. 15 g 2    TURMERIC, BULK, MISC by Misc.(Non-Drug; Combo Route) route once daily.      VIT A/VIT C/VIT E/ZINC/COPPER (PRESERVISION AREDS ORAL) Take by mouth once daily.         Allergies: Neomycin  Family History   Problem Relation Age of Onset    Heart disease Father     COPD Father     Heart attack Father     Alzheimer's disease Mother     Cancer Sister         breast cancer    Breast cancer Sister     Alzheimer's disease Sister     Other Brother         TIA    Hyperlipidemia Brother     Rheumatic fever Brother         as a child    Nephrolithiasis Daughter     Depression Son     Post-traumatic stress disorder Son     Cancer Maternal Aunt         breast cancer    Cancer Maternal Grandmother          breast cancer    Cancer Cousin         colon cancer     Social History     Tobacco Use    Smoking status: Never    Smokeless tobacco: Never   Substance Use Topics    Alcohol use: Yes     Comment: occasionally    Drug use: No     Review of Systems   Unable to perform ROS: Patient nonverbal     Objective:     Vitals:    Temp: 98 °F (36.7 °C)  Pulse: 84  BP: 134/67  Resp: (!) 24  SpO2: 100 %  Oxygen Concentration (%): 28    Temp  Min: 98 °F (36.7 °C)  Max: 98.2 °F (36.8 °C)  Pulse  Min: 79  Max: 84  BP  Min: 134/67  Max: 151/67  MAP (mmHg)  Min: 97  Max: 97  Resp  Min: 16  Max: 24  SpO2  Min: 95 %  Max: 100 %  Oxygen Concentration (%)  Min: 28  Max: 28    No intake/output data recorded.            Physical Exam  Vitals and nursing note reviewed.   Constitutional:       General: She is not in acute distress.     Appearance: Normal appearance. She is not ill-appearing, toxic-appearing or diaphoretic.   HENT:      Head: Normocephalic.   Eyes:      Extraocular Movements: Extraocular movements intact.      Pupils: Pupils are equal, round, and reactive to light.   Cardiovascular:      Rate and Rhythm: Normal rate and regular rhythm.      Pulses: Normal pulses.      Heart sounds: Normal heart sounds.   Pulmonary:      Effort: Pulmonary effort is normal. No respiratory distress.      Breath sounds: Normal breath sounds. No stridor. No wheezing, rhonchi or rales.   Chest:      Chest wall: No tenderness.   Abdominal:      General: Abdomen is flat.      Palpations: Abdomen is soft.      Tenderness: There is no abdominal tenderness. There is no guarding or rebound.   Musculoskeletal:         General: No swelling or tenderness.      Cervical back: Neck supple.      Right lower leg: No edema.      Left lower leg: No edema.      Comments: R femoral closure device intact, pulses 2+ in all 4 extremities   Skin:     General: Skin is warm and dry.      Capillary Refill: Capillary refill takes less than 2 seconds.      Coloration: Skin is  not jaundiced or pale.      Findings: No bruising.   Neurological:      Mental Status: She is alert.      Comments: Pt is alert, globally aphasic but mimics  No pronator drift, ROSARIO purposefully   Psychiatric:      Comments: KELSIE       Unable to test orientation, language, memory, judgment, insight, fund of knowledge, hearing, shoulder shrug, tongue protrusion, coordination, gait due to level of consciousness.       Today I personally reviewed pertinent medications, imaging, laboratory results, notably:

## 2024-03-08 NOTE — HOSPITAL COURSE
"3/7/24: Transfer for L M2 occlusion on CTA after NIH 10 w/ RUE weakness, global aphasia, AMS (LKW 0630 3/7) OOW for TNK. ASA+Plavix loaded and taken with IR for TICI3C  3/8/24: On post-angio pathway with exam NIH 9 with improving with patient still globally aphasic but able to mimic/comprehend some of speech/commands. RUE with improving strength, no drift. Remainder of exam non-focal. Awaiting MRI brain.  03/09/2024 MRI R MCA infarct without hemorrhage or mass effect. Remains largely aphasic, followed verbal command to close eyes. Stepped down to NPU.  03/10/2024 Exam stable. Pending IPR.  03/11/2024. No acute events overnight, however, during rounds this morning, team was notified of mental status change and a period of"blanking out" during OT session in the bathroom. Patient was assisted to the bedside recliner. Upon assessment, she was awake and at her baseline neurological status. Stat CTH and 1-hr EEG was ordered. Otherwise, low Na level on labs today. Pending IPR placement.   3-12-24 CT head from yesterday small amount SAH seen DAPT on hold. EEG no seizure activity seen  3-13-24 no acute events overnight, restart DAPT and ready for rehab  "

## 2024-03-08 NOTE — TRANSFER OF CARE
"Anesthesia Transfer of Care Note    Patient: Prema Phillips    Procedure(s) Performed: * No procedures listed *    Patient location: ICU    Anesthesia Type: general    Transport from OR: Transported from OR on 6-10 L/min O2 by face mask with adequate spontaneous ventilation. Upon arrival to PACU/ICU, patient attached to ventilator and auscultated to confirm bilateral breath sounds and adequate TV. Continuous ECG monitoring in transport. Continuous SpO2 monitoring in transport    Post pain: adequate analgesia    Post assessment: no apparent anesthetic complications    Post vital signs: stable    Level of consciousness: lethargic    Nausea/Vomiting: no nausea/vomiting    Complications: none    Transfer of care protocol was followed      Last vitals: Visit Vitals  /67   Pulse 84   Temp 36.7 °C (98 °F) (Oral)   Resp (!) 24   Ht 5' 6" (1.676 m)   Wt 61.2 kg (134 lb 14.7 oz)   SpO2 100%   BMI 21.78 kg/m²     "

## 2024-03-08 NOTE — MEDICAL/APP STUDENT
"DATE  03/08/2024  Embolic stroke suspected  SUBJECTIVE:  History of Present Illness:  Prema Phillips is a 79 y.o. female w/ acute ischemic L MCA stroke s/p thrombectomy who has a MHx relevant for HTN, HLD, B/L carotid stenosis, RBBB who presented as an acute stroke transfer for IR intervention after initial OSH presentation w/ aphasia and diagnostiscs consistent w/ L M2 occlusion. On arrival, NIHSS 10 & GCS 11. Pt was not a thrombolysis candidate d/t sx onset being >4.5 hrs. Pt was eligible for mechanical endovascular reperfusion and transported directly to IR suite shortly after arrival to [facility]. Post VENICE TICI grade 3.    Pt presented yesterday March 7th due to global aphasia. Patient's LKN was on March 7 @0630 by her  before he left the house. When he returned home around 12pm, found her roaming around the house and nonverbal.  reports there was some blood on the floor and suspects she fell. Pt noted to have abrasian on R hand and bruise on R side of forehead. Pt went to CT immediately on arrival to [facility] ED.     ANT. On exam, pt responds with shrugs, but her  who is at bedside reports she has been able to say the words "I don't know" when asked questions. Pt followed commands and appeared to understand questions, but only responded with shrugs. Her  brought her hearing aids this morning.   Unable to squeeze my hands, but no pronator drift nor motor drift in all 4 extremities. Pt would also raise arms when asked to do other commands, for example, when asked to lift her leg, pt raised both arms. However when I lifted the pt's leg and asked her to keep it lifted, pt was able to do so.    NIH Stroke Scale  1a. Level of Consciousness: 0=alert; keenly responsive  1b. Level of Consciousness questions (Age,  Month):  2=Answers neither task correctly d/t aphasia  1c.: Level of Consciousness: commands (Blink,  Open/Close Fist):  1=Answers one task correctly   2. Horizontal extraocular movement for " "gaze: 0=normal  3. Visual fields: 0=No visual loss  4. Facial palsy: 0=Normal symmetric movement  5a. Motor drift, left arm for 10 seconds: 0=No drift, limb holds 90 (or 45) degrees for full 10 seconds  5b. Motor drift, right arm for 10 seconds: 0=No drift, limb holds 90 (or 45) degrees for full 10 seconds  6a. Motor drift, left leg for 5 seconds: 0=No drift, limb holds 90 (or 45) degrees for full 10 seconds  6b. Motor drift, right leg for 5 seconds: 0=No drift, limb holds 90 (or 45) degrees for full 10 seconds  7. Limb ataxia (finger-nose/finger, heel-shin): 2=Present in two limbs   8. Sensory: 0=Normal; no sensory loss unable to assess d/t aphasia  9. Best Language: 3=Mute, global aphasia; no usable speech or auditory comprehension pt responds w/ shrugs, but her  reports she said the words "I don't know" when asked questions.  10. Dysarthria: 2=Severe; patient speech is so slurred as to be unintelligible in the absence of or our of proportion to any dysphagia, or is mute/anarthric unable to assess d/t aphasia  11. Extinction and Inattention (formerly  Neglect):  {extinction neuro:98985::"0=No abnormality"} unable to assess d/t aphasia.  NIH Stroke Scale Scoring Today: 10    Previous score was 10 taken on ***. Today, NIH Stroke Scale score is {BETTER/WORSE/SAME AS/:89588} compared to the  previous scoring.    Allergies:  Review of patient's allergies indicates:   Allergen Reactions    Neomycin Other (See Comments) and Swelling     Other reaction(s): Unknown       Current Medications:  Current Facility-Administered Medications   Medication Dose Route Frequency Provider Last Rate Last Admin    aspirin EC tablet 81 mg  81 mg Oral Daily Justin House MD   81 mg at 03/08/24 0840    atorvastatin tablet 40 mg  40 mg Oral Daily Justin House MD   40 mg at 03/08/24 0840    bisacodyL suppository 10 mg  10 mg Rectal Daily PRN Justin House MD        clopidogreL tablet 75 mg  75 mg Oral Daily Justin House MD   75 " "mg at 24 0840    EScitalopram oxalate tablet 20 mg  20 mg Oral Daily Justin House MD   20 mg at 24 0840    heparin (porcine) injection 5,000 Units  5,000 Units Subcutaneous Q8H Justin House MD   5,000 Units at 24 0549    labetalol 20 mg/4 mL (5 mg/mL) IV syring  10 mg Intravenous Q4H PRN Cara Salas PA-C        levothyroxine tablet 75 mcg  75 mcg Oral Before breakfast Justin House MD        sodium chloride 0.9% flush 10 mL  10 mL Intravenous PRN Ike Peterson MD           Review of Systems:  Review of Systems      OBJECTIVE:    Vital Signs (Most Recent)  BP (!) 144/63 (BP Location: Right arm, Patient Position: Lying)   Pulse 98   Temp 97.8 °F (36.6 °C) (Axillary)   Resp 18   Ht 5' 6" (1.676 m)   Wt 65.8 kg (145 lb)   SpO2 99%   BMI 23.40 kg/m²   Temp (24hrs), Av °F (36.7 °C), Min:97.8 °F (36.6 °C), Max:98.2 °F (36.8 °C)  {*** HELP TEXT ***    This SmartLink requires parameters for processing. Parameters allow for more information to be given to the SmartLink. This allows you to request specific information by giving the SmartLink precise direction.    The SmartLink accepts a list of result component base names  by commas. You can also request the number of results to display for each component. To indicate the number of results for each component, type the component name followed by a colon and then the number of results (Name:4). For all results for that particular component, type a star in place of the number (Name:*). To obtain the first and last results for a particular component, type FL in place of the number or the star (Name:FL). To obtain the last result for a particular component, type the component name without a colon, number, or star.    Example: .LABS[MCH:3,MCV:*,HCT    Your results may be limited by:    Lookback limit - results older than 4380 hours will be excluded.  }  Significant Labs:    CBC  Recent Labs   Lab 24  1303 " 03/08/24  0056   WBC 6.62 6.02   HGB 13.0 11.4*   HCT 38.2 34.5*    176       CMP  Recent Labs   Lab 03/07/24  1303 03/08/24  0056   * 136   K 4.3 4.4    104   CO2 23 22*   ANIONGAP 11 10   BUN 13 11   CREATININE 0.8 0.7    93   CALCIUM 9.2 8.8   MG  --  2.0   PHOS  --  3.9   ALKPHOS 76 71   ALT 16 11   AST 23 18   ALBUMIN 3.8 3.3*   PROT 7.2 6.1   BILITOT 0.3 0.3   INR 1.0  --         Physical Exam:  Physical Exam    Laboratory Results:  @[id number](WBC,HGB,HCT,MCV,PLT,PH,GLUCOSE,NA,K,MG,CL,CO2,BUN,Creatinine,Calcium,HCO3,CPK,MB,PT,INR,APTT,ALT,AST  ,ALKPHOS,BILITOT,PROT,ALBUMIN)@    Imaging/Pathology Results:  ***    Reviewed past medical, surgical & family history.  She has a past medical history of Acute ischemic left MCA stroke, Allergic rhinitis, Carotid artery plaque, History of total hysterectomy with bilateral salpingo-oophorectomy (BSO), Hyperlipidemia, Hypothyroid, Insomnia, Macular degeneration, right eye, Migraine headache, Osteopenia of the elderly, Postmenopausal HRT (hormone replacement therapy), and RBBB.  She has a past surgical history that includes Stapedes surgery (2006); Total abdominal hysterectomy w/ bilateral salpingoophorectomy; Appendectomy; Hysterectomy; Tubal ligation; Catatact surgery (Right, 06/19/2019); Colonoscopy (08/27/2019); and Eye surgery (Bilateral).  Her family history includes Alzheimer's disease in her mother and sister; Breast cancer in her sister; COPD in her father; Cancer in her cousin, maternal aunt, maternal grandmother, and sister; Depression in her son; Heart attack in her father; Heart disease in her father; Hyperlipidemia in her brother; Nephrolithiasis in her daughter; Other in her brother; Post-traumatic stress disorder in her son; Rheumatic fever in her brother.  Social History     Socioeconomic History    Marital status:     Number of children: 2   Tobacco Use    Smoking status: Never    Smokeless tobacco: Never   Substance and  Sexual Activity    Alcohol use: Yes     Comment: occasionally    Drug use: No    Sexual activity: Yes     Partners: Male     Comment: 2 kids,       Social Determinants of Health     Financial Resource Strain: Low Risk  (2/19/2024)    Overall Financial Resource Strain (CARDIA)     Difficulty of Paying Living Expenses: Not hard at all   Food Insecurity: No Food Insecurity (2/19/2024)    Hunger Vital Sign     Worried About Running Out of Food in the Last Year: Never true     Ran Out of Food in the Last Year: Never true   Transportation Needs: No Transportation Needs (2/19/2024)    PRAPARE - Transportation     Lack of Transportation (Medical): No     Lack of Transportation (Non-Medical): No   Physical Activity: Insufficiently Active (2/19/2024)    Exercise Vital Sign     Days of Exercise per Week: 3 days     Minutes of Exercise per Session: 40 min   Stress: Stress Concern Present (2/19/2024)    [place] [employer] - Occupational Stress Questionnaire     Feeling of Stress : To some extent   Social Connections: Socially Integrated (2/19/2024)    Social Connection and Isolation Panel [NHANES]     Frequency of Communication with Friends and Family: Twice a week     Frequency of Social Gatherings with Friends and Family: Once a week     Attends [employer] Services: More than 4 times per year     Active Member of Clubs or Organizations: Yes     Attends Club or Organization Meetings: More than 4 times per year     Marital Status:    Housing Stability: Low Risk  (2/19/2024)    Housing Stability Vital Sign     Unable to Pay for Housing in the Last Year: No     Number of Places Lived in the Last Year: 1     Unstable Housing in the Last Year: No         ASSESSMENT and PLAN:    Problem List:  Patient Active Problem List    Diagnosis Date Noted    Acute ischemic left MCA stroke 03/07/2024    Impaired gait and mobility 02/27/2024    Decreased range of motion with decreased strength 02/27/2024     Prediabetes 03/14/2022    Adjustment reaction with anxiety 04/13/2021    Stress due to family tension 04/13/2021    Atrophic vaginitis 07/23/2020    Diffuse cystic mastopathy of both breasts 07/23/2020    Gastritis 09/06/2019    Migraine headache 10/30/2017    Ear ringing 12/09/2016    Allergic rhinitis 05/31/2016    History of total hysterectomy with bilateral salpingo-oophorectomy (BSO) 05/31/2016    BMI 22.0-22.9, adult 05/31/2016    Macular degeneration, right eye 05/31/2016    Long-term use of aspirin therapy 05/31/2016    Family history of breast cancer in mother 05/31/2016    Osteopenia of the elderly 10/20/2015    Insomnia     Bilateral carotid artery stenosis 11/18/2013    RBBB 07/17/2012    Hyperlipidemia 07/17/2012    Hypothyroid 07/17/2012       Assessment & Plan      Assessment:  - NIH Stroke Scale Scoring today {0-42:43991}.  - Patient mood is ***.    Acute ischemic left MCA stroke  79 year old woman with history of hyperlipidemia, hypertension, bilateral carotid stenosis, and RBBB presenting as an acute stroke transfer for IR intervention after initial OSH presentation with aphasia and diagnostics consistent with L M2 occlusion. NIHSS 10 on arrival. Patient urgently transported to IR suite. Plan for Neurocritical Care admission thereafter.     Antithrombotics for secondary stroke prevention:  aspirin 325 mg & clopidogrel 300 mg x 1 at OSH  - daily aspirin 81 mg /  clopidogrel 75 mg x 30 days followed by monotherapy thereafter     Statins for secondary stroke prevention and hyperlipidemia, if present:   Statins: Atorvastatin- 40 mg daily    Aggressive risk factor modification: HTN, HLD, Diet, Exercise, CAD     Rehab efforts: The patient has been evaluated by a stroke team provider and the therapy needs have been fully considered based off the presenting complaints and exam findings. The following therapy evaluations are needed: PT evaluate and treat, OT evaluate and treat, SLP evaluate and treat, PM&R  evaluate for appropriate placement    Diagnostics ordered/pending: MRI head without contrast to assess brain parenchyma, TTE to assess cardiac function/status     VTE prophylaxis: Heparin 5000 units SQ every 8 hours    BP parameters: Infarct: Post sucessful thrombectomy, SBP <140  Post unsucessful thrombectomy, SBP <220        Bilateral carotid artery stenosis  History of.    Hyperlipidemia  - stroke risk factor  - LDL goal < 70  - high intensity statin    Prediabetes  - stroke risk factor    Acute ischemic left MCA stroke  Antithrombotics for secondary stroke prevention:  aspirin 325 mg & clopidogrel 300 mg x 1 at OSH  - daily aspirin 81 mg /  clopidogrel 75 mg x 30 days followed by monotherapy thereafter      Statins for secondary stroke prevention and hyperlipidemia, if present:   Statins: Atorvastatin- 40 mg daily     Aggressive risk factor modification: HTN, HLD, Diet, Exercise, CAD     Rehab efforts: The patient has been evaluated by a stroke team provider and the therapy needs have been fully considered based off the presenting complaints and exam findings. The following therapy evaluations are needed: PT evaluate and treat, OT evaluate and treat, SLP evaluate and treat, PM&R evaluate for appropriate placement     Diagnostics ordered/pending: MRI head without contrast to assess brain parenchyma, TTE to assess cardiac function/status      VTE prophylaxis: Heparin 5000 units SQ every 8 hours     BP parameters: Infarct: Post sucessful thrombectomy, SBP <140  Post unsucessful thrombectomy, SBP <220    - Q1h neuro checks          Hyperlipidemia  - stroke risk factor  - LDL goal < 70  - high intensity statin     Plan:  .a  Diagnostic cerebral angiogram and VENICE   - post successful thrombectomy SBP <140      - Patient will continue medical management for stroke prevention, including:  Antithrombotic medicine for secondary stroke prevention: aspirin 325 mg & clopidogrel 300 mg x 1 @ OSH  Daily aspirin 81 mg /  clopidogrel 75 mg x 30 days followed by monotherapy after  Statins for secondary stroke prevention and hyperlipidemia: atorvastatin 40 mg daily  Antihypertensives: ***  In-patient VTE prophylaxis: Heparin 5000 unit SQ q8h  - PT/OT: high-intensity therapy recommended following d/c. OT reports pt has a good rehab prognosis nand that pt would benefit from acute skilled OT services.  - Discharge Plan: ***    Patient Education:  - Per the American Stroke Association, the patient has been educated on secondary stroke prevention.  - Diet: Patient has been advised to follow a diet emphasizing vegetables, fruits, whole  grains, low-fat dairy products, fish, legumes, and nuts. Patient has been advised to limit sodium, sweets and red meat.  - Exercise: Patient has been advised that regular physical activity reduces stroke risk, positively impacts stroke risk factors and aids in  recovery. Patients who are able should engage in mild-to-moderate-intensity aerobic for a minimum of 10-20 minutes 3-4 times a  week. For patients with deficits that impair their ability to exercise, a supervised exercise program can be beneficial.  - Smoking: Patient has been advised that smoking approximately doubles the risk of stroke. Patient has been advised that counseling  with or without drug therapy is available to help quit smoking.  - Sleep Apnea: Patient has been advised that sleep apnea affects about 38%-40% of patients who have had a stroke. Patient has  been advised that a referral for testing is available, and that treatment with positive airway pressure may be beneficial.  I spent *** minutes on this visit, including face-to-face with the patient, chart review, and updating medical, surgical, social, and  family history. All questions were directed to the medical team and answered.  --  Urbano Arroyo, MS-3  [employer] Student

## 2024-03-08 NOTE — H&P
[facility] - Neuro Critical Care  Neurocritical Care  History & Physical    Admit Date: 3/7/2024  Service Date: 03/07/2024  Length of Stay: 0    Subjective:     Chief Complaint: Acute ischemic left MCA stroke    History of Present Illness: 79 year old woman with history of hyperlipidemia, hypertension, bilateral carotid stenosis, and RBBB presenting as an acute stroke transfer for IR intervention after initial OSH presentation. Patient's  reports that he last saw her normal at 6:30 a.m. today. He returned home at noon and found her want around the house but no longer talking or interactive. Said there was some blood on the floor and suspects that she fell. Diagnostics consistent with L M2 occlusion, but pt was outside TPA window at presentation. NIHSS 10 on arrival, with left pronator drift and global aphasia prior to intervention. Patient urgently transported to IR suite, underwent 2 pass TICI 3, now admitted to [facility] for close monitoring.       Past Medical History:   Diagnosis Date    Acute ischemic left MCA stroke 3/7/2024    Allergic rhinitis 5/31/2016    Carotid artery plaque 11/18/2013    History of total hysterectomy with bilateral salpingo-oophorectomy (BSO) 5/31/2016    Hyperlipidemia 7/17/2012    Hypothyroid     Insomnia     Macular degeneration, right eye 5/31/2016    Migraine headache     Osteopenia of the elderly 10/20/2015    Postmenopausal HRT (hormone replacement therapy)     RBBB 7/17/2012     Past Surgical History:   Procedure Laterality Date    APPENDECTOMY      Catatact surgery Right 06/19/2019    without any complications    COLONOSCOPY  08/27/2019    repeat in 5 years    EYE SURGERY Bilateral     cataracts extraction    HYSTERECTOMY      STAPEDES SURGERY  2006    TOTAL ABDOMINAL HYSTERECTOMY W/ BILATERAL SALPINGOOPHORECTOMY      TUBAL LIGATION        Current Facility-Administered Medications on File Prior to Encounter   Medication Dose Route Frequency Provider Last Rate Last Admin     [COMPLETED] aspirin suppository 300 mg  300 mg Rectal ED 1 Time Ashley Rivera MD   300 mg at 03/07/24 1347    [COMPLETED] iohexoL (OMNIPAQUE 350) injection 100 mL  100 mL Intravenous ONCE PRN Ashley Rivera MD   100 mL at 03/07/24 1334     Current Outpatient Medications on File Prior to Encounter   Medication Sig Dispense Refill    amitriptyline (ELAVIL) 25 MG tablet TAKE 1/2-1 TABLET BY MOUTH NIGHTLY FOR INSOMNIA. OK TO TAKE WITH THE LOW DOSE OF ATIVAN 90 tablet 4    aspirin (ECOTRIN) 81 MG EC tablet Take 81 mg by mouth once daily.      atorvastatin (LIPITOR) 20 MG tablet Take 1 tablet (20 mg total) by mouth once daily. 90 tablet 3    azelastine (ASTELIN) 137 mcg (0.1 %) nasal spray 1 spray (137 mcg total) by Nasal route 2 (two) times daily. 30 mL 11    calcium-vitamin D3-vitamin K 500-100-40 mg-unit-mcg Chew Take by mouth once daily.      carBAMazepine (TEGRETOL XR) 100 MG 12 hr tablet Take 1-2 tablets (100-200 mg total) by mouth 2 (two) times daily. 120 tablet 5    cholecalciferol, vitamin D3, (VITAMIN D3) 50 mcg (2,000 unit) Tab once daily.       coenzyme Q10 200 mg capsule Take 200 mg by mouth once daily.      EScitalopram oxalate (LEXAPRO) 20 MG tablet Take 1 tablet (20 mg total) by mouth once daily. 90 tablet 3    fluticasone propionate (FLONASE) 50 mcg/actuation nasal spray SPRAY TWICE IN EACH NOSTRIL ONCE DAILY 16 mL 11    ibuprofen (ADVIL,MOTRIN) 800 MG tablet Take 1 tablet (800 mg total) by mouth 2 (two) times daily as needed for Pain. 60 tablet 3    levocetirizine (XYZAL) 5 MG tablet Take 1 tablet (5 mg total) by mouth every evening. 30 tablet 11    levothyroxine (SYNTHROID) 75 MCG tablet TAKE 1 TABLET DAILY BEFORE BREAKFAST 90 tablet 3    LORazepam (ATIVAN) 0.5 MG tablet Take 1-2 tablets by mouth daily as needed for increased anxiety/panic attack. 60 tablet 1    multivitamin capsule Take 1 capsule by mouth once daily.      omeprazole (PRILOSEC) 20 MG capsule Take 1 capsule (20 mg total)  by mouth every morning. 90 capsule 3    ondansetron (ZOFRAN-ODT) 4 MG TbDL Take 1 tablet (4 mg total) by mouth every 8 (eight) hours as needed (nausea). 30 tablet 0    triamcinolone acetonide 0.025% (KENALOG) 0.025 % Oint Apply topically 2 (two) times daily. 15 g 2    TURMERIC, BULK, MISC by Misc.(Non-Drug; Combo Route) route once daily.      VIT A/VIT C/VIT E/ZINC/COPPER (PRESERVISION AREDS ORAL) Take by mouth once daily.         Allergies: Neomycin  Family History   Problem Relation Age of Onset    Heart disease Father     COPD Father     Heart attack Father     Alzheimer's disease Mother     Cancer Sister         breast cancer    Breast cancer Sister     Alzheimer's disease Sister     Other Brother         TIA    Hyperlipidemia Brother     Rheumatic fever Brother         as a child    Nephrolithiasis Daughter     Depression Son     Post-traumatic stress disorder Son     Cancer Maternal Aunt         breast cancer    Cancer Maternal Grandmother         breast cancer    Cancer Cousin         colon cancer     Social History     Tobacco Use    Smoking status: Never    Smokeless tobacco: Never   Substance Use Topics    Alcohol use: Yes     Comment: occasionally    Drug use: No     Review of Systems   Unable to perform ROS: Patient nonverbal     Objective:     Vitals:    Temp: 98 °F (36.7 °C)  Pulse: 84  BP: 134/67  Resp: (!) 24  SpO2: 100 %  Oxygen Concentration (%): 28    Temp  Min: 98 °F (36.7 °C)  Max: 98.2 °F (36.8 °C)  Pulse  Min: 79  Max: 84  BP  Min: 134/67  Max: 151/67  MAP (mmHg)  Min: 97  Max: 97  Resp  Min: 16  Max: 24  SpO2  Min: 95 %  Max: 100 %  Oxygen Concentration (%)  Min: 28  Max: 28    No intake/output data recorded.            Physical Exam  Vitals and nursing note reviewed.   Constitutional:       General: She is not in acute distress.     Appearance: Normal appearance. She is not ill-appearing, toxic-appearing or diaphoretic.   HENT:      Head: Normocephalic.   Eyes:      Extraocular Movements:  Extraocular movements intact.      Pupils: Pupils are equal, round, and reactive to light.   Cardiovascular:      Rate and Rhythm: Normal rate and regular rhythm.      Pulses: Normal pulses.      Heart sounds: Normal heart sounds.   Pulmonary:      Effort: Pulmonary effort is normal. No respiratory distress.      Breath sounds: Normal breath sounds. No stridor. No wheezing, rhonchi or rales.   Chest:      Chest wall: No tenderness.   Abdominal:      General: Abdomen is flat.      Palpations: Abdomen is soft.      Tenderness: There is no abdominal tenderness. There is no guarding or rebound.   Musculoskeletal:         General: No swelling or tenderness.      Cervical back: Neck supple.      Right lower leg: No edema.      Left lower leg: No edema.      Comments: R femoral closure device intact, pulses 2+ in all 4 extremities   Skin:     General: Skin is warm and dry.      Capillary Refill: Capillary refill takes less than 2 seconds.      Coloration: Skin is not jaundiced or pale.      Findings: No bruising.   Neurological:      Mental Status: She is alert.      Comments: Pt is alert, globally aphasic but mimics  No pronator drift, ROSARIO purposefully   Psychiatric:      Comments: KELSIE       Unable to test orientation, language, memory, judgment, insight, fund of knowledge, hearing, shoulder shrug, tongue protrusion, coordination, gait due to level of consciousness.       Today I personally reviewed pertinent medications, imaging, laboratory results, notably:        Assessment/Plan:     Neuro  * Acute ischemic left MCA stroke  Antithrombotics for secondary stroke prevention:  aspirin 325 mg & clopidogrel 300 mg x 1 at OSH  - daily aspirin 81 mg /  clopidogrel 75 mg x 30 days followed by monotherapy thereafter      Statins for secondary stroke prevention and hyperlipidemia, if present:   Statins: Atorvastatin- 40 mg daily     Aggressive risk factor modification: HTN, HLD, Diet, Exercise, CAD     Rehab efforts: The patient  has been evaluated by a stroke team provider and the therapy needs have been fully considered based off the presenting complaints and exam findings. The following therapy evaluations are needed: PT evaluate and treat, OT evaluate and treat, SLP evaluate and treat, PM&R evaluate for appropriate placement     Diagnostics ordered/pending: MRI head without contrast to assess brain parenchyma, TTE to assess cardiac function/status      VTE prophylaxis: Heparin 5000 units SQ every 8 hours     BP parameters: Infarct: Post sucessful thrombectomy, SBP <140  Post unsucessful thrombectomy, SBP <220    - Q1h neuro checks          Cardiac/Vascular  Hyperlipidemia  - stroke risk factor  - LDL goal < 70  - high intensity statin          The patient is being Prophylaxed for:  Venous Thromboembolism with: Chemical  Stress Ulcer with: Not Applicable   Ventilator Pneumonia with: not applicable    Activity Orders            Progressive Mobility Protocol (mobilize patient to their highest level of functioning at least twice daily) starting at 03/07 2000    Turn patient starting at 03/07 1800    Elevate HOB starting at 03/07 1749    Diet NPO: NPO starting at 03/07 1749          Full Code    Justin House MD  Neurocritical Care  [facility] - Neuro Critical Care

## 2024-03-08 NOTE — PLAN OF CARE
[facility] - Neuro Critical Care  Initial Discharge Assessment       Primary Care Provider: Odette Zhang MD    Admission Diagnosis: Aphasia [R47.01]    Admission Date: 3/7/2024  Expected Discharge Date:          Payor: HUMANA MANAGED MEDICARE / Plan: HUMANA MEDICARE PPO / Product Type: Medicare Advantage /     Extended Emergency Contact Information  Primary Emergency Contact: Yuan Phillips  Address: [address] [place] [address]           [place] [address] [facility]  Home Phone: [phone]  Mobile Phone: [phone]  Relation: Spouse    Discharge Plan A: Rehab  Discharge Plan B: Skilled Nursing Facility, Home with family, Home Health, Home      [id number] [place] - [address] [facility]  [address] [place] [address]  Phone: [phone] Fax: [phone]    [facility] [place] - [facility] AT [facility]  [place] [address]  Phone: [phone] Fax: [phone]      Initial Assessment (most recent)       Adult Discharge Assessment - 03/08/24 1200          Discharge Assessment    Assessment Type Discharge Planning Assessment     Confirmed/corrected address, phone number and insurance Yes     Confirmed Demographics Correct on Facesheet     Source of Information family     Communicated NINO with patient/caregiver Date not available/Unable to determine     Reason For Admission Cerebrovascular accident (CVA) due to embolism of left middle cerebral artery     People in Home spouse     Do you expect to return to your current living situation? Yes     Do you have help at home or someone to help you manage your care at home? Yes     Who are your caregiver(s) and their phone number(s)? Yuan Phillips ( ) [phone]     Walking or Climbing Stairs Difficulty no     Dressing/Bathing Difficulty no     Equipment Currently Used at Home --   Hearing Aid    Patient currently being followed by  outpatient case management? No     Do you currently have service(s) that help you manage your care at home? No     Do you take prescription medications? Yes     Do you have prescription coverage? Yes     Coverage HUMANA MANAGED MEDICARE - HUMANA MEDICARE PPO -     Is the patient taking medications as prescribed? yes     Who is going to help you get home at discharge? Flaco Phillips     Are you on dialysis? No     Do you take coumadin? No     Discharge Plan A Rehab     Discharge Plan B Skilled Nursing Facility;Home with family;Home Health;Home     DME Needed Upon Discharge  other (see comments)   TBD    Discharge Plan discussed with: --   Sister- In - Law       Physical Activity    On average, how many days per week do you engage in moderate to strenuous exercise (like a brisk walk)? Patient declined     On average, how many minutes do you engage in exercise at this level? Patient declined        Financial Resource Strain    How hard is it for you to pay for the very basics like food, housing, medical care, and heating? Patient declined        Housing Stability    In the last 12 months, was there a time when you were not able to pay the mortgage or rent on time? Patient declined     In the last 12 months, was there a time when you did not have a steady place to sleep or slept in a shelter (including now)? Patient declined        Transportation Needs    In the past 12 months, has lack of transportation kept you from medical appointments or from getting medications? Patient declined     In the past 12 months, has lack of transportation kept you from meetings, work, or from getting things needed for daily living? Patient declined        Food Insecurity    Within the past 12 months, you worried that your food would run out before you got the money to buy more. Patient declined     Within the past 12 months, the food you bought just didn't last and you didn't have money to get more. Patient declined        Stress    Do you  feel stress - tense, restless, nervous, or anxious, or unable to sleep at night because your mind is troubled all the time - these days? Patient declined        Social Connections    In a typical week, how many times do you talk on the phone with family, friends, or neighbors? Patient declined     How often do you get together with friends or relatives? Patient declined     How often do you attend Pentecostalism or Baptism services? Patient declined     Do you belong to any clubs or organizations such as Pentecostalism groups, unions, fraternal or athletic groups, or school groups? Patient declined     How often do you attend meetings of the clubs or organizations you belong to? Patient declined     Are you , , , , never , or living with a partner? Patient declined        Alcohol Use    Q1: How often do you have a drink containing alcohol? Patient declined     Q2: How many drinks containing alcohol do you have on a typical day when you are drinking? Patient declined     Q3: How often do you have six or more drinks on one occasion? Patient declined                        This SW met with patient and her sister- in- law  at bedside to complete DPA. Questions answered / contact numbers provided.  Use PREFERRED PHARMACY / BEDSIDE DELIVERY for any necessary medications at time of discharge. The patient is independent with all ADLs - does not use DME, In-home equipment, is not on HD, Coumadin or home oxygen.The patient's  will be assisting with help upon discharge. The patient's   will be providing transportation home.     Discharge Plan A and Plan B have been determined by review of patient's clinical status, future medical and therapeutic needs, and coverage/benefits for post-acute care in coordination with multidisciplinary team members.    Jessica Suarez LMSW  Case Management [facility]

## 2024-03-08 NOTE — PT/OT/SLP EVAL
Speech Language Pathology Evaluation  Cognitive/Bedside Swallow    Patient Name:  Prema Phillips   MRN:  [id number]  Admitting Diagnosis: Cerebrovascular accident (CVA) due to embolism of left middle cerebral artery    Recommendations:                  General Recommendations:  Dysphagia therapy, Speech/language therapy, and Cognitive-linguistic therapy  Diet recommendations:  Soft & Bite Sized Diet - IDDSI Level 6, Thin liquids - IDDSI Level 0   Aspiration Precautions: 1 bite/sip at a time, Alternating bites/sips, Check for pocketing/oral residue, Eliminate distractions, Feed only when awake/alert, HOB to 90 degrees, Meds crushed in puree, Small bites/sips, and Strict aspiration precautions   General Precautions: Standard, aspiration, fall  Communication strategies:  go to room if call light pushed    Assessment:     Prema Phillips is a 79 y.o. female with an SLP diagnosis of Aphasia and mild Dysphagia.     History:     Past Medical History:   Diagnosis Date    Acute ischemic left MCA stroke 3/7/2024    Allergic rhinitis 5/31/2016    Carotid artery plaque 11/18/2013    History of total hysterectomy with bilateral salpingo-oophorectomy (BSO) 5/31/2016    Hyperlipidemia 7/17/2012    Hypothyroid     Insomnia     Macular degeneration, right eye 5/31/2016    Migraine headache     Osteopenia of the elderly 10/20/2015    Postmenopausal HRT (hormone replacement therapy)     RBBB 7/17/2012       Past Surgical History:   Procedure Laterality Date    APPENDECTOMY      Catatact surgery Right 06/19/2019    without any complications    COLONOSCOPY  08/27/2019    repeat in 5 years    EYE SURGERY Bilateral     cataracts extraction    HYSTERECTOMY      STAPEDES SURGERY  2006    TOTAL ABDOMINAL HYSTERECTOMY W/ BILATERAL SALPINGOOPHORECTOMY      TUBAL LIGATION         Social History: Patient lives with her spouse, IND pta, reg diet/thin liquids.    Prior Intubation HX:  thrombectomy 3/7    Modified Barium Swallow: none reported    Chest  X-Rays: no new    Subjective     Nurse cleared for evaluation, pt alert and cooperative though mostly nonverbal to start session. Some increase in verbalization attempts by end of session.      Pain/Comfort:  Pain Rating 1:  (pt unable to state, NAD)  Pain Rating Post-Intervention 1:  (NAD)    Respiratory Status: Room air    Objective:     Cognitive Status:    Cont to assess 2/2 aphasia       Receptive Language:   Comprehension:   impaired  Questions Simple yes/no yes response via head nod only  Commands  One step x1 given max cues  Object identifications 0% in Fo 2    Expressive Language:  Verbal:    Impaired, pt nonfluent with limited vocalizations to start session,. Increased verbalization attempts by end of session thoguh primarily jargon  Automatic Speech  Counting 10% max cues, Phrase completion 0%, and singing happy birthday-10%  Repetition Words 0%        Motor Speech:  Cont to assess    Voice:   Limited initiation, low intensity, clear    Visual-Spatial:  tba    Reading:   tba      Written Expression:   tba    Oral Musculature Evaluation  Oral Musculature: unable to assess due to poor participation/comprehension (no obviousa symmetry at rest)  Dentition: present and adequate  Secretion Management: adequate  Mucosal Quality: adequate  Oral Labial Strength and Mobility: functional seal  Volitional Cough: not elicited  Volitional Swallow: not elicited  Voice Prior to PO Intake: minimal voicing prior to trials, clear    Bedside Swallow Eval:   Consistencies Assessed:  Thin liquids    Nectar thick liquids    Puree    Solids        Oral Phase:   Prolonged mastication-mild    Pharyngeal Phase:   Grimace observed with solids  no overt clinical signs/symptoms of aspiration  Audible swallow on thin x1 though 4 oz tolerated without overt s/s aspiration, vocal quality remained clear     Compensatory Strategies  None    Treatment: Education provided re: role of SLP, diet recs, swallow precs, s/s aspiration, and POC.   Spouse verbalized understanding and agreement.       Goals:   Multidisciplinary Problems       SLP Goals          Problem: SLP    Goal Priority Disciplines Outcome   SLP Goal     SLP Ongoing, Progressing   Description: Speech Language Pathology Goals  Goals expected to be met by 3/22  1. Pt will tolerate regular diet with thin liquids without overt s/s aspiration.   2. Pt will answer simple y/n questions with 70% accy.   3. Pt will follow simple commands with 50% accy with max cues.   4. Pt will complete simple auto speech tasks with 60% accy with max cues.   5. Pt will identify objects in field of 2 with 70% accy given max cues.   6. Pt will participate in assessment of reading, writing, visual spatial skills to determine need for tx.                        Plan:     Patient to be seen:  4 x/week   Plan of Care expires:  04/07/24  Plan of Care reviewed with:  patient, spouse   SLP Follow-Up:  Yes       Discharge recommendations:  Therapy Intensity Recommendations at Discharge: High Intensity Therapy   Barriers to Discharge:  Level of Skilled Assistance Needed      Time Tracking:     SLP Treatment Date:   03/08/24  Speech Start Time:  0714  Speech Stop Time:  0733     Speech Total Time (min):  19 min    Billable Minutes: Eval 10  and Eval Swallow and Oral Function 9    03/08/2024

## 2024-03-08 NOTE — PLAN OF CARE
"[facility] Care Plan    POC reviewed with Prema Phillips and family at 1800. Pt verbalized understanding. Questions and concerns addressed. No acute events today. Pt progressing toward goals. Will continue to monitor. See below and flowsheets for full assessment and VS info.   -post procedure 24 hr monitoring complete, neuro exam slowly improving  -MRI complete  -soft,bite sized diet added  -Straight cath x 1 for urinary retention  -Pending TTF        Is this a stroke patient? yes- Stroke booklet reviewed with patient and family, risk factors identified for patient and stroke booklet remains at bedside for ongoing education.     Care individualization: Pt needed help using hearing aids   Restraints: None        Neuro:  [place] Coma Scale  Best Eye Response: 4-->(E4) spontaneous  Best Motor Response: 5-->(M5) localizes pain  Best Verbal Response: 3-->(V3) inappropriate words  [place] Coma Scale Score: 12  Assessment Qualifiers: patient not sedated/intubated  Pupil PERRLA: yes     24 hr Temp:  [97.6 °F (36.4 °C)-98.7 °F (37.1 °C)]     CV:   Rhythm: normal sinus rhythm  BP goals:   SBP < 140  MAP > 65    Resp:      Oxygen Concentration (%): 28    Plan: N/A    GI/:     Diet/Nutrition Received: NPO  Last Bowel Movement: 03/06/24  Voiding Characteristics: due to void    Intake/Output Summary (Last 24 hours) at 3/8/2024 1756  Last data filed at 3/8/2024 0901  Gross per 24 hour   Intake 240 ml   Output 1450 ml   Net -1210 ml     Unmeasured Output  Stool Occurrence: 0    Labs/Accuchecks:  Recent Labs   Lab 03/08/24  0056   WBC 6.02   RBC 3.73*   HGB 11.4*   HCT 34.5*         Recent Labs   Lab 03/08/24  0056      K 4.4   CO2 22*      BUN 11   CREATININE 0.7   ALKPHOS 71   ALT 11   AST 18   BILITOT 0.3      Recent Labs   Lab 03/07/24  1303   INR 1.0    No results for input(s): "CPK", "CPKMB", "TROPONINI", "MB" in the last 168 hours.    Electrolytes: N/A - electrolytes WDL  Accuchecks: Q6H      LDA/Wounds: R hand " skin tear      Nurses Note -- 4 Eyes      Is there altered skin present? yes   Please check the following boxes that apply:   [x] LDA Added if Not in Epic (Describe Wound)   [x] New Altered Skin Integrity was Present on Admit and Documented in LDA   [x] Wound Image Taken    Wound Care Consulted? Yes    Second RN/Staff Member:  TRAMAINE Osorio

## 2024-03-08 NOTE — CONSULTS
Inpatient consult to Physical Medicine Rehab  Consult performed by: Jamee Holcomb NP  Consult ordered by: Justin House MD  Reason for consult: Rehab      Consult received. PM&R following.     LAURY Joy, FNP-C  Physical Medicine & Rehabilitation   03/08/2024

## 2024-03-08 NOTE — ASSESSMENT & PLAN NOTE
LDL 61; continue statin   Patient had episode of bradycardia with 2nd degree AV block, on call MD notified via perfect-serve patient assessed, asymptomatic, head of bed elevated, will continue to monitor.

## 2024-03-09 PROBLEM — R73.03 PREDIABETES: Chronic | Status: ACTIVE | Noted: 2022-03-14

## 2024-03-09 LAB
ALBUMIN SERPL BCP-MCNC: 3.4 G/DL (ref 3.5–5.2)
ALP SERPL-CCNC: 73 U/L (ref 55–135)
ALT SERPL W/O P-5'-P-CCNC: 13 U/L (ref 10–44)
ANION GAP SERPL CALC-SCNC: 12 MMOL/L (ref 8–16)
AST SERPL-CCNC: 20 U/L (ref 10–40)
BASOPHILS # BLD AUTO: 0.05 K/UL (ref 0–0.2)
BASOPHILS NFR BLD: 0.7 % (ref 0–1.9)
BILIRUB SERPL-MCNC: 0.3 MG/DL (ref 0.1–1)
BUN SERPL-MCNC: 11 MG/DL (ref 8–23)
CALCIUM SERPL-MCNC: 8.9 MG/DL (ref 8.7–10.5)
CHLORIDE SERPL-SCNC: 101 MMOL/L (ref 95–110)
CO2 SERPL-SCNC: 23 MMOL/L (ref 23–29)
CREAT SERPL-MCNC: 0.7 MG/DL (ref 0.5–1.4)
DIFFERENTIAL METHOD BLD: ABNORMAL
EOSINOPHIL # BLD AUTO: 0 K/UL (ref 0–0.5)
EOSINOPHIL NFR BLD: 0.4 % (ref 0–8)
ERYTHROCYTE [DISTWIDTH] IN BLOOD BY AUTOMATED COUNT: 12.8 % (ref 11.5–14.5)
EST. GFR  (NO RACE VARIABLE): >60 ML/MIN/1.73 M^2
GLUCOSE SERPL-MCNC: 91 MG/DL (ref 70–110)
HCT VFR BLD AUTO: 35.3 % (ref 37–48.5)
HGB BLD-MCNC: 11.6 G/DL (ref 12–16)
IMM GRANULOCYTES # BLD AUTO: 0.01 K/UL (ref 0–0.04)
IMM GRANULOCYTES NFR BLD AUTO: 0.1 % (ref 0–0.5)
LYMPHOCYTES # BLD AUTO: 1.2 K/UL (ref 1–4.8)
LYMPHOCYTES NFR BLD: 15.3 % (ref 18–48)
MAGNESIUM SERPL-MCNC: 2 MG/DL (ref 1.6–2.6)
MCH RBC QN AUTO: 30.4 PG (ref 27–31)
MCHC RBC AUTO-ENTMCNC: 32.9 G/DL (ref 32–36)
MCV RBC AUTO: 92 FL (ref 82–98)
MONOCYTES # BLD AUTO: 0.6 K/UL (ref 0.3–1)
MONOCYTES NFR BLD: 8 % (ref 4–15)
NEUTROPHILS # BLD AUTO: 5.8 K/UL (ref 1.8–7.7)
NEUTROPHILS NFR BLD: 75.5 % (ref 38–73)
NRBC BLD-RTO: 0 /100 WBC
PHOSPHATE SERPL-MCNC: 3 MG/DL (ref 2.7–4.5)
PLATELET # BLD AUTO: 174 K/UL (ref 150–450)
PMV BLD AUTO: 10.6 FL (ref 9.2–12.9)
POCT GLUCOSE: 104 MG/DL (ref 70–110)
POCT GLUCOSE: 82 MG/DL (ref 70–110)
POCT GLUCOSE: 94 MG/DL (ref 70–110)
POTASSIUM SERPL-SCNC: 3.9 MMOL/L (ref 3.5–5.1)
PROT SERPL-MCNC: 6.4 G/DL (ref 6–8.4)
RBC # BLD AUTO: 3.82 M/UL (ref 4–5.4)
SODIUM SERPL-SCNC: 136 MMOL/L (ref 136–145)
WBC # BLD AUTO: 7.63 K/UL (ref 3.9–12.7)

## 2024-03-09 PROCEDURE — 85025 COMPLETE CBC W/AUTO DIFF WBC: CPT

## 2024-03-09 PROCEDURE — 20600001 HC STEP DOWN PRIVATE ROOM

## 2024-03-09 PROCEDURE — 63600175 PHARM REV CODE 636 W HCPCS

## 2024-03-09 PROCEDURE — 11000001 HC ACUTE MED/SURG PRIVATE ROOM

## 2024-03-09 PROCEDURE — 94761 N-INVAS EAR/PLS OXIMETRY MLT: CPT

## 2024-03-09 PROCEDURE — 84100 ASSAY OF PHOSPHORUS: CPT

## 2024-03-09 PROCEDURE — 51798 US URINE CAPACITY MEASURE: CPT

## 2024-03-09 PROCEDURE — 63600175 PHARM REV CODE 636 W HCPCS: Mod: JZ,JG | Performed by: PSYCHIATRY & NEUROLOGY

## 2024-03-09 PROCEDURE — 99233 SBSQ HOSP IP/OBS HIGH 50: CPT | Mod: ,,,

## 2024-03-09 PROCEDURE — 25000003 PHARM REV CODE 250

## 2024-03-09 PROCEDURE — 83735 ASSAY OF MAGNESIUM: CPT

## 2024-03-09 PROCEDURE — 80053 COMPREHEN METABOLIC PANEL: CPT

## 2024-03-09 PROCEDURE — 51701 INSERT BLADDER CATHETER: CPT

## 2024-03-09 PROCEDURE — 99233 SBSQ HOSP IP/OBS HIGH 50: CPT | Mod: GC,,, | Performed by: PSYCHIATRY & NEUROLOGY

## 2024-03-09 RX ORDER — POLYETHYLENE GLYCOL 3350 17 G/17G
17 POWDER, FOR SOLUTION ORAL 2 TIMES DAILY PRN
Status: DISCONTINUED | OUTPATIENT
Start: 2024-03-09 | End: 2024-03-13 | Stop reason: HOSPADM

## 2024-03-09 RX ORDER — AMOXICILLIN 250 MG
1 CAPSULE ORAL DAILY
Status: DISCONTINUED | OUTPATIENT
Start: 2024-03-09 | End: 2024-03-13 | Stop reason: HOSPADM

## 2024-03-09 RX ADMIN — LEVOTHYROXINE SODIUM 75 MCG: 75 TABLET ORAL at 05:03

## 2024-03-09 RX ADMIN — HEPARIN SODIUM 5000 UNITS: 5000 INJECTION INTRAVENOUS; SUBCUTANEOUS at 05:03

## 2024-03-09 RX ADMIN — ESCITALOPRAM OXALATE 20 MG: 20 TABLET ORAL at 08:03

## 2024-03-09 RX ADMIN — HEPARIN SODIUM 5000 UNITS: 5000 INJECTION INTRAVENOUS; SUBCUTANEOUS at 01:03

## 2024-03-09 RX ADMIN — HEPARIN SODIUM 5000 UNITS: 5000 INJECTION INTRAVENOUS; SUBCUTANEOUS at 09:03

## 2024-03-09 RX ADMIN — LABETALOL HYDROCHLORIDE 10 MG: 5 INJECTION, SOLUTION INTRAVENOUS at 04:03

## 2024-03-09 RX ADMIN — ASPIRIN 81 MG: 81 TABLET, COATED ORAL at 08:03

## 2024-03-09 RX ADMIN — ATORVASTATIN CALCIUM 40 MG: 40 TABLET, FILM COATED ORAL at 08:03

## 2024-03-09 RX ADMIN — CLOPIDOGREL BISULFATE 75 MG: 75 TABLET ORAL at 08:03

## 2024-03-09 NOTE — PLAN OF CARE
Problem: Physical Therapy  Goal: Physical Therapy Goal  Description: Goals to be met by: 2024     Patient will increase functional independence with mobility by performin. Supine to sit with [place]  2. Sit to supine with [place]  3. Rolling to Left and Right with [place].  4. Sit to stand transfer with Supervision  5. Bed to chair transfer with Supervision using No Assistive Device  6. Gait  x 200 feet with Supervision using No Assistive Device.   7. Lower extremity exercise program x10 reps per handout, with supervision        Outcome: Ongoing, Progressing

## 2024-03-09 NOTE — ASSESSMENT & PLAN NOTE
Carotid US 3/9/20  CONCLUSIONS   There is 20 - 39% right Internal Carotid stenosis.  There is 40 - 49% left Internal Carotid stenosis.     No significant stenosis

## 2024-03-09 NOTE — PLAN OF CARE
Problem: Adult Inpatient Plan of Care  Goal: Plan of Care Review  Outcome: Ongoing, Progressing  Goal: Patient-Specific Goal (Individualized)  Outcome: Ongoing, Progressing  Goal: Absence of Hospital-Acquired Illness or Injury  Outcome: Ongoing, Progressing  Goal: Optimal Comfort and Wellbeing  Outcome: Ongoing, Progressing  Goal: Readiness for Transition of Care  Outcome: Ongoing, Progressing     Problem: Adjustment to Illness (Stroke, Ischemic/Transient Ischemic Attack)  Goal: Optimal Coping  Outcome: Ongoing, Progressing     Problem: Bowel Elimination Impaired (Stroke, Ischemic/Transient Ischemic Attack)  Goal: Effective Bowel Elimination  Outcome: Ongoing, Progressing     Problem: Cerebral Tissue Perfusion (Stroke, Ischemic/Transient Ischemic Attack)  Goal: Optimal Cerebral Tissue Perfusion  Outcome: Ongoing, Progressing     Problem: Cognitive Impairment (Stroke, Ischemic/Transient Ischemic Attack)  Goal: Optimal Cognitive Function  Outcome: Ongoing, Progressing     Problem: Communication Impairment (Stroke, Ischemic/Transient Ischemic Attack)  Goal: Improved Communication Skills  Outcome: Ongoing, Progressing     Problem: Functional Ability Impaired (Stroke, Ischemic/Transient Ischemic Attack)  Goal: Optimal Functional Ability  Outcome: Ongoing, Progressing     Problem: Respiratory Compromise (Stroke, Ischemic/Transient Ischemic Attack)  Goal: Effective Oxygenation and Ventilation  Outcome: Ongoing, Progressing     Problem: Sensorimotor Impairment (Stroke, Ischemic/Transient Ischemic Attack)  Goal: Improved Sensorimotor Function  Outcome: Ongoing, Progressing     Problem: Swallowing Impairment (Stroke, Ischemic/Transient Ischemic Attack)  Goal: Optimal Eating and Swallowing without Aspiration  Outcome: Ongoing, Progressing     Problem: Urinary Elimination Impaired (Stroke, Ischemic/Transient Ischemic Attack)  Goal: Effective Urinary Elimination  Outcome: Ongoing, Progressing     Problem: Impaired Wound  Healing  Goal: Optimal Wound Healing  Outcome: Ongoing, Progressing

## 2024-03-09 NOTE — PT/OT/SLP EVAL
Physical Therapy Evaluation    Patient Name:  Prema Phillips   MRN:  [id number]    Recommendations:     Discharge Recommendations: High Intensity Therapy   Discharge Equipment Recommendations: walker, rolling   Barriers to discharge: Inaccessible home and Decreased caregiver support    Assessment:     Prema Phillips is a 79 y.o. female admitted with a medical diagnosis of Cerebrovascular accident (CVA) due to embolism of left middle cerebral artery.  She presents with the following impairments/functional limitations: weakness, impaired endurance, impaired self care skills, impaired functional mobility, gait instability, impaired balance, visual deficits, decreased upper extremity function, decreased lower extremity function, decreased safety awareness. Pt would benefit from high intensity/frequency therapy for: Dynamic/static standing/sitting balance through skilled balance training, strengthening with the use of skilled therapeutic exercises interventions, mobility and safety training to ensure safe discharge home through skilled patient and caregiver education, and mobility through adaptive equipment training. Pt highly motivated to return to independent PLOF and can tolerate 3+hours of therapy. Pt continues to benefit from a collaborative multidisciplinary program to improve quality of life and focus on recovery of impairments    Rehab Prognosis: Good; patient would benefit from acute skilled PT services to address these deficits and reach maximum level of function.    Recent Surgery: * No surgery found *      Plan:     During this hospitalization, patient to be seen 4 x/week to address the identified rehab impairments via therapeutic activities, therapeutic exercises, gait training, neuromuscular re-education and progress toward the following goals:    Plan of Care Expires:  04/07/24    Subjective     Chief Complaint: pt unable to report 2/2 aphasia   Patient/Family Comments/goals: pt unable to report 2/2 aphasia  "  Pain/Comfort:  Pain Rating 1:  (unable to report)  Pain Rating Post-Intervention 1:  (unable to report)    Patients cultural, spiritual, Muslim conflicts given the current situation: no    Living Environment: Pt unable to report 2/2 aphasia. Needs to be obtained form family.    Objective:     Communicated with RN prior to session.  Patient found HOB elevated with telemetry, pulse ox (continuous), peripheral IV, PureWick, blood pressure cuff  upon PT entry to room.    General Precautions: Standard, fall, aphasia  Orthopedic Precautions:N/A   Braces: N/A  Respiratory Status: Room air    Exams:  Cognitive Exam:  pt unable to report 2/2 aphasia   Communication:   Yes/no questions: inaccurate, saying "yes" and "okay" all the time  Able to follow some hand gestures  Command follow: able to follow some simple 1 step commands  Fine Motor Coordination: KELSIE 2/2 aphasia   Postural Exam:  Patient presented with the following abnormalities:    -       Rounded shoulders  -       Forward head  -       Kyphosis  Sensation:  KELSIE 2/2 aphasia   RLE ROM: WFL  RLE Strength: Deficits: hip flexion 4/5, knee extension 5/5, knee flexion KELSIE 2/2 unable to follow commands, DF 5/5  LLE ROM: WFL  LLE Strength: Deficits: hip flexion 4/5, knee extension 5/5, knee flexion KELSIE 2/2 unable to follow commands, DF 5/5    Functional Mobility:  Bed Mobility:     Scooting: contact guard assistance  Supine to Sit: contact guard assistance  Sit to Supine: contact guard assistance  Transfers:     Sit to Stand:  contact guard assistance with no AD  Gait: 5 ft forward/backward, 3 ft left/right, BUE support, decreased gait speed and step length, CGA-Sonja, no AD      AM-PAC 6 CLICK MOBILITY  Total Score:17       Treatment & Education:  Patient educated on role of therapy, goals of session, and benefits of mobilizing.   Discussed PT plan of care during hospitalization.   Patient educated on calling for assistance.   Patient educated on how their diagnosis " impacts their mobility within PT scope of practice.   All questions answered within PT scope of practice.    Patient left HOB elevated with all lines intact, call button in reach, bed alarm on, and RN notified.    GOALS:   Multidisciplinary Problems       Physical Therapy Goals          Problem: Physical Therapy    Goal Priority Disciplines Outcome Goal Variances Interventions   Physical Therapy Goal     PT, PT/OT Ongoing, Progressing     Description: Goals to be met by: 2024     Patient will increase functional independence with mobility by performin. Supine to sit with [place]  2. Sit to supine with [place]  3. Rolling to Left and Right with [place].  4. Sit to stand transfer with Supervision  5. Bed to chair transfer with Supervision using No Assistive Device  6. Gait  x 200 feet with Supervision using No Assistive Device.   7. Lower extremity exercise program x10 reps per handout, with supervision                             History:     Past Medical History:   Diagnosis Date    Acute ischemic left MCA stroke 3/7/2024    Allergic rhinitis 2016    Carotid artery plaque 2013    History of total hysterectomy with bilateral salpingo-oophorectomy (BSO) 2016    Hyperlipidemia 2012    Hypothyroid     Insomnia     Macular degeneration, right eye 2016    Migraine headache     Osteopenia of the elderly 10/20/2015    Postmenopausal HRT (hormone replacement therapy)     RBBB 2012       Past Surgical History:   Procedure Laterality Date    APPENDECTOMY      Catatact surgery Right 2019    without any complications    COLONOSCOPY  2019    repeat in 5 years    EYE SURGERY Bilateral     cataracts extraction    HYSTERECTOMY      STAPEDES SURGERY      TOTAL ABDOMINAL HYSTERECTOMY W/ BILATERAL SALPINGOOPHORECTOMY      TUBAL LIGATION         Time Tracking:     PT Received On: 24  PT Start Time: 1639     PT Stop Time: 1703  PT Total Time (min): 24 min      Billable Minutes: Evaluation 12 and Therapeutic Activity 12      03/08/2024

## 2024-03-09 NOTE — PROGRESS NOTES
[facility] - Neurosurgery ([facility])  Vascular Neurology  Comprehensive Stroke Center  Progress Note    Assessment/Plan:     * Cerebrovascular accident (CVA) due to embolism of left middle cerebral artery  79 year old woman with HTN, HLD, RBBB presented as an acute stroke transfer 3/7 for IR intervention after initial OSH presentation with aphasia, CTA showed L M2 occlusion. Loaded with DAPT prior to transfer. NIHSS 10 on arrival. TTE with EF 55-60% with no atrial enlargement. MRI showed R MCA territory infarct without hemorrhage or mass effect. Etiology ESUS.    Slight improvement in aphasia today. Stepped down to NPU. Pending rehab placement.     Antithrombotics for secondary stroke prevention: aspirin 81 mg and clopidogrel 75 mg      Statins for secondary stroke prevention and hyperlipidemia, if present: Statins: Atorvastatin- 40 mg daily     Aggressive risk factor modification: HTN, HLD, Diet, Exercise, CAD     Rehab efforts: The patient has been evaluated by a stroke team provider and the therapy needs have been fully considered based off the presenting complaints and exam findings. The following therapy evaluations are needed: PT/OT rec high intensity therapy, SLP rec soft bite sized thin diet     Diagnostics ordered/pending: None     VTE prophylaxis: Heparin 5000 units SQ every 8 hours     BP parameters: Infarct: SBP <160    Prediabetes  A1c 5.7    Bilateral carotid artery stenosis  Reported history of; no significant stenosis on CTA    Hypothyroid  TSH WNL  - continue home synthroid 75 mcg    Hyperlipidemia  Stroke risk factor. LDL 61; taking atorvastatin 20 mg prior to admission.  - atorvastatin 40 mg         3/7/24: Transfer for L M2 occlusion on CTA after NIH 10 w/ RUE weakness, global aphasia, AMS (LKW 0630 3/7) OOW for TNK. ASA+Plavix loaded and taken with IR for TICI3C  3/8/24: On post-angio pathway with exam NIH 9 with improving with patient still globally aphasic but able to mimic/comprehend some of  speech/commands. RUE with improving strength, no drift. Remainder of exam non-focal. Awaiting MRI brain.  03/09/2024 MRI R MCA infarct without hemorrhage or mass effect. Remains largely aphasic, followed verbal command to close eyes. Stepped down to NPU.    STROKE DOCUMENTATION   Acute Stroke Times   Last Known Normal Date: 03/07/24  Last Known Normal Time: 0630  Symptom Onset Date: 03/07/24  Unknown Symptom Onset Time: Unknown Time  Stroke Team Called Date: 03/07/24  Stroke Team Called Time: 1525  Stroke Team Arrival Date: 03/07/24  Stroke Team Arrival Time: 1529  Thrombolytic Therapy Recommended: No  Thrombectomy Recommended: Yes  Decision to Treat Time for IR:  (1330 at OSH; 15:30 after re-evaluation [place] [facility]    NIH Scale:  1a. Level of Consciousness: 0-->Alert, keenly responsive  1b. LOC Questions: 2-->Answers neither question correctly  1c. LOC Commands: 1-->Performs one task correctly  2. Best Gaze: 0-->Normal  3. Visual: 0-->No visual loss  4. Facial Palsy: 0-->Normal symmetrical movements  5a. Motor Arm, Left: 0-->No drift, limb holds 90 (or 45) degrees for full 10 secs  5b. Motor Arm, Right: 0-->No drift, limb holds 90 (or 45) degrees for full 10 secs  6a. Motor Leg, Left: 0-->No drift, leg holds 30 degree position for full 5 secs  6b. Motor Leg, Right: 0-->No drift, leg holds 30 degree position for full 5 secs  7. Limb Ataxia: 0-->Absent  8. Sensory: 0-->Normal, no sensory loss  9. Best Language: 2-->Severe aphasia, all communication is through fragmentary expression, great need for inference, questioning, and guessing by the listener. Range of information that can be exchanged is limited, listener carries burden of. . . (see row details)  10. Dysarthria: 2-->Severe dysarthria, patients speech is so slurred as to be unintelligible in the absence of or out of proportion to any dysphasia, or is mute/anarthric  11. Extinction and Inattention (formerly Neglect): 0-->No abnormality  Total (NIH  Stroke Scale): 7       Modified Analia Score: 0  [place] Coma Scale:    ABCD2 Score:    [id number] Score:   HAS -BLED Score:   ICH Score:   Hunt & Goodrich Classification:      Hemorrhagic change of an Ischemic Stroke: Does this patient have an ischemic stroke with hemorrhagic changes? No     Neurologic Chief Complaint: R MCA infarct    Subjective:     Interval History: No acute events overnight. Patient verbalizes no complaints this morning.    HPI, Past Medical, Family, and Social History remains the same as documented in the initial encounter.     Review of Systems   Reason unable to perform ROS: ROS limited by patient's aphasia.   Constitutional:  Negative for diaphoresis and fever.   HENT:  Negative for nosebleeds and rhinorrhea.    Eyes:  Negative for discharge and redness.   Respiratory:  Negative for cough and wheezing.    Cardiovascular:  Negative for leg swelling.   Gastrointestinal:  Negative for abdominal distention and vomiting.   Genitourinary:  Negative for difficulty urinating and hematuria.   Neurological:  Positive for speech difficulty and weakness. Negative for tremors and facial asymmetry.     Scheduled Meds:   aspirin  81 mg Oral Daily    atorvastatin  40 mg Oral Daily    clopidogreL  75 mg Oral Daily    EScitalopram oxalate  20 mg Oral Daily    heparin (porcine)  5,000 Units Subcutaneous Q8H    levothyroxine  75 mcg Oral Before breakfast    senna-docusate 8.6-50 mg  1 tablet Oral Daily     Continuous Infusions: None  PRN Meds:labetalol, polyethylene glycol, sodium chloride 0.9%    Objective:     Vital Signs (Most Recent):  Temp: 98.2 °F (36.8 °C) (03/09/24 1105)  Pulse: 81 (03/09/24 1305)  Resp: (!) 21 (03/09/24 1305)  BP: (!) 145/66 (03/09/24 1305)  SpO2: 95 % (03/09/24 1305)  BP Location: Right arm    Vital Signs Range (Last 24H):  Temp:  [98.1 °F (36.7 °C)-98.7 °F (37.1 °C)]   Pulse:  []   Resp:  [14-41]   BP: (113-167)/(58-83)   SpO2:  [93 %-98 %]   BP Location: Right arm      "  Physical Exam  Vitals and nursing note reviewed.   Constitutional:       General: She is not in acute distress.     Appearance: She is not diaphoretic.   HENT:      Head: Normocephalic and atraumatic.      Nose: Nose normal. No rhinorrhea.   Eyes:      General:         Right eye: No discharge.         Left eye: No discharge.      Conjunctiva/sclera: Conjunctivae normal.   Cardiovascular:      Rate and Rhythm: Normal rate.   Pulmonary:      Effort: Pulmonary effort is normal. No respiratory distress.   Musculoskeletal:         General: No tenderness or deformity.   Skin:     General: Skin is warm and dry.          Neurological Exam:   LOC: alert  Attention Span: Good   Language: global aphasia - says "I don't know" to orientation questions, largely does not follow commands  Articulation: No dysarthria  EOM (CN III, IV, VI): Full/intact  Facial Movement (CN VII): Symmetric facial expression    Motor: Arm left  Normal 5/5  Leg left  Normal 5/5  Arm right  Paresis: 4/5  Leg right Normal 5/5    Laboratory:  CMP:   Recent Labs   Lab 03/09/24  0345   CALCIUM 8.9   ALBUMIN 3.4*   PROT 6.4      K 3.9   CO2 23      BUN 11   CREATININE 0.7   ALKPHOS 73   ALT 13   AST 20   BILITOT 0.3     CBC:   Recent Labs   Lab 03/09/24  0345   WBC 7.63   RBC 3.82*   HGB 11.6*   HCT 35.3*      MCV 92   MCH 30.4   MCHC 32.9       Diagnostic Results   Brain/Vessel Imaging   CTH 3/7/24  No definite acute intracranial findings by noncontrast CT.     CTA 3/7/24  No CT evidence of acute infarction.  Note that MRI has greater sensitivity to detect small acute infarction and could be done if this is clinically warranted.     No large vessel occlusion in the intracranial circulation.     No hemodynamically significant stenosis of the neck vessels.    IR Angiogram 3/7/24  Formal read pending; per procedure note with combination of aspiration and stent retrieving TICI3 reperfusion obtained    MRI Brain w/o contrast 3/8/24  1. " Predominately cortically-based acute to early subacute ischemia involving the right MCA territory, as discussed.  No evidence of macroscopic hemorrhage within the infarct territory.  No significant mass effect related to this cytotoxic edema.  2. Redemonstrated remote post ischemic sequelae, as discussed.    Cardiac Imaging   TTE 3/8/24    Left Ventricle: The left ventricle is normal in size. Normal wall thickness. Normal wall motion. There is normal systolic function with a visually estimated ejection fraction of 55 - 60%. There is normal diastolic function.    Right Ventricle: Normal right ventricular cavity size. Systolic function is normal.    Pulmonary Artery: The estimated pulmonary artery systolic pressure is 25 mmHg.    IVC/SVC: Normal venous pressure at 3 mmHg.    Latoya Ortiz MD  Comprehensive Stroke Center  Department of Vascular Neurology   [facility])

## 2024-03-09 NOTE — CONSULTS
Inpatient consult to Physical Medicine Rehab  Consult performed by: Jamee Holcomb NP  Consult ordered by: Wilder Fregoso MD  Reason for consult: Rehab      Consult received. PM&R following.     LAURY Joy, FNP-C  Physical Medicine & Rehabilitation   03/09/2024

## 2024-03-09 NOTE — NURSING
Nurses Note -- 4 Eyes      3/9/2024   4:33 PM      Skin assessed during: Transfer      [] No Altered Skin Integrity Present    []Prevention Measures Documented      [x] Yes- Altered Skin Integrity Present or Discovered   [] LDA Added if Not in Epic (Describe Wound)   [] New Altered Skin Integrity was Present on Admit and Documented in LDA   [] Wound Image Taken    Wound Care Consulted? No    Attending Nurse:  Uma Coello RN/Staff Member:   [place]

## 2024-03-09 NOTE — ASSESSMENT & PLAN NOTE
79 year old woman with HTN, HLD, RBBB presented as an acute stroke transfer 3/7 for IR intervention after initial OSH presentation with aphasia, CTA showed L M2 occlusion. Loaded with DAPT prior to transfer. NIHSS 10 on arrival. TTE with EF 55-60% with no atrial enlargement. MRI showed R MCA territory infarct without hemorrhage or mass effect. Etiology ESUS.    Slight improvement in aphasia today. Stepped down to NPU. Pending rehab placement.     Antithrombotics for secondary stroke prevention: aspirin 81 mg and clopidogrel 75 mg      Statins for secondary stroke prevention and hyperlipidemia, if present: Statins: Atorvastatin- 40 mg daily     Aggressive risk factor modification: HTN, HLD, Diet, Exercise, CAD     Rehab efforts: The patient has been evaluated by a stroke team provider and the therapy needs have been fully considered based off the presenting complaints and exam findings. The following therapy evaluations are needed: PT/OT rec high intensity therapy, SLP rec soft bite sized thin diet     Diagnostics ordered/pending: None     VTE prophylaxis: Heparin 5000 units SQ every 8 hours     BP parameters: Infarct: SBP <160

## 2024-03-09 NOTE — ASSESSMENT & PLAN NOTE
Antithrombotics for secondary stroke prevention:  aspirin 325 mg & clopidogrel 300 mg x 1 at OSH  - daily aspirin 81 mg /  clopidogrel 75 mg x 30 days followed by monotherapy thereafter      Statins for secondary stroke prevention and hyperlipidemia, if present:   Statins: Atorvastatin- 40 mg daily     Aggressive risk factor modification: HTN, HLD, Diet, Exercise, CAD     Rehab efforts: The patient has been evaluated by a stroke team provider and the therapy needs have been fully considered based off the presenting complaints and exam findings. The following therapy evaluations are needed: PT evaluate and treat, OT evaluate and treat, SLP evaluate and treat, PM&R evaluate for appropriate placement     Diagnostics ordered/pending: MRI head without contrast to assess brain parenchyma, TTE to assess cardiac function/status      VTE prophylaxis: Heparin 5000 units SQ every 8 hours     BP parameters: Infarct: Post sucessful thrombectomy, SBP <160    - Q1h neuro checks, vital checks  - MRI complete and without hemorrhagic conversion  - Regular diet  - PT/OT/SLP  -SCDs, SQH

## 2024-03-09 NOTE — SUBJECTIVE & OBJECTIVE
Neurologic Chief Complaint: R MCA infarct    Subjective:     Interval History: No acute events overnight. Patient verbalizes no complaints this morning.    HPI, Past Medical, Family, and Social History remains the same as documented in the initial encounter.     Review of Systems   Reason unable to perform ROS: ROS limited by patient's aphasia.   Constitutional:  Negative for diaphoresis and fever.   HENT:  Negative for nosebleeds and rhinorrhea.    Eyes:  Negative for discharge and redness.   Respiratory:  Negative for cough and wheezing.    Cardiovascular:  Negative for leg swelling.   Gastrointestinal:  Negative for abdominal distention and vomiting.   Genitourinary:  Negative for difficulty urinating and hematuria.   Neurological:  Positive for speech difficulty and weakness. Negative for tremors and facial asymmetry.     Scheduled Meds:   aspirin  81 mg Oral Daily    atorvastatin  40 mg Oral Daily    clopidogreL  75 mg Oral Daily    EScitalopram oxalate  20 mg Oral Daily    heparin (porcine)  5,000 Units Subcutaneous Q8H    levothyroxine  75 mcg Oral Before breakfast    senna-docusate 8.6-50 mg  1 tablet Oral Daily     Continuous Infusions: None  PRN Meds:labetalol, polyethylene glycol, sodium chloride 0.9%    Objective:     Vital Signs (Most Recent):  Temp: 98.2 °F (36.8 °C) (03/09/24 1105)  Pulse: 81 (03/09/24 1305)  Resp: (!) 21 (03/09/24 1305)  BP: (!) 145/66 (03/09/24 1305)  SpO2: 95 % (03/09/24 1305)  BP Location: Right arm    Vital Signs Range (Last 24H):  Temp:  [98.1 °F (36.7 °C)-98.7 °F (37.1 °C)]   Pulse:  []   Resp:  [14-41]   BP: (113-167)/(58-83)   SpO2:  [93 %-98 %]   BP Location: Right arm       Physical Exam  Vitals and nursing note reviewed.   Constitutional:       General: She is not in acute distress.     Appearance: She is not diaphoretic.   HENT:      Head: Normocephalic and atraumatic.      Nose: Nose normal. No rhinorrhea.   Eyes:      General:         Right eye: No discharge.     "     Left eye: No discharge.      Conjunctiva/sclera: Conjunctivae normal.   Cardiovascular:      Rate and Rhythm: Normal rate.   Pulmonary:      Effort: Pulmonary effort is normal. No respiratory distress.   Musculoskeletal:         General: No tenderness or deformity.   Skin:     General: Skin is warm and dry.          Neurological Exam:   LOC: alert  Attention Span: Good   Language: global aphasia - says "I don't know" to orientation questions, largely does not follow commands  Articulation: No dysarthria  EOM (CN III, IV, VI): Full/intact  Facial Movement (CN VII): Symmetric facial expression    Motor: Arm left  Normal 5/5  Leg left  Normal 5/5  Arm right  Paresis: 4/5  Leg right Normal 5/5    Laboratory:  CMP:   Recent Labs   Lab 03/09/24  0345   CALCIUM 8.9   ALBUMIN 3.4*   PROT 6.4      K 3.9   CO2 23      BUN 11   CREATININE 0.7   ALKPHOS 73   ALT 13   AST 20   BILITOT 0.3     CBC:   Recent Labs   Lab 03/09/24  0345   WBC 7.63   RBC 3.82*   HGB 11.6*   HCT 35.3*      MCV 92   MCH 30.4   MCHC 32.9       Diagnostic Results   Brain/Vessel Imaging   CTH 3/7/24  No definite acute intracranial findings by noncontrast CT.     CTA 3/7/24  No CT evidence of acute infarction.  Note that MRI has greater sensitivity to detect small acute infarction and could be done if this is clinically warranted.     No large vessel occlusion in the intracranial circulation.     No hemodynamically significant stenosis of the neck vessels.    IR Angiogram 3/7/24  Formal read pending; per procedure note with combination of aspiration and stent retrieving TICI3 reperfusion obtained    MRI Brain w/o contrast 3/8/24  1. Predominately cortically-based acute to early subacute ischemia involving the right MCA territory, as discussed.  No evidence of macroscopic hemorrhage within the infarct territory.  No significant mass effect related to this cytotoxic edema.  2. Redemonstrated remote post ischemic sequelae, as " discussed.    Cardiac Imaging   TTE 3/8/24    Left Ventricle: The left ventricle is normal in size. Normal wall thickness. Normal wall motion. There is normal systolic function with a visually estimated ejection fraction of 55 - 60%. There is normal diastolic function.    Right Ventricle: Normal right ventricular cavity size. Systolic function is normal.    Pulmonary Artery: The estimated pulmonary artery systolic pressure is 25 mmHg.    IVC/SVC: Normal venous pressure at 3 mmHg.

## 2024-03-09 NOTE — PROGRESS NOTES
[facility]  Neurocritical Care  Progress Note    Admit Date: 3/7/2024  Service Date: 03/09/2024  Length of Stay: 2    Subjective:     Chief Complaint: Cerebrovascular accident (CVA) due to embolism of left middle cerebral artery    History of Present Illness: 79 year old woman with history of hyperlipidemia, hypertension, bilateral carotid stenosis, and RBBB presenting as an acute stroke transfer for IR intervention after initial OSH presentation. Patient's  reports that he last saw her normal at 6:30 a.m. today. He returned home at noon and found her want around the house but no longer talking or interactive. Said there was some blood on the floor and suspects that she fell. Diagnostics consistent with L M2 occlusion, but pt was outside TPA window at presentation. NIHSS 10 on arrival, with left pronator drift and global aphasia prior to intervention. Patient urgently transported to IR suite, underwent 2 pass TICI 3, now admitted to [facility] for close monitoring.     Hospital Course: 03/09/2024 NAEON. Boarding for stroke team.       Interval History:  Soft diet per speech recs.     Review of Systems   Reason unable to perform ROS: Aphasia.     Objective:     Vitals:  Temp: 98.2 °F (36.8 °C)  Pulse: 81  Rhythm: normal sinus rhythm  BP: (!) 145/66  MAP (mmHg): 95  Resp: (!) 21  SpO2: 95 %    Temp  Min: 98.1 °F (36.7 °C)  Max: 98.7 °F (37.1 °C)  Pulse  Min: 67  Max: 114  BP  Min: 113/59  Max: 167/83  MAP (mmHg)  Min: 83  Max: 116  Resp  Min: 14  Max: 41  SpO2  Min: 93 %  Max: 98 %    03/08 0701 - 03/09 0700  In: 240 [P.O.:240]  Out: 975 [Urine:975]   Unmeasured Output  Stool Occurrence: 0        Physical Exam  Vitals and nursing note reviewed.   Constitutional:       General: She is not in acute distress.     Appearance: Normal appearance. She is not ill-appearing, toxic-appearing or diaphoretic.   HENT:      Head: Normocephalic.   Eyes:      Extraocular Movements: Extraocular movements intact.       Pupils: Pupils are equal, round, and reactive to light.   Cardiovascular:      Rate and Rhythm: Normal rate and regular rhythm.      Pulses: Normal pulses.      Heart sounds: Normal heart sounds.   Pulmonary:      Effort: Pulmonary effort is normal. No respiratory distress.      Breath sounds: Normal breath sounds. No stridor. No wheezing, rhonchi or rales.   Chest:      Chest wall: No tenderness.   Abdominal:      General: Abdomen is flat.      Palpations: Abdomen is soft.      Tenderness: There is no abdominal tenderness. There is no guarding or rebound.   Musculoskeletal:         General: No swelling or tenderness.      Cervical back: Neck supple.      Right lower leg: No edema.      Left lower leg: No edema.   Skin:     General: Skin is warm and dry.      Capillary Refill: Capillary refill takes less than 2 seconds.      Coloration: Skin is not jaundiced or pale.      Findings: No bruising.   Neurological:      Mental Status: She is alert.      Comments: E4V2M5  Globally aphasic  PERRL  Does not follow commands but intermittently mimics  GERMAN spont and against gravity           Medications:  Continuous Scheduledaspirin, 81 mg, Daily  atorvastatin, 40 mg, Daily  clopidogreL, 75 mg, Daily  EScitalopram oxalate, 20 mg, Daily  heparin (porcine), 5,000 Units, Q8H  levothyroxine, 75 mcg, Before breakfast  senna-docusate 8.6-50 mg, 1 tablet, Daily    PRNbisacodyL, 10 mg, Daily PRN  labetalol, 10 mg, Q4H PRN  sodium chloride 0.9%, 10 mL, PRN      Today I personally reviewed pertinent medications, lines/drains/airways, imaging, cardiology results, laboratory results, microbiology results, notably:    Diet  Diet Soft & Bite Sized (IDDSI Level 6) Thin; Standard Tray  Diet Soft & Bite Sized (IDDSI Level 6) Thin; Standard Tray        Assessment/Plan:     Neuro  * Cerebrovascular accident (CVA) due to embolism of left middle cerebral artery  Antithrombotics for secondary stroke prevention:  aspirin 325 mg & clopidogrel 300  mg x 1 at OSH  - daily aspirin 81 mg /  clopidogrel 75 mg x 30 days followed by monotherapy thereafter      Statins for secondary stroke prevention and hyperlipidemia, if present:   Statins: Atorvastatin- 40 mg daily     Aggressive risk factor modification: HTN, HLD, Diet, Exercise, CAD     Rehab efforts: The patient has been evaluated by a stroke team provider and the therapy needs have been fully considered based off the presenting complaints and exam findings. The following therapy evaluations are needed: PT evaluate and treat, OT evaluate and treat, SLP evaluate and treat, PM&R evaluate for appropriate placement     Diagnostics ordered/pending: MRI head without contrast to assess brain parenchyma, TTE to assess cardiac function/status      VTE prophylaxis: Heparin 5000 units SQ every 8 hours     BP parameters: Infarct: Post sucessful thrombectomy, SBP <160    - Q1h neuro checks, vital checks  - MRI complete and without hemorrhagic conversion  - Regular diet  - PT/OT/SLP  -SCDs, SQH          Cardiac/Vascular  Bilateral carotid artery stenosis  Carotid US 3/9/20  CONCLUSIONS   There is 20 - 39% right Internal Carotid stenosis.  There is 40 - 49% left Internal Carotid stenosis.     No significant stenosis       Hyperlipidemia  - stroke risk factor  - LDL goal < 70  - high intensity statin    Endocrine  Prediabetes  A1c 5.7    Hypothyroid  Home synthroid resumed          The patient is being Prophylaxed for:  Venous Thromboembolism with: Mechanical or Chemical  Stress Ulcer with: None  Ventilator Pneumonia with: none    Activity Orders            Diet Soft & Bite Sized (IDDSI Level 6) Thin; Standard Tray: Soft & Bite Sized starting at 03/08 1336    Progressive Mobility Protocol (mobilize patient to their highest level of functioning at least twice daily) starting at 03/07 2000    Turn patient starting at 03/07 1800    Elevate HOB starting at 03/07 1749          Full Code    Level III    Mady Pratt,  LORETTA  Neurocritical Care  [facility] Neuro [facility]

## 2024-03-09 NOTE — PLAN OF CARE
POC reviewed with patient. Stoke education at bedside. Expressive aphasia. Follows some commands. Afebrile. Room air. Labetalol x1 given to maintain SBP below 160. VS and assessments per flowsheet.

## 2024-03-09 NOTE — SUBJECTIVE & OBJECTIVE
Interval History:  Soft diet per speech recs.     Review of Systems   Reason unable to perform ROS: Aphasia.     Objective:     Vitals:  Temp: 98.2 °F (36.8 °C)  Pulse: 81  Rhythm: normal sinus rhythm  BP: (!) 145/66  MAP (mmHg): 95  Resp: (!) 21  SpO2: 95 %    Temp  Min: 98.1 °F (36.7 °C)  Max: 98.7 °F (37.1 °C)  Pulse  Min: 67  Max: 114  BP  Min: 113/59  Max: 167/83  MAP (mmHg)  Min: 83  Max: 116  Resp  Min: 14  Max: 41  SpO2  Min: 93 %  Max: 98 %    03/08 0701 - 03/09 0700  In: 240 [P.O.:240]  Out: 975 [Urine:975]   Unmeasured Output  Stool Occurrence: 0        Physical Exam  Vitals and nursing note reviewed.   Constitutional:       General: She is not in acute distress.     Appearance: Normal appearance. She is not ill-appearing, toxic-appearing or diaphoretic.   HENT:      Head: Normocephalic.   Eyes:      Extraocular Movements: Extraocular movements intact.      Pupils: Pupils are equal, round, and reactive to light.   Cardiovascular:      Rate and Rhythm: Normal rate and regular rhythm.      Pulses: Normal pulses.      Heart sounds: Normal heart sounds.   Pulmonary:      Effort: Pulmonary effort is normal. No respiratory distress.      Breath sounds: Normal breath sounds. No stridor. No wheezing, rhonchi or rales.   Chest:      Chest wall: No tenderness.   Abdominal:      General: Abdomen is flat.      Palpations: Abdomen is soft.      Tenderness: There is no abdominal tenderness. There is no guarding or rebound.   Musculoskeletal:         General: No swelling or tenderness.      Cervical back: Neck supple.      Right lower leg: No edema.      Left lower leg: No edema.   Skin:     General: Skin is warm and dry.      Capillary Refill: Capillary refill takes less than 2 seconds.      Coloration: Skin is not jaundiced or pale.      Findings: No bruising.   Neurological:      Mental Status: She is alert.      Comments: E4V2M5  Globally aphasic  PERRL  Does not follow commands but intermittently mimics  GERMAN spont  and against gravity           Medications:  Continuous Scheduledaspirin, 81 mg, Daily  atorvastatin, 40 mg, Daily  clopidogreL, 75 mg, Daily  EScitalopram oxalate, 20 mg, Daily  heparin (porcine), 5,000 Units, Q8H  levothyroxine, 75 mcg, Before breakfast  senna-docusate 8.6-50 mg, 1 tablet, Daily    PRNbisacodyL, 10 mg, Daily PRN  labetalol, 10 mg, Q4H PRN  sodium chloride 0.9%, 10 mL, PRN      Today I personally reviewed pertinent medications, lines/drains/airways, imaging, cardiology results, laboratory results, microbiology results, notably:    Diet  Diet Soft & Bite Sized (IDDSI Level 6) Thin; Standard Tray  Diet Soft & Bite Sized (IDDSI Level 6) Thin; Standard Tray

## 2024-03-10 LAB
ALBUMIN SERPL BCP-MCNC: 3.4 G/DL (ref 3.5–5.2)
ALP SERPL-CCNC: 74 U/L (ref 55–135)
ALT SERPL W/O P-5'-P-CCNC: 14 U/L (ref 10–44)
ANION GAP SERPL CALC-SCNC: 11 MMOL/L (ref 8–16)
AST SERPL-CCNC: 25 U/L (ref 10–40)
BASOPHILS # BLD AUTO: 0.05 K/UL (ref 0–0.2)
BASOPHILS NFR BLD: 0.7 % (ref 0–1.9)
BILIRUB SERPL-MCNC: 0.4 MG/DL (ref 0.1–1)
BUN SERPL-MCNC: 16 MG/DL (ref 8–23)
CALCIUM SERPL-MCNC: 9.1 MG/DL (ref 8.7–10.5)
CHLORIDE SERPL-SCNC: 99 MMOL/L (ref 95–110)
CO2 SERPL-SCNC: 24 MMOL/L (ref 23–29)
CREAT SERPL-MCNC: 0.7 MG/DL (ref 0.5–1.4)
DIFFERENTIAL METHOD BLD: ABNORMAL
EOSINOPHIL # BLD AUTO: 0.1 K/UL (ref 0–0.5)
EOSINOPHIL NFR BLD: 0.7 % (ref 0–8)
ERYTHROCYTE [DISTWIDTH] IN BLOOD BY AUTOMATED COUNT: 12.5 % (ref 11.5–14.5)
EST. GFR  (NO RACE VARIABLE): >60 ML/MIN/1.73 M^2
GLUCOSE SERPL-MCNC: 83 MG/DL (ref 70–110)
HCT VFR BLD AUTO: 36.8 % (ref 37–48.5)
HGB BLD-MCNC: 12.3 G/DL (ref 12–16)
IMM GRANULOCYTES # BLD AUTO: 0.01 K/UL (ref 0–0.04)
IMM GRANULOCYTES NFR BLD AUTO: 0.1 % (ref 0–0.5)
LYMPHOCYTES # BLD AUTO: 1 K/UL (ref 1–4.8)
LYMPHOCYTES NFR BLD: 14.3 % (ref 18–48)
MAGNESIUM SERPL-MCNC: 2.1 MG/DL (ref 1.6–2.6)
MCH RBC QN AUTO: 30.4 PG (ref 27–31)
MCHC RBC AUTO-ENTMCNC: 33.4 G/DL (ref 32–36)
MCV RBC AUTO: 91 FL (ref 82–98)
MONOCYTES # BLD AUTO: 0.7 K/UL (ref 0.3–1)
MONOCYTES NFR BLD: 9.6 % (ref 4–15)
NEUTROPHILS # BLD AUTO: 5.4 K/UL (ref 1.8–7.7)
NEUTROPHILS NFR BLD: 74.6 % (ref 38–73)
NRBC BLD-RTO: 0 /100 WBC
OHS QRS DURATION: 116 MS
OHS QTC CALCULATION: 472 MS
PHOSPHATE SERPL-MCNC: 2.8 MG/DL (ref 2.7–4.5)
PLATELET # BLD AUTO: 192 K/UL (ref 150–450)
PMV BLD AUTO: 11.2 FL (ref 9.2–12.9)
POCT GLUCOSE: 101 MG/DL (ref 70–110)
POCT GLUCOSE: 105 MG/DL (ref 70–110)
POCT GLUCOSE: 89 MG/DL (ref 70–110)
POTASSIUM SERPL-SCNC: 4.2 MMOL/L (ref 3.5–5.1)
PROT SERPL-MCNC: 6.8 G/DL (ref 6–8.4)
RBC # BLD AUTO: 4.04 M/UL (ref 4–5.4)
SODIUM SERPL-SCNC: 134 MMOL/L (ref 136–145)
WBC # BLD AUTO: 7.19 K/UL (ref 3.9–12.7)

## 2024-03-10 PROCEDURE — 94760 N-INVAS EAR/PLS OXIMETRY 1: CPT

## 2024-03-10 PROCEDURE — 99233 SBSQ HOSP IP/OBS HIGH 50: CPT | Mod: GC,,, | Performed by: PSYCHIATRY & NEUROLOGY

## 2024-03-10 PROCEDURE — 25000003 PHARM REV CODE 250

## 2024-03-10 PROCEDURE — 20600001 HC STEP DOWN PRIVATE ROOM

## 2024-03-10 PROCEDURE — 51798 US URINE CAPACITY MEASURE: CPT

## 2024-03-10 PROCEDURE — 83735 ASSAY OF MAGNESIUM: CPT

## 2024-03-10 PROCEDURE — 85025 COMPLETE CBC W/AUTO DIFF WBC: CPT

## 2024-03-10 PROCEDURE — 63600175 PHARM REV CODE 636 W HCPCS

## 2024-03-10 PROCEDURE — 80053 COMPREHEN METABOLIC PANEL: CPT

## 2024-03-10 PROCEDURE — 84100 ASSAY OF PHOSPHORUS: CPT

## 2024-03-10 PROCEDURE — 51701 INSERT BLADDER CATHETER: CPT

## 2024-03-10 PROCEDURE — 11000001 HC ACUTE MED/SURG PRIVATE ROOM

## 2024-03-10 PROCEDURE — 25000003 PHARM REV CODE 250: Performed by: STUDENT IN AN ORGANIZED HEALTH CARE EDUCATION/TRAINING PROGRAM

## 2024-03-10 PROCEDURE — 36415 COLL VENOUS BLD VENIPUNCTURE: CPT

## 2024-03-10 RX ADMIN — LEVOTHYROXINE SODIUM 75 MCG: 75 TABLET ORAL at 05:03

## 2024-03-10 RX ADMIN — DOCUSATE SODIUM AND SENNOSIDES 1 TABLET: 8.6; 5 TABLET, FILM COATED ORAL at 08:03

## 2024-03-10 RX ADMIN — HEPARIN SODIUM 5000 UNITS: 5000 INJECTION INTRAVENOUS; SUBCUTANEOUS at 09:03

## 2024-03-10 RX ADMIN — POLYETHYLENE GLYCOL 3350 17 G: 17 POWDER, FOR SOLUTION ORAL at 01:03

## 2024-03-10 RX ADMIN — HEPARIN SODIUM 5000 UNITS: 5000 INJECTION INTRAVENOUS; SUBCUTANEOUS at 05:03

## 2024-03-10 RX ADMIN — HEPARIN SODIUM 5000 UNITS: 5000 INJECTION INTRAVENOUS; SUBCUTANEOUS at 01:03

## 2024-03-10 RX ADMIN — ASPIRIN 81 MG: 81 TABLET, COATED ORAL at 08:03

## 2024-03-10 RX ADMIN — ESCITALOPRAM OXALATE 20 MG: 20 TABLET ORAL at 08:03

## 2024-03-10 RX ADMIN — CLOPIDOGREL BISULFATE 75 MG: 75 TABLET ORAL at 08:03

## 2024-03-10 RX ADMIN — ATORVASTATIN CALCIUM 40 MG: 40 TABLET, FILM COATED ORAL at 08:03

## 2024-03-10 NOTE — PLAN OF CARE
Problem: Adult Inpatient Plan of Care  Goal: Patient-Specific Goal (Individualized)  Description: Pt will maintain sbp below 160  Outcome: Ongoing, Progressing  Flowsheets (Taken 3/10/2024 0458)  Anxieties, Fears or Concerns: none  Individualized Care Needs: clustered care  Goal: Optimal Comfort and Wellbeing  Outcome: Ongoing, Progressing  Intervention: Monitor Pain and Promote Comfort  Flowsheets (Taken 3/10/2024 0458)  Pain Management Interventions: care clustered  Intervention: Provide Person-Centered Care  Flowsheets (Taken 3/10/2024 0458)  Trust Relationship/Rapport:   choices provided   care explained   VSS. Bed in lowest position, side rails x 3, call light in use.

## 2024-03-10 NOTE — PROGRESS NOTES
"[facility] - Neurosurgery ([facility])  Vascular Neurology  Comprehensive Stroke Center  Progress Note    Assessment/Plan:     * Cerebrovascular accident (CVA) due to embolism of left middle cerebral artery  79 year old woman with HTN, HLD, RBBB presented as an acute stroke transfer 3/7 for IR intervention after initial OSH presentation with aphasia, CTA showed L M2 occlusion. Loaded with DAPT prior to transfer. NIHSS 10 on arrival. TTE with EF 55-60% with no atrial enlargement. MRI showed R MCA territory infarct without hemorrhage or mass effect. Etiology ESUS, will need cardiac event monitor on discharge.    Exam unchanged, says "I don't know" to questions and only follows command to close eyes. Pending rehab placement.     Antithrombotics for secondary stroke prevention: aspirin 81 mg and clopidogrel 75 mg      Statins for secondary stroke prevention and hyperlipidemia, if present: Statins: Atorvastatin- 40 mg daily     Aggressive risk factor modification: HTN, HLD, Diet, Exercise, CAD     Rehab efforts: The patient has been evaluated by a stroke team provider and the therapy needs have been fully considered based off the presenting complaints and exam findings. The following therapy evaluations are needed: PT/OT rec high intensity therapy, SLP rec soft bite sized thin diet     Diagnostics ordered/pending: None     VTE prophylaxis: Heparin 5000 units SQ every 8 hours     BP parameters: Infarct: SBP <160    Prediabetes  A1c 5.7    Bilateral carotid artery stenosis  Reported history of; no significant stenosis on CTA    Hypothyroid  TSH WNL  - continue home synthroid 75 mcg    Hyperlipidemia  Stroke risk factor. LDL 61; taking atorvastatin 20 mg prior to admission.  - atorvastatin 40 mg         3/7/24: Transfer for L M2 occlusion on CTA after NIH 10 w/ RUE weakness, global aphasia, AMS (LKW 0630 3/7) OOW for TNK. ASA+Plavix loaded and taken with IR for TICI3C  3/8/24: On post-angio pathway with exam NIH 9 with " improving with patient still globally aphasic but able to mimic/comprehend some of speech/commands. RUE with improving strength, no drift. Remainder of exam non-focal. Awaiting MRI brain.  03/09/2024 MRI R MCA infarct without hemorrhage or mass effect. Remains largely aphasic, followed verbal command to close eyes. Stepped down to NPU.  03/10/2024 Exam stable. Pending IPR.    STROKE DOCUMENTATION   Acute Stroke Times   Last Known Normal Date: 03/07/24  Last Known Normal Time: 0630  Symptom Onset Date: 03/07/24  Unknown Symptom Onset Time: Unknown Time  Stroke Team Called Date: 03/07/24  Stroke Team Called Time: 1525  Stroke Team Arrival Date: 03/07/24  Stroke Team Arrival Time: 1529  Thrombolytic Therapy Recommended: No  Thrombectomy Recommended: Yes  Decision to Treat Time for IR:  (1330 at OSH; 15:30 after re-evaluation on arrival to [facility]NO ED)    NIH Scale:  1a. Level of Consciousness: 0-->Alert, keenly responsive  1b. LOC Questions: 2-->Answers neither question correctly  1c. LOC Commands: 1-->Performs one task correctly  2. Best Gaze: 0-->Normal  3. Visual: 0-->No visual loss  4. Facial Palsy: 0-->Normal symmetrical movements  5a. Motor Arm, Left: 0-->No drift, limb holds 90 (or 45) degrees for full 10 secs  5b. Motor Arm, Right: 0-->No drift, limb holds 90 (or 45) degrees for full 10 secs  6a. Motor Leg, Left: 0-->No drift, leg holds 30 degree position for full 5 secs  6b. Motor Leg, Right: 0-->No drift, leg holds 30 degree position for full 5 secs  7. Limb Ataxia: 0-->Absent  8. Sensory: 0-->Normal, no sensory loss  9. Best Language: 2-->Severe aphasia, all communication is through fragmentary expression, great need for inference, questioning, and guessing by the listener. Range of information that can be exchanged is limited, listener carries burden of. . . (see row details)  10. Dysarthria: 2-->Severe dysarthria, patients speech is so slurred as to be unintelligible in the absence of or out of proportion to  any dysphasia, or is mute/anarthric  11. Extinction and Inattention (formerly Neglect): 0-->No abnormality  Total (NIH Stroke Scale): 7       Modified [place] Score: 0  [place] Coma Scale:    ABCD2 Score:    [id number] Score:   HAS -BLED Score:   ICH Score:   Hunt & Goodrich Classification:      Hemorrhagic change of an Ischemic Stroke: Does this patient have an ischemic stroke with hemorrhagic changes? No     Neurologic Chief Complaint: R MCA infarct    Subjective:     Interval History: No acute events overnight. Patient verbalizes no complaints.    HPI, Past Medical, Family, and Social History remains the same as documented in the initial encounter.     Review of Systems   Reason unable to perform ROS: ROS limited by patient's aphasia.   Constitutional:  Negative for diaphoresis and fever.   HENT:  Negative for nosebleeds and rhinorrhea.    Eyes:  Negative for discharge and redness.   Respiratory:  Negative for cough and wheezing.    Cardiovascular:  Negative for leg swelling.   Gastrointestinal:  Negative for abdominal distention and vomiting.   Genitourinary:  Negative for difficulty urinating and hematuria.   Neurological:  Positive for speech difficulty and weakness. Negative for tremors and facial asymmetry.     Scheduled Meds:   aspirin  81 mg Oral Daily    atorvastatin  40 mg Oral Daily    clopidogreL  75 mg Oral Daily    EScitalopram oxalate  20 mg Oral Daily    heparin (porcine)  5,000 Units Subcutaneous Q8H    levothyroxine  75 mcg Oral Before breakfast    senna-docusate 8.6-50 mg  1 tablet Oral Daily     Continuous Infusions: None  PRN Meds:labetalol, polyethylene glycol, sodium chloride 0.9%    Objective:     Vital Signs (Most Recent):  Temp: 98.9 °F (37.2 °C) (03/10/24 0755)  Pulse: 68 (03/10/24 0755)  Resp: 17 (03/10/24 0755)  BP: (!) 145/70 (03/10/24 0755)  SpO2: 98 % (03/10/24 0755)  BP Location: Right arm    Vital Signs Range (Last 24H):  Temp:  [98.2 °F (36.8 °C)-99.4 °F (37.4 °C)]   Pulse:  [61-98]  "  Resp:  [16-23]   BP: (143-159)/(64-76)   SpO2:  [95 %-99 %]   BP Location: Right arm       Physical Exam  Vitals and nursing note reviewed.   Constitutional:       General: She is not in acute distress.     Appearance: She is not diaphoretic.   HENT:      Head: Normocephalic and atraumatic.      Nose: Nose normal. No rhinorrhea.   Eyes:      General:         Right eye: No discharge.         Left eye: No discharge.      Conjunctiva/sclera: Conjunctivae normal.   Cardiovascular:      Rate and Rhythm: Normal rate.   Pulmonary:      Effort: Pulmonary effort is normal. No respiratory distress.   Musculoskeletal:         General: No tenderness or deformity.   Skin:     General: Skin is warm and dry.          Neurological Exam:   LOC: alert  Attention Span: Good   Language: global aphasia - says "I don't know" to orientation questions, does not follow commands with exception of eye closure  Articulation: No dysarthria  Facial Movement (CN VII): Symmetric facial expression      Laboratory:  CMP:   Recent Labs   Lab 03/10/24  0333   CALCIUM 9.1   ALBUMIN 3.4*   PROT 6.8   *   K 4.2   CO2 24   CL 99   BUN 16   CREATININE 0.7   ALKPHOS 74   ALT 14   AST 25   BILITOT 0.4     CBC:   Recent Labs   Lab 03/10/24  0333   WBC 7.19   RBC 4.04   HGB 12.3   HCT 36.8*      MCV 91   MCH 30.4   MCHC 33.4       Diagnostic Results   Brain/Vessel Imaging   CTH 3/7/24  No definite acute intracranial findings by noncontrast CT.      CTA 3/7/24  No CT evidence of acute infarction.  Note that MRI has greater sensitivity to detect small acute infarction and could be done if this is clinically warranted.     No large vessel occlusion in the intracranial circulation.     No hemodynamically significant stenosis of the neck vessels.     IR Angiogram 3/7/24  Formal read pending; per procedure note with combination of aspiration and stent retrieving TICI3 reperfusion obtained     MRI Brain w/o contrast 3/8/24  1. Predominately " cortically-based acute to early subacute ischemia involving the right MCA territory, as discussed.  No evidence of macroscopic hemorrhage within the infarct territory.  No significant mass effect related to this cytotoxic edema.  2. Redemonstrated remote post ischemic sequelae, as discussed.     Cardiac Imaging   TTE 3/8/24    Left Ventricle: The left ventricle is normal in size. Normal wall thickness. Normal wall motion. There is normal systolic function with a visually estimated ejection fraction of 55 - 60%. There is normal diastolic function.    Right Ventricle: Normal right ventricular cavity size. Systolic function is normal.    Pulmonary Artery: The estimated pulmonary artery systolic pressure is 25 mmHg.    IVC/SVC: Normal venous pressure at 3 mmHg.    Latoya Ortiz MD  Comprehensive Stroke Center  Department of Vascular Neurology   [facility] Neurosurgery [facility]

## 2024-03-10 NOTE — NURSING
Nurses Note -- 4 Eyes      3/10/2024   3:12 AM      Skin assessed during: Daily Assessment      [] No Altered Skin Integrity Present    []Prevention Measures Documented      [x] Yes- Altered Skin Integrity Present or Discovered   [x] LDA Added if Not in Epic (Describe Wound)   [] New Altered Skin Integrity was Present on Admit and Documented in LDA   [] Wound Image Taken    Wound Care Consulted? Yes    Attending Nurse:  Criselda Coello RN/Staff Member:   Uma

## 2024-03-10 NOTE — SUBJECTIVE & OBJECTIVE
Neurologic Chief Complaint: R MCA infarct    Subjective:     Interval History: No acute events overnight. Patient verbalizes no complaints.    HPI, Past Medical, Family, and Social History remains the same as documented in the initial encounter.     Review of Systems   Reason unable to perform ROS: ROS limited by patient's aphasia.   Constitutional:  Negative for diaphoresis and fever.   HENT:  Negative for nosebleeds and rhinorrhea.    Eyes:  Negative for discharge and redness.   Respiratory:  Negative for cough and wheezing.    Cardiovascular:  Negative for leg swelling.   Gastrointestinal:  Negative for abdominal distention and vomiting.   Genitourinary:  Negative for difficulty urinating and hematuria.   Neurological:  Positive for speech difficulty and weakness. Negative for tremors and facial asymmetry.     Scheduled Meds:   aspirin  81 mg Oral Daily    atorvastatin  40 mg Oral Daily    clopidogreL  75 mg Oral Daily    EScitalopram oxalate  20 mg Oral Daily    heparin (porcine)  5,000 Units Subcutaneous Q8H    levothyroxine  75 mcg Oral Before breakfast    senna-docusate 8.6-50 mg  1 tablet Oral Daily     Continuous Infusions: None  PRN Meds:labetalol, polyethylene glycol, sodium chloride 0.9%    Objective:     Vital Signs (Most Recent):  Temp: 98.9 °F (37.2 °C) (03/10/24 0755)  Pulse: 68 (03/10/24 0755)  Resp: 17 (03/10/24 0755)  BP: (!) 145/70 (03/10/24 0755)  SpO2: 98 % (03/10/24 0755)  BP Location: Right arm    Vital Signs Range (Last 24H):  Temp:  [98.2 °F (36.8 °C)-99.4 °F (37.4 °C)]   Pulse:  [61-98]   Resp:  [16-23]   BP: (143-159)/(64-76)   SpO2:  [95 %-99 %]   BP Location: Right arm       Physical Exam  Vitals and nursing note reviewed.   Constitutional:       General: She is not in acute distress.     Appearance: She is not diaphoretic.   HENT:      Head: Normocephalic and atraumatic.      Nose: Nose normal. No rhinorrhea.   Eyes:      General:         Right eye: No discharge.         Left eye: No  "discharge.      Conjunctiva/sclera: Conjunctivae normal.   Cardiovascular:      Rate and Rhythm: Normal rate.   Pulmonary:      Effort: Pulmonary effort is normal. No respiratory distress.   Musculoskeletal:         General: No tenderness or deformity.   Skin:     General: Skin is warm and dry.          Neurological Exam:   LOC: alert  Attention Span: Good   Language: global aphasia - says "I don't know" to orientation questions, does not follow commands with exception of eye closure  Articulation: No dysarthria  Facial Movement (CN VII): Symmetric facial expression      Laboratory:  CMP:   Recent Labs   Lab 03/10/24  0333   CALCIUM 9.1   ALBUMIN 3.4*   PROT 6.8   *   K 4.2   CO2 24   CL 99   BUN 16   CREATININE 0.7   ALKPHOS 74   ALT 14   AST 25   BILITOT 0.4     CBC:   Recent Labs   Lab 03/10/24  0333   WBC 7.19   RBC 4.04   HGB 12.3   HCT 36.8*      MCV 91   MCH 30.4   MCHC 33.4       Diagnostic Results   Brain/Vessel Imaging   CTH 3/7/24  No definite acute intracranial findings by noncontrast CT.      CTA 3/7/24  No CT evidence of acute infarction.  Note that MRI has greater sensitivity to detect small acute infarction and could be done if this is clinically warranted.     No large vessel occlusion in the intracranial circulation.     No hemodynamically significant stenosis of the neck vessels.     IR Angiogram 3/7/24  Formal read pending; per procedure note with combination of aspiration and stent retrieving TICI3 reperfusion obtained     MRI Brain w/o contrast 3/8/24  1. Predominately cortically-based acute to early subacute ischemia involving the right MCA territory, as discussed.  No evidence of macroscopic hemorrhage within the infarct territory.  No significant mass effect related to this cytotoxic edema.  2. Redemonstrated remote post ischemic sequelae, as discussed.     Cardiac Imaging   TTE 3/8/24    Left Ventricle: The left ventricle is normal in size. Normal wall thickness. Normal wall " motion. There is normal systolic function with a visually estimated ejection fraction of 55 - 60%. There is normal diastolic function.    Right Ventricle: Normal right ventricular cavity size. Systolic function is normal.    Pulmonary Artery: The estimated pulmonary artery systolic pressure is 25 mmHg.    IVC/SVC: Normal venous pressure at 3 mmHg.

## 2024-03-10 NOTE — ASSESSMENT & PLAN NOTE
"79 year old woman with HTN, HLD, RBBB presented as an acute stroke transfer 3/7 for IR intervention after initial OSH presentation with aphasia, CTA showed L M2 occlusion. Loaded with DAPT prior to transfer. NIHSS 10 on arrival. TTE with EF 55-60% with no atrial enlargement. MRI showed R MCA territory infarct without hemorrhage or mass effect. Etiology ESUS, will need cardiac event monitor on discharge.    Exam unchanged, says "I don't know" to questions and only follows command to close eyes. Pending rehab placement.     Antithrombotics for secondary stroke prevention: aspirin 81 mg and clopidogrel 75 mg      Statins for secondary stroke prevention and hyperlipidemia, if present: Statins: Atorvastatin- 40 mg daily     Aggressive risk factor modification: HTN, HLD, Diet, Exercise, CAD     Rehab efforts: The patient has been evaluated by a stroke team provider and the therapy needs have been fully considered based off the presenting complaints and exam findings. The following therapy evaluations are needed: PT/OT rec high intensity therapy, SLP rec soft bite sized thin diet     Diagnostics ordered/pending: None     VTE prophylaxis: Heparin 5000 units SQ every 8 hours     BP parameters: Infarct: SBP <160  "

## 2024-03-11 ENCOUNTER — DOCUMENTATION ONLY (OUTPATIENT)
Dept: NEUROLOGY | Facility: CLINIC | Age: 79
End: 2024-03-11
Payer: MEDICARE

## 2024-03-11 PROBLEM — I60.9 SAH (SUBARACHNOID HEMORRHAGE): Status: ACTIVE | Noted: 2024-03-11

## 2024-03-11 PROBLEM — R47.01 APHASIA: Status: ACTIVE | Noted: 2024-03-11

## 2024-03-11 LAB
ALBUMIN SERPL BCP-MCNC: 3.2 G/DL (ref 3.5–5.2)
ALP SERPL-CCNC: 70 U/L (ref 55–135)
ALT SERPL W/O P-5'-P-CCNC: 16 U/L (ref 10–44)
ANION GAP SERPL CALC-SCNC: 6 MMOL/L (ref 8–16)
AST SERPL-CCNC: 30 U/L (ref 10–40)
BASOPHILS # BLD AUTO: 0.05 K/UL (ref 0–0.2)
BASOPHILS NFR BLD: 0.8 % (ref 0–1.9)
BILIRUB SERPL-MCNC: 0.4 MG/DL (ref 0.1–1)
BUN SERPL-MCNC: 16 MG/DL (ref 8–23)
CALCIUM SERPL-MCNC: 8.8 MG/DL (ref 8.7–10.5)
CHLORIDE SERPL-SCNC: 99 MMOL/L (ref 95–110)
CO2 SERPL-SCNC: 28 MMOL/L (ref 23–29)
CREAT SERPL-MCNC: 0.8 MG/DL (ref 0.5–1.4)
DIFFERENTIAL METHOD BLD: ABNORMAL
EOSINOPHIL # BLD AUTO: 0.1 K/UL (ref 0–0.5)
EOSINOPHIL NFR BLD: 1.6 % (ref 0–8)
ERYTHROCYTE [DISTWIDTH] IN BLOOD BY AUTOMATED COUNT: 12.5 % (ref 11.5–14.5)
EST. GFR  (NO RACE VARIABLE): >60 ML/MIN/1.73 M^2
GLUCOSE SERPL-MCNC: 106 MG/DL (ref 70–110)
HCT VFR BLD AUTO: 36 % (ref 37–48.5)
HGB BLD-MCNC: 12 G/DL (ref 12–16)
IMM GRANULOCYTES # BLD AUTO: 0.01 K/UL (ref 0–0.04)
IMM GRANULOCYTES NFR BLD AUTO: 0.2 % (ref 0–0.5)
LYMPHOCYTES # BLD AUTO: 1.2 K/UL (ref 1–4.8)
LYMPHOCYTES NFR BLD: 19.3 % (ref 18–48)
MAGNESIUM SERPL-MCNC: 2 MG/DL (ref 1.6–2.6)
MCH RBC QN AUTO: 30 PG (ref 27–31)
MCHC RBC AUTO-ENTMCNC: 33.3 G/DL (ref 32–36)
MCV RBC AUTO: 90 FL (ref 82–98)
MONOCYTES # BLD AUTO: 0.7 K/UL (ref 0.3–1)
MONOCYTES NFR BLD: 11.8 % (ref 4–15)
NEUTROPHILS # BLD AUTO: 4.1 K/UL (ref 1.8–7.7)
NEUTROPHILS NFR BLD: 66.3 % (ref 38–73)
NRBC BLD-RTO: 0 /100 WBC
PHOSPHATE SERPL-MCNC: 2.7 MG/DL (ref 2.7–4.5)
PLATELET # BLD AUTO: 196 K/UL (ref 150–450)
PMV BLD AUTO: 10.8 FL (ref 9.2–12.9)
POCT GLUCOSE: 100 MG/DL (ref 70–110)
POCT GLUCOSE: 110 MG/DL (ref 70–110)
POCT GLUCOSE: 110 MG/DL (ref 70–110)
POCT GLUCOSE: 126 MG/DL (ref 70–110)
POCT GLUCOSE: 153 MG/DL (ref 70–110)
POCT GLUCOSE: 162 MG/DL (ref 70–110)
POCT GLUCOSE: 99 MG/DL (ref 70–110)
POTASSIUM SERPL-SCNC: 4 MMOL/L (ref 3.5–5.1)
PROT SERPL-MCNC: 6.5 G/DL (ref 6–8.4)
RBC # BLD AUTO: 4 M/UL (ref 4–5.4)
SODIUM SERPL-SCNC: 133 MMOL/L (ref 136–145)
WBC # BLD AUTO: 6.18 K/UL (ref 3.9–12.7)

## 2024-03-11 PROCEDURE — 97530 THERAPEUTIC ACTIVITIES: CPT

## 2024-03-11 PROCEDURE — 20600001 HC STEP DOWN PRIVATE ROOM

## 2024-03-11 PROCEDURE — 95813 EEG EXTND MNTR 61-119 MIN: CPT

## 2024-03-11 PROCEDURE — 51798 US URINE CAPACITY MEASURE: CPT

## 2024-03-11 PROCEDURE — 97535 SELF CARE MNGMENT TRAINING: CPT

## 2024-03-11 PROCEDURE — 36415 COLL VENOUS BLD VENIPUNCTURE: CPT

## 2024-03-11 PROCEDURE — 95813 EEG EXTND MNTR 61-119 MIN: CPT | Mod: 26,,, | Performed by: PSYCHIATRY & NEUROLOGY

## 2024-03-11 PROCEDURE — 63600175 PHARM REV CODE 636 W HCPCS

## 2024-03-11 PROCEDURE — 25000003 PHARM REV CODE 250

## 2024-03-11 PROCEDURE — 99233 SBSQ HOSP IP/OBS HIGH 50: CPT | Mod: ,,, | Performed by: PSYCHIATRY & NEUROLOGY

## 2024-03-11 PROCEDURE — 84100 ASSAY OF PHOSPHORUS: CPT

## 2024-03-11 PROCEDURE — 83735 ASSAY OF MAGNESIUM: CPT

## 2024-03-11 PROCEDURE — 85025 COMPLETE CBC W/AUTO DIFF WBC: CPT

## 2024-03-11 PROCEDURE — 92526 ORAL FUNCTION THERAPY: CPT

## 2024-03-11 PROCEDURE — 92507 TX SP LANG VOICE COMM INDIV: CPT

## 2024-03-11 PROCEDURE — 80053 COMPREHEN METABOLIC PANEL: CPT

## 2024-03-11 RX ADMIN — CLOPIDOGREL BISULFATE 75 MG: 75 TABLET ORAL at 08:03

## 2024-03-11 RX ADMIN — LEVOTHYROXINE SODIUM 75 MCG: 75 TABLET ORAL at 06:03

## 2024-03-11 RX ADMIN — HEPARIN SODIUM 5000 UNITS: 5000 INJECTION INTRAVENOUS; SUBCUTANEOUS at 05:03

## 2024-03-11 RX ADMIN — ATORVASTATIN CALCIUM 40 MG: 40 TABLET, FILM COATED ORAL at 08:03

## 2024-03-11 RX ADMIN — ESCITALOPRAM OXALATE 20 MG: 20 TABLET ORAL at 08:03

## 2024-03-11 RX ADMIN — ASPIRIN 81 MG: 81 TABLET, COATED ORAL at 08:03

## 2024-03-11 RX ADMIN — HEPARIN SODIUM 5000 UNITS: 5000 INJECTION INTRAVENOUS; SUBCUTANEOUS at 02:03

## 2024-03-11 NOTE — PROCEDURES
Extended Routine EEG Report    Prema Phillips  [id number]  1945    DATE OF SERVICE: 3/11/2024  REASON FOR CONSULT:  79-year-old woman admitted with confusion and speech difficulty found to have a left hemispheric stroke.  Evaluate for evidence of epileptiform activity.    METHODOLOGY   Electroencephalographic (EEG) recording is with electrodes placed according to the International 10-20 placement system.  Thirty two (32) channels of digital signal (sampling rate of 512/sec) including T1 and T2 was simultaneously recorded from the scalp and may include  EKG, EMG, and/or eye monitors.  Recording band pass was 0.1 to 512 hz.  Digital video recording of the patient is simultaneously recorded with the EEG.  The patient is instructed report clinical symptoms which may occur during the recording session.  EEG and video recording is stored and archived in digital format. Activation procedures which include photic stimulation, hyperventilation and instructing patients to perform simple task are done in selected patients.    The EEG is displayed on a monitor screen and can be reviewed using different montages.  Computer assisted analysis is employed to detect spike and electrographic seizure activity.   The entire record is submitted for computer analysis.  The entire recording is visually reviewed and the times identified by computer analysis as being spikes or seizures are reviewed again.  Compresses spectral analysis (CSA) is also performed on the activity recorded from each individual channel.  This is displayed as a power display of frequencies from 0 to 30 Hz over time.   The CSA is reviewed looking for asymmetries in power between homologous areas of the scalp and then compared with the original EEG recording.     [employer] software is also utilized in the review of this study.  This software suite analyzes the EEG recording in multiple domains.  Coherence and rhythmicity is computed to identify EEG sections which may  contain organized seizures.  Each channel undergoes analysis to detect presence of spike and sharp waves which have special and morphological characteristic of epileptic activity.  The routine EEG recording is converted from spacial into frequency domain.  This is then displayed comparing homologous areas to identify areas of significant asymmetry.  Algorithm to identify non-cortically generated artifact is used to separate eye movement, EMG and other artifact from the EEG.      RECORDING TIMES  Start on 03/11/2024 at 14:01 p.m.  Stop on 03/11/2024 at 15:23 p.m.-> End of the Recording Session  A total of 1 hour and 19 minutes of EEG recording is obtained.    EEG FINDINGS  Background activity:   The background is continuous mixed frequency theta/delta activity with more prominent delta slowing seen over the left temporal leads.  There is intermittent slowing seen in all quadrants, left>right.  There is a 7-8 hz posterior dominant rhythm somewhat better formed over the right posterior quadrant.    Sleep:  The patient transitions from wakefulness to sleep with the appearance of sleep architecture    Activation procedures:   The patient is awake and interactive but is unable to answer orientation questions or follow commands.    Cardiac Monitor:   Heart rate appears generally regular on a single lead EKG.    Impression:   This is an abnormal extended routine EEG because of generalized background slowing consistent with a mild encephalopathy with more prominent slowing over the left temporal leads consistent with focal cortical dysfunction in this region.  There is evidence of subcortical/deep midline dysfunction, left>right.  There are no epileptiform discharges and no electrographic seizures.    Criselda Guillory MD PhD [facility]  Neurology-Epilepsy  [facility].

## 2024-03-11 NOTE — NURSING
Nurses Note -- 4 Eyes      3/10/2024   9:45 PM      Skin assessed during: Daily Assessment      [] No Altered Skin Integrity Present    []Prevention Measures Documented      [x] Yes- Altered Skin Integrity Present or Discovered   [x] LDA Added if Not in Epic (Describe Wound)   [] New Altered Skin Integrity was Present on Admit and Documented in LDA   [] Wound Image Taken    Wound Care Consulted? Yes    Attending Nurse:  Criselda Coello RN/Staff Member:   De

## 2024-03-11 NOTE — PLAN OF CARE
Problem: Adult Inpatient Plan of Care  Goal: Patient-Specific Goal (Individualized)  Description: Pt will maintain sbp below 160  Outcome: Ongoing, Progressing  Flowsheets (Taken 3/11/2024 0317)  Anxieties, Fears or Concerns: none  Individualized Care Needs: clustered care  Goal: Optimal Comfort and Wellbeing  Outcome: Ongoing, Progressing  Intervention: Monitor Pain and Promote Comfort  Flowsheets (Taken 3/11/2024 0317)  Pain Management Interventions: care clustered  Intervention: Provide Person-Centered Care  Flowsheets (Taken 3/11/2024 0317)  Trust Relationship/Rapport:   care explained   choices provided     Problem: Cerebral Tissue Perfusion (Stroke, Ischemic/Transient Ischemic Attack)  Goal: Optimal Cerebral Tissue Perfusion  Outcome: Ongoing, Progressing  Intervention: Protect and Optimize Cerebral Perfusion  Flowsheets (Taken 3/11/2024 0317)  Sensory Stimulation Regulation: care clustered  Cerebral Perfusion Promotion: blood pressure monitored     VSS. Bed in lowest position, side rails x3, call light in use.

## 2024-03-11 NOTE — PT/OT/SLP PROGRESS
Speech Language Pathology Treatment    Patient Name:  Prema Phillips   MRN:  [id number]  Admitting Diagnosis: Cerebrovascular accident (CVA) due to embolism of left middle cerebral artery    Recommendations:                 General Recommendations:  Dysphagia therapy and Speech/language therapy  Diet recommendations:  Minced & Moist Diet - IDDSI Level 5, Liquid Diet Level: Thin   Aspiration Precautions: Eliminate distractions, HOB to 90 degrees, and Meds whole 1 at a time   General Precautions: Standard, aspiration, aphasia  Communication strategies:  none and go to room if call light pushed    Assessment:     Prema Phillips is a 79 y.o. female with an SLP diagnosis of Aphasia.   Subjective     No family present  Patient goals: shamir     Pain/Comfort:  Pain Rating 1: 0/10  Pain Rating Post-Intervention 1: 0/10    Respiratory Status: Room air    Objective:     Has the patient been evaluated by SLP for swallowing?   Yes  Keep patient NPO? No   Current Respiratory Status:        Pt. Seen while sitting up in chair.  Communicatively, she did not follow  any simple commands and modeled 5/8 simple commands.  Responses to simple yes/no questions was undifferentiated vocalizations or no response at all.   Undifferentiated vocalizations were elicited when asked to count and sing with therapist.  She made no vocal response when asked to complete automatic sentences given max cues.  Pt. Was observed during breakfast meal self feeding with left hand.  Oral and pharyngeal phases of swallow were wfl.  No facial grimacing observed with solids.  No coughing,t hroat clearing or change in vocal quality noted following the swallow.    Goals:   Multidisciplinary Problems       SLP Goals          Problem: SLP    Goal Priority Disciplines Outcome   SLP Goal     SLP Ongoing, Progressing   Description: Speech Language Pathology Goals  Goals expected to be met by 3/22  1. Pt will tolerate regular diet with thin liquids without overt s/s aspiration.    2. Pt will answer simple y/n questions with 70% accy.   3. Pt will follow simple commands with 50% accy with max cues.   4. Pt will complete simple auto speech tasks with 60% accy with max cues.   5. Pt will identify objects in field of 2 with 70% accy given max cues.   6. Pt will participate in assessment of reading, writing, visual spatial skills to determine need for tx.                        Plan:     Patient to be seen:  5 x/week   Plan of Care expires:  04/07/24  Plan of Care reviewed with:  patient   SLP Follow-Up:  Yes       Discharge recommendations:  High Intensity Therapy   Barriers to Discharge:  None    Time Tracking:     SLP Treatment Date:   03/11/24  Speech Start Time:  0800  Speech Stop Time:  0820     Speech Total Time (min):  20 min    Billable Minutes: Speech Therapy Individual 10 and Treatment Swallowing Dysfunction 10    03/11/2024

## 2024-03-11 NOTE — PT/OT/SLP PROGRESS
"Physical Therapy Treatment    Patient Name:  Prema Phillips   MRN:  [id number]    Recommendations:     Discharge Recommendations: High Intensity Therapy  Discharge Equipment Recommendations: walker, rolling  Barriers to discharge: None    Assessment:     Prema Phillips is a 79 y.o. female admitted with a medical diagnosis of Cerebrovascular accident (CVA) due to embolism of left middle cerebral artery.  She presents with the following impairments/functional limitations: weakness, impaired endurance, impaired functional mobility, impaired balance, impaired cognition.  Patient re-assessed this date. Strength 4/5 globally BLE. Patient continues to be aphasic with difficulty verbally expressing, and follows 75% of commands with some impulsivity and repetition needed. She required no assistance for bed mobility and CGA for sit to stand transfer and several steps ambulation at side of bed. Additional ambulation and balance tasks not assessed this date due to presence of EEG monitoring.      Rehab Prognosis: Good; patient would benefit from acute skilled PT services to address these deficits and reach maximum level of function.    Recent Surgery: * No surgery found *      Plan:     During this hospitalization, patient to be seen 4 x/week to address the identified rehab impairments via gait training, therapeutic activities, therapeutic exercises, neuromuscular re-education and progress toward the following goals:    Plan of Care Expires:  04/07/24    Subjective     Chief Complaint: not stated  Patient/Family Comments/goals: "I don't know" re: questions at end of session.  Pain/Comfort:  Pain Rating 1: 0/10  Pain Rating Post-Intervention 1: 0/10      Objective:     Communicated with TRAMAINE Kc prior to session.  Patient found HOB elevated with telemetry, EEG, bed alarm upon PT entry to room.  in room.    General Precautions: Standard, aspiration, aphasia  Orthopedic Precautions: N/A  Braces: N/A  Respiratory Status: Room " air    Cognition:   Orientation: unable to verbalize d/t aphasia   Affect: pleasant, cooperative, and confused  Alertness: eyes open 100 % of session  Insight: poor insight into deficits and decreased safety awareness  Attention: requires redirection to task  Command Following: patient follows 75 % of 1-step commands  Communication: can verbalize some words with correct context     Strength and ROM  LLE: WNL ROM, 4/5 global strength   RLE: WNL ROM, 4/5 global strength     Functional Mobility:  Bed Mobility:     Scooting: supervision  Supine to Sit: supervision  Sit to Supine: supervision  Transfers:     Sit to Stand:  contact guard assistance with hand-held assist  Gait: patient ambulated 6' at EOB with CGA no AD  Deviations Noted: decreased gait speed, decreased step height R,L, and decreased step length R, L   Balance:   Sitting static: SBA  Sitting dynamic: SBA  Standing static: CGA  Standing dynamic: CGA     AM-PAC 6 CLICK MOBILITY  Turning over in bed (including adjusting bedclothes, sheets and blankets)?: 4  Sitting down on and standing up from a chair with arms (e.g., wheelchair, bedside commode, etc.): 4  Moving from lying on back to sitting on the side of the bed?: 4  Moving to and from a bed to a chair (including a wheelchair)?: 4  Need to walk in hospital room?: 3  Climbing 3-5 steps with a railing?: 2  Basic Mobility Total Score: 21       Treatment & Education:  Education: patient educated on POC, need for therapy, safety with mobility, discharge recommendations and equipment recommendations. Patient verbalized understanding of all topics.    Cues for longer step length and height, patient able to make slight correction.    Patient left HOB elevated with all lines intact, call button in reach, RN notified, and  present.    GOALS:   Multidisciplinary Problems       Physical Therapy Goals          Problem: Physical Therapy    Goal Priority Disciplines Outcome Goal Variances Interventions   Physical  Therapy Goal     PT, PT/OT Ongoing, Progressing     Description: Goals to be met by: 2024     Patient will increase functional independence with mobility by performin. Supine to sit with [place]  2. Sit to supine with [place]  3. Rolling to Left and Right with [place].  4. Sit to stand transfer with Supervision  5. Bed to chair transfer with Supervision using No Assistive Device  6. Gait  x 200 feet with Supervision using No Assistive Device.   7. Lower extremity exercise program x10 reps per handout, with supervision                             Time Tracking:     PT Received On: 24  PT Start Time: 1446     PT Stop Time: 1502  PT Total Time (min): 16 min     Billable Minutes: Therapeutic Activity 16 minutes    Treatment Type: Treatment  PT/PTA: PT     Number of PTA visits since last PT visit: 0     2024

## 2024-03-11 NOTE — NURSING
"Writer alerted by OT that patient went "blank " in the face while they were brushing their teeth at the sink and stopped following commands. Code stroke called and Stroke team at the bedside. Patient was assisted back to the chair by OT and other RN. /60 when back in chair. NIH stroke scale back to patients baseline from this AM (6) when assessed with stroke team. Repeat CT ordered along with EEG. Writer brought patient down for CT at approx 1015. No further acute distress observed or reported at this time.   "

## 2024-03-11 NOTE — PT/OT/SLP PROGRESS
"Occupational Therapy   Treatment    Name: Prema Phillips  MRN: [id number]  Admitting Diagnosis:  Cerebrovascular accident (CVA) due to embolism of left middle cerebral artery       Recommendations:     Discharge Recommendations: High Intensity Therapy  Discharge Equipment Recommendations:   continue to assess  Barriers to discharge:   increased skilled A needed    Assessment:     Prema Phillips is a 79 y.o. female with a medical diagnosis of Cerebrovascular accident (CVA) due to embolism of left middle cerebral artery.  She presents with performance deficits affecting function are weakness, impaired endurance, impaired self care skills, impaired functional mobility, gait instability, impaired cognition, decreased safety awareness.     Pt engaged fairly in therapy this date, completed STS transfer from upright chair with CGA/RW and walked to bathroom sink with CGA/RW.  Pt required increased assistance with grooming due to difficulty with sequencing and bilateral coordination, provided with frequent multisensory cues. As pt attempted to brush teeth she became increasingly confused and unresponsive, writing OT called for assistance and RN and PT arrived to assist pt to upright chair max x3 in bathroom doorway. When reclined, pt returned to A&O, and BP measured 138/60 at recline, MD team arrived to assist with assessment.     Rehab Prognosis:  Good; patient would benefit from acute skilled OT services to address these deficits and reach maximum level of function.       Plan:     Patient to be seen 4 x/week to address the above listed problems via self-care/home management, therapeutic activities, therapeutic exercises, neuromuscular re-education  Plan of Care Expires: 03/22/24  Plan of Care Reviewed with: patient, spouse    Subjective     Chief Complaint: "I don't know"   Patient/Family Comments/goals: Return to PLOF  Pain/Comfort:  Pain Rating 1: 0/10  - unable to quantify 2* aphasia  Pain Rating Post-Intervention 1: " 0/10    Objective:     Communicated with: RN prior to session.  Patient found up in chair with   upon OT entry to room.    General Precautions: Standard, aspiration, fall, aphasia (global)    Orthopedic Precautions:N/A  Braces: N/A  Respiratory Status: Room air     Occupational Performance:     Bed Mobility:    Not observed this date    Functional Mobility/Transfers:  Patient completed Sit <> Stand Transfer with contact guard assistance  with  rolling walker   Pt able to sustain upright stance at sink ~3min, after which pt began to move in forward flexion and lose fine motor coordination/responsiveness 2* vasovagal response  Functional Mobility: Pt engaging in functional mobility to simulate household/community distances from upright chair to bathroom sink with CGA and utilizing RW in order to maximize functional activity tolerance and standing balance required for engagement in occupations of choice. Pt had difficulty with R hand weakness, placing R  hand on RW appropriately  Pt lowered to upright chair with max A x3 persons and placed in recliner with BP checked, MD team present      Activities of Daily Living:  Grooming: moderate assistance brushing teeth, requiring multisensory cues for sequencing and fine motor coordination   Upper Body Dressing: minimum assistance [facility] gown as erica      [facility] 6 Click ADL: 13    Treatment & Education:  Pt educated on role of OT, POC, and goals for therapy.    POC was dicussed with patient/caregiver, who was included in its development and is in agreement with the identified goals and treatment plan.   Patient and family aware of patient's deficits and therapy progression.   Time provided for therapeutic counseling and discussion of health disposition.   Educated on importance of EOB/OOB mobility, maintaining routine, sitting up in chair, and maximizing independence with ADLs during admission   Pt completed ADLs and functional mobility for treatment session as noted  above   Pt/caregiver verbalized understanding and expressed no further concerns/questions.  Updated communication board with level of assist required \      Patient left up in chair with all lines intact, call button in reach, and RN present    GOALS:   Multidisciplinary Problems       Occupational Therapy Goals          Problem: Occupational Therapy    Goal Priority Disciplines Outcome Interventions   Occupational Therapy Goal     OT, PT/OT Ongoing, Progressing    Description: Goals to be met by: 03-22-24     Patient will increase functional independence with ADLs by performing:    UE Dressing with Supervision.  LE Dressing with Supervision.  Grooming while standing at sink with Supervision.  Toileting from toilet with Supervision for hygiene and clothing management.   Supine to sit with Supervision.  Stand pivot transfers with Supervision.  Toilet transfer to toilet with Supervision.  Pt. To follow 4/4 simple commands                         Time Tracking:     OT Date of Treatment: 03/11/24  OT Start Time: 0917  OT Stop Time: 0943  OT Total Time (min): 26 min    Billable Minutes:Self Care/Home Management 10  Therapeutic Activity 16    OT/RIO: OT          3/11/2024

## 2024-03-11 NOTE — ANESTHESIA POSTPROCEDURE EVALUATION
Anesthesia Post Evaluation    Patient: Prema Phillips    Procedure(s) Performed: * No procedures listed *    Final Anesthesia Type: general      Patient location during evaluation: PACU  Patient participation: Yes- Able to Participate  Level of consciousness: awake and alert and sedated  Post-procedure vital signs: reviewed and stable  Pain management: adequate  Airway patency: patent    PONV status at discharge: No PONV and nausea (controlled)  Anesthetic complications: no      Cardiovascular status: blood pressure returned to baseline  Respiratory status: unassisted  Hydration status: euvolemic  Follow-up not needed.              Vitals Value Taken Time   /67 03/11/24 0710   Temp 36.9 °C (98.4 °F) 03/11/24 0710   Pulse 68 03/11/24 1104   Resp 16 03/11/24 0710   SpO2 98 % 03/11/24 0710         No case tracking events are documented in the log.      Pain/Babs Score: No data recorded

## 2024-03-11 NOTE — CONSULTS
Wound consult received on patient's skin tears to right hand and wrist.  Recommend following skin tear protocol. Weekly mepilex foam dressing changes.  Dressings changed. Patient tolerated care well. Nursing to continue care.

## 2024-03-11 NOTE — PT/OT/SLP PROGRESS
Physical Therapy      Patient Name:  Prema Phillips   MRN:  [id number]    Patient not seen today secondary to Other (Comment). Pt with new remote cerebellar infarcts per latest CT scan impression. PT to be assigned to re-assess pt.  Will follow-up for re-assessment 3/12.

## 2024-03-12 PROBLEM — E87.1 HYPONATREMIA: Status: ACTIVE | Noted: 2024-03-12

## 2024-03-12 PROBLEM — I69.959 HEMIPARESIS AS LATE EFFECT OF CEREBROVASCULAR DISEASE: Status: ACTIVE | Noted: 2024-03-12

## 2024-03-12 PROBLEM — R53.81 DEBILITY: Status: ACTIVE | Noted: 2024-03-12

## 2024-03-12 PROBLEM — F32.A DEPRESSION: Status: ACTIVE | Noted: 2024-03-12

## 2024-03-12 LAB
ALBUMIN SERPL BCP-MCNC: 3.2 G/DL (ref 3.5–5.2)
ALP SERPL-CCNC: 67 U/L (ref 55–135)
ALT SERPL W/O P-5'-P-CCNC: 22 U/L (ref 10–44)
ANION GAP SERPL CALC-SCNC: 9 MMOL/L (ref 8–16)
AST SERPL-CCNC: 43 U/L (ref 10–40)
BASOPHILS # BLD AUTO: 0.06 K/UL (ref 0–0.2)
BASOPHILS NFR BLD: 1 % (ref 0–1.9)
BILIRUB SERPL-MCNC: 0.3 MG/DL (ref 0.1–1)
BUN SERPL-MCNC: 16 MG/DL (ref 8–23)
CALCIUM SERPL-MCNC: 9.1 MG/DL (ref 8.7–10.5)
CHLORIDE SERPL-SCNC: 100 MMOL/L (ref 95–110)
CO2 SERPL-SCNC: 26 MMOL/L (ref 23–29)
CREAT SERPL-MCNC: 0.7 MG/DL (ref 0.5–1.4)
DIFFERENTIAL METHOD BLD: ABNORMAL
EOSINOPHIL # BLD AUTO: 0.1 K/UL (ref 0–0.5)
EOSINOPHIL NFR BLD: 2.1 % (ref 0–8)
ERYTHROCYTE [DISTWIDTH] IN BLOOD BY AUTOMATED COUNT: 12.7 % (ref 11.5–14.5)
EST. GFR  (NO RACE VARIABLE): >60 ML/MIN/1.73 M^2
GLUCOSE SERPL-MCNC: 99 MG/DL (ref 70–110)
HCT VFR BLD AUTO: 36.3 % (ref 37–48.5)
HGB BLD-MCNC: 12.1 G/DL (ref 12–16)
IMM GRANULOCYTES # BLD AUTO: 0.02 K/UL (ref 0–0.04)
IMM GRANULOCYTES NFR BLD AUTO: 0.3 % (ref 0–0.5)
LYMPHOCYTES # BLD AUTO: 1.7 K/UL (ref 1–4.8)
LYMPHOCYTES NFR BLD: 27.2 % (ref 18–48)
MAGNESIUM SERPL-MCNC: 2 MG/DL (ref 1.6–2.6)
MCH RBC QN AUTO: 30.9 PG (ref 27–31)
MCHC RBC AUTO-ENTMCNC: 33.3 G/DL (ref 32–36)
MCV RBC AUTO: 93 FL (ref 82–98)
MONOCYTES # BLD AUTO: 0.8 K/UL (ref 0.3–1)
MONOCYTES NFR BLD: 12.2 % (ref 4–15)
NEUTROPHILS # BLD AUTO: 3.5 K/UL (ref 1.8–7.7)
NEUTROPHILS NFR BLD: 57.2 % (ref 38–73)
NRBC BLD-RTO: 0 /100 WBC
PHOSPHATE SERPL-MCNC: 2.9 MG/DL (ref 2.7–4.5)
PLATELET # BLD AUTO: 219 K/UL (ref 150–450)
PMV BLD AUTO: 11.1 FL (ref 9.2–12.9)
POCT GLUCOSE: 104 MG/DL (ref 70–110)
POCT GLUCOSE: 122 MG/DL (ref 70–110)
POTASSIUM SERPL-SCNC: 3.8 MMOL/L (ref 3.5–5.1)
PROT SERPL-MCNC: 6.5 G/DL (ref 6–8.4)
RBC # BLD AUTO: 3.92 M/UL (ref 4–5.4)
SODIUM SERPL-SCNC: 135 MMOL/L (ref 136–145)
WBC # BLD AUTO: 6.13 K/UL (ref 3.9–12.7)

## 2024-03-12 PROCEDURE — 92507 TX SP LANG VOICE COMM INDIV: CPT

## 2024-03-12 PROCEDURE — 92526 ORAL FUNCTION THERAPY: CPT

## 2024-03-12 PROCEDURE — 80053 COMPREHEN METABOLIC PANEL: CPT

## 2024-03-12 PROCEDURE — 99233 SBSQ HOSP IP/OBS HIGH 50: CPT | Mod: ,,, | Performed by: PSYCHIATRY & NEUROLOGY

## 2024-03-12 PROCEDURE — 94761 N-INVAS EAR/PLS OXIMETRY MLT: CPT

## 2024-03-12 PROCEDURE — 20600001 HC STEP DOWN PRIVATE ROOM

## 2024-03-12 PROCEDURE — 84100 ASSAY OF PHOSPHORUS: CPT

## 2024-03-12 PROCEDURE — 83735 ASSAY OF MAGNESIUM: CPT

## 2024-03-12 PROCEDURE — 25000003 PHARM REV CODE 250

## 2024-03-12 PROCEDURE — 85025 COMPLETE CBC W/AUTO DIFF WBC: CPT

## 2024-03-12 PROCEDURE — 97112 NEUROMUSCULAR REEDUCATION: CPT

## 2024-03-12 PROCEDURE — 36415 COLL VENOUS BLD VENIPUNCTURE: CPT

## 2024-03-12 RX ADMIN — ESCITALOPRAM OXALATE 20 MG: 20 TABLET ORAL at 08:03

## 2024-03-12 RX ADMIN — LEVOTHYROXINE SODIUM 75 MCG: 75 TABLET ORAL at 05:03

## 2024-03-12 RX ADMIN — ATORVASTATIN CALCIUM 40 MG: 40 TABLET, FILM COATED ORAL at 08:03

## 2024-03-12 NOTE — HPI
Prema Phillips is a 79 y.o. woman with past medical history of hypertension, hyperlipidemia, bilateral carotid stenosis, and RBBB who presents as an acute stroke transfer after initial presentation to OSH with acute onset aphasia and diagnostics consistent with left M2 occlusion. Patient transported to IR suite shortly after arrival to [facility] ED.

## 2024-03-12 NOTE — PT/OT/SLP PROGRESS
"Speech Language Pathology Treatment    Patient Name:  Prema Phillips   MRN:  [id number]  Admitting Diagnosis: Cerebrovascular accident (CVA) due to embolism of left middle cerebral artery    Recommendations:                 General Recommendations:  Dysphagia therapy and Speech/language therapy  Diet recommendations:  Regular Diet - IDDSI Level 7, Liquid Diet Level: Thin liquids - IDDSI Level 0   Aspiration Precautions: Eliminate distractions, HOB to 90 degrees, Meds whole 1 at a time, and Strict aspiration precautions   General Precautions: Standard, aphasia, aspiration, fall  Communication strategies:  go to room if call light pushed    Assessment:     Prema Phillips is a 79 y.o. female with an SLP diagnosis of Aphasia.      Subjective     "Okay."    Pain/Comfort:  Pain Rating 1: 0/10  Pain Rating Post-Intervention 1: 0/10    Respiratory Status: Room air    Objective:     Has the patient been evaluated by SLP for swallowing?   Yes  Keep patient NPO? No   Current Respiratory Status:        Pt seen alert, sitting upright in chair. Pt given cup sips of water x 3 and bites of cracker x 2. No facial grimacing or overt s/s of aspiration observed. Spontaneous language c/b primarily jargon with occasional appropriate short phrases. Pt followed simple one-step commands with 29% acc and answered simple y/n questions with 0% acc. Pt did not state any numbers during automatic speech task of counting 1-10, but said "to you" while SLP sang happy birthday. Pt did not complete any automatic speech phrases despite max cues. Perseverations noted during automatic speech tasks. Pt identified objects in a field of 2 with 20% acc. Education provided re: language stimulation, communication strategies, diet recs, aspiration precautions, role of SLP, and continued plan of care. Pt's  verbalized understanding. Education to be ongoing.     Goals:   Multidisciplinary Problems       SLP Goals          Problem: SLP    Goal Priority " Disciplines Outcome   SLP Goal     SLP Ongoing, Progressing   Description: Speech Language Pathology Goals  Goals expected to be met by 3/22  1. Pt will tolerate regular diet with thin liquids without overt s/s aspiration.   2. Pt will answer simple y/n questions with 70% accy.   3. Pt will follow simple commands with 50% accy with max cues.   4. Pt will complete simple auto speech tasks with 60% accy with max cues.   5. Pt will identify objects in field of 2 with 70% accy given max cues.   6. Pt will participate in assessment of reading, writing, visual spatial skills to determine need for tx.                        Plan:     Patient to be seen:  5 x/week   Plan of Care expires:  04/07/24  Plan of Care reviewed with:  patient, spouse   SLP Follow-Up:  Yes       Discharge recommendations:  High Intensity Therapy   Barriers to Discharge:  Level of Skilled Assistance Needed      Time Tracking:     SLP Treatment Date:   03/12/24  Speech Start Time:  1032  Speech Stop Time:  1052     Speech Total Time (min):  20 min    Billable Minutes: Speech Therapy Individual 12 and Treatment Swallowing Dysfunction 8    03/12/2024

## 2024-03-12 NOTE — SUBJECTIVE & OBJECTIVE
"Neurologic Chief Complaint: R MCA infarct    Subjective:     Interval History: No acute events overnight, however, during rounds this morning, team was notified of mental status change and a period of"blanking out" during OT session in the bathroom. Patient was assisted to the bedside recliner. Upon assessment, she was awake and at her baseline neurological status. Stat CTH and 1-hr EEG was ordered. Otherwise, low Na level on labs today. Pending IPR placement.     HPI, Past Medical, Family, and Social History remains the same as documented in the initial encounter.     Review of Systems   Unable to perform ROS: Other (ROS limited by patient's aphasia)     Scheduled Meds:   atorvastatin  40 mg Oral Daily    EScitalopram oxalate  20 mg Oral Daily    heparin (porcine)  5,000 Units Subcutaneous Q8H    levothyroxine  75 mcg Oral Before breakfast    senna-docusate 8.6-50 mg  1 tablet Oral Daily     Continuous Infusions: None  PRN Meds:labetalol, polyethylene glycol, sodium chloride 0.9%    Objective:     Vital Signs (Most Recent):  Temp: 98 °F (36.7 °C) (03/11/24 1555)  Pulse: 70 (03/11/24 1555)  Resp: 16 (03/11/24 1555)  BP: 130/62 (03/11/24 1555)  SpO2: 97 % (03/11/24 1555)  BP Location: Right arm    Vital Signs Range (Last 24H):  Temp:  [97.8 °F (36.6 °C)-98.4 °F (36.9 °C)]   Pulse:  [62-78]   Resp:  [16-18]   BP: (118-149)/(58-67)   SpO2:  [97 %-99 %]   BP Location: Right arm       Physical Exam  Vitals and nursing note reviewed.   Constitutional:       General: She is not in acute distress.     Appearance: She is not diaphoretic.   HENT:      Head: Normocephalic and atraumatic.      Nose: Nose normal.   Eyes:      Conjunctiva/sclera: Conjunctivae normal.   Cardiovascular:      Rate and Rhythm: Normal rate.   Pulmonary:      Effort: Pulmonary effort is normal. No respiratory distress.   Skin:     General: Skin is warm and dry.   Neurological:      Comments: Confused baseline; incomprehensible words        "   Neurological Exam:   LOC: alert  Attention Span: Good   Language: global aphasia - follows some commands   Articulation: mild dysarthria  Facial Movement (CN VII): Symmetric facial expression   ROSARIO     Laboratory:  CMP:   Recent Labs   Lab 03/11/24  0552   CALCIUM 8.8   ALBUMIN 3.2*   PROT 6.5   *   K 4.0   CO2 28   CL 99   BUN 16   CREATININE 0.8   ALKPHOS 70   ALT 16   AST 30   BILITOT 0.4       CBC:   Recent Labs   Lab 03/11/24  0552   WBC 6.18   RBC 4.00   HGB 12.0   HCT 36.0*      MCV 90   MCH 30.0   MCHC 33.3         Diagnostic Results   Brain/Vessel Imaging   CTH 3/11/2024    Impression:     Evolving areas of recent infarction left MCA distribution as above.     Small remote right cerebellar infarcts.     Small volume subarachnoid hemorrhage in the left sylvian fissure.    CTH 3/7/24  No definite acute intracranial findings by noncontrast CT.      CTA 3/7/24  No CT evidence of acute infarction.  Note that MRI has greater sensitivity to detect small acute infarction and could be done if this is clinically warranted.     No large vessel occlusion in the intracranial circulation.     No hemodynamically significant stenosis of the neck vessels.     IR Angiogram 3/7/24  Formal read pending; per procedure note with combination of aspiration and stent retrieving TICI3 reperfusion obtained     MRI Brain w/o contrast 3/8/24  1. Predominately cortically-based acute to early subacute ischemia involving the right MCA territory, as discussed.  No evidence of macroscopic hemorrhage within the infarct territory.  No significant mass effect related to this cytotoxic edema.  2. Redemonstrated remote post ischemic sequelae, as discussed.     Cardiac Imaging   TTE 3/8/24    Left Ventricle: The left ventricle is normal in size. Normal wall thickness. Normal wall motion. There is normal systolic function with a visually estimated ejection fraction of 55 - 60%. There is normal diastolic function.    Right Ventricle:  Normal right ventricular cavity size. Systolic function is normal.    Pulmonary Artery: The estimated pulmonary artery systolic pressure is 25 mmHg.    IVC/SVC: Normal venous pressure at 3 mmHg.

## 2024-03-12 NOTE — PLAN OF CARE
CHW met with patient/family at bedside. Patient experience rounding completed and reviewed the following.     Do you know your discharge plan? Yes or No,    If yes, what is the plan?   Yes Rehab     Have you discussed your needs and preferences with your SW/CM? Yes     If you are discharging home, do you have help at home? Yes     Do you think you will need help additional at home at discharge?  No     Do you currently have difficulty keeping up with bills, affording medicine or buying food?  No    Assigned SW/CM notified of any patient/family needs or concerns. Appropriate resources provided to address patient's needs.   Zeenat Munoz W  Case Management  [phone]

## 2024-03-12 NOTE — PHYSICIAN QUERY
"PT Name: Prema Phillips  MR #: [id number]    DOCUMENTATION CLARIFICATION     CDS/: Davina Card RN, CDS               Contact information: [email or web address]    This form is a permanent document in the medical record.     Query Date: March 12, 2024    By submitting this query, we are merely seeking further clarification of documentation. Please utilize your independent clinical judgment when addressing the question(s) below.    Supporting Clinical Findings Location in Medical Record     --Writer alerted by OT that patient went "blank " in the face while they were brushing their teeth at the sink and stopped following commands. Code stroke called and Stroke team at the bedside.   --NIH stroke scale back to patients baseline from this AM (6) when assessed with stroke team. Repeat CT ordered along with EEG.     --Cerebrovascular accident (CVA) due to embolism of left middle cerebral artery         -MRI showed R MCA territory infarct without hemorrhage or mass effect. Etiology ESUS        -Antithrombotics for secondary stroke prevention: aspirin 81 mg and clopidogrel 75 mg on hold currently due to new SAH   --SAH (subarachnoid hemorrhage)           -No acute events overnight, however, during rounds this morning, team was notified of mental status change and a period of"blanking out" during OT session in the bathroom. Patient was assisted to the bedside recliner. Upon assessment, she was awake and at her baseline neurological status. Stat CTH obtained, which showed a new small SAH in the L sylvian fissure . DAPT held for now.       RN Note 3/11            Vas Neuro Note 3/11 (Dr Nguyen)   --aspirin EC tablet 81 mg PO QD (started 3/8-stopped 3/11)  --clopidogreL tablet 75 mg PO QD (started 3/8-stopped 3/11)    --No definite hemorrhage within the infarct territory.     Impression:   Evolving areas of recent infarction left MCA distribution as above.   Small volume subarachnoid hemorrhage in the left " sylvian fissure.   MAR      Brain MRI 3/8    CTH 3/11           Provider, please clarify if there is any clinical correlation between _SAH (subarachnoid hemorrhage) ___ and _aspirin 81 mg and clopidogrel 75 mg___.           Are the conditions:      [  ] Due to or associated with each other   [ xxxxxx ] Unrelated to each other   [  ] Other explanation (Please Specify): ______________   [  ] Clinically Undetermined                                                                               Please document in your progress notes daily for the duration of treatment until resolved and include in your discharge summary.

## 2024-03-12 NOTE — ASSESSMENT & PLAN NOTE
"79 year old woman with HTN, HLD, RBBB presented as an acute stroke transfer 3/7 for IR intervention after initial OSH presentation with aphasia, CTA showed L M2 occlusion. Loaded with DAPT prior to transfer. NIHSS 10 on arrival. TTE with EF 55-60% with no atrial enlargement. MRI showed R MCA territory infarct without hemorrhage or mass effect. Etiology ESUS, will need cardiac event monitor on discharge.    No acute events overnight, however, during rounds this morning, team was notified of mental status change and a period of"blanking out" during OT session in the bathroom. Patient was assisted to the bedside recliner. Upon assessment, she was awake and at her baseline neurological status. Stat CTH obtained, which showed a new small SAH in the L sylvian fissure . DAPT held for now. 1-hr EEG with no seizure activities noted. Otherwise, low Na level on labs today, monitoring. Pending IPR placement.      Antithrombotics for secondary stroke prevention: aspirin 81 mg and clopidogrel 75 mg on hold currently due to new SAH.      Statins for secondary stroke prevention and hyperlipidemia, if present: Statins: Atorvastatin- 40 mg daily     Aggressive risk factor modification: HTN, HLD, Diet, Exercise, CAD     Rehab efforts: The patient has been evaluated by a stroke team provider and the therapy needs have been fully considered based off the presenting complaints and exam findings. The following therapy evaluations are needed: PT/OT rec high intensity therapy, SLP rec soft bite sized thin diet     Diagnostics ordered/pending: None     VTE prophylaxis: SCDs     BP parameters: Infarct: SBP <160  "

## 2024-03-12 NOTE — PROGRESS NOTES
"Tony Knight - Neurosurgery ([facility])  Vascular Neurology  Comprehensive Stroke Center  Progress Note    Assessment/Plan:     * Cerebrovascular accident (CVA) due to embolism of left middle cerebral artery  79 year old woman with HTN, HLD, RBBB presented as an acute stroke transfer 3/7 for IR intervention after initial OSH presentation with aphasia, CTA showed L M2 occlusion. Loaded with DAPT prior to transfer. NIHSS 10 on arrival. TTE with EF 55-60% with no atrial enlargement. MRI showed R MCA territory infarct without hemorrhage or mass effect. Etiology ESUS, will need cardiac event monitor on discharge.    No acute events overnight, however, during rounds this morning, team was notified of mental status change and a period of"blanking out" during OT session in the bathroom. Patient was assisted to the bedside recliner. Upon assessment, she was awake and at her baseline neurological status. Stat CTH obtained, which showed a new small SAH in the L sylvian fissure . DAPT held for now. 1-hr EEG with no seizure activities noted. Otherwise, low Na level on labs today, monitoring. Pending IPR placement.      Antithrombotics for secondary stroke prevention: aspirin 81 mg and clopidogrel 75 mg on hold currently due to new SAH.      Statins for secondary stroke prevention and hyperlipidemia, if present: Statins: Atorvastatin- 40 mg daily     Aggressive risk factor modification: HTN, HLD, Diet, Exercise, CAD     Rehab efforts: The patient has been evaluated by a stroke team provider and the therapy needs have been fully considered based off the presenting complaints and exam findings. The following therapy evaluations are needed: PT/OT rec high intensity therapy, SLP rec soft bite sized thin diet     Diagnostics ordered/pending: None     VTE prophylaxis: SCDs     BP parameters: Infarct: SBP <160    SAH (subarachnoid hemorrhage)  See "Cerebrovascular accident (CVA) due to embolism of left middle cerebral " "artery"        Aphasia  Due to stroke  Therapy ongoing    Prediabetes  A1c 5.7  Not on any meds currently    Bilateral carotid artery stenosis  Reported history of; no significant stenosis on CTA    Hypothyroid  TSH WNL  - continue home synthroid 75 mcg    Hyperlipidemia  Stroke risk factor. LDL 61; taking atorvastatin 20 mg prior to admission.  - atorvastatin 40 mg         3/7/24: Transfer for L M2 occlusion on CTA after NIH 10 w/ RUE weakness, global aphasia, AMS (LKW 0630 3/7) OOW for TNK. ASA+Plavix loaded and taken with IR for TICI3C  3/8/24: On post-angio pathway with exam NIH 9 with improving with patient still globally aphasic but able to mimic/comprehend some of speech/commands. RUE with improving strength, no drift. Remainder of exam non-focal. Awaiting MRI brain.  03/09/2024 MRI R MCA infarct without hemorrhage or mass effect. Remains largely aphasic, followed verbal command to close eyes. Stepped down to NPU.  03/10/2024 Exam stable. Pending IPR.  03/11/2024. No acute events overnight, however, during rounds this morning, team was notified of mental status change and a period of"blanking out" during OT session in the bathroom. Patient was assisted to the bedside recliner. Upon assessment, she was awake and at her baseline neurological status. Stat CTH and 1-hr EEG was ordered. Otherwise, low Na level on labs today. Pending IPR placement.     STROKE DOCUMENTATION   Acute Stroke Times   Last Known Normal Date: 03/07/24  Last Known Normal Time: 0630  Symptom Onset Date: 03/07/24  Unknown Symptom Onset Time: Unknown Time  Stroke Team Called Date: 03/07/24  Stroke Team Called Time: 1525  Stroke Team Arrival Date: 03/07/24  Stroke Team Arrival Time: 1529  Thrombolytic Therapy Recommended: No  Thrombectomy Recommended: Yes  Decision to Treat Time for IR:  (1330 at OSH; 15:30 after re-evaluation on arrival to [facility] ED)    NIH Scale:  1a. Level of Consciousness: 0-->Alert, keenly responsive  1b. LOC Questions: " "2-->Answers neither question correctly  1c. LOC Commands: 1-->Performs one task correctly  2. Best Gaze: 0-->Normal  3. Visual: 0-->No visual loss  4. Facial Palsy: 0-->Normal symmetrical movements  5a. Motor Arm, Left: 0-->No drift, limb holds 90 (or 45) degrees for full 10 secs  5b. Motor Arm, Right: 0-->No drift, limb holds 90 (or 45) degrees for full 10 secs  6a. Motor Leg, Left: 0-->No drift, leg holds 30 degree position for full 5 secs  6b. Motor Leg, Right: 0-->No drift, leg holds 30 degree position for full 5 secs  7. Limb Ataxia: 0-->Absent  8. Sensory: 0-->Normal, no sensory loss  9. Best Language: 2-->Severe aphasia, all communication is through fragmentary expression, great need for inference, questioning, and guessing by the listener. Range of information that can be exchanged is limited, listener carries burden of. . . (see row details)  10. Dysarthria: 1-->Mild-to-moderate dysarthria, patient slurs at least some words and, at worst, can be understood with some difficulty  11. Extinction and Inattention (formerly Neglect): 0-->No abnormality  Total (NIH Stroke Scale): 6       Modified Analia Score: 0  [place] Coma Scale:    ABCD2 Score:    [id number] Score:   HAS -BLED Score:   ICH Score:   Hunt & Goodrich Classification:      Hemorrhagic change of an Ischemic Stroke: Does this patient have an ischemic stroke with hemorrhagic changes? No     Neurologic Chief Complaint: R MCA infarct    Subjective:     Interval History: No acute events overnight, however, during rounds this morning, team was notified of mental status change and a period of"blanking out" during OT session in the bathroom. Patient was assisted to the bedside recliner. Upon assessment, she was awake and at her baseline neurological status. Stat CTH and 1-hr EEG was ordered. Otherwise, low Na level on labs today. Pending IPR placement.     HPI, Past Medical, Family, and Social History remains the same as documented in the initial encounter.   "   Review of Systems   Unable to perform ROS: Other (ROS limited by patient's aphasia)     Scheduled Meds:   atorvastatin  40 mg Oral Daily    EScitalopram oxalate  20 mg Oral Daily    heparin (porcine)  5,000 Units Subcutaneous Q8H    levothyroxine  75 mcg Oral Before breakfast    senna-docusate 8.6-50 mg  1 tablet Oral Daily     Continuous Infusions: None  PRN Meds:labetalol, polyethylene glycol, sodium chloride 0.9%    Objective:     Vital Signs (Most Recent):  Temp: 98 °F (36.7 °C) (03/11/24 1555)  Pulse: 70 (03/11/24 1555)  Resp: 16 (03/11/24 1555)  BP: 130/62 (03/11/24 1555)  SpO2: 97 % (03/11/24 1555)  BP Location: Right arm    Vital Signs Range (Last 24H):  Temp:  [97.8 °F (36.6 °C)-98.4 °F (36.9 °C)]   Pulse:  [62-78]   Resp:  [16-18]   BP: (118-149)/(58-67)   SpO2:  [97 %-99 %]   BP Location: Right arm       Physical Exam  Vitals and nursing note reviewed.   Constitutional:       General: She is not in acute distress.     Appearance: She is not diaphoretic.   HENT:      Head: Normocephalic and atraumatic.      Nose: Nose normal.   Eyes:      Conjunctiva/sclera: Conjunctivae normal.   Cardiovascular:      Rate and Rhythm: Normal rate.   Pulmonary:      Effort: Pulmonary effort is normal. No respiratory distress.   Skin:     General: Skin is warm and dry.   Neurological:      Comments: Confused baseline; incomprehensible words          Neurological Exam:   LOC: alert  Attention Span: Good   Language: global aphasia - follows some commands   Articulation: mild dysarthria  Facial Movement (CN VII): Symmetric facial expression   ROSARIO     Laboratory:  CMP:   Recent Labs   Lab 03/11/24  0552   CALCIUM 8.8   ALBUMIN 3.2*   PROT 6.5   *   K 4.0   CO2 28   CL 99   BUN 16   CREATININE 0.8   ALKPHOS 70   ALT 16   AST 30   BILITOT 0.4       CBC:   Recent Labs   Lab 03/11/24  0552   WBC 6.18   RBC 4.00   HGB 12.0   HCT 36.0*      MCV 90   MCH 30.0   MCHC 33.3         Diagnostic Results   Brain/Vessel Imaging    CTH 3/11/2024    Impression:     Evolving areas of recent infarction left MCA distribution as above.     Small remote right cerebellar infarcts.     Small volume subarachnoid hemorrhage in the left sylvian fissure.    CTH 3/7/24  No definite acute intracranial findings by noncontrast CT.      CTA 3/7/24  No CT evidence of acute infarction.  Note that MRI has greater sensitivity to detect small acute infarction and could be done if this is clinically warranted.     No large vessel occlusion in the intracranial circulation.     No hemodynamically significant stenosis of the neck vessels.     IR Angiogram 3/7/24  Formal read pending; per procedure note with combination of aspiration and stent retrieving TICI3 reperfusion obtained     MRI Brain w/o contrast 3/8/24  1. Predominately cortically-based acute to early subacute ischemia involving the right MCA territory, as discussed.  No evidence of macroscopic hemorrhage within the infarct territory.  No significant mass effect related to this cytotoxic edema.  2. Redemonstrated remote post ischemic sequelae, as discussed.     Cardiac Imaging   TTE 3/8/24    Left Ventricle: The left ventricle is normal in size. Normal wall thickness. Normal wall motion. There is normal systolic function with a visually estimated ejection fraction of 55 - 60%. There is normal diastolic function.    Right Ventricle: Normal right ventricular cavity size. Systolic function is normal.    Pulmonary Artery: The estimated pulmonary artery systolic pressure is 25 mmHg.    IVC/SVC: Normal venous pressure at 3 mmHg.    Kia Nguyen [facility]  Department of Vascular Neurology   [facility]

## 2024-03-12 NOTE — PLAN OF CARE
03/12/24 0837   Post-Acute Status   Post-Acute Authorization Placement   Post-Acute Placement Status Pending medical clearance/testing   Coverage [employer]   Discharge Plan   Discharge Plan A Rehab   Discharge Plan B Home with family;Home Health     SW met with patient and  Yuan at bedside.  SW let them know that preference, [facility], did not have any beds and would they consider their next choice, [facility] or [facility].   has chosen [facility]. PMR in and SW reached out to liaison.      Discharge Plan A and Plan B have been determined by review of patient's clinical status, future medical and therapeutic needs, and coverage/benefits for post-acute care in coordination with multidisciplinary team members.    Zeenat Toscano, NEMESIO  [facility]  [phone]

## 2024-03-12 NOTE — CARE UPDATE
I have reviewed the chart of Prema Phillips who is hospitalized for the following:    Active Hospital Problems    Diagnosis    *Cerebrovascular accident (CVA) due to embolism of left middle cerebral artery    Hyponatremia    Hemiparesis as late effect of cerebrovascular disease    Depression     On lexapro      Debility     Therapy recc high intensity therapy       Aphasia    SAH (subarachnoid hemorrhage)    Prediabetes    Bilateral carotid artery stenosis     Carotid US 3/9/20  CONCLUSIONS   There is 20 - 39% right Internal Carotid stenosis.  There is 40 - 49% left Internal Carotid stenosis.    No significant stenosis      Hyperlipidemia    Hypothyroid    RBBB          Mandy Lee NP  Unit Based AUGUST   
Additional Progress Note...

## 2024-03-12 NOTE — PLAN OF CARE
Problem: Adult Inpatient Plan of Care  Goal: Patient-Specific Goal (Individualized)  Description: Pt will maintain sbp below 160  Outcome: Ongoing, Progressing  Flowsheets (Taken 3/12/2024 0154)  Anxieties, Fears or Concerns: none  Individualized Care Needs: clustered care  Goal: Optimal Comfort and Wellbeing  Outcome: Ongoing, Progressing  Intervention: Monitor Pain and Promote Comfort  Flowsheets (Taken 3/12/2024 0154)  Pain Management Interventions: care clustered  Intervention: Provide Person-Centered Care  Flowsheets (Taken 3/12/2024 0154)  Trust Relationship/Rapport:   care explained   choices provided     Problem: Cerebral Tissue Perfusion (Stroke, Ischemic/Transient Ischemic Attack)  Goal: Optimal Cerebral Tissue Perfusion  Outcome: Ongoing, Progressing  Intervention: Protect and Optimize Cerebral Perfusion  Flowsheets (Taken 3/12/2024 0154)  Cerebral Perfusion Promotion: blood pressure monitored     VSS. Bed in lowest position, side rails x 3, call light in reach.

## 2024-03-12 NOTE — PROGRESS NOTES
"Tony Knight - Neurosurgery ([facility])  Vascular Neurology  Comprehensive Stroke Center  Progress Note    Assessment/Plan:     * Cerebrovascular accident (CVA) due to embolism of left middle cerebral artery  79 year old woman with HTN, HLD, RBBB presented as an acute stroke transfer 3/7 for IR intervention after initial OSH presentation with aphasia, CTA showed L M2 occlusion. Loaded with DAPT prior to transfer. NIHSS 10 on arrival. TTE with EF 55-60% with no atrial enlargement. MRI showed R MCA territory infarct without hemorrhage or mass effect. Etiology ESUS, will need cardiac event monitor on discharge.    No acute events overnight, however, during rounds this morning, team was notified of mental status change and a period of"blanking out" during OT session in the bathroom. Patient was assisted to the bedside recliner. Upon assessment, she was awake and at her baseline neurological status. Stat CTH obtained, which showed a new small SAH in the L sylvian fissure . DAPT held for now. 1-hr EEG with no seizure activities noted. Otherwise, low Na level on labs today, monitoring. Pending IPR placement.      Antithrombotics for secondary stroke prevention: aspirin 81 mg and clopidogrel 75 mg on hold currently due to new SAH.      Statins for secondary stroke prevention and hyperlipidemia, if present: Statins: Atorvastatin- 40 mg daily     Aggressive risk factor modification: HTN, HLD, Diet, Exercise, CAD     Rehab efforts: The patient has been evaluated by a stroke team provider and the therapy needs have been fully considered based off the presenting complaints and exam findings. The following therapy evaluations are needed: PT/OT rec high intensity therapy, SLP rec soft bite sized thin diet     Diagnostics ordered/pending: None     VTE prophylaxis: SCDs     BP parameters: Infarct: SBP <160    SAH (subarachnoid hemorrhage)  See "Cerebrovascular accident (CVA) due to embolism of left middle cerebral " "artery"        Aphasia  Due to stroke  Therapy ongoing    Prediabetes  A1c 5.7  Not on any meds currently    Bilateral carotid artery stenosis  Reported history of; no significant stenosis on CTA    Hypothyroid  TSH WNL  - continue home synthroid 75 mcg    Hyperlipidemia  Stroke risk factor. LDL 61; taking atorvastatin 20 mg prior to admission.  - atorvastatin 40 mg         3/7/24: Transfer for L M2 occlusion on CTA after NIH 10 w/ RUE weakness, global aphasia, AMS (LKW 0630 3/7) OOW for TNK. ASA+Plavix loaded and taken with IR for TICI3C  3/8/24: On post-angio pathway with exam NIH 9 with improving with patient still globally aphasic but able to mimic/comprehend some of speech/commands. RUE with improving strength, no drift. Remainder of exam non-focal. Awaiting MRI brain.  03/09/2024 MRI R MCA infarct without hemorrhage or mass effect. Remains largely aphasic, followed verbal command to close eyes. Stepped down to NPU.  03/10/2024 Exam stable. Pending IPR.  03/11/2024. No acute events overnight, however, during rounds this morning, team was notified of mental status change and a period of"blanking out" during OT session in the bathroom. Patient was assisted to the bedside recliner. Upon assessment, she was awake and at her baseline neurological status. Stat CTH and 1-hr EEG was ordered. Otherwise, low Na level on labs today. Pending IPR placement.   3-12-24 CT head from yesterday small amount SAH seen DAPT on hold. EEG no seizure activity seen    STROKE DOCUMENTATION   Acute Stroke Times   Last Known Normal Date: 03/07/24  Last Known Normal Time: 0630  Symptom Onset Date: 03/07/24  Unknown Symptom Onset Time: Unknown Time  Stroke Team Called Date: 03/07/24  Stroke Team Called Time: 1525  Stroke Team Arrival Date: 03/07/24  Stroke Team Arrival Time: 1529  Thrombolytic Therapy Recommended: No  Thrombectomy Recommended: Yes  Decision to Treat Time for IR:  (1330 at OSH; 15:30 after re-evaluation on arrival to [facility] ED)    NIH Scale:  1a. Level of Consciousness: 0-->Alert, keenly responsive  1b. LOC Questions: 2-->Answers neither question correctly  1c. LOC Commands: 0-->Performs both tasks correctly  2. Best Gaze: 0-->Normal  3. Visual: 0-->No visual loss  4. Facial Palsy: 0-->Normal symmetrical movements  5a. Motor Arm, Left: 0-->No drift, limb holds 90 (or 45) degrees for full 10 secs  5b. Motor Arm, Right: 0-->No drift, limb holds 90 (or 45) degrees for full 10 secs  6a. Motor Leg, Left: 0-->No drift, leg holds 30 degree position for full 5 secs  6b. Motor Leg, Right: 0-->No drift, leg holds 30 degree position for full 5 secs  7. Limb Ataxia: 0-->Absent  8. Sensory: 0-->Normal, no sensory loss  9. Best Language: 2-->Severe aphasia, all communication is through fragmentary expression, great need for inference, questioning, and guessing by the listener. Range of information that can be exchanged is limited, listener carries burden of. . . (see row details)  10. Dysarthria: 1-->Mild-to-moderate dysarthria, patient slurs at least some words and, at worst, can be understood with some difficulty  11. Extinction and Inattention (formerly Neglect): 0-->No abnormality  Total (NIH Stroke Scale): 5       Modified Analia Score: 0  [place] Coma Scale:    ABCD2 Score:    [id number] Score:   HAS -BLED Score:   ICH Score:   Hunt & Goodrich Classification:      Hemorrhagic change of an Ischemic Stroke: Does this patient have an ischemic stroke with hemorrhagic changes? No     Neurologic Chief Complaint: R MCA infarct    Subjective:     Interval History: No acute events overnight,CT scan with small amount SAH DAPT on hold, EEG no seizure activity seen     HPI, Past Medical, Family, and Social History remains the same as documented in the initial encounter.     Review of Systems   Unable to perform ROS: Other (ROS limited by patient's aphasia)     Scheduled Meds:   atorvastatin  40 mg Oral Daily    EScitalopram oxalate  20 mg Oral Daily     levothyroxine  75 mcg Oral Before breakfast    senna-docusate 8.6-50 mg  1 tablet Oral Daily     Continuous Infusions: None  PRN Meds:labetalol, polyethylene glycol, sodium chloride 0.9%    Objective:     Vital Signs (Most Recent):  Temp: 98.1 °F (36.7 °C) (03/12/24 0711)  Pulse: 68 (03/12/24 0711)  Resp: 16 (03/12/24 0711)  BP: (!) 117/56 (03/12/24 0711)  SpO2: 99 % (03/12/24 0711)  BP Location: Right arm    Vital Signs Range (Last 24H):  Temp:  [97.8 °F (36.6 °C)-98.7 °F (37.1 °C)]   Pulse:  [63-83]   Resp:  [16-17]   BP: (112-130)/(56-62)   SpO2:  [97 %-99 %]   BP Location: Right arm       Physical Exam  Vitals and nursing note reviewed.   Neurological:      Comments: Confused baseline; incomprehensible words          Neurological Exam:   LOC: alert  Attention Span: Good   Language: global aphasia - follows some commands   Articulation: mild dysarthria  Facial Movement (CN VII): Symmetric facial expression   ROSARIO     Laboratory:  CMP:   Recent Labs   Lab 03/12/24  0247   CALCIUM 9.1   ALBUMIN 3.2*   PROT 6.5   *   K 3.8   CO2 26      BUN 16   CREATININE 0.7   ALKPHOS 67   ALT 22   AST 43*   BILITOT 0.3       CBC:   Recent Labs   Lab 03/12/24  0248   WBC 6.13   RBC 3.92*   HGB 12.1   HCT 36.3*      MCV 93   MCH 30.9   MCHC 33.3         Diagnostic Results   Brain/Vessel Imaging   CTH 3/11/2024  Evolving areas of recent infarction left MCA distribution as above.  Small remote right cerebellar infarcts.  Small volume subarachnoid hemorrhage in the left sylvian fissure.    CTH 3/7/24  No definite acute intracranial findings by noncontrast CT.      CTA 3/7/24  No CT evidence of acute infarction.  Note that MRI has greater sensitivity to detect small acute infarction and could be done if this is clinically warranted.     No large vessel occlusion in the intracranial circulation.     No hemodynamically significant stenosis of the neck vessels.     IR Angiogram 3/7/24  Formal read pending; per procedure  note with combination of aspiration and stent retrieving TICI3 reperfusion obtained     MRI Brain w/o contrast 3/8/24  1. Predominately cortically-based acute to early subacute ischemia involving the right MCA territory, as discussed.  No evidence of macroscopic hemorrhage within the infarct territory.  No significant mass effect related to this cytotoxic edema.  2. Redemonstrated remote post ischemic sequelae, as discussed.     Cardiac Imaging   TTE 3/8/24    Left Ventricle: The left ventricle is normal in size. Normal wall thickness. Normal wall motion. There is normal systolic function with a visually estimated ejection fraction of 55 - 60%. There is normal diastolic function.    Right Ventricle: Normal right ventricular cavity size. Systolic function is normal.    Pulmonary Artery: The estimated pulmonary artery systolic pressure is 25 mmHg.    IVC/SVC: Normal venous pressure at 3 mmHg.    Chyna Zavala NP  Comprehensive Stroke Center  Department of Vascular Neurology   [facility] Neurosurgery [facility])

## 2024-03-12 NOTE — PLAN OF CARE
Problem: Physical Therapy  Goal: Physical Therapy Goal  Description: Goals to be met by: 2024     Patient will increase functional independence with mobility by performin. Supine to sit with Price  2. Sit to supine with Price  3. Rolling to Left and Right with Price.  4. Sit to stand transfer with Supervision  5. Bed to chair transfer with Supervision using No Assistive Device  6. Gait  x 200 feet with Supervision using No Assistive Device.   7. Lower extremity exercise program x10 reps per handout, with supervision  8. Score <22/30 on FGA.  9. Follow 100% of motor commands without additional cueing.        Outcome: Ongoing, Progressing

## 2024-03-12 NOTE — PT/OT/SLP PROGRESS
"Physical Therapy Treatment    Patient Name:  Prema Phillips   MRN:  [id number]    Recommendations:     Discharge Recommendations: High Intensity Therapy  Discharge Equipment Recommendations: walker, rolling  Barriers to discharge: None    Assessment:     Prema Phillips is a 79 y.o. female admitted with a medical diagnosis of Cerebrovascular accident (CVA) due to embolism of left middle cerebral artery.  She presents with the following impairments/functional limitations: weakness, impaired endurance, impaired functional mobility, impaired balance, impaired cognition.  Patient required SBA sit to stand transfer and CGA for straight path ambulation. She demonstrated difficulty with dual task ambulation and verbal command following, requiring demonstration for most tasks and inability to complete some tasks even with verbal cues and demonstration. She required min-mod assistance with dual task ambulation challenges and continues to display difficulty with verbal communication due to aphasia. Continuing to recommend high level of therapy post discharge to maximize benefits; patient could tolerate 3xhours/day of combined therapies.       Rehab Prognosis: Good; patient would benefit from acute skilled PT services to address these deficits and reach maximum level of function.    Recent Surgery: * No surgery found *      Plan:     During this hospitalization, patient to be seen 4 x/week to address the identified rehab impairments via gait training, therapeutic activities, therapeutic exercises, neuromuscular re-education and progress toward the following goals:    Plan of Care Expires:  04/07/24    Subjective     Chief Complaint: not stated  Patient/Family Comments/goals: "I don't know" patient attempted to rate pain and communicate with therapist.  Pain/Comfort:  Pain Rating 1:  (unable to rate)  Pain Rating Post-Intervention 1:  (unable to rate)      Objective:     Communicated with TRAMAINE Kc prior to session.  Patient found up in " chair with telemetry, chair check upon PT entry to room.  in room.    General Precautions: Standard, aspiration, aphasia  Orthopedic Precautions: N/A  Braces: N/A  Respiratory Status: Room air     Functional Mobility:  Bed Mobility:  patient received in chair   Transfers:     Sit to Stand:  stand by assistance with no AD  Gait:   Patient ambulated 30' with CGA no AD. Deviations Noted: decreased gait speed, decreased step height R,L, decreased step length R, L, and narrow LEORA    Balance:   Sitting static: supervision  Sitting dynamic: supervision  Standing static: SBA  Standing dynamic: min-mod as below (scored from FGA)  Gait on level surface =  2  Change in Gait Speed = 0  Gait with horizontal head turns  = 2   decreased gait speed, step height/length with activity   Gait with vertical head turns = 0   Unable to ambulate while completing task  Gait with pivot turns = 1  Step over obstacle = 0  Unable to complete task with BLE, modA  Gait with Narrow LEORA = 1  modA  Gait with eyes closed = 2  Significant decrease in gait speed, Sonja for balance  Ambulating Backwards = 2  Significant decrease in gait speed, Sonja for balance  Picking up object off ground = 0  patient unable to attempt task despite verbal cues and demonstration        AM-PAC 6 CLICK MOBILITY  Turning over in bed (including adjusting bedclothes, sheets and blankets)?: 4  Sitting down on and standing up from a chair with arms (e.g., wheelchair, bedside commode, etc.): 4  Moving from lying on back to sitting on the side of the bed?: 4  Moving to and from a bed to a chair (including a wheelchair)?: 4  Need to walk in hospital room?: 3  Climbing 3-5 steps with a railing?: 2  Basic Mobility Total Score: 21       Treatment & Education:  Demonstration and Verbal and tactile cues during all dual task ambulation trials above with assistance as needed.  Explanation to patient and  regarding balance tasks and importance of successful higher level  balance to reduce falls risk.     Patient left up in chair with all lines intact, call button in reach, RN notified, and  present.    GOALS:   Multidisciplinary Problems       Physical Therapy Goals          Problem: Physical Therapy    Goal Priority Disciplines Outcome Goal Variances Interventions   Physical Therapy Goal     PT, PT/OT Ongoing, Progressing     Description: Goals to be met by: 2024     Patient will increase functional independence with mobility by performin. Supine to sit with [place]  2. Sit to supine with [place]  3. Rolling to Left and Right with [place].  4. Sit to stand transfer with Supervision  5. Bed to chair transfer with Supervision using No Assistive Device  6. Gait  x 200 feet with Supervision using No Assistive Device.   7. Lower extremity exercise program x10 reps per handout, with supervision  8. Score <22/30 on FGA.  9. Follow 100% of motor commands without additional cueing.                             Time Tracking:     PT Received On: 24  PT Start Time: 934     PT Stop Time: 957  PT Total Time (min): 23 min     Billable Minutes: Neuromuscular Re-education 23 minutes    Treatment Type: Treatment  PT/PTA: PT     Number of PTA visits since last PT visit: 0     2024

## 2024-03-12 NOTE — SUBJECTIVE & OBJECTIVE
Neurologic Chief Complaint: R MCA infarct    Subjective:     Interval History: No acute events overnight,CT scan with small amount SAH DAPT on hold, EEG no seizure activity seen     HPI, Past Medical, Family, and Social History remains the same as documented in the initial encounter.     Review of Systems   Unable to perform ROS: Other (ROS limited by patient's aphasia)     Scheduled Meds:   atorvastatin  40 mg Oral Daily    EScitalopram oxalate  20 mg Oral Daily    levothyroxine  75 mcg Oral Before breakfast    senna-docusate 8.6-50 mg  1 tablet Oral Daily     Continuous Infusions: None  PRN Meds:labetalol, polyethylene glycol, sodium chloride 0.9%    Objective:     Vital Signs (Most Recent):  Temp: 98.1 °F (36.7 °C) (03/12/24 0711)  Pulse: 68 (03/12/24 0711)  Resp: 16 (03/12/24 0711)  BP: (!) 117/56 (03/12/24 0711)  SpO2: 99 % (03/12/24 0711)  BP Location: Right arm    Vital Signs Range (Last 24H):  Temp:  [97.8 °F (36.6 °C)-98.7 °F (37.1 °C)]   Pulse:  [63-83]   Resp:  [16-17]   BP: (112-130)/(56-62)   SpO2:  [97 %-99 %]   BP Location: Right arm       Physical Exam  Vitals and nursing note reviewed.   Neurological:      Comments: Confused baseline; incomprehensible words          Neurological Exam:   LOC: alert  Attention Span: Good   Language: global aphasia - follows some commands   Articulation: mild dysarthria  Facial Movement (CN VII): Symmetric facial expression   ROSARIO     Laboratory:  CMP:   Recent Labs   Lab 03/12/24  0247   CALCIUM 9.1   ALBUMIN 3.2*   PROT 6.5   *   K 3.8   CO2 26      BUN 16   CREATININE 0.7   ALKPHOS 67   ALT 22   AST 43*   BILITOT 0.3       CBC:   Recent Labs   Lab 03/12/24  0248   WBC 6.13   RBC 3.92*   HGB 12.1   HCT 36.3*      MCV 93   MCH 30.9   MCHC 33.3         Diagnostic Results   Brain/Vessel Imaging   [facility] 3/11/2024  Evolving areas of recent infarction left MCA distribution as above.  Small remote right cerebellar infarcts.  Small volume subarachnoid  hemorrhage in the left sylvian fissure.    CTH 3/7/24  No definite acute intracranial findings by noncontrast CT.      CTA 3/7/24  No CT evidence of acute infarction.  Note that MRI has greater sensitivity to detect small acute infarction and could be done if this is clinically warranted.     No large vessel occlusion in the intracranial circulation.     No hemodynamically significant stenosis of the neck vessels.     IR Angiogram 3/7/24  Formal read pending; per procedure note with combination of aspiration and stent retrieving TICI3 reperfusion obtained     MRI Brain w/o contrast 3/8/24  1. Predominately cortically-based acute to early subacute ischemia involving the right MCA territory, as discussed.  No evidence of macroscopic hemorrhage within the infarct territory.  No significant mass effect related to this cytotoxic edema.  2. Redemonstrated remote post ischemic sequelae, as discussed.     Cardiac Imaging   TTE 3/8/24    Left Ventricle: The left ventricle is normal in size. Normal wall thickness. Normal wall motion. There is normal systolic function with a visually estimated ejection fraction of 55 - 60%. There is normal diastolic function.    Right Ventricle: Normal right ventricular cavity size. Systolic function is normal.    Pulmonary Artery: The estimated pulmonary artery systolic pressure is 25 mmHg.    IVC/SVC: Normal venous pressure at 3 mmHg.

## 2024-03-13 VITALS
DIASTOLIC BLOOD PRESSURE: 58 MMHG | HEART RATE: 75 BPM | OXYGEN SATURATION: 98 % | TEMPERATURE: 98 F | RESPIRATION RATE: 20 BRPM | WEIGHT: 141.75 LBS | BODY MASS INDEX: 22.78 KG/M2 | SYSTOLIC BLOOD PRESSURE: 118 MMHG | HEIGHT: 66 IN

## 2024-03-13 PROBLEM — I67.89 HEMIPARESIS OF RIGHT DOMINANT SIDE DUE TO ACUTE CEREBROVASCULAR DISEASE: Status: ACTIVE | Noted: 2024-03-12

## 2024-03-13 PROBLEM — I63.9 APHASIA DUE TO ACUTE STROKE: Status: ACTIVE | Noted: 2024-03-11

## 2024-03-13 PROBLEM — G81.91 HEMIPARESIS OF RIGHT DOMINANT SIDE DUE TO ACUTE CEREBROVASCULAR DISEASE: Status: ACTIVE | Noted: 2024-03-12

## 2024-03-13 LAB
ALBUMIN SERPL BCP-MCNC: 3 G/DL (ref 3.5–5.2)
ALP SERPL-CCNC: 64 U/L (ref 55–135)
ALT SERPL W/O P-5'-P-CCNC: 22 U/L (ref 10–44)
ANION GAP SERPL CALC-SCNC: 7 MMOL/L (ref 8–16)
AST SERPL-CCNC: 35 U/L (ref 10–40)
BASOPHILS # BLD AUTO: 0.07 K/UL (ref 0–0.2)
BASOPHILS NFR BLD: 0.9 % (ref 0–1.9)
BILIRUB SERPL-MCNC: 0.4 MG/DL (ref 0.1–1)
BUN SERPL-MCNC: 15 MG/DL (ref 8–23)
CALCIUM SERPL-MCNC: 8.9 MG/DL (ref 8.7–10.5)
CHLORIDE SERPL-SCNC: 100 MMOL/L (ref 95–110)
CO2 SERPL-SCNC: 27 MMOL/L (ref 23–29)
CREAT SERPL-MCNC: 0.7 MG/DL (ref 0.5–1.4)
DIFFERENTIAL METHOD BLD: ABNORMAL
EOSINOPHIL # BLD AUTO: 0.2 K/UL (ref 0–0.5)
EOSINOPHIL NFR BLD: 2.8 % (ref 0–8)
ERYTHROCYTE [DISTWIDTH] IN BLOOD BY AUTOMATED COUNT: 12.8 % (ref 11.5–14.5)
EST. GFR  (NO RACE VARIABLE): >60 ML/MIN/1.73 M^2
GLUCOSE SERPL-MCNC: 99 MG/DL (ref 70–110)
HCT VFR BLD AUTO: 35.8 % (ref 37–48.5)
HGB BLD-MCNC: 12 G/DL (ref 12–16)
IMM GRANULOCYTES # BLD AUTO: 0.02 K/UL (ref 0–0.04)
IMM GRANULOCYTES NFR BLD AUTO: 0.3 % (ref 0–0.5)
LYMPHOCYTES # BLD AUTO: 1.5 K/UL (ref 1–4.8)
LYMPHOCYTES NFR BLD: 20.1 % (ref 18–48)
MAGNESIUM SERPL-MCNC: 1.9 MG/DL (ref 1.6–2.6)
MCH RBC QN AUTO: 30.8 PG (ref 27–31)
MCHC RBC AUTO-ENTMCNC: 33.5 G/DL (ref 32–36)
MCV RBC AUTO: 92 FL (ref 82–98)
MONOCYTES # BLD AUTO: 0.8 K/UL (ref 0.3–1)
MONOCYTES NFR BLD: 11.2 % (ref 4–15)
NEUTROPHILS # BLD AUTO: 4.8 K/UL (ref 1.8–7.7)
NEUTROPHILS NFR BLD: 64.7 % (ref 38–73)
NRBC BLD-RTO: 0 /100 WBC
PHOSPHATE SERPL-MCNC: 3.3 MG/DL (ref 2.7–4.5)
PLATELET # BLD AUTO: 218 K/UL (ref 150–450)
PMV BLD AUTO: 11 FL (ref 9.2–12.9)
POCT GLUCOSE: 120 MG/DL (ref 70–110)
POTASSIUM SERPL-SCNC: 3.9 MMOL/L (ref 3.5–5.1)
PROT SERPL-MCNC: 6.1 G/DL (ref 6–8.4)
RBC # BLD AUTO: 3.89 M/UL (ref 4–5.4)
SODIUM SERPL-SCNC: 134 MMOL/L (ref 136–145)
WBC # BLD AUTO: 7.43 K/UL (ref 3.9–12.7)

## 2024-03-13 PROCEDURE — 25000003 PHARM REV CODE 250

## 2024-03-13 PROCEDURE — 97535 SELF CARE MNGMENT TRAINING: CPT

## 2024-03-13 PROCEDURE — 25000003 PHARM REV CODE 250: Performed by: NURSE PRACTITIONER

## 2024-03-13 PROCEDURE — 84100 ASSAY OF PHOSPHORUS: CPT

## 2024-03-13 PROCEDURE — 36415 COLL VENOUS BLD VENIPUNCTURE: CPT

## 2024-03-13 PROCEDURE — 97112 NEUROMUSCULAR REEDUCATION: CPT

## 2024-03-13 PROCEDURE — 99233 SBSQ HOSP IP/OBS HIGH 50: CPT | Mod: ,,, | Performed by: PSYCHIATRY & NEUROLOGY

## 2024-03-13 PROCEDURE — 85025 COMPLETE CBC W/AUTO DIFF WBC: CPT

## 2024-03-13 PROCEDURE — 83735 ASSAY OF MAGNESIUM: CPT

## 2024-03-13 PROCEDURE — 80053 COMPREHEN METABOLIC PANEL: CPT

## 2024-03-13 RX ORDER — ASPIRIN 81 MG/1
81 TABLET ORAL DAILY
Status: DISCONTINUED | OUTPATIENT
Start: 2024-03-13 | End: 2024-03-13 | Stop reason: HOSPADM

## 2024-03-13 RX ORDER — ATORVASTATIN CALCIUM 40 MG/1
40 TABLET, FILM COATED ORAL DAILY
Qty: 90 TABLET | Refills: 3
Start: 2024-03-14 | End: 2024-06-10 | Stop reason: SDUPTHER

## 2024-03-13 RX ORDER — CLOPIDOGREL BISULFATE 75 MG/1
75 TABLET ORAL DAILY
Qty: 30 TABLET | Refills: 11
Start: 2024-03-13 | End: 2025-03-13

## 2024-03-13 RX ORDER — CLOPIDOGREL BISULFATE 75 MG/1
75 TABLET ORAL DAILY
Status: DISCONTINUED | OUTPATIENT
Start: 2024-03-13 | End: 2024-03-13 | Stop reason: HOSPADM

## 2024-03-13 RX ORDER — LEVOTHYROXINE SODIUM 75 UG/1
75 TABLET ORAL
Qty: 30 TABLET | Refills: 11
Start: 2024-03-14 | End: 2025-03-14

## 2024-03-13 RX ADMIN — CLOPIDOGREL BISULFATE 75 MG: 75 TABLET ORAL at 01:03

## 2024-03-13 RX ADMIN — ESCITALOPRAM OXALATE 20 MG: 20 TABLET ORAL at 08:03

## 2024-03-13 RX ADMIN — ASPIRIN 81 MG: 81 TABLET, COATED ORAL at 01:03

## 2024-03-13 RX ADMIN — ATORVASTATIN CALCIUM 40 MG: 40 TABLET, FILM COATED ORAL at 08:03

## 2024-03-13 RX ADMIN — LEVOTHYROXINE SODIUM 75 MCG: 75 TABLET ORAL at 05:03

## 2024-03-13 RX ADMIN — DOCUSATE SODIUM AND SENNOSIDES 1 TABLET: 8.6; 5 TABLET, FILM COATED ORAL at 08:03

## 2024-03-13 NOTE — PROGRESS NOTES
"[facility] - Neurosurgery ([facility])  Vascular Neurology  Comprehensive Stroke Center  Progress Note    Assessment/Plan:     * Cerebrovascular accident (CVA) due to embolism of left middle cerebral artery  79 year old woman with HTN, HLD, RBBB presented as an acute stroke transfer 3/7 for IR intervention after initial OSH presentation with aphasia, CTA showed L M2 occlusion. Loaded with DAPT prior to transfer. NIHSS 10 on arrival. TTE with EF 55-60% with no atrial enlargement. MRI showed R MCA territory infarct without hemorrhage or mass effect. Etiology ESUS, will need cardiac event monitor on discharge.     Antithrombotics for secondary stroke prevention: aspirin 81 mg and clopidogrel 75 mg on daily     Statins for secondary stroke prevention and hyperlipidemia, if present: Statins: Atorvastatin- 40 mg daily     Aggressive risk factor modification: HTN, HLD, Diet, Exercise, CAD     Rehab efforts: The patient has been evaluated by a stroke team provider and the therapy needs have been fully considered based off the presenting complaints and exam findings. The following therapy evaluations are needed: PT/OT rec high intensity therapy, SLP rec soft bite sized thin diet     Diagnostics ordered/pending: None     VTE prophylaxis: SCDs     BP parameters: Infarct: SBP <160    SAH (subarachnoid hemorrhage)  See "Cerebrovascular accident (CVA) due to embolism of left middle cerebral artery"        Aphasia due to acute stroke  Due to stroke  Therapy ongoing    Prediabetes  A1c 5.7  Not on any meds currently    Bilateral carotid artery stenosis  Reported history of; no significant stenosis on CTA  resolved    Hypothyroid  TSH WNL  - continue home synthroid 75 mcg    Hyperlipidemia  Stroke risk factor. LDL 61; taking atorvastatin 20 mg prior to admission.  - atorvastatin 40 mg         3/7/24: Transfer for L M2 occlusion on CTA after NIH 10 w/ RUE weakness, global aphasia, AMS ([facility] 0630 3/7) OOW for TNK. ASA+Plavix loaded and " "taken with IR for TICI3C  3/8/24: On post-angio pathway with exam NIH 9 with improving with patient still globally aphasic but able to mimic/comprehend some of speech/commands. RUE with improving strength, no drift. Remainder of exam non-focal. Awaiting MRI brain.  03/09/2024 MRI R MCA infarct without hemorrhage or mass effect. Remains largely aphasic, followed verbal command to close eyes. Stepped down to NPU.  03/10/2024 Exam stable. Pending IPR.  03/11/2024. No acute events overnight, however, during rounds this morning, team was notified of mental status change and a period of"blanking out" during OT session in the bathroom. Patient was assisted to the bedside recliner. Upon assessment, she was awake and at her baseline neurological status. Stat CTH and 1-hr EEG was ordered. Otherwise, low Na level on labs today. Pending IPR placement.   3-12-24 CT head from yesterday small amount SAH seen DAPT on hold. EEG no seizure activity seen  3-13-24 no acute events overnight, restart DAPT and ready for rehab    STROKE DOCUMENTATION   Acute Stroke Times   Last Known Normal Date: 03/07/24  Last Known Normal Time: 0630  Symptom Onset Date: 03/07/24  Unknown Symptom Onset Time: Unknown Time  Stroke Team Called Date: 03/07/24  Stroke Team Called Time: 1525  Stroke Team Arrival Date: 03/07/24  Stroke Team Arrival Time: 1529  Thrombolytic Therapy Recommended: No  Thrombectomy Recommended: Yes  Decision to Treat Time for IR:  (1330 at OSH; 15:30 after re-evaluation on arrival to [facility] [place])    NIH Scale:  1a. Level of Consciousness: 0-->Alert, keenly responsive  1b. LOC Questions: 2-->Answers neither question correctly  1c. LOC Commands: 0-->Performs both tasks correctly  2. Best Gaze: 0-->Normal  3. Visual: 0-->No visual loss  4. Facial Palsy: 0-->Normal symmetrical movements  5a. Motor Arm, Left: 0-->No drift, limb holds 90 (or 45) degrees for full 10 secs  5b. Motor Arm, Right: 0-->No drift, limb holds 90 (or 45) degrees for " full 10 secs  6a. Motor Leg, Left: 0-->No drift, leg holds 30 degree position for full 5 secs  6b. Motor Leg, Right: 0-->No drift, leg holds 30 degree position for full 5 secs  7. Limb Ataxia: 0-->Absent  8. Sensory: 0-->Normal, no sensory loss  9. Best Language: 2-->Severe aphasia, all communication is through fragmentary expression, great need for inference, questioning, and guessing by the listener. Range of information that can be exchanged is limited, listener carries burden of. . . (see row details)  10. Dysarthria: 1-->Mild-to-moderate dysarthria, patient slurs at least some words and, at worst, can be understood with some difficulty  11. Extinction and Inattention (formerly Neglect): 0-->No abnormality  Total (NIH Stroke Scale): 5       Modified [place] Score: 0  [place] Coma Scale:    ABCD2 Score:    [id number] Score:   HAS -BLED Score:   ICH Score:   Hunt & Goodrich Classification:      Hemorrhagic change of an Ischemic Stroke: Does this patient have an ischemic stroke with hemorrhagic changes? No     Neurologic Chief Complaint: R MCA infarct    Subjective:     Interval History: No acute events overnight, Neuro stable will restart DAPT    HPI, Past Medical, Family, and Social History remains the same as documented in the initial encounter.     Review of Systems   Unable to perform ROS: Other (ROS limited by patient's aphasia)     Scheduled Meds:   atorvastatin  40 mg Oral Daily    EScitalopram oxalate  20 mg Oral Daily    levothyroxine  75 mcg Oral Before breakfast    senna-docusate 8.6-50 mg  1 tablet Oral Daily     Continuous Infusions: None  PRN Meds:labetalol, polyethylene glycol, sodium chloride 0.9%    Objective:     Vital Signs (Most Recent):  Temp: 98 °F (36.7 °C) (03/13/24 0420)  Pulse: 72 (03/13/24 0420)  Resp: 18 (03/13/24 0420)  BP: (!) 114/55 (03/13/24 0420)  SpO2: 98 % (03/13/24 0420)  BP Location: Right arm    Vital Signs Range (Last 24H):  Temp:  [97.8 °F (36.6 °C)-98 °F (36.7 °C)]   Pulse:   [66-86]   Resp:  [16-18]   BP: (114-128)/(55-61)   SpO2:  [98 %-100 %]   BP Location: Right arm       Physical Exam  Vitals and nursing note reviewed.   Neurological:      Comments: Confused baseline; incomprehensible words          Neurological Exam:   LOC: alert  Attention Span: Good   Language: global aphasia - follows some commands   Articulation: mild dysarthria  Facial Movement (CN VII): Symmetric facial expression   ROSARIO     Laboratory:  CMP:   Recent Labs   Lab 03/13/24  0440   CALCIUM 8.9   ALBUMIN 3.0*   PROT 6.1   *   K 3.9   CO2 27      BUN 15   CREATININE 0.7   ALKPHOS 64   ALT 22   AST 35   BILITOT 0.4       CBC:   Recent Labs   Lab 03/13/24  0440   WBC 7.43   RBC 3.89*   HGB 12.0   HCT 35.8*      MCV 92   MCH 30.8   MCHC 33.5         Diagnostic Results   Brain/Vessel Imaging   CTH 3/11/2024  Evolving areas of recent infarction left MCA distribution as above.  Small remote right cerebellar infarcts.  Small volume subarachnoid hemorrhage in the left sylvian fissure.    CTH 3/7/24  No definite acute intracranial findings by noncontrast CT.      CTA 3/7/24  No CT evidence of acute infarction.  Note that MRI has greater sensitivity to detect small acute infarction and could be done if this is clinically warranted.     No large vessel occlusion in the intracranial circulation.     No hemodynamically significant stenosis of the neck vessels.     IR Angiogram 3/7/24  Formal read pending; per procedure note with combination of aspiration and stent retrieving TICI3 reperfusion obtained     MRI Brain w/o contrast 3/8/24  1. Predominately cortically-based acute to early subacute ischemia involving the right MCA territory, as discussed.  No evidence of macroscopic hemorrhage within the infarct territory.  No significant mass effect related to this cytotoxic edema.  2. Redemonstrated remote post ischemic sequelae, as discussed.     Cardiac Imaging   TTE 3/8/24    Left Ventricle: The left ventricle  is normal in size. Normal wall thickness. Normal wall motion. There is normal systolic function with a visually estimated ejection fraction of 55 - 60%. There is normal diastolic function.    Right Ventricle: Normal right ventricular cavity size. Systolic function is normal.    Pulmonary Artery: The estimated pulmonary artery systolic pressure is 25 mmHg.    IVC/SVC: Normal venous pressure at 3 mmHg.    Chyna Zavala, NP  [facility]  Department of Vascular Neurology   [facility])

## 2024-03-13 NOTE — PLAN OF CARE
SW reached out to [facility] to see if patient had been seen and reviewed by MD for acceptance.  Patient will be med ready tomorrow once they restart DAPT.  PMR not in today and was unable to see yesterday due to a stroke code.  Patient needs auth submitted if accepted.  SW will continue to follow.      Discharge Plan A and Plan B have been determined by review of patient's clinical status, future medical and therapeutic needs, and coverage/benefits for post-acute care in coordination with multidisciplinary team members.    Zeenat Toscano, NEMESIO  [facility]  [phone]

## 2024-03-13 NOTE — PT/OT/SLP PROGRESS
"Occupational Therapy   Treatment    Name: Prema Phillips  MRN: [id number]  Admitting Diagnosis:  Cerebrovascular accident (CVA) due to embolism of left middle cerebral artery       Recommendations:     Discharge Recommendations: High Intensity Therapy  Discharge Equipment Recommendations:  walker, rolling  Barriers to discharge:  Other (Comment) (increased skilled assist required)    Assessment:     Prema Phillips is a 79 y.o. female with a medical diagnosis of Cerebrovascular accident (CVA) due to embolism of left middle cerebral artery.  She presents with the following performance deficits affecting function: weakness, impaired endurance, impaired functional mobility, gait instability, impaired cognition, impaired balance, decreased coordination, decreased upper extremity function, decreased lower extremity function, decreased safety awareness. Pt continues to demo global aphasia, requiring demonstration and tactile cues for all commands. Pt with noted apraxia, requiring [place] A to apply toothpaste to toothbrush and to not use toothpaste as eye drops. Patient has demonstrated sufficient progression to warrant high intensity therapy evidenced by objectives noted below.      Rehab Prognosis:  Good; patient would benefit from acute skilled OT services to address these deficits and reach maximum level of function.       Plan:     Patient to be seen 4 x/week to address the above listed problems via self-care/home management, therapeutic activities, therapeutic exercises, neuromuscular re-education, cognitive retraining  Plan of Care Expires: 03/22/24  Plan of Care Reviewed with: patient    Subjective     Chief Complaint: none verbalized  Patient/Family Comments/goals: "I'm not sure"  Pain/Comfort:  Pain Rating 1: 0/10  Pain Rating Post-Intervention 1: 0/10    Objective:     Communicated with: RN prior to session.  Patient found HOB elevated with telemetry, bed alarm upon OT entry to room.    General Precautions: Standard, " aphasia, aspiration, fall    Orthopedic Precautions:N/A  Braces: N/A  Respiratory Status: Room air     Occupational Performance:     Bed Mobility:    Patient completed Scooting/Bridging with stand by assistance  Patient completed Supine to Sit with stand by assistance  Patient completed Sit to Supine with stand by assistance     Functional Mobility/Transfers:  Patient completed Sit <> Stand Transfer with contact guard assistance  with  no assistive device   Patient completed Toilet Transfer Step Transfer technique with minimum assistance with  no AD  Functional Mobility: Pt engaging in functional mobility to simulate household/community distances approx 15'x2 with Min A and utilizing RW in order to maximize functional activity tolerance and standing balance required for engagement in occupations of choice.      Activities of Daily Living:  Grooming: maximal assistance for sequencing for oral hygiene, face hygiene - [place] A required for technique   Upper Body Dressing: moderate assistance to don gown as robe  Lower Body Dressing: minimum assistance to don socks  Toileting: minimum assistance for kallie hygiene following BM on toilet      [facility] 6 Click ADL: 12    Treatment & Education:  Pt educated on the following:  - role of OT and OT POC  - use of call light to request for assistance with all functional mobility to ensure safety during hospital stay  - importance of continued mobilization  - Safe transfer techniques and proper body mechanics for fall prevention and improved independence with functional transfers   - Importance of OOB activities to increase endurance and tolerance for increased participation in daily ADLs.    - Various therex pt can perform outside of therapy session to increase functional endurance and strength for overall improved independence in occupations of choice.   - benefits of continued participation in therapy.   - Pt educated on importance of calling for staff assist for functional  mobility/transfers.  - All pt questions within OT scope of practice addressed, pt verbalized understanding.      Patient left HOB elevated with all lines intact, call button in reach, bed alarm on, RN notified, and  present    GOALS:   Multidisciplinary Problems       Occupational Therapy Goals          Problem: Occupational Therapy    Goal Priority Disciplines Outcome Interventions   Occupational Therapy Goal     OT, PT/OT Ongoing, Progressing    Description: Goals to be met by: 03-22-24     Patient will increase functional independence with ADLs by performing:    UE Dressing with Supervision.  LE Dressing with Supervision.  Grooming while standing at sink with Supervision.  Toileting from toilet with Supervision for hygiene and clothing management.   Supine to sit with Supervision.  Stand pivot transfers with Supervision.  Toilet transfer to toilet with Supervision.  Pt. To follow 4/4 simple commands                         Time Tracking:     OT Date of Treatment: 03/13/24  OT Start Time: 1320  OT Stop Time: 1343  OT Total Time (min): 23 min    Billable Minutes:Self Care/Home Management 13  Neuromuscular Re-education 10    OT/RIO: OT          3/13/2024

## 2024-03-13 NOTE — ASSESSMENT & PLAN NOTE
79 year old woman with HTN, HLD, RBBB presented as an acute stroke transfer 3/7 for IR intervention after initial OSH presentation with aphasia, CTA showed L M2 occlusion. Loaded with DAPT prior to transfer. NIHSS 10 on arrival. TTE with EF 55-60% with no atrial enlargement. MRI showed R MCA territory infarct without hemorrhage or mass effect. Etiology ESUS, will need cardiac event monitor on discharge.     Antithrombotics for secondary stroke prevention: aspirin 81 mg and clopidogrel 75 mg on daily     Statins for secondary stroke prevention and hyperlipidemia, if present: Statins: Atorvastatin- 40 mg daily     Aggressive risk factor modification: HTN, HLD, Diet, Exercise, CAD     Rehab efforts: The patient has been evaluated by a stroke team provider and the therapy needs have been fully considered based off the presenting complaints and exam findings. The following therapy evaluations are needed: PT/OT rec high intensity therapy, SLP rec soft bite sized thin diet     Diagnostics ordered/pending: None     VTE prophylaxis: SCDs     BP parameters: Infarct: SBP <160

## 2024-03-13 NOTE — SUBJECTIVE & OBJECTIVE
Neurologic Chief Complaint: R MCA infarct    Subjective:     Interval History: No acute events overnight, Neuro stable will restart DAPT    HPI, Past Medical, Family, and Social History remains the same as documented in the initial encounter.     Review of Systems   Unable to perform ROS: Other (ROS limited by patient's aphasia)     Scheduled Meds:   atorvastatin  40 mg Oral Daily    EScitalopram oxalate  20 mg Oral Daily    levothyroxine  75 mcg Oral Before breakfast    senna-docusate 8.6-50 mg  1 tablet Oral Daily     Continuous Infusions: None  PRN Meds:labetalol, polyethylene glycol, sodium chloride 0.9%    Objective:     Vital Signs (Most Recent):  Temp: 98 °F (36.7 °C) (03/13/24 0420)  Pulse: 72 (03/13/24 0420)  Resp: 18 (03/13/24 0420)  BP: (!) 114/55 (03/13/24 0420)  SpO2: 98 % (03/13/24 0420)  BP Location: Right arm    Vital Signs Range (Last 24H):  Temp:  [97.8 °F (36.6 °C)-98 °F (36.7 °C)]   Pulse:  [66-86]   Resp:  [16-18]   BP: (114-128)/(55-61)   SpO2:  [98 %-100 %]   BP Location: Right arm       Physical Exam  Vitals and nursing note reviewed.   Neurological:      Comments: Confused baseline; incomprehensible words          Neurological Exam:   LOC: alert  Attention Span: Good   Language: global aphasia - follows some commands   Articulation: mild dysarthria  Facial Movement (CN VII): Symmetric facial expression   ROSARIO     Laboratory:  CMP:   Recent Labs   Lab 03/13/24 0440   CALCIUM 8.9   ALBUMIN 3.0*   PROT 6.1   *   K 3.9   CO2 27      BUN 15   CREATININE 0.7   ALKPHOS 64   ALT 22   AST 35   BILITOT 0.4       CBC:   Recent Labs   Lab 03/13/24 0440   WBC 7.43   RBC 3.89*   HGB 12.0   HCT 35.8*      MCV 92   MCH 30.8   MCHC 33.5         Diagnostic Results   Brain/Vessel Imaging   [facility] 3/11/2024  Evolving areas of recent infarction left MCA distribution as above.  Small remote right cerebellar infarcts.  Small volume subarachnoid hemorrhage in the left sylvian fissure.    CT  3/7/24  No definite acute intracranial findings by noncontrast CT.      CTA 3/7/24  No CT evidence of acute infarction.  Note that MRI has greater sensitivity to detect small acute infarction and could be done if this is clinically warranted.     No large vessel occlusion in the intracranial circulation.     No hemodynamically significant stenosis of the neck vessels.     IR Angiogram 3/7/24  Formal read pending; per procedure note with combination of aspiration and stent retrieving TICI3 reperfusion obtained     MRI Brain w/o contrast 3/8/24  1. Predominately cortically-based acute to early subacute ischemia involving the right MCA territory, as discussed.  No evidence of macroscopic hemorrhage within the infarct territory.  No significant mass effect related to this cytotoxic edema.  2. Redemonstrated remote post ischemic sequelae, as discussed.     Cardiac Imaging   TTE 3/8/24    Left Ventricle: The left ventricle is normal in size. Normal wall thickness. Normal wall motion. There is normal systolic function with a visually estimated ejection fraction of 55 - 60%. There is normal diastolic function.    Right Ventricle: Normal right ventricular cavity size. Systolic function is normal.    Pulmonary Artery: The estimated pulmonary artery systolic pressure is 25 mmHg.    IVC/SVC: Normal venous pressure at 3 mmHg.

## 2024-03-13 NOTE — PLAN OF CARE
Problem: Adult Inpatient Plan of Care  Goal: Plan of Care Review  Outcome: Ongoing, Progressing  Goal: Patient-Specific Goal (Individualized)  Description: Pt will maintain sbp below 160  Outcome: Ongoing, Progressing  Goal: Absence of Hospital-Acquired Illness or Injury  Outcome: Ongoing, Progressing  Goal: Optimal Comfort and Wellbeing  Outcome: Ongoing, Progressing  Goal: Readiness for Transition of Care  Outcome: Ongoing, Progressing     Problem: Adjustment to Illness (Stroke, Ischemic/Transient Ischemic Attack)  Goal: Optimal Coping  Outcome: Ongoing, Progressing     Problem: Bowel Elimination Impaired (Stroke, Ischemic/Transient Ischemic Attack)  Goal: Effective Bowel Elimination  Outcome: Ongoing, Progressing     Problem: Cerebral Tissue Perfusion (Stroke, Ischemic/Transient Ischemic Attack)  Goal: Optimal Cerebral Tissue Perfusion  Outcome: Ongoing, Progressing     Problem: Cognitive Impairment (Stroke, Ischemic/Transient Ischemic Attack)  Goal: Optimal Cognitive Function  Outcome: Ongoing, Progressing     Problem: Communication Impairment (Stroke, Ischemic/Transient Ischemic Attack)  Goal: Improved Communication Skills  Outcome: Ongoing, Progressing     Problem: Functional Ability Impaired (Stroke, Ischemic/Transient Ischemic Attack)  Goal: Optimal Functional Ability  Outcome: Ongoing, Progressing     Problem: Respiratory Compromise (Stroke, Ischemic/Transient Ischemic Attack)  Goal: Effective Oxygenation and Ventilation  Outcome: Ongoing, Progressing     Problem: Sensorimotor Impairment (Stroke, Ischemic/Transient Ischemic Attack)  Goal: Improved Sensorimotor Function  Outcome: Ongoing, Progressing     Problem: Swallowing Impairment (Stroke, Ischemic/Transient Ischemic Attack)  Goal: Optimal Eating and Swallowing without Aspiration  Outcome: Ongoing, Progressing     Problem: Urinary Elimination Impaired (Stroke, Ischemic/Transient Ischemic Attack)  Goal: Effective Urinary Elimination  Outcome: Ongoing,  Progressing   Stroke booklet @ BS, reviewed c pt.

## 2024-03-13 NOTE — NURSING
RN removed tele monitor and PIV x2. Pt Dc'd to [facility] via [facility]. All belongings sent home c pt . Upon DC pt stable, NAD. RN attempted to call report x 2 to TRAMAINE Elena. No answer.     Carissa Fatima

## 2024-03-13 NOTE — PLAN OF CARE
[facility] (Hospital)  Discharge Final Note    Primary Care Provider: Odette Zhang MD    Expected Discharge Date: 3/13/2024    Final Discharge Note (most recent)       Final Note - 03/13/24 1434          Final Note    Assessment Type Final Discharge Note (P)      Anticipated Discharge Disposition Rehab Facility (P)         Post-Acute Status    Post-Acute Placement Status Set-up Complete/Auth obtained (P)                  Patient discharging to [facility].  RN provided with number for report and SW set up transport for 230pm.  Patient and  aware.      Zeenat Toscano, LMSW [facility]  [phone]            No future appointments.      Important Message from Medicare  Important Message from Medicare regarding Discharge Appeal Rights: Given to patient/caregiver, Explained to patient/caregiver, Signed/date by patient/caregiver     Date IMM was signed: 03/12/24  Time IMM was signed: 0917    Contact Info       Odette Zhang MD   Specialty: Family Medicine   Relationship: PCP - General    [address]  [address]  [place] [address]   Phone: [phone]       Next Steps: Follow up    Instructions: once d/c from rehab

## 2024-03-13 NOTE — PLAN OF CARE
OT POC reviewed. Goals remain appropriate.  Problem: Occupational Therapy  Goal: Occupational Therapy Goal  Description: Goals to be met by: 03-22-24     Patient will increase functional independence with ADLs by performing:    UE Dressing with Supervision.  LE Dressing with Supervision.  Grooming while standing at sink with Supervision.  Toileting from toilet with Supervision for hygiene and clothing management.   Supine to sit with Supervision.  Stand pivot transfers with Supervision.  Toilet transfer to toilet with Supervision.  Pt. To follow 4/4 simple commands    Outcome: Ongoing, Progressing

## 2024-03-13 NOTE — DISCHARGE SUMMARY
"[facility] Neuro[facility] ([facility])  Vascular Neurology  Comprehensive Stroke Center  Discharge Summary     Summary:     Admit Date: 3/7/2024  3:17 PM    Discharge Date and Time: No discharge date for patient encounter.    Attending Physician: Glenn Haas MD     Discharge Provider: Chyna Zavala NP    History of Present Illness: Prema Phillips is a 79 y.o. woman with past medical history of hypertension, hyperlipidemia, bilateral carotid stenosis, and RBBB who presents as an acute stroke transfer after initial presentation to OSH with acute onset aphasia and diagnostics consistent with left M2 occlusion. Patient transported to IR suite shortly after arrival to [facility] ED.      Hospital Course (synopsis of major diagnoses, care, treatment, and services provided during the course of the hospital stay): 3/7/24: Transfer for L M2 occlusion on CTA after NIH 10 w/ RUE weakness, global aphasia, AMS (LKW 0630 3/7) OOW for TNK. ASA+Plavix loaded and taken with IR for TICI3C  3/8/24: On post-angio pathway with exam NIH 9 with improving with patient still globally aphasic but able to mimic/comprehend some of speech/commands. RUE with improving strength, no drift. Remainder of exam non-focal. Awaiting MRI brain.  03/09/2024 MRI R MCA infarct without hemorrhage or mass effect. Remains largely aphasic, followed verbal command to close eyes. Stepped down to NPU.  03/10/2024 Exam stable. Pending IPR.  03/11/2024. No acute events overnight, however, during rounds this morning, team was notified of mental status change and a period of"blanking out" during OT session in the bathroom. Patient was assisted to the bedside recliner. Upon assessment, she was awake and at her baseline neurological status. Stat CTH and 1-hr EEG was ordered. Otherwise, low Na level on labs today. Pending IPR placement.   3-12-24 CT head from yesterday small amount SAH seen DAPT on hold. EEG no seizure activity seen  3-13-24 no acute events overnight, " restart DAPT and ready for rehab    Goals of Care Treatment Preferences:  Code Status: Full Code      Stroke Etiology: Ischemic Undetermined Cause Cryptogenic Embolism / ESUS (Embolic Stroke of Undetermined Source)    STROKE DOCUMENTATION   Acute Stroke Times   Last Known Normal Date: 03/07/24  Last Known Normal Time: 0630  Symptom Onset Date: 03/07/24  Unknown Symptom Onset Time: Unknown Time  Stroke Team Called Date: 03/07/24  Stroke Team Called Time: 1525  Stroke Team Arrival Date: 03/07/24  Stroke Team Arrival Time: 1529  Thrombolytic Therapy Recommended: No  Thrombectomy Recommended: Yes  Decision to Treat Time for IR:  (1330 at OSH; 15:30 after re-evaluation on [place] [facility])     NIH Scale:  1a. Level of Consciousness: 0-->Alert, keenly responsive  1b. LOC Questions: 2-->Answers neither question correctly  1c. LOC Commands: 0-->Performs both tasks correctly  2. Best Gaze: 0-->Normal  3. Visual: 0-->No visual loss  4. Facial Palsy: 0-->Normal symmetrical movements  5a. Motor Arm, Left: 0-->No drift, limb holds 90 (or 45) degrees for full 10 secs  5b. Motor Arm, Right: 0-->No drift, limb holds 90 (or 45) degrees for full 10 secs  6a. Motor Leg, Left: 0-->No drift, leg holds 30 degree position for full 5 secs  6b. Motor Leg, Right: 0-->No drift, leg holds 30 degree position for full 5 secs  7. Limb Ataxia: 0-->Absent  8. Sensory: 0-->Normal, no sensory loss  9. Best Language: 2-->Severe aphasia, all communication is through fragmentary expression, great need for inference, questioning, and guessing by the listener. Range of information that can be exchanged is limited, listener carries burden of. . . (see row details)  10. Dysarthria: 1-->Mild-to-moderate dysarthria, patient slurs at least some words and, at worst, can be understood with some difficulty  11. Extinction and Inattention (formerly Neglect): 0-->No abnormality  Total (NIH Stroke Scale): 5        Modified Analia Score: 3  Garwood Coma Scale:     ABCD2 Score:    [id number] Score:   HAS -BLED Score:   ICH Score:   Hunt & Goodrich Classification:       Assessment/Plan:     Diagnostic Results:      Brain/Vessel Imaging   [facility] 3/11/2024  Evolving areas of recent infarction left MCA distribution as above.  Small remote right cerebellar infarcts.  Small volume subarachnoid hemorrhage in the left sylvian fissure.     [facility] 3/7/24  No definite acute intracranial findings by noncontrast CT.      CTA 3/7/24  No CT evidence of acute infarction.  Note that MRI has greater sensitivity to detect small acute infarction and could be done if this is clinically warranted.     No large vessel occlusion in the intracranial circulation.     No hemodynamically significant stenosis of the neck vessels.     IR Angiogram 3/7/24  Formal read pending; per procedure note with combination of aspiration and stent retrieving TICI3 reperfusion obtained     MRI Brain w/o contrast 3/8/24  1. Predominately cortically-based acute to early subacute ischemia involving the right MCA territory, as discussed.  No evidence of macroscopic hemorrhage within the infarct territory.  No significant mass effect related to this cytotoxic edema.  2. Redemonstrated remote post ischemic sequelae, as discussed.     Cardiac Imaging   TTE 3/8/24    Left Ventricle: The left ventricle is normal in size. Normal wall thickness. Normal wall motion. There is normal systolic function with a visually estimated ejection fraction of 55 - 60%. There is normal diastolic function.    Right Ventricle: Normal right ventricular cavity size. Systolic function is normal.    Pulmonary Artery: The estimated pulmonary artery systolic pressure is 25 mmHg.    IVC/SVC: Normal venous pressure at 3 mmHg.    Interventions: None    Complications: None    Disposition: Rehab Facility    Final Active Diagnoses:    Diagnosis Date Noted POA    PRINCIPAL PROBLEM:  Cerebrovascular accident (CVA) due to embolism of left middle cerebral artery  [I63.412] 03/07/2024 Yes    Hyponatremia [E87.1] 03/12/2024 Yes    Hemiparesis of right dominant side due to acute cerebrovascular disease [I67.89, G81.91] 03/12/2024 Yes    Depression [F32.A] 03/12/2024 Yes    Debility [R53.81] 03/12/2024 Yes    Aphasia due to acute stroke [I63.9, R47.01] 03/11/2024 Yes    SAH (subarachnoid hemorrhage) [I60.9] 03/11/2024 No    Prediabetes [R73.03] 03/14/2022 Yes     Chronic    Bilateral carotid artery stenosis [I65.23] 11/18/2013 Yes     Chronic    Hyperlipidemia [E78.5] 07/17/2012 Yes     Chronic    Hypothyroid [E03.9] 07/17/2012 Yes     Chronic    RBBB [I45.10] 07/17/2012 Yes      Problems Resolved During this Admission:     No new Assessment & Plan notes have been filed under this hospital service since the last note was generated.  Service: Vascular Neurology      Recommendations:     Post-discharge complication risks: None    Stroke Education given to: patient and family    Follow-up in Stroke Clinic in 4-6 weeks.     Discharge Plan:  Antithrombotics: Aspirin 81mg, Clopidogrel 75mg  Statin: Atorvastatin 40mg  Aggresive risk factor modification:  Hypertension  High Cholesterol    Follow Up:   Follow-up Information       Odette Zhang MD Follow up.    Specialty: Family Medicine  Why: once d/c from rehab  Contact information:  [address]  [address]  [facility] [address] [phone]                             Patient Instructions:      Ambulatory referral/consult to Vascular Neurology   Standing Status: Future   Referral Priority: Routine Referral Type: Consultation   Referral Reason: Specialty Services Required   Requested Specialty: Vascular Neurology   Number of Visits Requested: 1     Diet Cardiac   Order Comments: See Stroke Patient Education Guide Booklet for details.     Call 911 for any of the following:   Order Comments: Call 911  right away if any of the following warning signs come on suddenly, even if the symptoms only last for a few minutes. With stroke,  timing is very important.   - Warning Signs of Stroke:  - Weakness: You may feel a sudden weakness, tingling or loss of feeling on one side of your face or body.  - Vision Problems: You may have sudden double vision or trouble seeing in one or both eyes.  - Speech Problems: You may have sudden trouble talking, slured speech, or problems understanding others.  - Headache: You may have sudden, severe headache.  - Movement Problems: You may experience dizziness, a feeling of spinning, a loss of balance, a feeling of falling or blackouts.     Activity as tolerated     Cardiac event monitor   Standing Status: Future Standing Exp. Date: 03/13/25   Order Comments: Once patient is discharged from rehab     Order Specific Question Answer Comments   Cardiac Event Monitor Auto Trigger    Release to patient Immediate        Medications:  Reconciled Home Medications:      Medication List        START taking these medications      clopidogreL 75 mg tablet  Commonly known as: PLAVIX  Take 1 tablet (75 mg total) by mouth once daily.            CHANGE how you take these medications      atorvastatin 40 MG tablet  Commonly known as: LIPITOR  Take 1 tablet (40 mg total) by mouth once daily.  Start taking on: March 14, 2024  What changed:   medication strength  how much to take     levothyroxine 75 MCG tablet  Commonly known as: SYNTHROID  Take 1 tablet (75 mcg total) by mouth before breakfast.  Start taking on: March 14, 2024  What changed:   how much to take  how to take this  when to take this  additional instructions            CONTINUE taking these medications      amitriptyline 25 MG tablet  Commonly known as: ELAVIL  TAKE 1/2-1 TABLET BY MOUTH NIGHTLY FOR INSOMNIA. OK TO TAKE WITH THE LOW DOSE OF ATIVAN     aspirin 81 MG EC tablet  Commonly known as: ECOTRIN  Take 81 mg by mouth once daily.     carBAMazepine 100 MG 12 hr tablet  Commonly known as: TEGRETOL XR  Take 1-2 tablets (100-200 mg total) by mouth 2 (two) times daily.      EScitalopram oxalate 20 MG tablet  Commonly known as: LEXAPRO  Take 1 tablet (20 mg total) by mouth once daily.            STOP taking these medications      azelastine 137 mcg (0.1 %) nasal spray  Commonly known as: ASTELIN     calcium-vitamin D3-vitamin K 500-100-40 mg-unit-mcg Chew     cholecalciferol (vitamin D3) 50 mcg (2,000 unit) Tab  Commonly known as: VITAMIN D3     coenzyme Q10 200 mg capsule     fluticasone propionate 50 mcg/actuation nasal spray  Commonly known as: FLONASE     ibuprofen 800 MG tablet  Commonly known as: ADVIL,MOTRIN     levocetirizine 5 MG tablet  Commonly known as: XYZAL     LORazepam 0.5 MG tablet  Commonly known as: ATIVAN     multivitamin capsule     omeprazole 20 MG capsule  Commonly known as: PRILOSEC     ondansetron 4 MG Tbdl  Commonly known as: ZOFRAN-ODT     PRESERVISION AREDS ORAL     triamcinolone acetonide 0.025% 0.025 % Oint  Commonly known as: KENALOG     TURMERIC (BULK) MISC              Chyna Zavala NP  [facility]  Department of Vascular Neurology   [facility] ([facility])

## 2024-03-13 NOTE — PLAN OF CARE
[facility]    FACILITY TRANSFER ORDERS      Patient Name: Prema Phillips  YOB: 1945    PCP: Odette Zhang MD   PCP Address: [address] / [place] [address]  PCP Phone Number: [phone]  PCP Fax: [phone]    Encounter Date: 03/13/2024    Admit to: [facility]     Vital Signs:  Routine    Diagnoses:   Active Hospital Problems    Diagnosis  POA    *Cerebrovascular accident (CVA) due to embolism of left middle cerebral artery [I63.412]  Yes    Hyponatremia [E87.1]  Yes    Hemiparesis of right dominant side due to acute cerebrovascular disease [I67.89, G81.91]  Yes    Depression [F32.A]  Yes     On lexapro      Debility [R53.81]  Yes     Therapy recc high intensity therapy       Aphasia due to acute stroke [I63.9, R47.01]  Yes    SAH (subarachnoid hemorrhage) [I60.9]  No    Prediabetes [R73.03]  Yes     Chronic    Bilateral carotid artery stenosis [I65.23]  Yes     Chronic     Carotid US 3/9/20  CONCLUSIONS   There is 20 - 39% right Internal Carotid stenosis.  There is 40 - 49% left Internal Carotid stenosis.    No significant stenosis      Hyperlipidemia [E78.5]  Yes     Chronic    Hypothyroid [E03.9]  Yes     Chronic    RBBB [I45.10]  Yes      Resolved Hospital Problems   No resolved problems to display.       Allergies:  Review of patient's allergies indicates:   Allergen Reactions    Neomycin Other (See Comments) and Swelling     Other reaction(s): Unknown       Diet: cardiac diet and soft diet    Activities: Activity as tolerated    Goals of Care Treatment Preferences:  Code Status: Full Code      Nursing: vital signs routine     Labs: per facility    CONSULTS:    Physical Therapy to evaluate and treat. , Occupational Therapy to evaluate and treat., Speech Therapy to evaluate and treat for Language., and  to evaluate for community resources/long-range planning.    MISCELLANEOUS CARE:  None    WOUND CARE ORDERS  None    Medications: Review discharge  medications with patient and family and provide education.      Current Discharge Medication List        START taking these medications    Details   clopidogreL (PLAVIX) 75 mg tablet Take 1 tablet (75 mg total) by mouth once daily.  Qty: 30 tablet, Refills: 11           CONTINUE these medications which have CHANGED    Details   atorvastatin (LIPITOR) 40 MG tablet Take 1 tablet (40 mg total) by mouth once daily.  Qty: 90 tablet, Refills: 3      levothyroxine (SYNTHROID) 75 MCG tablet Take 1 tablet (75 mcg total) by mouth before breakfast.  Qty: 30 tablet, Refills: 11           CONTINUE these medications which have NOT CHANGED    Details   amitriptyline (ELAVIL) 25 MG tablet TAKE 1/2-1 TABLET BY MOUTH NIGHTLY FOR INSOMNIA. OK TO TAKE WITH THE LOW DOSE OF ATIVAN  Qty: 90 tablet, Refills: 4    Comments: DX Code Needed  PATIENT IS REQUESTING REFILLS.  Associated Diagnoses: Adjustment reaction with anxiety; Chronic migraine without aura without status migrainosus, not intractable; Insomnia, unspecified type      aspirin (ECOTRIN) 81 MG EC tablet Take 81 mg by mouth once daily.      EScitalopram oxalate (LEXAPRO) 20 MG tablet Take 1 tablet (20 mg total) by mouth once daily.  Qty: 90 tablet, Refills: 3    Associated Diagnoses: Adjustment reaction with anxiety; Stress due to family tension           STOP taking these medications       azelastine (ASTELIN) 137 mcg (0.1 %) nasal spray Comments:   Reason for Stopping:         calcium-vitamin D3-vitamin K 500-100-40 mg-unit-mcg Chew Comments:   Reason for Stopping:         carBAMazepine (TEGRETOL XR) 100 MG 12 hr tablet Comments:   Reason for Stopping:         cholecalciferol, vitamin D3, (VITAMIN D3) 50 mcg (2,000 unit) Tab Comments:   Reason for Stopping:         coenzyme Q10 200 mg capsule Comments:   Reason for Stopping:         fluticasone propionate (FLONASE) 50 mcg/actuation nasal spray Comments:   Reason for Stopping:         ibuprofen (ADVIL,MOTRIN) 800 MG tablet  Comments:   Reason for Stopping:         levocetirizine (XYZAL) 5 MG tablet Comments:   Reason for Stopping:         LORazepam (ATIVAN) 0.5 MG tablet Comments:   Reason for Stopping:         multivitamin capsule Comments:   Reason for Stopping:         omeprazole (PRILOSEC) 20 MG capsule Comments:   Reason for Stopping:         ondansetron (ZOFRAN-ODT) 4 MG TbDL Comments:   Reason for Stopping:         triamcinolone acetonide 0.025% (KENALOG) 0.025 % Oint Comments:   Reason for Stopping:         TURMERIC, BULK, MISC Comments:   Reason for Stopping:         VIT A/VIT C/VIT E/ZINC/COPPER (PRESERVISION AREDS ORAL) Comments:   Reason for Stopping:                  Immunizations Administered as of 3/13/2024       Name Date Dose VIS Date Route Exp Date    COVID-19, MRNA, LN-S, PF (Moderna) 11/18/2021 0.5 mL -- Intramuscular --    Site: Left arm     : [employer]     Lot: [id number]     Comment: Adminis     COVID-19, MRNA, LN-S, PF (Moderna) 4/1/2021 -- -- Intramuscular --    Site: Left arm     : [employer]     Lot: [id number]     Comment: Adminis     COVID-19, MRNA, LN-S, PF (Moderna) 3/4/2021 -- -- Intramuscular --    Site: Left arm     : Modern[employer]     Lot: [id number]     Comment: Adminis             unkown    Some patients may experience side effects after vaccination.  These may include fever, headache, muscle or joint aches.  Most symptoms resolve with 24-48 hours and do not require urgent medical evaluation unless they persist for more than 72 hours or symptoms are concerning for an unrelated medical condition.          _________________________________  Chyna Zavlaa NP  03/13/2024

## 2024-03-26 ENCOUNTER — TELEPHONE (OUTPATIENT)
Dept: FAMILY MEDICINE | Facility: CLINIC | Age: 79
End: 2024-03-26
Payer: MEDICARE

## 2024-03-26 DIAGNOSIS — G81.91 HEMIPARESIS OF RIGHT DOMINANT SIDE DUE TO ACUTE CEREBROVASCULAR DISEASE: ICD-10-CM

## 2024-03-26 DIAGNOSIS — R53.1 DECREASED RANGE OF MOTION WITH DECREASED STRENGTH: Primary | ICD-10-CM

## 2024-03-26 DIAGNOSIS — I63.412 CEREBROVASCULAR ACCIDENT (CVA) DUE TO EMBOLISM OF LEFT MIDDLE CEREBRAL ARTERY: ICD-10-CM

## 2024-03-26 DIAGNOSIS — M25.60 DECREASED RANGE OF MOTION WITH DECREASED STRENGTH: Primary | ICD-10-CM

## 2024-03-26 DIAGNOSIS — I67.89 HEMIPARESIS OF RIGHT DOMINANT SIDE DUE TO ACUTE CEREBROVASCULAR DISEASE: ICD-10-CM

## 2024-03-26 LAB
LEFT EYE DM RETINOPATHY: NEGATIVE
RIGHT EYE DM RETINOPATHY: NEGATIVE

## 2024-03-26 NOTE — TELEPHONE ENCOUNTER
----- Message from Yesenia Carrion, PT sent at 3/26/2024 12:35 PM CDT -----  Regarding: Orders  Hi Dr. Zhang,  This patient is in need of speech, occupational and physical therapy s/p stroke. Would you mind placing these orders for us as soon as possible? She is on our neuro team's schedule for tomorrow at 8am for a PT evaluation. Poor thing showed up to our clinic with only ortho orders and on my schedule, an ortho PT :). I hope you are doing well!    Thanks,  Yesenia Carrion, PT, DPT

## 2024-03-27 ENCOUNTER — CLINICAL SUPPORT (OUTPATIENT)
Dept: REHABILITATION | Facility: HOSPITAL | Age: 79
End: 2024-03-27
Payer: MEDICARE

## 2024-03-27 ENCOUNTER — CLINICAL SUPPORT (OUTPATIENT)
Dept: REHABILITATION | Facility: HOSPITAL | Age: 79
End: 2024-03-27
Attending: FAMILY MEDICINE
Payer: MEDICARE

## 2024-03-27 DIAGNOSIS — R53.1 DECREASED RANGE OF MOTION WITH DECREASED STRENGTH: Primary | ICD-10-CM

## 2024-03-27 DIAGNOSIS — R48.2 APRAXIA: Primary | ICD-10-CM

## 2024-03-27 DIAGNOSIS — R48.2 APRAXIA: ICD-10-CM

## 2024-03-27 DIAGNOSIS — R27.9 LACK OF COORDINATION DUE TO ACUTE CEREBROVASCULAR ACCIDENT (CVA): ICD-10-CM

## 2024-03-27 DIAGNOSIS — R47.01 APHASIA: ICD-10-CM

## 2024-03-27 DIAGNOSIS — M25.60 DECREASED RANGE OF MOTION WITH DECREASED STRENGTH: Primary | ICD-10-CM

## 2024-03-27 DIAGNOSIS — R53.1 DECREASED RANGE OF MOTION WITH DECREASED STRENGTH: ICD-10-CM

## 2024-03-27 DIAGNOSIS — I63.412 CEREBROVASCULAR ACCIDENT (CVA) DUE TO EMBOLISM OF LEFT MIDDLE CEREBRAL ARTERY: ICD-10-CM

## 2024-03-27 DIAGNOSIS — I67.89 HEMIPARESIS OF RIGHT DOMINANT SIDE DUE TO ACUTE CEREBROVASCULAR DISEASE: ICD-10-CM

## 2024-03-27 DIAGNOSIS — I63.9 LACK OF COORDINATION DUE TO ACUTE CEREBROVASCULAR ACCIDENT (CVA): ICD-10-CM

## 2024-03-27 DIAGNOSIS — G81.91 HEMIPARESIS OF RIGHT DOMINANT SIDE DUE TO ACUTE CEREBROVASCULAR DISEASE: ICD-10-CM

## 2024-03-27 DIAGNOSIS — M25.60 DECREASED RANGE OF MOTION WITH DECREASED STRENGTH: ICD-10-CM

## 2024-03-27 DIAGNOSIS — M62.81 MUSCLE WEAKNESS OF RIGHT UPPER EXTREMITY: Primary | ICD-10-CM

## 2024-03-27 DIAGNOSIS — Z91.81 RISK FOR FALLS: ICD-10-CM

## 2024-03-27 PROBLEM — R26.89 IMPAIRED GAIT AND MOBILITY: Status: RESOLVED | Noted: 2024-02-27 | Resolved: 2024-03-27

## 2024-03-27 PROCEDURE — 92507 TX SP LANG VOICE COMM INDIV: CPT | Mod: PN

## 2024-03-27 PROCEDURE — 97110 THERAPEUTIC EXERCISES: CPT | Mod: PN

## 2024-03-27 PROCEDURE — 92523 SPEECH SOUND LANG COMPREHEN: CPT | Mod: PN

## 2024-03-27 PROCEDURE — 97166 OT EVAL MOD COMPLEX 45 MIN: CPT | Mod: PN

## 2024-03-27 PROCEDURE — 97162 PT EVAL MOD COMPLEX 30 MIN: CPT | Mod: PN

## 2024-03-27 NOTE — PROGRESS NOTES
"[facility]  Speech Therapy Evaluation -Neurological Rehabilitation    Date: 3/27/2024     Name: Prema Phillips   MRN: [id number]    Therapy Diagnosis:   Encounter Diagnoses   Name Primary?    Decreased range of motion with decreased strength     Cerebrovascular accident (CVA) due to embolism of left middle cerebral artery     Hemiparesis of right dominant side due to acute cerebrovascular disease     Physician: eLxii Hagan DPM  Physician Orders: Ambulatory Referral to Speech Therapy   Medical Diagnosis: M25.60,R53.1 (ICD-10-CM) - Decreased range of motion with decreased strength I63.412 (ICD-10-CM) - Cerebrovascular accident (CVA) due to embolism of left middle cerebral artery I67.89,G81.91 (ICD-10-CM) - Hemiparesis of right dominant side due to acute cerebrovascular disease    Visit # / Visits Authorized:  1 / 1   Date of Evaluation:  3/27/2024   Insurance Authorization Period: 3/26/24 to 3/26/25  Plan of Care Certification:    3/27/2024 to 5/22/2024     Time In: 0935  Time Out: 1025   Total time: 50 minutes    Procedure   Speech Language Evaluation    Speech-Language-Voice Treatment     Precautions: Communication  Subjective   Date of Onset: March 2024  History of Current Condition:  Prema Phillips is a 79 y.o. female who presents to [facility] Outpatient Speech Therapy for evaluation secondary to stroke. Patient was referred to therapy by Lexii Hagan DPM , which is the patient's podiatrist. Patient's  "Maldonado" was present for evaluation and assisted with information gathering. Maldonado left the house for a little while with Mrs. Lloyd sitting at the table reading a book and drinking her coffee. When he returned, he found her on the floor with her phone in hand mumbling. Found to have a stroke. She then participated in therapy at inpatient rehab and returned home.   Past Medical History: Prema Phillips  has a past medical history of Acute ischemic left MCA stroke " (3/7/2024), Allergic rhinitis (5/31/2016), Carotid artery plaque (11/18/2013), History of total hysterectomy with bilateral salpingo-oophorectomy (BSO) (5/31/2016), Hyperlipidemia (7/17/2012), Hypothyroid, Insomnia, Macular degeneration, right eye (5/31/2016), Migraine headache, Osteopenia of the elderly (10/20/2015), Postmenopausal HRT (hormone replacement therapy), and RBBB (7/17/2012).  Prema Phillips  has a past surgical history that includes Stapedes surgery (2006); Total abdominal hysterectomy w/ bilateral salpingoophorectomy; Appendectomy; Hysterectomy; Tubal ligation; Catatact surgery (Right, 06/19/2019); Colonoscopy (08/27/2019); and Eye surgery (Bilateral).  Medical Hx and Allergies: Prema has a current medication list which includes the following prescription(s): amitriptyline, aspirin, atorvastatin, clopidogrel, escitalopram oxalate, and levothyroxine.   Review of patient's allergies indicates:   Allergen Reactions    Neomycin Other (See Comments) and Swelling     Other reaction(s): Unknown    Carbamazepine Rash     Rash per 3/02 RN telephone encounter. On interview, pt/family unable to say if she continued or stopped taking medication   Imaging:   MRI Brain 3/8/24: Predominately cortically-based acute to early subacute ischemia involving the right MCA territory, as discussed.  No evidence of macroscopic hemorrhage within the infarct territory.  No significant mass effect related to this cytotoxic edema. Redemonstrated remote post ischemic sequelae, as discussed.  CT Head 3/11/24 Evolving areas of recent infarction left MCA distribution as above. Small remote right cerebellar infarcts. Small volume subarachnoid hemorrhage in the left sylvian fissure.  Prior Therapy:  ST in acute care setting and in inpatient rehab setting focusing on dysarthria, motor speech planning, and aphasia  Social History:  Patient lives with her  Maldonado and daughter Una in [place]. She enjoys reading novels and  "gardening. They have 1 cat (Polina) and 2 dogs - 1 small dog Little Bit and a larger dog Jony. Patient is not currently driving. They attend [employer].  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 No pain behaviors observed throughout the session.  Patient's Therapy Goals:  unable to state formally  Objective   Portions of the Western Aphasia Battery - Revised (WAB-R) were administered to determine the patient's current level of speech-language abilities.    Results of the informal assessment are as follows:  Speech Sample using Picture from WAB-R: "Well sawda there (unintelligible word) see, she I don't know if this is maydut, this is I guess a sever I don't know, dre, have a boat, I don't know, Oh have a, they got a" - neologisms present, some short generic phrases produced intelligibly  Simple Yes/No Questions (presented verbally): 2/10   Simple Yes/No Questions (presented visually): 10/10  Auditory Word Recognition   Objects: 5/5   Shapes: 0/2 - even with written word after auditory presented   Letters: 3/6   Body Parts: 2/6  Simple Verbal 1 step commands: 1/4  Repetition Monosyllabic Words: 0/2  Object Namin/10 verbal responses; patient independently gestured correct use of object for 8/10 trials  Sentence Completion: 0/4  Writing: unable to write first name independently; reported to have copied her name in occupational therapy evaluation; spontaneous writing of "CECO"    Spontaneous production of speech intermittently throughout the evaluation when prompted with social information questions: "He's 17 (dog)" "I don't know off hand"  Treatment   Total Treatment Time Separate from Evaluation: 10 minutes   Treatment included the following:  [employer] Therapy Comprehension Cynthia  Patient performed task where she was required to read a " "single word and select the correct pictured object   Field of 2 - 100% accuracy independently for Easy, Medium, and Hard levels  Field of 4 - 90% accuracy independently for Hard level  Discussed downloading Lite version of the carlos a and provided handout with carlos a name, website, and instructions  Discussed prognostic factors for aphasia rehabilitation   Acute stages of recovery, motivation, family support, and neuroplasticity as positive prognostic factors  Age being the major factor which may hinder her progress towards goals    Education provided:   -role of Speech Therapy, goals/plan of care, scheduling/cancellations, insurance limitations with patient  -Additional Education provided:   Attention Strategies  Word finding strategies    Patient and family members expressed understanding.     Home Program: download [employer] Therapy Lite version of Language carlos a - will assist in session next time if unable to do so at home  Assessment     Prema presents to [facility] status post medical diagnosis of stroke. Her  is present for evaluation and provided history and social information for the purposes of this assessment.     Interpretation of objective assessment: Given the extent of Mrs. Lloyd's difficulty with expressive and receptive language abilities, informal assessment probes were completed today. Mrs. Lloyd presents with relative strengths in comprehension abilities, with more simple information as opposed to complex. Reading appears to be a relative strength as well, which can be paired with receptive and expressive language tasks. As for verbal production, patient generated several generic phrases such as "I don't know" or "I guess" but otherwise has difficulty naming objects, answering open-ended questions, and participating in basic conversation.  She presents with aphasia as characterized by impairments in auditory comprehension, word finding, expression, and writing. She will benefit from " skilled therapy services to address all above-stated deficits with focus on increasing her social participation through desired daily activities and tasks.    Demonstrates impairments including limitations as described in the problem list.     Positive prognostic factors: time since onset of stroke, motivation, participation, family support  Negative prognostic factors: age  Barriers to therapy: No barriers to therapy identified.     Patient's spiritual, cultural, and educational needs considered and patient agreeable to plan of care and goals.    Patient will benefit from skilled therapy.    Rehab Potential: good    Short Term Goals: (4 weeks) Current Progress:   Patient will follow simple 1-2 step commands presented visually with >90% accuracy for 2 consecutive sessions to enhance her receptive language skills.    Progressing/ Not Met 3/27/2024   Established this date   2. Patient will follow simple 1 step commands presented verbally with 75% accuracy and 1 repetition provided to enhance her receptive language skills.     Progressing/ Not Met 3/27/2024   Established this date   3. Patient will read single words aloud with 50% accuracy and 1 phonemic cue in order to bridge to higher level reading tasks.     Progressing/ Not Met 3/27/2024   Established this date    4. Patient will repeat monosyllabic, common words with 50% accuracy when provided with a visual cue to increase her spoken language abilities.     Progressing/ Not Met 3/27/2024   Established this date    5. Patient will complete rote speech tasks with >75% accuracy in unison with speech-language pathologist to increase spoken language abilities.    Progressing/ Not Met 3/27/2024   Established this date    6. Patient will answer simple open-ended questions with relevant responses with >75% accuracy with visual or auditory stimuli to increase spoken language output.    Progressing/ Not Met 3/27/2024   Established this date      Long Term Goals: (8 weeks)  Current Progress:   Patient will increase her social participation in conversation with > 5 utterances produced independently.   Established this date     2. Patient will state basic wants/needs/concerns >90% of the time independently through use of various modalities in order to increase her social participation with others.     Established this date       Plan     Recommended Treatment Plan:  Patient will participate in the [facility] program for speech therapy 2 times per week for 8 weeks to address her Communication deficits, to educate patient and their family, and to participate in a home exercise program.      Therapist's Name:   Nori Yin MS, CCC-SLP, CBIS  Speech-Language Pathologist  Certified Brain Injury Specialist  3/27/2024

## 2024-03-27 NOTE — PLAN OF CARE
[facility]   Occupational Therapy Initial Neurological Evaluation     Name: Prema Phillips  Clinic Number: [id number]    Therapy Diagnosis:   Encounter Diagnoses   Name Primary?    Muscle weakness of right upper extremity Yes    Apraxia     Lack of coordination due to acute cerebrovascular accident (CVA)      Physician: Odette Zhang MD    Physician Orders: Eval and Treat  Medical Diagnosis: M25.60,R53.1 (ICD-10-CM) - Decreased range of motion with decreased strength I63.412 (ICD-10-CM) - Cerebrovascular accident (CVA) due to embolism of left middle cerebral artery I67.89,G81.91 (ICD-10-CM) - Hemiparesis of right dominant side due to acute cerebrovascular disease  Evaluation Date: 3/27/2024  Insurance Authorization Period Expiration: 12/31/2024  Plan of Care Certification Period: 05/24/2024  Visit # / Visits authorized: 1 / 1  FOTO: 0 / 3    Precautions:  Standard, Fall, and aphasia, apraxia     Time In: 8:45 am  Time Out: 9:30 am  Total Billable Time: 45 minutes    Subjective     Date of Onset: 3/7/2024    History of Current Condition: Prema present with  who provided past and current level of function due to Prema's aphasia. He reported she went to the hospital due to onset of aphasia and confusion on 3/7/2024. Imaging detected CVA in left MCA. She was admitted to ICU and then did a week in inpatient rehab before discharging home with family. Since discharge, she continues to exhibit right sided weakness and cognitive deficits, affecting her ability to perform all activities of daily living and instrumental activities of daily living independently.      Involved Side: right   Dominant Side: Right    Imaging: - MRI Brain (3/8/2024): 1. Predominately cortically-based acute to early subacute ischemia involving the right MCA territory, as discussed.  No evidence of macroscopic hemorrhage within the infarct territory.  No significant mass effect related to this cytotoxic edema.  2.  "Redemonstrated remote post ischemic sequelae, as discussed.  - CT Head (3/11/2024): Evolving areas of recent infarction left MCA distribution as above. Small remote right cerebellar infarcts. Small volume subarachnoid hemorrhage in the left sylvian fissure.    Previous Therapy: IPR physical therapy, Occupational Therapy and speech therapy    Pain:  Pt was unable to verbalize numerical value for pain due to aphasia. No visual signs of pain during assessment.  does not report any reports of patient complaining of pain at home.     Occupation:  retired       Functional Limitations/Social History:    Prior Level of Function: Independent with all ADLs.   Current Level of Function: mod I - min A     Current Functional Status   Home/Living environment: lives with their spouse and lives with their daughter    - single story home, 0 steps to enter    - DME: walking shower with grab bars        Limitation of Functional Status as follows:   ADLs/IADLs:     - Feeding: set up- min A     - Bathing: min A     - Dressing: SBA - min A   - Grooming: SBA - min A     - Home Management:  doing now, prior to this Prema was doing all household chores     - Driving: not currently driving      Leisure: Gardening    Patient's Goals for Therapy: "to get better and be the way she was" =      Past Medical History/Physical Systems Review:     Past Medical History:  Prema Phillips  has a past medical history of Acute ischemic left MCA stroke, Allergic rhinitis, Carotid artery plaque, History of total hysterectomy with bilateral salpingo-oophorectomy (BSO), Hyperlipidemia, Hypothyroid, Insomnia, Macular degeneration, right eye, Migraine headache, Osteopenia of the elderly, Postmenopausal HRT (hormone replacement therapy), and RBBB.    Past Surgical History:  Prema Phillips  has a past surgical history that includes Stapedes surgery (2006); Total abdominal hysterectomy w/ bilateral salpingoophorectomy; Appendectomy; Hysterectomy; " Tubal ligation; Catatact surgery (Right, 06/19/2019); Colonoscopy (08/27/2019); and Eye surgery (Bilateral).    Current Medications:  Prema has a current medication list which includes the following prescription(s): amitriptyline, aspirin, atorvastatin, clopidogrel, escitalopram oxalate, and levothyroxine.    Allergies:  Review of patient's allergies indicates:   Allergen Reactions    Neomycin Other (See Comments) and Swelling     Other reaction(s): Unknown    Carbamazepine Rash     Rash per 3/02 RN telephone encounter. On interview, pt/family unable to say if she continued or stopped taking medication        Objective       Cognitive Exam:  Oriented Unable to verbalize    Behaviors cooperative   Follows Commands/attention: Follows single step directions about 50% of the time, tactile and physical cues for assessments    Communication See speech therapy eval for evaluation, expressive aphasia present, possible global aphasia    Safety awareness/insight to disability unaware of diagnosis, treatment, and prognosis     Visual/Perceptual:  Comments: reading glasses     Physical Exam:  Postural examination/scapula alignment:  upright posture, mild winging of right scapula with good muscle activation with tactile cues   Joint integrity: intact   Skin integrity: intact   Edema: no swelling noted and no edema reported by     Palpation: no visible signs of pain with palpation   left active range of motion and manual muscle test within normal limits.    Joint Evaluation  AROM  3/27/2024 PROM   3/27/2024 MMS   3/27/2024    Right Right Right   Scapular Elevation   3/5   Scapular Retraction   3/5   Shoulder Flex 0-180 121 130 3-/5   Shoulder Extension 0-80 60  3-/5   Shoulder Abd 0-180 130  3/5   Shoulder ER 0-90   3/5   Shoulder IR 0-90 WNL  3/5   Elbow Flexion 0-150 150  3/5   Elbow Extension 0  3/5   Wrist Flex 0-80 90  3/5   Wrist Ext 0-70 50  3-/5   Supination 0-80 WNL  3/5   Pronation 0-80 WNL  3/5   *WNL - Within  Normal Limits  *NT = Not Tested    Fist: tight right hand ;      Strength: (FLOR Dynamometer in lbs.) Average 3 trials, Position II:     3/27/2024 3/27/2024   Rung II Right Left   Trial 1 19# 20#   Trial 2 14# 22#   Trial 3 15# 26#   Average 16# 22.6#     Normal  Average Values  Female Right Left Male: Right Left   20-29 66 lbs 62 lbs         94 lbs 86 lbs   30-39 68 lbs 64 lbs 90 lbs 82 lbs   40-49 64 lbs 62 lbs 80 lbs 74 lbs   50-59 62 lbs 57 lbs 72 lbs 65 lbs   60-69 53 lbs 51 lbs 63 lbs 56 lbs   70+ 44 lbs 42 lbs 54 lbs 48 lbs   SD = +/- 19lbs       Pinch Strength (Measured in lbs)     3/27/2024 3/27/2024    Right Left   Key Pinch 5 # 7 #   3pt Pinch 3 # 5 #   2pt Pinch 3 # 6 #       Fine/Gross Motor Coordination    Assessment  Right   3/27/2024 Left  3/27/2024   9 hole peg test 9 pegs in/out for time 2:25  2 cues for sequencing  :48   SMITHA (Rapid Alternating Movements)    impaired - moderate   intact   Finger to Nose (5 times)  impaired - moderate intact   Finger Flicks (coordination moving from digit flexion to digit extension)  impaired - minimum intact   *WNL - Within Normal Limits  *NT = Not Tested    Tone:  Modified Jessa Scale:   0 - No increase in muscle tone    Sensation:  Prema  was unable to complete sensory assessment at this time due to aphasia     Balance:   Static Sit - NORMAL: No deviations seen in posture held statically  Dynamic sit- NORMAL: No deviations seen in posture held statically  See physical therapy eval for detailed balance assessment     Endurance Deficit: moderate                   Intake Outcome Measure for FOTO Survey    Staff did not capture; will assess first after            Home Exercises and Patient Education Provided     Education provided:   -role of OT, goals for OT, scheduling/cancellations, insurance limitations with patient.  -Additional Education provided: stroke recovery, prognosis, neuro re-ed with using right upper extremity at home     Written Home Exercises  Provided: Patient instructed to cont prior HEP.  Exercises were reviewed and Prema was able to demonstrate them prior to the end of the session.    Prema demonstrated good  understanding of the education provided.     Assessment     Prema Phillips is a 79 y.o. female referred to outpatient occupational therapy and presents with a medical diagnosis of M25.60,R53.1 (ICD-10-CM) - Decreased range of motion with decreased strength I63.412 (ICD-10-CM) - Cerebrovascular accident (CVA) due to embolism of left middle cerebral artery I67.89,G81.91 (ICD-10-CM) - Hemiparesis of right dominant side due to acute cerebrovascular disease.  During evaluation, she exhibited significant motor apraxia, requiring verbal, visual and tactile cues to complete tasks. In addition, she exhibited mild active range of motion deficits in shoulder and hand movements, affecting overall strength and coordination with right dominant hand. She required significant time to complete 9 hole peg assessment with right hand compared to left, indicating impairments with dexterity and in hand manipulation. Unable to assess pain or sensation due to cognitive impairments from CVA. Due to impairments, she is impacted functionally, requiring more assistance from her family. Following medical record review it is determined that pt will benefit from occupational therapy services in order to maximize pain free and/or functional use of right upper extremity. The following goals were discussed with the patient and patient is in agreement with them as to be addressed in the treatment plan. The patient's rehab potential is Good.     Anticipated barriers to occupational therapy: cognitive impairments     Plan of care discussed with patient: Yes  Patient's spiritual, cultural and educational needs considered and patient is agreeable to the plan of care and goals as stated below:     Medical Necessity is demonstrated by the following  Occupational  Profile/History  Co-morbidities and personal factors that may impact the plan of care [] LOW: Brief chart review  [x] MODERATE: Expanded chart review   [] HIGH: Extensive chart review    Moderate / High Support Documentation: visual impairments, cognitive impairments, high prior level of function and requiring more assistance currently      Examination  Performance deficits relating to physical, cognitive or psychosocial skills that result in activity limitations and/or participation restrictions  [] LOW: addressing 1-3 Performance deficits  [] MODERATE: 3-5 Performance deficits  [x] HIGH: 5+ Performance deficits (please support below)    Moderate / High Support Documentation:    Physical:  Joint Mobility  Muscle Power/Strength   Strength  Pinch Strength  Gross Motor Coordination  Fine Motor Coordination  Proprioception Functions    Cognitive:  Orientation  Communication  Memory  Safety Awareness/Insight to Disability    Psychosocial:    Habits  Routines  Rituals     Treatment Options [] LOW: Limited options  [x] MODERATE: Several options  [] HIGH: Multiple options      Decision Making/ Complexity Score: moderate       The following goals were discussed with the patient and patient is in agreement with them as to be addressed in the treatment plan.     Goals:  Short Term Goals: 4 weeks   - Pt will report 50% compliance with home exercise program in order to maintain progress attained during therapy.   - Pt will improve active range of motion of right shoulder flexion/abduction to 120/140 degrees in order to increase ease with bathing and dressing tasks.   - Pt will increase right/left  strength to 22/30# in order to improve ease when dressing, bathing and completing gardening tasks.  - Pt will decrease time on 9HPT to under 2 minutes using right hand  to improve fine motor speed and coordination needed to perform home maintenance, grooming and hygiene, money manipulation.     Long Term Goals: 8 weeks   - Pt  will report independence with home exercise program in order to maintain progress attained during therapy.   - Pt will increase MMT for bilateral shoulder movements in all planes  to 4/5 to increase upper body strength needed for home management tasks, high level IADLs and leisure related activities.   - Pt will increase bilateral  strength to 40# in order to improve ease with functional tasks such as gardening.   - Pt will improve right/left key/3pt/2pt pinch by 2-3# in order to increase ease when dressing and grooming.   - Pt will complete simulated instrumental activities of daily living activity with less than 2 cues for completion in order to return to prior level of function and decrease caregiver burden.     Plan   Certification Period/Plan of care expiration: 3/27/2024 to 05/24/2024.    Outpatient Occupational Therapy 2 times weekly for 8 weeks to include the following interventions: Fluidotherapy, Manual Therapy, Moist Heat/ Ice, Neuromuscular Re-ed, Patient Education, Self Care, Therapeutic Activities, and Therapeutic Exercise.    Corinne Rapier, OT        Physician's Signature: _________________________________________ Date: ________________

## 2024-03-27 NOTE — PLAN OF CARE
"[facility]  Speech Therapy Evaluation -Neurological Rehabilitation    Date: 3/27/2024     Name: Prema Phillips   MRN: [id number]    Therapy Diagnosis:   Encounter Diagnoses   Name Primary?    Decreased range of motion with decreased strength     Cerebrovascular accident (CVA) due to embolism of left middle cerebral artery     Hemiparesis of right dominant side due to acute cerebrovascular disease  Aphasia     Physician: Lexii Hagan DPM  Physician Orders: Ambulatory Referral to Speech Therapy   Medical Diagnosis: M25.60,R53.1 (ICD-10-CM) - Decreased range of motion with decreased strength I63.412 (ICD-10-CM) - Cerebrovascular accident (CVA) due to embolism of left middle cerebral artery I67.89,G81.91 (ICD-10-CM) - Hemiparesis of right dominant side due to acute cerebrovascular disease    Visit # / Visits Authorized:  1 / 1   Date of Evaluation:  3/27/2024   Insurance Authorization Period: 3/26/24 to 3/26/25  Plan of Care Certification:    3/27/2024 to 5/22/2024     Time In: 0935  Time Out: 1025   Total time: 50 minutes    Procedure   Speech Language Evaluation    Speech-Language-Voice Treatment     Precautions: Communication  Subjective   Date of Onset: March 2024  History of Current Condition:  Prema Phillips is a 79 y.o. female who presents to [facility] Outpatient Speech Therapy for evaluation secondary to stroke. Patient was referred to therapy by Odette Zhang which is the patient's primary care provider. Patient's  "Maldonado" was present for evaluation and assisted with information gathering. Maldonado left the house for a little while with Mrs. Lloyd sitting at the table reading a book and drinking her coffee. When he returned, he found her on the floor with her phone in hand mumbling. Found to have a stroke. She then participated in therapy at inpatient rehab and returned home.   Past Medical History: Prema Phillips  has a past medical history of Acute ischemic left " MCA stroke (3/7/2024), Allergic rhinitis (5/31/2016), Carotid artery plaque (11/18/2013), History of total hysterectomy with bilateral salpingo-oophorectomy (BSO) (5/31/2016), Hyperlipidemia (7/17/2012), Hypothyroid, Insomnia, Macular degeneration, right eye (5/31/2016), Migraine headache, Osteopenia of the elderly (10/20/2015), Postmenopausal HRT (hormone replacement therapy), and RBBB (7/17/2012).  Prema Phillips  has a past surgical history that includes Stapedes surgery (2006); Total abdominal hysterectomy w/ bilateral salpingoophorectomy; Appendectomy; Hysterectomy; Tubal ligation; Catatact surgery (Right, 06/19/2019); Colonoscopy (08/27/2019); and Eye surgery (Bilateral).  Medical Hx and Allergies: Prema has a current medication list which includes the following prescription(s): amitriptyline, aspirin, atorvastatin, clopidogrel, escitalopram oxalate, and levothyroxine.   Review of patient's allergies indicates:   Allergen Reactions    Neomycin Other (See Comments) and Swelling     Other reaction(s): Unknown    Carbamazepine Rash     Rash per 3/02 RN telephone encounter. On interview, pt/family unable to say if she continued or stopped taking medication   Imaging:   MRI Brain 3/8/24: Predominately cortically-based acute to early subacute ischemia involving the right MCA territory, as discussed.  No evidence of macroscopic hemorrhage within the infarct territory.  No significant mass effect related to this cytotoxic edema. Redemonstrated remote post ischemic sequelae, as discussed.  CT Head 3/11/24 Evolving areas of recent infarction left MCA distribution as above. Small remote right cerebellar infarcts. Small volume subarachnoid hemorrhage in the left sylvian fissure.  Prior Therapy:  ST in acute care setting and in inpatient rehab setting focusing on dysarthria, motor speech planning, and aphasia  Social History:  Patient lives with her  Maldonado and daughter Una in [place]. She enjoys reading  "novels and gardening. They have 1 cat (Polina) and 2 dogs - 1 small dog Little Bit and a larger dog Jony. Patient is not currently driving. They attend [employer].  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 No pain behaviors observed throughout the session.  Patient's Therapy Goals:  unable to state formally  Objective   Portions of the Western Aphasia Battery - Revised (WAB-R) were administered to determine the patient's current level of speech-language abilities.    Results of the informal assessment are as follows:  Speech Sample using Picture from WAB-R: "Well sawda there (unintelligible word) see, she I don't know if this is maydut, this is I guess a sever I don't know, dre, have a boat, I don't know, Oh have a, they got a" - neologisms present, some short generic phrases produced intelligibly  Simple Yes/No Questions (presented verbally): 2/10   Simple Yes/No Questions (presented visually): 10/10  Auditory Word Recognition   Objects: 5/5   Shapes: 0/2 - even with written word after auditory presented   Letters: 3/6   Body Parts: 2/6  Simple Verbal 1 step commands: 1/4  Repetition Monosyllabic Words: 0/2  Object Namin/10 verbal responses; patient independently gestured correct use of object for 8/10 trials  Sentence Completion: 0/4  Writing: unable to write first name independently; reported to have copied her name in occupational therapy evaluation; spontaneous writing of "CECO"    Spontaneous production of speech intermittently throughout the evaluation when prompted with social information questions: "He's 17 (dog)" "I don't know off hand"  Treatment   Total Treatment Time Separate from Evaluation: 10 minutes   Treatment included the following:  Tactus Therapy Comprehension Cynthia  Patient performed task where she was required to " "read a single word and select the correct pictured object   Field of 2 - 100% accuracy independently for Easy, Medium, and Hard levels  Field of 4 - 90% accuracy independently for Hard level  Discussed downloading Lite version of the carlos a and provided handout with carlos a name, website, and instructions  Discussed prognostic factors for aphasia rehabilitation   Acute stages of recovery, motivation, family support, and neuroplasticity as positive prognostic factors  Age being the major factor which may hinder her progress towards goals    Education provided:   -role of Speech Therapy, goals/plan of care, scheduling/cancellations, insurance limitations with patient  -Additional Education provided:   Attention Strategies  Word finding strategies    Patient and family members expressed understanding.     Home Program: download Tactus Therapy Lite version of Language carlos a - will assist in session next time if unable to do so at home  Assessment     Prema presents to [facility] status post medical diagnosis of stroke. Her  is present for evaluation and provided history and social information for the purposes of this assessment.     Interpretation of objective assessment: Given the extent of Mrs. Lloyd's difficulty with expressive and receptive language abilities, informal assessment probes were completed today. Mrs. Lloyd presents with relative strengths in comprehension abilities, with more simple information as opposed to complex. Reading appears to be a relative strength as well, which can be paired with receptive and expressive language tasks. As for verbal production, patient generated several generic phrases such as "I don't know" or "I guess" but otherwise has difficulty naming objects, answering open-ended questions, and participating in basic conversation.  She presents with aphasia as characterized by impairments in auditory comprehension, word finding, expression, and writing. She will benefit " from skilled therapy services to address all above-stated deficits with focus on increasing her social participation through desired daily activities and tasks.    Demonstrates impairments including limitations as described in the problem list.     Positive prognostic factors: time since onset of stroke, motivation, participation, family support  Negative prognostic factors: age  Barriers to therapy: No barriers to therapy identified.     Patient's spiritual, cultural, and educational needs considered and patient agreeable to plan of care and goals.    Patient will benefit from skilled therapy.    Rehab Potential: good    Short Term Goals: (4 weeks) Current Progress:   Patient will follow simple 1-2 step commands presented visually with >90% accuracy for 2 consecutive sessions to enhance her receptive language skills.    Progressing/ Not Met 3/27/2024   Established this date   2. Patient will follow simple 1 step commands presented verbally with 75% accuracy and 1 repetition provided to enhance her receptive language skills.     Progressing/ Not Met 3/27/2024   Established this date   3. Patient will read single words aloud with 50% accuracy and 1 phonemic cue in order to bridge to higher level reading tasks.     Progressing/ Not Met 3/27/2024   Established this date    4. Patient will repeat monosyllabic, common words with 50% accuracy when provided with a visual cue to increase her spoken language abilities.     Progressing/ Not Met 3/27/2024   Established this date    5. Patient will complete rote speech tasks with >75% accuracy in unison with speech-language pathologist to increase spoken language abilities.    Progressing/ Not Met 3/27/2024   Established this date    6. Patient will answer simple open-ended questions with relevant responses with >75% accuracy with visual or auditory stimuli to increase spoken language output.    Progressing/ Not Met 3/27/2024   Established this date      Long Term Goals: (8  weeks) Current Progress:   Patient will increase her social participation in conversation with > 5 utterances produced independently.   Established this date     2. Patient will state basic wants/needs/concerns >90% of the time independently through use of various modalities in order to increase her social participation with others.     Established this date       Plan     Recommended Treatment Plan:  Patient will participate in the [facility] rehabilitation program for speech therapy 2 times per week for 8 weeks to address her Communication deficits, to educate patient and their family, and to participate in a home exercise program.      Therapist's Name:   Nori Yin MS, CCC-SLP, CBIS  Speech-Language Pathologist  Certified Brain Injury Specialist  3/27/2024

## 2024-03-27 NOTE — PLAN OF CARE
[facility]  Physical Therapy Neurological Rehabilitation Initial Evaluation     Name: Prema Phillips  Clinic Number: [id number]    Therapy Diagnosis:   Encounter Diagnoses   Name Primary?    Decreased range of motion with decreased strength     Cerebrovascular accident (CVA) due to embolism of left middle cerebral artery     Hemiparesis of right dominant side due to acute cerebrovascular disease     Apraxia Yes    Risk for falls      Physician: Lexii Hagan DPM    Physician Orders: PT Eval and Treat   Medical Diagnosis from Referral: Decreased range of motion with decreased strength [M25.60, R53.1], Cerebrovascular accident (CVA) due to embolism of left middle cerebral artery [I63.412], Hemiparesis of right dominant side due to acute cerebrovascular disease [I67.89, G81.91]   Evaluation Date: 3/27/2024  Authorization Period Expiration: 12/31/2024  Plan of Care Expiration: 5/24/2024  Progress Note Due: 4/27/2024  Date of Surgery: N/A  Visit # / Visits authorized: 1/1  FOTO: 1/3    Precautions: Standard, Fall, and Aphasia (per SLP - consider written instructions as patient demonstrated good reading comprehension during speech eval)    Time In: 8:05AM  Time Out: 8:45AM  Total Billable Time: 40 minutes (1 mod eval; 1 TE)    Subjective      Date of onset: 3/7/2024    History of current condition - Prema's  reports: Developed stroke like symptoms (left sided weakness and speech deficits) on 3/7/2024. Diagnosed with L MCA stroke upon presentation to hospital. Managed acutely for about a week and discharged to [facility] on 3/13 where she received intensive physical, speech, and occupational therapy for about one additional week. She was discharged home on 3/22 where she lives with her  and adult daughter. Of note,  provides majority of subjective report today due to patient's significant aphasia.    Imaging  - MRI Brain (3/8/2024): 1. Predominately cortically-based acute  to early subacute ischemia involving the right MCA territory, as discussed.  No evidence of macroscopic hemorrhage within the infarct territory.  No significant mass effect related to this cytotoxic edema.  2. Redemonstrated remote post ischemic sequelae, as discussed.  - CT Head (3/11/2024): Evolving areas of recent infarction left MCA distribution as above. Small remote right cerebellar infarcts. Small volume subarachnoid hemorrhage in the left sylvian fissure.    Prior Therapy: physical therapy/occupational therapy/speech therapy in [facility]  Social History: lives with  and adult daughter  Falls:  denies   DME: walk in shower with bars  Home Environment: single story home; threshold to enter   Exercise Routine / History: not keeping up with HEP from rehab   Family Present at time of Eval:    Prior Level of Function: independent   Current Level of Function: mod-max A for ADLs (but not physical assistance; sequencing and supervision)    Pain:  Current 0/10, worst 0/10, best 0/10   Location: N/A  Description: N/A  Aggravating Factors: N/A  Easing Factors: N/A    Patient's/caregiver's goals: return to prior physical level of function    Medical History:   Past Medical History:   Diagnosis Date    Acute ischemic left MCA stroke 3/7/2024    Allergic rhinitis 5/31/2016    Carotid artery plaque 11/18/2013    History of total hysterectomy with bilateral salpingo-oophorectomy (BSO) 5/31/2016    Hyperlipidemia 7/17/2012    Hypothyroid     Insomnia     Macular degeneration, right eye 5/31/2016    Migraine headache     Osteopenia of the elderly 10/20/2015    Postmenopausal HRT (hormone replacement therapy)     RBBB 7/17/2012     Surgical History:   Prema MANLEY Alan  has a past surgical history that includes Stapedes surgery (2006); Total abdominal hysterectomy w/ bilateral salpingoophorectomy; Appendectomy; Hysterectomy; Tubal ligation; Catatact surgery (Right, 06/19/2019); Colonoscopy (08/27/2019); and Eye  surgery (Bilateral).    Medications:   Prema has a current medication list which includes the following prescription(s): amitriptyline, aspirin, atorvastatin, clopidogrel, escitalopram oxalate, and levothyroxine.    Allergies:   Review of patient's allergies indicates:   Allergen Reactions    Neomycin Other (See Comments) and Swelling     Other reaction(s): Unknown    Carbamazepine Rash     Rash per 3/02 RN telephone encounter. On interview, pt/family unable to say if she continued or stopped taking medication        Objective      - Command followin% simple; 25% complex   - Speech: see SLP evaluation; aphasia (potentially global)    Mental status: alert; unable to assess orientation level due to significant aphasia  Behavior:  calm and cooperative  Attention Span and Concentration: Grossly intact    Dominant hand:  right     Tone: patient unable to fully relax for valid assessment    Visual/Auditory: able to track/perform smooth pursuits past midline; mildly hard of hearing      Coordination:   - fine motor: see occupational therapy evaluation  - UE coordination: see occupational therapy evaluation    - LE coordination:  intact when cued to return demonstrate rapid alternating movement of ankle dorsiflexion/plantarflexion       RANGE OF MOTION--LOWER EXTREMITIES  (R) LE mildly decreased hamstring flexibility  (L) LE: mildly decreased hamstring flexibility    Lower Extremity Strength: Unable to formally assess as patient inconsistently able to follow two step commands required for manual muscle testing; functionally she appears at least 4/5-5/5 in major muscle groups of bilateral lower extremity    Five Time Sit to Stand: 18 seconds with bilateral upper extremity assist    Timed Up and Go: 15.5 seconds without AD    Self-Selected Walking Speed: 0.71m/s    Fast Walking Speed 1.08m/s    Single Leg Stance  - left lower extremity: 5 seconds  - right lower extremity: 3 seconds     Gait Assessment:   - AD used:  none  - Assistance: supervision without assistive device  - Distance: 100+ feet throughout course of assessment   - Stairs: not assessed today    Gait Analysis:  Deviations noted: decreased heel strike bilaterally (right more so than left); decreased amplitude of arm swing    Impairments contributing to deviations:  stroke related impairments    Functional Mobility (Bed mobility, transfers)  Supine to sit: mod A from PT but due to apraxia and need for guidance with mobility item (not due to weakness, coordination impairment)  Sit to supine: independent   Rolling: independent   Transfers to bed: independent with ambulation approach  Sit to stand:  modified independent    Intake Outcome Measure for FOTO Non-Traumatic CNS Dysfunction Survey    Therapist reviewed FOTO scores for Prema Phillips on 3/27/2024.   FOTO report - see Media section or FOTO account episode details.    Intake Score: 40% (predicted = 59%)       Treatment     Total Treatment time separate from Evaluation: 10 minutes    Prema received the treatments listed below:      therapeutic exercises to develop strength and endurance for 10 minutes including:  - Standing reciprocal hip flexion marches with light touchdown support x10B  - Sit to stands from mat x10  - Standing calf raises with light touchdown support  - Patient/caregiver education on recommended HEP volume/technique    Patient Education and Home Exercises     Education provided:   - PT plan of care  - Role of outpatient physical versus speech versus occupational therapy    Written Home Exercises Provided: yes.  Exercises were reviewed and Prema was able to demonstrate them prior to the end of the session.  Prema demonstrated good  understanding of the education provided.     See EMR under Patient Instructions for exercises provided 3/27/2024.    Assessment     Prema is a 79 y.o. female referred to outpatient Physical Therapy with a medical diagnosis of Decreased range of motion with decreased  strength [M25.60, R53.1], Cerebrovascular accident (CVA) due to embolism of left middle cerebral artery [I63.412], Hemiparesis of right dominant side due to acute cerebrovascular disease [I67.89, G81.91]. Patient presents with motor apraxia; mild gait abnormalities; higher level balance impairments; and objective risk for falls per Timed Up and Go. Evaluation somewhat limited by patient's significant aphasia but she is able to follow simple commands somewhat consistently (also of note, per Speech Therapist, patient demonstrates good reading comprehension which may be used during future sessions for instruction). She would benefit from skilled physical therapy services to address listed impairments, decrease risk for falls, improve independence with functional mobility/ADLs, and improve overall quality of life.     Patient prognosis is Good.   Patient will benefit from skilled outpatient Physical Therapy to address the deficits stated above and in the chart below, provide patient /family education, and to maximize patient's level of independence.     Plan of care discussed with patient: Yes  Patient's spiritual, cultural and educational needs considered and patient is agreeable to the plan of care and goals as stated below:     Anticipated Barriers for therapy: asphasia    Medical Necessity is demonstrated by the following  History  Co-morbidities and personal factors that may impact the plan of care [] LOW: no personal factors / co-morbidities  [] MODERATE: 1-2 personal factors / co-morbidities  [] HIGH: 3+ personal factors / co-morbidities    Moderate / High Support Documentation:   Co-morbidities affecting plan of care:   Carotid artery plaque   Hypothyroid   Insomnia   Macular degeneration, right eye   Migraine headache   Osteopenia of the elderly   RBBB     Personal Factors:   age     Examination  Body Structures and Functions, activity limitations and participation restrictions that may impact the plan of care  [] LOW: addressing 1-2 elements  [] MODERATE: 3+ elements  [x] HIGH: 4+ elements (please support below)    Moderate / High Support Documentation: see above     Clinical Presentation [] LOW: stable  [x] MODERATE: Evolving  [] HIGH: Unstable     Decision Making/ Complexity Score: moderate       Goals:  Short Term Goals: 4 weeks   Patient will be compliant with HEP in order to maximize PT benefits  Patient will complete >/=10 seconds of single leg stance on each leg in order to improve static postural control during standing ADLs  Patient will complete TUG in </= 13 seconds in order to reduce risk for falls and improve safety with functional mobility  Patient will improve self-selected walking speed to >/=0.8 m/s in order to improve safety with community mobility    Long Term Goals: 8 weeks   Patient will score >/= 59% on FOTO survey in order to improve self-perception of functional mobility deficits  Patient will complete TUG in </= 11 seconds in order to reduce risk for falls and improve safety with functional mobility  Patient will score </= 15 seconds on Five Time Sit to  order to improve bilateral lower extremity endurance and muscular power for transfers   Patient will improve self-selected walking speed to >/=1.0 m/s in order to improve safety with community mobility  Patient will perform 1 floor <> stand transfer with bilateral upper extremity support in order to demonstrate safe functional mobility in case of future fall  Patient will begin some form of home/community fitness in order to sustain progress gained in PT    Plan     Plan of care Certification: 3/27/2024 to 5/24/2024.    Outpatient Physical Therapy 2 times weekly for 8 weeks to include the following interventions: Gait Training, Manual Therapy, Moist Heat/ Ice, Neuromuscular Re-ed, Patient Education, Self Care, Therapeutic Activities, and Therapeutic Exercise.     INDIO REAL, PT        Physician's Signature:  _________________________________________ Date: ________________

## 2024-03-28 PROBLEM — I63.9 LACK OF COORDINATION DUE TO ACUTE CEREBROVASCULAR ACCIDENT (CVA): Status: ACTIVE | Noted: 2024-03-28

## 2024-03-28 PROBLEM — M62.81 MUSCLE WEAKNESS OF RIGHT UPPER EXTREMITY: Status: ACTIVE | Noted: 2024-03-27

## 2024-03-28 PROBLEM — R27.9 LACK OF COORDINATION DUE TO ACUTE CEREBROVASCULAR ACCIDENT (CVA): Status: ACTIVE | Noted: 2024-03-28

## 2024-03-30 NOTE — TELEPHONE ENCOUNTER
No care due was identified.  [facility] Embedded Care Due Messages. Reference number: [id number].   3/30/2024 7:09:47 AM CDT

## 2024-04-01 RX ORDER — OMEPRAZOLE 20 MG/1
20 CAPSULE, DELAYED RELEASE ORAL EVERY MORNING
Qty: 90 CAPSULE | Refills: 2 | Status: SHIPPED | OUTPATIENT
Start: 2024-04-01

## 2024-04-02 PROBLEM — Z79.02 VISIT FOR MONITORING PLAVIX THERAPY: Status: ACTIVE | Noted: 2024-04-02

## 2024-04-02 PROBLEM — Z51.81 VISIT FOR MONITORING PLAVIX THERAPY: Status: ACTIVE | Noted: 2024-04-02

## 2024-04-02 NOTE — PROGRESS NOTES
" Office Visit    Patient Name: Prema Phillips    : 1945  MRN: [id number]    Subjective:  Prema is a 79 y.o. female who presents today for:    Follow-up    Per D/C Summary:      Admit Date: 3/7/2024  3:17 PM  D/C 3/13/24 to Rehab  D/C from Rehab on 3/22/24-- no home health,, all OP therapy, working on resources through [facility]     Attending Physician: Glenn Haas MD      History of Present Illness: Prema Phillips is a 79 y.o. woman with past medical history of hypertension, hyperlipidemia, bilateral carotid stenosis, and RBBB who presents as an acute stroke transfer after initial presentation to OSH with acute onset aphasia and diagnostics consistent with left M2 occlusion. Patient transported to IR suite shortly after arrival to [facility] ED.       Hospital Course (synopsis of major diagnoses, care, treatment, and services provided during the course of the hospital stay):   3/7/24: Transfer for L M2 occlusion on CTA after NIH 10 w/ RUE weakness, global aphasia, AMS (LKW 0630 3/7) OOW for TNK. ASA+Plavix loaded and taken with IR for TICI3C  3/8/24: On post-angio pathway with exam NIH 9 with improving with patient still globally aphasic but able to mimic/comprehend some of speech/commands. RUE with improving strength, no drift. Remainder of exam non-focal. Awaiting MRI brain.  2024 MRI R MCA infarct without hemorrhage or mass effect. Remains largely aphasic, followed verbal command to close eyes. Stepped down to NPU.  03/10/2024 Exam stable. Pending IPR.  2024. No acute events overnight, however, during rounds this morning, team was notified of mental status change and a period of"blanking out" during OT session in the bathroom. Patient was assisted to the bedside recliner. Upon assessment, she was awake and at her baseline neurological status. Stat CTH and 1-hr EEG was ordered. Otherwise, low Na level on labs today. Pending IPR placement.   3-12-24 CT head from yesterday small amount SAH " seen DAPT on hold. EEG no seizure activity seen  3-13-24 no acute events overnight, restart DAPT and ready for rehab     Has follow up in [facility] 4/18/24      Today she presents for OV w/ her  Yuan-- he has no acute concerns.       Since D/C from rehab has been receiving OP OT/PT/Speech.      Functional status is improving. Still struggling with significant aphasia but she has improved significantly from a musculoskeletal/strength standpoint.    Diet is improving but still not back to baseline.   Lost about 5 lbs with hospitalization.     Has some constipation but it is managed with p.r.n. stool softeners and MiraLax.  At 1 point daughter had some urinary concerns but her  reports that these seemed to have subsided with increase in water consumption and some cranberry supplements.     Discharge Plan:  Antithrombotics: Aspirin 81mg, Clopidogrel 75mg  Statin: Atorvastatin 40mg  She has been compliant with the above medications   Bleeding/Bruising-- no concerns.      IR Angiogram 3/7/24  Formal read pending; per procedure note with combination of aspiration and stent retrieving TICI3 reperfusion obtained     MRI Brain w/o contrast 3/8/24  1. Predominately cortically-based acute to early subacute ischemia involving the right MCA territory, as discussed.  No evidence of macroscopic hemorrhage within the infarct territory.  No significant mass effect related to this cytotoxic edema.  2. Redemonstrated remote post ischemic sequelae, as discussed.    LABS 3/18/24: sodium 135, normal EGFR & electrolytes with Mg/Phos, CBC 3/18/24 w/ HCT 34.2  ESR/CRP WNL 3/17/24     Cardiac Imaging   TTE 3/8/24    Left Ventricle: The left ventricle is normal in size. Normal wall thickness. Normal wall motion. There is normal systolic function with a visually estimated ejection fraction of 55 - 60%. There is normal diastolic function.    Right Ventricle: Normal right ventricular cavity size. Systolic function is  normal.    Pulmonary Artery: The estimated pulmonary artery systolic pressure is 25 mmHg.    IVC/SVC: Normal venous pressure at 3 mmHg.     Carotid US 3/22/22:   There is 20-39% right Internal Carotid Stenosis.  There is 40-49% left Internal Carotid Stenosis. Elevated flow velocities noted in area of vessel tortuosity, may lead to overestimate of stenosis.  Similar findings noted on report 3/9/20.       PAST MEDICAL HISTORY, SURGICAL/SOCIAL/FAMILY HISTORY REVIEWED AS PER CHART, WITH PERTINENT FINDINGS INCLUDED IN HISTORY SECTION OF NOTE.     Current Medications    Medication List with Changes/Refills   Current Medications    AMITRIPTYLINE (ELAVIL) 25 MG TABLET    TAKE 1/2-1 TABLET BY MOUTH NIGHTLY FOR INSOMNIA. OK TO TAKE WITH THE LOW DOSE OF ATIVAN    ASPIRIN (ECOTRIN) 81 MG EC TABLET    Take 81 mg by mouth once daily.    ATORVASTATIN (LIPITOR) 40 MG TABLET    Take 1 tablet (40 mg total) by mouth once daily.    CLOPIDOGREL (PLAVIX) 75 MG TABLET    Take 1 tablet (75 mg total) by mouth once daily.    ESCITALOPRAM OXALATE (LEXAPRO) 20 MG TABLET    Take 1 tablet (20 mg total) by mouth once daily.    LEVOTHYROXINE (SYNTHROID) 75 MCG TABLET    Take 1 tablet (75 mcg total) by mouth before breakfast.    OMEPRAZOLE (PRILOSEC) 20 MG CAPSULE    TAKE 1 CAPSULE BY MOUTH IN THE MORNING       Allergies   Review of patient's allergies indicates:   Allergen Reactions    Neomycin Other (See Comments) and Swelling     Other reaction(s): Unknown    Carbamazepine Rash     Rash per 3/02 RN telephone encounter. On interview, pt/family unable to say if she continued or stopped taking medication         Review of Systems (Pertinent positives)  Review of Systems   Constitutional:  Negative for unexpected weight change.   HENT:  Negative for trouble swallowing.    Eyes:  Negative for visual disturbance.   Respiratory:  Negative for cough and shortness of breath.    Cardiovascular:  Negative for chest pain and leg swelling.  "  Gastrointestinal:  Positive for constipation (manageable).   Genitourinary:  Positive for urgency.   Skin:  Negative for wound.   Allergic/Immunologic: Negative for environmental allergies.   Neurological:  Positive for speech difficulty and weakness. Negative for dizziness, light-headedness and headaches.   Hematological:  Does not bruise/bleed easily.       BP (!) 112/58 (BP Location: Left arm, Patient Position: Sitting, BP Method: Medium (Manual))   Pulse 86   Resp 18   Ht 5' 6" (1.676 m)   Wt 58.5 kg (128 lb 15.5 oz)   SpO2 98%   BMI 20.82 kg/m²     Physical Exam  Vitals reviewed.   Constitutional:       General: She is not in acute distress.     Appearance: Normal appearance. She is well-developed.   HENT:      Head: Normocephalic and atraumatic.      Right Ear: Tympanic membrane normal.      Left Ear: Tympanic membrane normal.      Nose: No congestion or rhinorrhea.      Mouth/Throat:      Pharynx: No oropharyngeal exudate or posterior oropharyngeal erythema.   Eyes:      General: No scleral icterus.        Right eye: No discharge.         Left eye: No discharge.      Extraocular Movements: Extraocular movements intact.      Conjunctiva/sclera: Conjunctivae normal.   Neck:      Vascular: No carotid bruit.   Cardiovascular:      Rate and Rhythm: Normal rate and regular rhythm.      Heart sounds: No murmur heard.  Pulmonary:      Effort: Pulmonary effort is normal.      Breath sounds: Normal breath sounds.   Abdominal:      Palpations: Abdomen is soft.   Musculoskeletal:      Right lower leg: No edema.      Left lower leg: No edema.   Skin:     General: Skin is warm and dry.   Neurological:      General: No focal deficit present.      Mental Status: She is alert and oriented to person, place, and time.      Cranial Nerves: Facial asymmetry (Very slight right-sided facial droop) present.      Motor: No abnormal muscle tone.      Coordination: Coordination normal (can march in place, alternate leg/arm " lifting).      Gait: Gait is intact.      Comments: Most significant deficit on neuro exam as her aphasia.      Seems to have progressed well in terms of coordination and strength.  Walks in well without a mobility aid.    Psychiatric:         Mood and Affect: Mood normal.         Behavior: Behavior normal.           Assessment/Plan:  Prema Phillips is a 79 y.o. female who presents today for :        ICD-10-CM ICD-9-CM    1. Aphasia due to acute stroke  I63.9 434.91     R47.01 784.3       2. Hemiparesis of right dominant side due to acute cerebrovascular disease  I67.89 436     G81.91 342.91       3. Cerebrovascular accident (CVA) due to embolism of left middle cerebral artery  I63.412 434.11 Comprehensive Metabolic Panel      Lipid Panel      CBC Auto Differential      TSH      Vitamin B12      Prealbumin      Hemoglobin A1C      4. SAH (subarachnoid hemorrhage)  I60.9 430       5. Bilateral carotid artery stenosis  I65.23 433.10      433.30       6. RBBB  I45.10 426.4       7. Mixed hyperlipidemia  E78.2 272.2 Comprehensive Metabolic Panel      Lipid Panel      CBC Auto Differential      TSH      Vitamin B12      Prealbumin      Hemoglobin A1C      8. Visit for monitoring Plavix therapy  Z51.81 V58.83     Z79.02 V58.63       9. Long-term use of aspirin therapy  Z79.82 V58.66       10. BMI 22.0-22.9, adult  Z68.22 V85.1       11. Acquired hypothyroidism  E03.9 244.9 TSH      12. Adjustment reaction with anxiety  F43.22 309.24       13. Depression, unspecified depression type  F32.A 311       14. Insomnia, unspecified type  G47.00 780.52       15. Gastritis, presence of bleeding unspecified, unspecified chronicity, unspecified gastritis type  K29.70 535.50       16. Encounter for monitoring long-term proton pump inhibitor therapy  Z51.81 V58.83 Comprehensive Metabolic Panel    Z79.899 V58.69 Lipid Panel      CBC Auto Differential      TSH      Vitamin B12      Prealbumin      Hemoglobin A1C      17. Medication  management  Z79.899 V58.69 Comprehensive Metabolic Panel      Lipid Panel      CBC Auto Differential      TSH      Vitamin B12      Prealbumin      Hemoglobin A1C      18. Prediabetes  R73.03 790.29 Hemoglobin A1C      19. Debility  R53.81 799.3 Comprehensive Metabolic Panel      Lipid Panel      CBC Auto Differential      TSH      Vitamin B12      Prealbumin      Hemoglobin A1C        APHASIA/HEMIPARESIS OF R (dominant) SIDE 2/2 ACUTE STROKE w/ Subsequent Small SAH: Imaging studies and IR procedure reviewed as per HPI  Now on DAPT w/ ASA 81/Plavix 75 and Lipitor 40. LDL 3/7/24 104-- LIPITOR INCREASED from 20-->40 w/ Repeat LIPID PANEL to be done in 90 days, LDL GOAL < 70  S/P Rehab and now in OP PT/OT/Speech and progressing-- needs additional therapy.   No additional needs reported other than  feels she needs an increased frequency of therapy.   Follow up in Neuro Stroke Clinic 4/18/24   Has known mild carotid stenosis that has been followed on prior ultrasounds, less than 50% and stable, continue high-intensity statin and dual antiplatelet therapy for now given recent stroke.     RBB: Incomplete, noted on most recent EKG, No concerns on ECHO with normal wall motion, systolic and diastolic function    PREDIABETES: A1c 5.7, low carb/ high protein diet & rechekc in 3 months with labs     GASTRITIS: Symptoms stable on PRILOSEC 20, PPI labs monitored     INSOMNIA: Amitriptyline 25 helping with sleep.      HYPOTHYROIDISM: Synthroid 75 mcg, recheck     DEPRESSION/ ANXIETY: Lexapro 20, good family support--  and daughter.     A total of 43 minutes was spent on this encounter that included explaining differentials, symptom counseling, review of recent results, and next steps of diagnosis and management plan, along with direct documentation of the encounter.      Patient Instructions   REVIEW MEDICATIONS ON LIST AND ENSURE ACCURACY OF HOME REGIMEN.    ALSO CONTINUE PRESERVISION AND VITAMIN D3 SUPPLEMENT WITH   MIRALAX/ STOOL SOFTENER AS NEEDED.     WORK UP TOWARDS 60 OZ OF WATER DAILY AND NOTIFY IF ANY BLADDER CONCERNS AND WOULD SEND ORDERS URINE TESTING.     INCREASE PROTEIN IN DIET AND ADD A BOOST OR ENSURE PROTEIN SUPPLEMENT DRINK ONCE DAILY BETWEEN MEALS.       Follow up in about 3 months (around 7/3/2024) for to follow up on lab results, return as needed for new concerns.

## 2024-04-02 NOTE — PROGRESS NOTES
[facility]  Speech Therapy Treatment Note- Neurological Rehabilitation  Date: 4/3/2024     Name: Prema Phillips   MRN: [id number]   Therapy Diagnosis:   Encounter Diagnoses   Name Primary?    Aphasia Yes    Apraxia    Physician: Odette Zhang MD  Physician Orders: Ambulatory Referral to Speech Therapy   Medical Diagnosis: M25.60,R53.1 (ICD-10-CM) - Decreased range of motion with decreased strength I63.412 (ICD-10-CM) - Cerebrovascular accident (CVA) due to embolism of left middle cerebral artery I67.89,G81.91 (ICD-10-CM) - Hemiparesis of right dominant side due to acute cerebrovascular disease    Visit #/ Visits Authorized: 1/20  Date of Evaluation:  3/27/24  Insurance Authorization Period: 3/27/24-12/31/24  Plan of Care Expiration Date:    5/22/24  Extended Plan of Care:  n/a   Progress Note: 4/27/24     Time In:  1345  Time Out:  1435  Total Billable Time: 50 minutes     Precautions: Communication  Subjective:   Patient reports:  states she is doing well but has had a busy day; at end of session, patient answered yes when asked if she was tired   She was compliant to home exercise program. Attempted to find [facility] a on ipad but daughter feels it may be too easy for her  Response to previous treatment: good  Pain Scale: Patient unable to indicate pain secondary to aphasia. No pain behaviors observed throughout the session.  Objective:   UNTIMED  Procedure   Speech- Language- Voice Therapy     Short Term Goals: (4 weeks) Current Progress:   Patient will follow simple 1-2 step commands presented visually with >90% accuracy for 2 consecutive sessions to enhance her receptive language skills.    Progressing/ Not Met 4/4/2024 [facility] Advanced Comprehension Level 1 (1 step objects) completed when reading silently completed with 80% accuracy independently (8/10 trials).    2. Patient will follow simple 1 step commands presented verbally with 75% accuracy and 1 repetition  provided to enhance her receptive language skills.     Progressing/ Not Met 4/4/2024 1 step body commands presented verbally completed with 70% accuracy (7/10 trials) independently with 1 repetition provided.   3. Patient will read single words aloud with 50% accuracy and 1 phonemic cue in order to bridge to higher level reading tasks.     Progressing/ Not Met 4/4/2024 Patient read monosyllabic words on first attempt with 27% accuracy independently (3/11 trials). Phonemic paraphasias were present for 3 additional words, which were not considered in the accuracy as listed above.     4. Patient will repeat monosyllabic, common words with 50% accuracy when provided with a visual cue to increase her spoken language abilities.     Progressing/ Not Met 4/4/2024 Patient repeated monosyllabic words with 72% accuracy independently (8/11 trials) but was given 2-3 repetitions of the word by speech-language pathologist prior to patient repeating herself. 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       5. Patient will complete rote speech tasks with >75% accuracy in unison with speech-language pathologist to increase spoken language abilities.     Progressing/ Not Met 4/4/2024 Counted 1-10, stating three, eight, and approximations of 7  ABCs approx 75% in tandem with speech-language pathologist   Oh when the saints go marching in - some legible and correct words were produced; intonation appropriate     6. Patient will answer simple open-ended questions with relevant responses with >75% accuracy with visual or auditory stimuli to increase spoken language output.     Progressing/ Not Met 4/4/2024 Patient answered 1 open-ended question today; data was not taken directly on this goal today      Probed ACRT for 3 words via [facility] carlos a - mouse, plant, and bread. Patient spelled 1/3 words independently with scrambled letters provided. Patient then wrote the words with pen/paper and then attempted to repeat the words.    Long Term Goals: (8  weeks) Current Progress:   Patient will increase her social participation in conversation with > 5 utterances produced independently.    Established this date     2. Patient will state basic wants/needs/concerns >90% of the time independently through use of various modalities in order to increase her social participation with others.     Established this date      Patient Education/Response:   Patient educated regarding the followin. [place] speech task important for speech production  2. Aphasia and prognosis - recovery and intensity of therapy (discussed with patient's )  3. [facility] Cynthia    Home program established: yes-trial [facility]  Patient verbalized understanding to all above education provided.     See Electronic Medical Record under Patient Instructions for exercises provided throughout therapy.  Assessment:   Initial treatment session today focused on various receptive and expressive language tasks through use of following written and verbal commands, answering open-ended questions, reading, as well as writing to enhance her speech-language abilities. Patient presented with increased speech elicitation attempts as compared to initial evaluation. Speech-language pathologist suspects apraxia of speech as well given the visible groping and attempts at forming words during repetition tasks. Following discussion with her , updated plan of care for 3x a week for speech therapy was decided upon today by all parties.    Prema is progressing well towards her goals. Current goals remain appropriate. Goals to be updated as necessary.     Patient prognosis is Good. Patient will continue to benefit from skilled outpatient speech and language therapy to address the deficits listed in the problem list on initial evaluation, provide patient/family education and to maximize patient's level of independence in the home and community environment.   Medical necessity is demonstrated by the following  IMPAIRMENTS:  Aphasia: Patient with few true words in all situations severely limiting functional communication with both familiar and unfamiliar communication partners.   Barriers to Therapy: none  Patient's spiritual, cultural and educational needs considered and patient agreeable to plan of care and goals.  Plan:   Continue Plan of Care with focus on rehabilitation and compensation for aphasia secondary to stroke.  Updated plan of care to 3x/week    Nori Yin MS, CCC-SLP, CBIS  Speech-Language Pathologist  Certified Brain Injury Specialist  4/4/2024

## 2024-04-03 ENCOUNTER — CLINICAL SUPPORT (OUTPATIENT)
Dept: REHABILITATION | Facility: HOSPITAL | Age: 79
End: 2024-04-03
Payer: MEDICARE

## 2024-04-03 ENCOUNTER — OFFICE VISIT (OUTPATIENT)
Dept: FAMILY MEDICINE | Facility: CLINIC | Age: 79
End: 2024-04-03
Payer: MEDICARE

## 2024-04-03 VITALS
OXYGEN SATURATION: 98 % | DIASTOLIC BLOOD PRESSURE: 58 MMHG | BODY MASS INDEX: 20.73 KG/M2 | HEART RATE: 86 BPM | HEIGHT: 66 IN | WEIGHT: 129 LBS | SYSTOLIC BLOOD PRESSURE: 112 MMHG | RESPIRATION RATE: 18 BRPM

## 2024-04-03 DIAGNOSIS — R48.2 APRAXIA: ICD-10-CM

## 2024-04-03 DIAGNOSIS — I45.10 RBBB: ICD-10-CM

## 2024-04-03 DIAGNOSIS — R73.03 PREDIABETES: Chronic | ICD-10-CM

## 2024-04-03 DIAGNOSIS — Z79.82 LONG-TERM USE OF ASPIRIN THERAPY: ICD-10-CM

## 2024-04-03 DIAGNOSIS — R47.01 APHASIA DUE TO ACUTE STROKE: Primary | ICD-10-CM

## 2024-04-03 DIAGNOSIS — F43.22 ADJUSTMENT REACTION WITH ANXIETY: ICD-10-CM

## 2024-04-03 DIAGNOSIS — Z79.899 ENCOUNTER FOR MONITORING LONG-TERM PROTON PUMP INHIBITOR THERAPY: ICD-10-CM

## 2024-04-03 DIAGNOSIS — G47.00 INSOMNIA, UNSPECIFIED TYPE: ICD-10-CM

## 2024-04-03 DIAGNOSIS — I60.9 SAH (SUBARACHNOID HEMORRHAGE): ICD-10-CM

## 2024-04-03 DIAGNOSIS — I63.9 APHASIA DUE TO ACUTE STROKE: Primary | ICD-10-CM

## 2024-04-03 DIAGNOSIS — R47.01 APHASIA: Primary | ICD-10-CM

## 2024-04-03 DIAGNOSIS — R27.9 LACK OF COORDINATION DUE TO ACUTE CEREBROVASCULAR ACCIDENT (CVA): ICD-10-CM

## 2024-04-03 DIAGNOSIS — I63.9 LACK OF COORDINATION DUE TO ACUTE CEREBROVASCULAR ACCIDENT (CVA): ICD-10-CM

## 2024-04-03 DIAGNOSIS — G81.91 HEMIPARESIS OF RIGHT DOMINANT SIDE DUE TO ACUTE CEREBROVASCULAR DISEASE: ICD-10-CM

## 2024-04-03 DIAGNOSIS — R53.81 DEBILITY: ICD-10-CM

## 2024-04-03 DIAGNOSIS — I65.23 BILATERAL CAROTID ARTERY STENOSIS: Chronic | ICD-10-CM

## 2024-04-03 DIAGNOSIS — M62.81 MUSCLE WEAKNESS OF RIGHT UPPER EXTREMITY: Primary | ICD-10-CM

## 2024-04-03 DIAGNOSIS — I67.89 HEMIPARESIS OF RIGHT DOMINANT SIDE DUE TO ACUTE CEREBROVASCULAR DISEASE: ICD-10-CM

## 2024-04-03 DIAGNOSIS — Z51.81 VISIT FOR MONITORING PLAVIX THERAPY: ICD-10-CM

## 2024-04-03 DIAGNOSIS — Z79.899 MEDICATION MANAGEMENT: ICD-10-CM

## 2024-04-03 DIAGNOSIS — E78.2 MIXED HYPERLIPIDEMIA: Chronic | ICD-10-CM

## 2024-04-03 DIAGNOSIS — F32.A DEPRESSION, UNSPECIFIED DEPRESSION TYPE: ICD-10-CM

## 2024-04-03 DIAGNOSIS — K29.70 GASTRITIS, PRESENCE OF BLEEDING UNSPECIFIED, UNSPECIFIED CHRONICITY, UNSPECIFIED GASTRITIS TYPE: ICD-10-CM

## 2024-04-03 DIAGNOSIS — Z79.02 VISIT FOR MONITORING PLAVIX THERAPY: ICD-10-CM

## 2024-04-03 DIAGNOSIS — I63.412 CEREBROVASCULAR ACCIDENT (CVA) DUE TO EMBOLISM OF LEFT MIDDLE CEREBRAL ARTERY: ICD-10-CM

## 2024-04-03 DIAGNOSIS — E03.9 ACQUIRED HYPOTHYROIDISM: Chronic | ICD-10-CM

## 2024-04-03 DIAGNOSIS — Z51.81 ENCOUNTER FOR MONITORING LONG-TERM PROTON PUMP INHIBITOR THERAPY: ICD-10-CM

## 2024-04-03 PROCEDURE — 92507 TX SP LANG VOICE COMM INDIV: CPT | Mod: PN

## 2024-04-03 PROCEDURE — 97112 NEUROMUSCULAR REEDUCATION: CPT | Mod: PN

## 2024-04-03 PROCEDURE — 1160F RVW MEDS BY RX/DR IN RCRD: CPT | Mod: CPTII,S$GLB,, | Performed by: FAMILY MEDICINE

## 2024-04-03 PROCEDURE — 1126F AMNT PAIN NOTED NONE PRSNT: CPT | Mod: CPTII,S$GLB,, | Performed by: FAMILY MEDICINE

## 2024-04-03 PROCEDURE — 99999 PR PBB SHADOW E&M-EST. PATIENT-LVL IV: CPT | Mod: PBBFAC,,, | Performed by: FAMILY MEDICINE

## 2024-04-03 PROCEDURE — 97530 THERAPEUTIC ACTIVITIES: CPT | Mod: PN,59

## 2024-04-03 PROCEDURE — 3078F DIAST BP <80 MM HG: CPT | Mod: CPTII,S$GLB,, | Performed by: FAMILY MEDICINE

## 2024-04-03 PROCEDURE — 99215 OFFICE O/P EST HI 40 MIN: CPT | Mod: S$GLB,,, | Performed by: FAMILY MEDICINE

## 2024-04-03 PROCEDURE — 1101F PT FALLS ASSESS-DOCD LE1/YR: CPT | Mod: CPTII,S$GLB,, | Performed by: FAMILY MEDICINE

## 2024-04-03 PROCEDURE — 1159F MED LIST DOCD IN RCRD: CPT | Mod: CPTII,S$GLB,, | Performed by: FAMILY MEDICINE

## 2024-04-03 PROCEDURE — 3288F FALL RISK ASSESSMENT DOCD: CPT | Mod: CPTII,S$GLB,, | Performed by: FAMILY MEDICINE

## 2024-04-03 PROCEDURE — 1111F DSCHRG MED/CURRENT MED MERGE: CPT | Mod: CPTII,S$GLB,, | Performed by: FAMILY MEDICINE

## 2024-04-03 PROCEDURE — 3074F SYST BP LT 130 MM HG: CPT | Mod: CPTII,S$GLB,, | Performed by: FAMILY MEDICINE

## 2024-04-03 NOTE — PATIENT INSTRUCTIONS
REVIEW MEDICATIONS ON LIST AND ENSURE ACCURACY OF HOME REGIMEN.    ALSO CONTINUE PRESERVISION AND VITAMIN D3 SUPPLEMENT WITH  MIRALAX/ STOOL SOFTENER AS NEEDED.     WORK UP TOWARDS 60 OZ OF WATER DAILY AND NOTIFY IF ANY BLADDER CONCERNS AND WOULD SEND ORDERS URINE TESTING.     INCREASE PROTEIN IN DIET AND ADD A BOOST OR ENSURE PROTEIN SUPPLEMENT DRINK ONCE DAILY BETWEEN MEALS.

## 2024-04-03 NOTE — PROGRESS NOTES
"[facility]  Occupational Therapy Treatment Note     Date: 4/3/2024  Name: Prema Phillips  Clinic Number: [id number]    Therapy Diagnosis:   Encounter Diagnoses   Name Primary?    Muscle weakness of right upper extremity Yes    Lack of coordination due to acute cerebrovascular accident (CVA)      Physician: Odette Zhang MD    Physician Orders: Eval and Treat  Medical Diagnosis: M25.60,R53.1 (ICD-10-CM) - Decreased range of motion with decreased strength I63.412 (ICD-10-CM) - Cerebrovascular accident (CVA) due to embolism of left middle cerebral artery I67.89,G81.91 (ICD-10-CM) - Hemiparesis of right dominant side due to acute cerebrovascular disease  Evaluation Date: 3/27/2024  Insurance Authorization Period Expiration: 12/31/2024  Plan of Care Certification Period: 05/24/2024  Visit # / Visits authorized: 1 / 20  FOTO: 0 / 3     Precautions:  Standard, Fall, and aphasia, apraxia      Time In: 1:00 pm  Time Out: 1:45 pm  Total Billable Time: 45 minutes     Subjective     Patient reports: "I am good."   She was compliant with home exercise program given last session.   Response to previous treatment:first since eval   Functional change: first since eval     Pain: 0/10  Location: no pain reported     Objective     Physical Exam:  Postural examination/scapula alignment:  upright posture, mild winging of right scapula with good muscle activation with tactile cues   Joint integrity: intact   Skin integrity: intact   Edema: no swelling noted and no edema reported by     Palpation: no visible signs of pain with palpation   left active range of motion and manual muscle test within normal limits.     Joint Evaluation  AROM  3/27/2024 PROM   3/27/2024 MMS   3/27/2024     Right Right Right   Scapular Elevation     3/5   Scapular Retraction     3/5   Shoulder Flex 0-180 121 130 3-/5   Shoulder Extension 0-80 60   3-/5   Shoulder Abd 0-180 130   3/5   Shoulder ER 0-90     3/5   Shoulder IR " 0-90 WNL   3/5   Elbow Flexion 0-150 150   3/5   Elbow Extension 0   3/5   Wrist Flex 0-80 90   3/5   Wrist Ext 0-70 50   3-/5   Supination 0-80 WNL   3/5   Pronation 0-80 WNL   3/5   *WNL - Within Normal Limits  *NT = Not Tested     Fist: tight right hand ;       Strength: (FLOR Dynamometer in lbs.) Average 3 trials, Position II:       3/27/2024 3/27/2024   Rung II Right Left   Trial 1 19# 20#   Trial 2 14# 22#   Trial 3 15# 26#   Average 16# 22.6#      Normal  Average Values  Female Right Left Male: Right Left   20-29 66 lbs 62 lbs         94 lbs 86 lbs   30-39 68 lbs 64 lbs 90 lbs 82 lbs   40-49 64 lbs 62 lbs 80 lbs 74 lbs   50-59 62 lbs 57 lbs 72 lbs 65 lbs   60-69 53 lbs 51 lbs 63 lbs 56 lbs   70+ 44 lbs 42 lbs 54 lbs 48 lbs   SD = +/- 19lbs         Pinch Strength (Measured in lbs)       3/27/2024 3/27/2024     Right Left   Key Pinch 5 # 7 #   3pt Pinch 3 # 5 #   2pt Pinch 3 # 6 #         Fine/Gross Motor Coordination     Assessment   Right   3/27/2024 Left  3/27/2024   9 hole peg test 9 pegs in/out for time 2:25  2 cues for sequencing  :48   SMITHA (Rapid Alternating Movements)      impaired - moderate    intact   Finger to Nose (5 times)   impaired - moderate intact   Finger Flicks (coordination moving from digit flexion to digit extension)   impaired - minimum intact   *WNL - Within Normal Limits  *NT = Not Tested     Tone:  Modified Jessa Scale:   0 - No increase in muscle tone     Sensation:  Prema  was unable to complete sensory assessment at this time due to aphasia      Balance:   Static Sit - NORMAL: No deviations seen in posture held statically  Dynamic sit- NORMAL: No deviations seen in posture held statically  See physical therapy eval for detailed balance assessment      Endurance Deficit: moderate    Treatment     Prema received the treatments listed below:      neuromuscular re-education activities to improve: Coordination, Kinesthetic, Sense, Proprioception, and Posture for 30 minutes. The  following activities were included:  - Upper Body Ergometer (UBE) L2 for 6 minutes at 25-30 RPM to provide proprioceptive awareness, postural stability, strength, activity tolerance, and reciprocal patterns with bimanual coordination completed to improve use of bilateral upper extremities in order to prepare for therapeutic exercises/activities. Cues provided as needed for postural stability/positioning  - - scapular elevation/retraction x10   - shoulder abduction on therapy ball 2x10   - scapular retraction/protraction on therapy ball 90* abduction 2x10   - supine chest press 1# dowel 2x10  - supine shoulder flexion 1# dowel 2x10  - supine external rotation 1# dowel 2x10  - supine external rotation and PNF D1 pattern with stress ball squeezes x12    therapeutic activities to improve functional performance for 15  minutes, including:  - functional reach with velcro cards reaching     Patient Education and Home Exercises     Education provided:   - Progress towards goals     Written Home Exercises Provided: Patient instructed to cont prior HEP.  Exercises were reviewed and Prema was able to demonstrate them prior to the end of the session.  Prema demonstrated good  understanding of the home exercise program provided. See electronic medical record under Patient Instructions for exercises provided during therapy sessions.       Assessment     Prema tolerated today's session well. Interventions focused on upper extremity strengthening, activity tolerance, motor planning and coordination. She exhibited good control of right upper extremity with mod cues for biomechanics and managing joint stability. No pain reported. Increased time for functional activity with reaching which facilitated PNF D1 pattern, visual scanning, STM recall and coordination. Min cues to initiate activity, improvement with problem solving and task completion as activity progressed.    Prema is progressing well towards her goals and there are no updates  to goals at this time. Pt prognosis is Good.     Patient will continue to benefit from skilled outpatient occupational therapy to address the deficits listed in the problem list on initial evaluation provide patient/family education and to maximize patient's level of independence in the home and community environment.     Patient's spiritual, cultural and educational needs considered and patient agreeable to plan of care and goals.    Anticipated barriers to occupational therapy: cognitive deficits     Goals:  Short Term Goals: 4 weeks   - Pt will report 50% compliance with home exercise program in order to maintain progress attained during therapy. Not met, progressing  - Pt will improve active range of motion of right shoulder flexion/abduction to 120/140 degrees in order to increase ease with bathing and dressing tasks. Not met, progressing  - Pt will increase right/left  strength to 22/30# in order to improve ease when dressing, bathing and completing gardening tasks. Not met, progressing  - Pt will decrease time on 9HPT to under 2 minutes using right hand  to improve fine motor speed and coordination needed to perform home maintenance, grooming and hygiene, money manipulation. Not met, progressing     Long Term Goals: 8 weeks   - Pt will report independence with home exercise program in order to maintain progress attained during therapy. Not met, progressing  - Pt will increase MMT for bilateral shoulder movements in all planes  to 4/5 to increase upper body strength needed for home management tasks, high level IADLs and leisure related activities. Not met, progressing  - Pt will increase bilateral  strength to 40# in order to improve ease with functional tasks such as gardening. Not met, progressing  - Pt will improve right/left key/3pt/2pt pinch by 2-3# in order to increase ease when dressing and grooming. Not met, progressing  - Pt will complete simulated instrumental activities of daily living  activity with less than 2 cues for completion in order to return to prior level of function and decrease caregiver burden. Not met, progressing    Plan     Updates/Grading for next session: continue with plan of care     Corinne Rapier, OT   4/3/2024

## 2024-04-03 NOTE — Clinical Note
Divya Zhang, Sorry to make you cosign again for speech therapy for this patient. Per discussion with the patient's , we are increasing the intensity of her speech services to 3x/week from 2x/week which requires the updated plan of care. We are so excited to work with Mrs. Lloyd and see great potential! Let me know if you have any questions. Thanks, Nori

## 2024-04-04 NOTE — PLAN OF CARE
[facility]  Speech Therapy Updated Plan of Care-Neurological Rehabilitation         Date: 4/3/2024   Name: Prema Phillips  Clinic Number: [id number]    Therapy Diagnosis:   Encounter Diagnoses   Name Primary?    Aphasia Yes    Apraxia      Physician: Odette Zhang MD    Physician Orders: Ambulatory Referral to Speech Therapy   Medical Diagnosis: M25.60,R53.1 (ICD-10-CM) - Decreased range of motion with decreased strength I63.412 (ICD-10-CM) - Cerebrovascular accident (CVA) due to embolism of left middle cerebral artery I67.89,G81.91 (ICD-10-CM) - Hemiparesis of right dominant side due to acute cerebrovascular disease    Visit #/ Visits Authorized: 1/20  Date of Evaluation:  3/27/24  Insurance Authorization Period: 3/27/24-12/31/24  Updated Intensity: 3x/week    Total Visits Received: 1 + initial evaluation    Precautions:Cognition and Communication  Subjective     Update: Patient is doing well; feeling more fatigued as activities levels increase but is motivated throughout therapeutic intervention tasks    Objective     Update: see follow up note dated 4/3/2024    Assessment     Update: Prema Phillips presents to [facility] status post medical diagnosis of aphasia following stroke. Demonstrates impairments including limitations as described in the problem list. Positive prognostic factors include motivation and engagement. Negative prognostic factors include n/a. Patient will benefit from skilled, outpatient rehabilitation speech therapy.    Rehab Potential: good   Pt's spiritual, cultural, and educational needs considered and patient agreeable to plan of care and goals.    Education: Plan of Care, role of SLP in care, course of medical disease affect on therapy diagnosis , scheduling/ cancellation policy, and insurance limitations / visit limit      Plan     Updated Therapy Intensity: 3x/week    Recommended Treatment Plan: Patient will participate in the [facility] rehabilitation  program for speech therapy 3 times per week to address her Communication and Motor Speech deficits, to educate patient and their family, and to participate in a home exercise program.    Therapist's Name:  Nori Yin MS, CCC-SLP, CBIS  Speech-Language Pathologist  Certified Brain Injury Specialist  4/4/2024     I CERTIFY THE NEED FOR THESE SERVICES FURNISHED UNDER THIS PLAN OF TREATMENT AND WHILE UNDER MY CARE      Physician Name: _______________________________    Physician Signature: ____________________________

## 2024-04-05 ENCOUNTER — CLINICAL SUPPORT (OUTPATIENT)
Dept: REHABILITATION | Facility: HOSPITAL | Age: 79
End: 2024-04-05
Payer: MEDICARE

## 2024-04-05 DIAGNOSIS — R48.2 APRAXIA: ICD-10-CM

## 2024-04-05 DIAGNOSIS — R47.01 APHASIA: Primary | ICD-10-CM

## 2024-04-05 PROCEDURE — 92507 TX SP LANG VOICE COMM INDIV: CPT | Mod: PN

## 2024-04-05 NOTE — PROGRESS NOTES
"[facility]  Speech Therapy Treatment Note- Neurological Rehabilitation  Date: 4/5/2024     Name: Prema Phillpis   MRN: [id number]   Therapy Diagnosis:   Encounter Diagnoses   Name Primary?    Aphasia Yes    Apraxia      Physician: Odette Zhang MD  Physician Orders: Ambulatory Referral to Speech Therapy   Medical Diagnosis: M25.60,R53.1 (ICD-10-CM) - Decreased range of motion with decreased strength I63.412 (ICD-10-CM) - Cerebrovascular accident (CVA) due to embolism of left middle cerebral artery I67.89,G81.91 (ICD-10-CM) - Hemiparesis of right dominant side due to acute cerebrovascular disease    Visit #/ Visits Authorized: 2/20  Date of Evaluation:  3/27/24  Insurance Authorization Period: 3/27/24-12/31/24  Plan of Care Expiration Date:    5/22/24  Extended Plan of Care:  n/a   Progress Note: 4/27/24     Time In:  1435  Time Out:  1515  Total Billable Time: 40 minutes     Precautions: Communication  Subjective:   Patient reports:  "okay"; daughter Una in session today with questions and asking for suggestions for home exercise program   She was compliant to home exercise program. Una reported Tact[facility] Therapy too easy for her -pointing to objects when reading the word (Lite version of carlos a)  Response to previous treatment: good  Pain Scale: Patient unable to indicate pain secondary to aphasia. No pain behaviors observed throughout the session.  Objective:   UNTIMED  Procedure   Speech- Language- Voice Therapy     Short Term Goals: (4 weeks) Current Progress:   Patient will follow simple 1-2 step commands presented visually with >90% accuracy for 2 consecutive sessions to enhance her receptive language skills.    Progressing/ Not Met 4/5/2024 Tactus therapy Advanced Comprehension Level 1 (1 step objects) completed when reading silently completed with 90%, previously 80% accuracy independently (8/10 trials).   1/2 consecutive sessions complete for 1 step commands    Probed 2 step " commands presented visually - completed with 25% accuracy independently (1/4 trials)   2. Patient will follow simple 1 step commands presented verbally with 75% accuracy and 1 repetition provided to enhance her receptive language skills.     Progressing/ Not Met 4/5/2024 1 step body commands presented verbally completed with 25% accuracy (1/4) independently, previously 70% accuracy (7/10 trials) independently with 1 repetition provided.  Decreased abilities today - unsure causation   3. Patient will read single words aloud with 50% accuracy and 1 phonemic cue in order to bridge to higher level reading tasks.     Progressing/ Not Met 4/5/2024 Patient read monosyllabic words on first attempt with 60% accuracy independently (6/10 trials), previously 27% accuracy independently (3/11 trials).  Attempted 2 syllable words with 50% accuracy (2/4 trials).    4. Patient will repeat monosyllabic, common words with 50% accuracy when provided with a visual cue to increase her spoken language abilities.     Progressing/ Not Met 4/5/2024 Did not address today       5. Patient will complete rote speech tasks with >75% accuracy in unison with speech-language pathologist to increase spoken language abilities.     Progressing/ Not Met 4/5/2024 Counted 1-10 with approximations noted - counted on hands + verbal production  Oh when the saints go marching in - less words produced today than in previous session which were correct, but attempts at word production noted; intonation appropriate  Patient attends [employer] [facility] so Riley Lee was said as well with no true words produced by patient; however approximations present; intonation off     6. Patient will answer simple open-ended questions with relevant responses with >75% accuracy with visual or auditory stimuli to increase spoken language output.     Progressing/ Not Met 4/5/2024 Did not address today       Long Term Goals: (8 weeks) Current Progress:   Patient will increase her social  participation in conversation with > 5 utterances produced independently.    Established this date     2. Patient will state basic wants/needs/concerns >90% of the time independently through use of various modalities in order to increase her social participation with others.     Established this date      Patient Education/Response:   Patient educated regarding the followin. [place] speech task important for speech production  2. Aphasia and prognosis - recovery and intensity of therapy (discussed with patient's )  3. [facility] Cynthia    Home program established: yes-packet of worksheets sent home today with patient and her daughter Una  Patient verbalized understanding to all above education provided.     See Electronic Medical Record under Patient Instructions for exercises provided throughout therapy.  Assessment:   Prema participated in various receptive and expressive language tasks through use of following written and verbal commands, reading 1 and 2 syllable words, as well as writing to enhance her speech-language abilities. Patient had her hearing aids and glasses on today for session. Prema's reading abilities continue to be a relative strength for her. There were noted increased spontaneous speech productions which were relevant to conversational topics as well. She continues with impairments in both receptive and expressive language skills,  however, has excellent potential for rehabilitation. A home exercise program was sent home with the patient and her daughter with 2 copies made - one for patient to attempt and one for the daughter and patient to do together.    Prema is progressing well towards her goals. Current goals remain appropriate. Goals to be updated as necessary.     Patient prognosis is Good. Patient will continue to benefit from skilled outpatient speech and language therapy to address the deficits listed in the problem list on initial evaluation, provide patient/family  education and to maximize patient's level of independence in the home and community environment.   Medical necessity is demonstrated by the following IMPAIRMENTS:  Aphasia: Patient with few true words in all situations severely limiting functional communication with both familiar and unfamiliar communication partners.   Barriers to Therapy: none  Patient's spiritual, cultural and educational needs considered and patient agreeable to plan of care and goals.  Plan:   Continue Plan of Care with focus on rehabilitation and compensation for aphasia secondary to stroke.    Nori Yin MS, CCC-SLP, CBIS  Speech-Language Pathologist  Certified Brain Injury Specialist  4/5/2024

## 2024-04-08 ENCOUNTER — CLINICAL SUPPORT (OUTPATIENT)
Dept: REHABILITATION | Facility: HOSPITAL | Age: 79
End: 2024-04-08
Payer: MEDICARE

## 2024-04-08 DIAGNOSIS — R47.01 APHASIA: Primary | ICD-10-CM

## 2024-04-08 DIAGNOSIS — I63.9 LACK OF COORDINATION DUE TO ACUTE CEREBROVASCULAR ACCIDENT (CVA): ICD-10-CM

## 2024-04-08 DIAGNOSIS — R48.2 APRAXIA: ICD-10-CM

## 2024-04-08 DIAGNOSIS — R27.9 LACK OF COORDINATION DUE TO ACUTE CEREBROVASCULAR ACCIDENT (CVA): ICD-10-CM

## 2024-04-08 DIAGNOSIS — M62.81 MUSCLE WEAKNESS OF RIGHT UPPER EXTREMITY: Primary | ICD-10-CM

## 2024-04-08 PROCEDURE — 97530 THERAPEUTIC ACTIVITIES: CPT | Mod: PN

## 2024-04-08 PROCEDURE — 92507 TX SP LANG VOICE COMM INDIV: CPT | Mod: PN

## 2024-04-08 NOTE — PROGRESS NOTES
"[facility]  Occupational Therapy Treatment Note     Date: 4/8/2024  Name: Prema Phillips  Clinic Number: [id number]    Therapy Diagnosis:   Encounter Diagnoses   Name Primary?    Muscle weakness of right upper extremity Yes    Lack of coordination due to acute cerebrovascular accident (CVA)        Physician: Odette Zhang MD    Physician Orders: Eval and Treat  Medical Diagnosis: M25.60,R53.1 (ICD-10-CM) - Decreased range of motion with decreased strength I63.412 (ICD-10-CM) - Cerebrovascular accident (CVA) due to embolism of left middle cerebral artery I67.89,G81.91 (ICD-10-CM) - Hemiparesis of right dominant side due to acute cerebrovascular disease  Evaluation Date: 3/27/2024  Insurance Authorization Period Expiration: 12/31/2024  Plan of Care Certification Period: 05/24/2024  Visit # / Visits authorized: 2 / 20  FOTO: 0 / 3     Precautions:  Standard, Fall, and aphasia, apraxia      Time In: 3:15 pm  Time Out: 4:00 pm  Total Billable Time: 45 minutes     Subjective     Patient reports: "I am good. How are you?"   She was compliant with home exercise program given last session.   Response to previous treatment: good   Functional change: improved communication skills     Pain: 0/10  Location: no pain reported     Objective     Physical Exam:  Postural examination/scapula alignment:  upright posture, mild winging of right scapula with good muscle activation with tactile cues   Joint integrity: intact   Skin integrity: intact   Edema: no swelling noted and no edema reported by     Palpation: no visible signs of pain with palpation   left active range of motion and manual muscle test within normal limits.     Joint Evaluation  AROM  3/27/2024 PROM   3/27/2024 MMS   3/27/2024     Right Right Right   Scapular Elevation     3/5   Scapular Retraction     3/5   Shoulder Flex 0-180 121 130 3-/5   Shoulder Extension 0-80 60   3-/5   Shoulder Abd 0-180 130   3/5   Shoulder ER 0-90     " 3/5   Shoulder IR 0-90 WNL   3/5   Elbow Flexion 0-150 150   3/5   Elbow Extension 0   3/5   Wrist Flex 0-80 90   3/5   Wrist Ext 0-70 50   3-/5   Supination 0-80 WNL   3/5   Pronation 0-80 WNL   3/5   *WNL - Within Normal Limits  *NT = Not Tested     Fist: tight right hand ;       Strength: (FLOR Dynamometer in lbs.) Average 3 trials, Position II:       3/27/2024 3/27/2024   Rung II Right Left   Trial 1 19# 20#   Trial 2 14# 22#   Trial 3 15# 26#   Average 16# 22.6#      Normal  Average Values  Female Right Left Male: Right Left   20-29 66 lbs 62 lbs         94 lbs 86 lbs   30-39 68 lbs 64 lbs 90 lbs 82 lbs   40-49 64 lbs 62 lbs 80 lbs 74 lbs   50-59 62 lbs 57 lbs 72 lbs 65 lbs   60-69 53 lbs 51 lbs 63 lbs 56 lbs   70+ 44 lbs 42 lbs 54 lbs 48 lbs   SD = +/- 19lbs         Pinch Strength (Measured in lbs)       3/27/2024 3/27/2024     Right Left   Key Pinch 5 # 7 #   3pt Pinch 3 # 5 #   2pt Pinch 3 # 6 #         Fine/Gross Motor Coordination     Assessment   Right   3/27/2024 Left  3/27/2024   9 hole peg test 9 pegs in/out for time 2:25  2 cues for sequencing  :48   SMITHA (Rapid Alternating Movements)      impaired - moderate    intact   Finger to Nose (5 times)   impaired - moderate intact   Finger Flicks (coordination moving from digit flexion to digit extension)   impaired - minimum intact   *WNL - Within Normal Limits  *NT = Not Tested     Tone:  Modified Jessa Scale:   0 - No increase in muscle tone     Sensation:  Prema  was unable to complete sensory assessment at this time due to aphasia      Balance:   Static Sit - NORMAL: No deviations seen in posture held statically  Dynamic sit- NORMAL: No deviations seen in posture held statically  See physical therapy eval for detailed balance assessment      Endurance Deficit: moderate    Treatment     Prema received the treatments listed below:      neuromuscular re-education activities to improve: Coordination, Kinesthetic, Sense, Proprioception, and Posture for  6 minutes. The following activities were included:  - Upper Body Ergometer (UBE) L2.5 for 6 minutes at 25-30 RPM to provide proprioceptive awareness, postural stability, strength, activity tolerance, and reciprocal patterns with bimanual coordination completed to improve use of bilateral upper extremities in order to prepare for therapeutic exercises/activities. Cues provided as needed for postural stability/positioning    therapeutic activities to improve functional performance for 39 minutes, including:  - laundry/sorting activity with towels, shirts, bean bags standing with functional reach   - handwriting drills    - tracing, coping letters and name     Patient Education and Home Exercises     Education provided:   - Progress towards goals     Written Home Exercises Provided: Patient instructed to cont prior HEP.  Exercises were reviewed and Prema was able to demonstrate them prior to the end of the session.  Prema demonstrated good  understanding of the home exercise program provided. See electronic medical record under Patient Instructions for exercises provided during therapy sessions.       Assessment     Prema tolerated today's session well. She reported fatigue with UBE indicating decreased activity tolerance in bilateral upper extremities. She was able to maintain stance with SBA while completing therapeutic activity to simulate instrumental activities of daily living. Mod cues for sorting to initiate task but good bimanual coordination and completion. Prema exhibited difficulty with letter formation, requiring visual cue to recall letter and verbal cues for problem solving and formation of letters. Most difficulty with curved letters, K, W, Y and Z. Discussed patient participation at home with . He reported noticing improved participation at home as well.     Prema is progressing well towards her goals and there are no updates to goals at this time. Pt prognosis is Good.     Patient will continue to  benefit from skilled outpatient occupational therapy to address the deficits listed in the problem list on initial evaluation provide patient/family education and to maximize patient's level of independence in the home and community environment.     Patient's spiritual, cultural and educational needs considered and patient agreeable to plan of care and goals.    Anticipated barriers to occupational therapy: cognitive deficits     Goals:  Short Term Goals: 4 weeks   - Pt will report 50% compliance with home exercise program in order to maintain progress attained during therapy. Not met, progressing  - Pt will improve active range of motion of right shoulder flexion/abduction to 120/140 degrees in order to increase ease with bathing and dressing tasks. Not met, progressing  - Pt will increase right/left  strength to 22/30# in order to improve ease when dressing, bathing and completing gardening tasks. Not met, progressing  - Pt will decrease time on 9HPT to under 2 minutes using right hand  to improve fine motor speed and coordination needed to perform home maintenance, grooming and hygiene, money manipulation. Not met, progressing     Long Term Goals: 8 weeks   - Pt will report independence with home exercise program in order to maintain progress attained during therapy. Not met, progressing  - Pt will increase MMT for bilateral shoulder movements in all planes  to 4/5 to increase upper body strength needed for home management tasks, high level IADLs and leisure related activities. Not met, progressing  - Pt will increase bilateral  strength to 40# in order to improve ease with functional tasks such as gardening. Not met, progressing  - Pt will improve right/left key/3pt/2pt pinch by 2-3# in order to increase ease when dressing and grooming. Not met, progressing  - Pt will complete simulated instrumental activities of daily living activity with less than 2 cues for completion in order to return to prior level  of function and decrease caregiver burden. Not met, progressing    Plan     Updates/Grading for next session: continue with plan of care     Corinne Rapier, OT   4/8/2024

## 2024-04-08 NOTE — PROGRESS NOTES
[facility]  Speech Therapy Treatment Note- Neurological Rehabilitation  Date: 4/8/2024     Name: Prema Phillips   MRN: [id number]   Therapy Diagnosis:   Encounter Diagnoses   Name Primary?    Aphasia Yes    Apraxia      Physician: Odette Zhang MD  Physician Orders: Ambulatory Referral to Speech Therapy   Medical Diagnosis: M25.60,R53.1 (ICD-10-CM) - Decreased range of motion with decreased strength I63.412 (ICD-10-CM) - Cerebrovascular accident (CVA) due to embolism of left middle cerebral artery I67.89,G81.91 (ICD-10-CM) - Hemiparesis of right dominant side due to acute cerebrovascular disease    Visit #/ Visits Authorized: 4/20  Date of Evaluation:  3/27/24  Insurance Authorization Period: 3/27/24-12/31/24  Plan of Care Expiration Date:    5/22/24  Extended Plan of Care:  n/a   Progress Note: 4/27/24     Time In:  1432  Time Out:  1515  Total Billable Time: 43 minutes     Precautions: Communication  Subjective:   Patient reports:  patient's  brought her to therapy today.   She was compliant to home exercise program.   Response to previous treatment: good  Pain Scale: Patient unable to indicate pain secondary to aphasia. No pain behaviors observed throughout the session.  Objective:   UNTIMED  Procedure   Speech- Language- Voice Therapy     Short Term Goals: (4 weeks) Current Progress:   Patient will follow simple 1-2 step commands presented visually with >90% accuracy for 2 consecutive sessions to enhance her receptive language skills.    Progressing/ Not Met 4/8/2024 Tactus therapy Advanced Comprehension Level 1 (1 step objects) completed when reading silently completed with 85% acc (17/20), previously 90% acc.    Probed 2 step commands presented visually -Not addressed this session.    2. Patient will follow simple 1 step commands presented verbally with 75% accuracy and 1 repetition provided to enhance her receptive language skills.     Progressing/ Not Met 4/8/2024 1 step body  "commands presented verbally completed with 25% accuracy (1/4) independently, previously 70% accuracy (7/10 trials) independently with 1 repetition provided.  Decreased abilities today - unsure causation    Touch your nose with visual +  Close your eyes +   Nod your head +  Stomp your your feet -   Clap your hands - with visual +     3. Patient will read single words aloud with 50% accuracy and 1 phonemic cue in order to bridge to higher level reading tasks.     Progressing/ Not Met 4/8/2024 Patient read monosyllabic words on first attempt with 50% accuracy independently (5/10 trials).  Attempted 2 syllable words with 50% accuracy (2/4 trials).    4. Patient will repeat monosyllabic, common words with 50% accuracy when provided with a visual cue to increase her spoken language abilities.     Progressing/ Not Met 4/8/2024 Repeat after me: 70% acc Independently (7/10 trials)  Hi -   Cat +  Dog -   Bye +  Stop +  Go -   Out +  Up +  Down +  Help +       5. Patient will complete rote speech tasks with >75% accuracy in unison with speech-language pathologist to increase spoken language abilities.     Progressing/ Not Met 4/8/2024 Counted 1-10 with approximations noted - counted on hands + verbal production - max difficulty. Unable to complete.     Oh when the saints go marching in - less words produced today than in previous session which were correct, but attempts at word production noted; intonation appropriate  Patient attends [employer] [facility] so Riley Lee was attempted as well. "Thy womb javier" Patient was saving true words but they didn't make sense or flow together. "Get us to javier. I'm not sure. Holy or not. Forever."    Attempted to sing happy birthday with no true words.      6. Patient will answer simple open-ended questions with relevant responses with >75% accuracy with visual or auditory stimuli to increase spoken language output.     Progressing/ Not Met 4/8/2024 Did not address today       Long Term Goals: (8 " weeks) Current Progress:   Patient will increase her social participation in conversation with > 5 utterances produced independently.    Established this date     2. Patient will state basic wants/needs/concerns >90% of the time independently through use of various modalities in order to increase her social participation with others.     Established this date      Patient Education/Response:   Patient educated regarding the followin. [place] speech task important for speech production  2. Aphasia and prognosis - recovery and intensity of therapy (discussed with patient's )  3. [employer] Therapy Cynthia    Home program established: yes-packet of worksheets sent home today with patient and her daughter Una  Patient verbalized understanding to all above education provided.     See Electronic Medical Record under Patient Instructions for exercises provided throughout therapy.  Assessment:   Prema participated in various receptive and expressive language tasks through use of following written and verbal commands, reading 1 and 2 syllable words, as well as writing to enhance her speech-language abilities. Patient did not have her hearing aids on.  Patient declined using her glasses for today's session. Prema's reading abilities continue to be a relative strength for her.  She continues with impairments in both receptive and expressive language skills,  however, has excellent potential for rehabilitation.     Prema is progressing well towards her goals. Current goals remain appropriate. Goals to be updated as necessary.     Patient prognosis is Good. Patient will continue to benefit from skilled outpatient speech and language therapy to address the deficits listed in the problem list on initial evaluation, provide patient/family education and to maximize patient's level of independence in the home and community environment.   Medical necessity is demonstrated by the following IMPAIRMENTS:  Aphasia: Patient with few  true words in all situations severely limiting functional communication with both familiar and unfamiliar communication partners.   Barriers to Therapy: none  Patient's spiritual, cultural and educational needs considered and patient agreeable to plan of care and goals.  Plan:   Continue Plan of Care with focus on rehabilitation and compensation for aphasia secondary to stroke.    Shreya Hung MS, CCC-SLP  Speech-Language Pathologist  4/8/2024

## 2024-04-09 ENCOUNTER — CLINICAL SUPPORT (OUTPATIENT)
Dept: REHABILITATION | Facility: HOSPITAL | Age: 79
End: 2024-04-09
Payer: MEDICARE

## 2024-04-09 DIAGNOSIS — R48.2 APRAXIA: ICD-10-CM

## 2024-04-09 DIAGNOSIS — Z91.81 RISK FOR FALLS: ICD-10-CM

## 2024-04-09 DIAGNOSIS — R48.2 APRAXIA: Primary | ICD-10-CM

## 2024-04-09 DIAGNOSIS — R47.01 APHASIA: Primary | ICD-10-CM

## 2024-04-09 PROCEDURE — 97110 THERAPEUTIC EXERCISES: CPT | Mod: PN

## 2024-04-09 PROCEDURE — 92507 TX SP LANG VOICE COMM INDIV: CPT | Mod: PN

## 2024-04-09 PROCEDURE — 97112 NEUROMUSCULAR REEDUCATION: CPT | Mod: PN

## 2024-04-09 NOTE — PROGRESS NOTES
[facility]   Physical Therapy Treatment Note      Name: Prema Phillips  Clinic Number: [id number]    Therapy Diagnosis:   Encounter Diagnoses   Name Primary?    Apraxia Yes    Risk for falls      Physician: Odette Zhang MD    Visit Date: 4/9/2024    Physician Orders: PT Eval and Treat   Medical Diagnosis from Referral: Decreased range of motion with decreased strength [M25.60, R53.1], Cerebrovascular accident (CVA) due to embolism of left middle cerebral artery [I63.412], Hemiparesis of right dominant side due to acute cerebrovascular disease [I67.89, G81.91]   Evaluation Date: 3/27/2024  Authorization Period Expiration: 12/31/2024  Plan of Care Expiration: 5/24/2024  Progress Note Due: 4/27/2024  Date of Surgery: N/A  Visit # / Visits authorized: 1/20 (+eval)  FOTO: 1/3     Precautions: Standard, Fall, and Aphasia (per SLP - consider written instructions as patient demonstrated good reading comprehension during speech eval)     Time In: 0930  Time Out: 1015  Total Billable Time: 45 minutes (2TE, 1 NR)      PTA Visit #: 0/5       Subjective     Patient reports: Indicated she is doing well. Difficulty answering if she was able to complete home exercise program or not due to aphasia.  She will be compliant with home exercise program.  Response to previous treatment: eval only, no adverse reactions  Functional change: ongoing    Pain: 0/10  Location:  n/a     Objective      Objective Measures updated at progress report unless specified.     Treatment     Prema received the treatments listed below:      therapeutic exercises to develop strength and endurance for 20 minutes including:  Tangerine Power[employer] recumbent stepper x6 min L3.5 twin peaks setting for endurance/activity tolerance assessment and training    Home exercise program review:   - Standing reciprocal hip flexion marches with light touchdown support 2x10 bilateral 1.5# ankle wt  - Sit to stands from chair x10  - Standing calf raises 2 x 15  with occasional touchdown support    - standing toe raises 2 x 15 with occasional touchdown support  -Standing hip abductions 2 x 15  1.5# ankle wt  -standing hamstring curls  1.5# ankle wt    neuromuscular re-education activities to improve: Balance and Coordination for 25 minutes. The following activities were included:  Narrow base of support eyes closed trials  Tandem  static x30s bilateral     X10 foot lengths near ballet bar:  Tandem walking x 8 lengths - occasional touch down on bar  3s marches  x 6 lengths - occasional touch down on bar  Hurdles (4 medium) forwards - good clearance and spacing  Hurdles (4 medium) sidestepping - challenging clearance and step sequencing  Backwards walking - small step lengths and hesitant    Airex:   Narrow base of support with vertical and horizontal head turns and eyes closed trials         Patient Education and Home Exercises       Education provided: Reinforced home exercise program provided at [facility].     Written Home Exercises Provided: Patient instructed to cont prior HEP. Exercises were reviewed and Prema was able to demonstrate them prior to the end of the session.  Prema demonstrated good  understanding of the education provided. See Electronic Medical Record under Patient Instructions for exercises provided during therapy sessions    Assessment     Prema did will with initial follow up. Her aphasia does require less verbal cues and more visual demonstration for successful task performance.  Home exercise program was reinforced today. We were able to add weights for resistance and progress overall balance tasks. She has most difficulty with tandem stance balance and backwards walking. Continue to focus on functional movements with added balance challenge for best carryover and understanding.     Prema Is progressing well towards her goals.   Patient prognosis is Good.     Patient will continue to benefit from skilled outpatient physical therapy to address the deficits  listed in the problem list box on initial evaluation, provide pt/family education and to maximize pt's level of independence in the home and community environment.     Patient's spiritual, cultural and educational needs considered and pt agreeable to plan of care and goals.     Anticipated barriers to physical therapy: asphasia    Goals:  Short Term Goals: 4 weeks   Patient will be compliant with HEP in order to maximize PT benefits  Patient will complete >/=10 seconds of single leg stance on each leg in order to improve static postural control during standing ADLs  Patient will complete TUG in </= 13 seconds in order to reduce risk for falls and improve safety with functional mobility  Patient will improve self-selected walking speed to >/=0.8 m/s in order to improve safety with community mobility     Long Term Goals: 8 weeks   Patient will score >/= 59% on FOTO survey in order to improve self-perception of functional mobility deficits  Patient will complete TUG in </= 11 seconds in order to reduce risk for falls and improve safety with functional mobility  Patient will score </= 15 seconds on Five Time Sit to  order to improve bilateral lower extremity endurance and muscular power for transfers   Patient will improve self-selected walking speed to >/=1.0 m/s in order to improve safety with community mobility  Patient will perform 1 floor <> stand transfer with bilateral upper extremity support in order to demonstrate safe functional mobility in case of future fall  Patient will begin some form of home/community fitness in order to sustain progress gained in PT       Plan     Dynamic balance with tandem and narrow base of support - functional balance and tasks for ease of understanding.  Continue plan of care (POC).      Audrey Prado, PT

## 2024-04-09 NOTE — PROGRESS NOTES
[facility]  Speech Therapy Treatment Note- Neurological Rehabilitation  Date: 4/9/2024     Name: Prema Phillips   MRN: [id number]   Therapy Diagnosis:   Encounter Diagnoses   Name Primary?    Aphasia Yes    Apraxia      Physician: Odette Zhang MD  Physician Orders: Ambulatory Referral to Speech Therapy   Medical Diagnosis: M25.60,R53.1 (ICD-10-CM) - Decreased range of motion with decreased strength I63.412 (ICD-10-CM) - Cerebrovascular accident (CVA) due to embolism of left middle cerebral artery I67.89,G81.91 (ICD-10-CM) - Hemiparesis of right dominant side due to acute cerebrovascular disease    Visit #/ Visits Authorized: 5/20  Date of Evaluation:  3/27/24  Insurance Authorization Period: 3/27/24-12/31/24  Plan of Care Expiration Date:    5/22/24  Extended Plan of Care:  n/a   Progress Note: 4/27/24     Time In:  11:29  Time Out:  12:00  Total Billable Time: 31 minutes     Precautions: Communication  Subjective:   Patient reports:  patient's  brought her to therapy today.   She was compliant to home exercise program.   Response to previous treatment: good  Pain Scale: Patient unable to indicate pain secondary to aphasia. No pain behaviors observed throughout the session.  Objective:   UNTIMED  Procedure   Speech- Language- Voice Therapy     Short Term Goals: (4 weeks) Current Progress:   Patient will follow simple 1-2 step commands presented visually with >90% accuracy for 2 consecutive sessions to enhance her receptive language skills.    Progressing/ Not Met 4/9/2024 Tactus therapy Advanced Comprehension Level 1 (1 step objects) completed when reading silently completed with 90% acc Independently     Probed 2 step commands presented visually -Not addressed this session.    2. Patient will follow simple 1 step commands presented verbally with 75% accuracy and 1 repetition provided to enhance her receptive language skills.     Progressing/ Not Met 4/9/2024 1 step body commands  presented verbally completed with 50% accuracy (2/4) independently, previously 70% accuracy (7/10 trials) independently with 1 repetition provided       3. Patient will read single words aloud with 50% accuracy and 1 phonemic cue in order to bridge to higher level reading tasks.     Progressing/ Not Met 4/9/2024 Patient read monosyllabic words on first attempt with 65% accuracy independently (13/20 trials).      Correctly identified the following words: hi, you, mom, house, boy, girl, dad, ball.     2 syllables: 5/7 acc   4. Patient will repeat monosyllabic, common words with 50% accuracy when provided with a visual cue to increase her spoken language abilities.     Progressing/ Not Met 4/9/2024 Repeat after me: 50% acc Independently (5/10 trials)  Hi -   Cat -  Dog -   Bye -  Stop +  Go -   Out +  Up +  Down +  Help +     5. Patient will complete rote speech tasks with >75% accuracy in unison with speech-language pathologist to increase spoken language abilities.     Progressing/ Not Met 4/9/2024 Counted 1-10 with approximations noted - counted on hands + verbal production - max difficulty. Unable to complete.  Said 1, 5, 6, 7, 10 - not in order. Added other inappropriate words when counting.    Attempted Hail Rosa with no true words.     Attempted to sing happy birthday with no true words.      6. Patient will answer simple open-ended questions with relevant responses with >75% accuracy with visual or auditory stimuli to increase spoken language output.     Progressing/ Not Met 4/9/2024 Did not address today       Long Term Goals: (8 weeks) Current Progress:   Patient will increase her social participation in conversation with > 5 utterances produced independently.    Established this date     2. Patient will state basic wants/needs/concerns >90% of the time independently through use of various modalities in order to increase her social participation with others.     Established this date      Patient  Education/Response:   Patient educated regarding the followin. Occidental speech task important for speech production  2. Aphasia and prognosis - recovery and intensity of therapy (discussed with patient's )  3. [employer] Therapy Cynthia    Home program established: yes-packet of worksheets sent home today with patient and her daughter Una  Patient verbalized understanding to all above education provided.     See Electronic Medical Record under Patient Instructions for exercises provided throughout therapy.  Assessment:   Prema participated in various receptive and expressive language tasks through use of following written and verbal commands, reading 1 and 2 syllable words, as well as writing to enhance her speech-language abilities. Improvement noted reading monosyllabic and bi syllabic words. Prema's reading abilities continue to be a relative strength for her.  She continues with impairments in both receptive and expressive language skills,  however, has excellent potential for rehabilitation.     Prema is progressing well towards her goals. Current goals remain appropriate. Goals to be updated as necessary.     Patient prognosis is Good. Patient will continue to benefit from skilled outpatient speech and language therapy to address the deficits listed in the problem list on initial evaluation, provide patient/family education and to maximize patient's level of independence in the home and community environment.   Medical necessity is demonstrated by the following IMPAIRMENTS:  Aphasia: Patient with few true words in all situations severely limiting functional communication with both familiar and unfamiliar communication partners.   Barriers to Therapy: none  Patient's spiritual, cultural and educational needs considered and patient agreeable to plan of care and goals.  Plan:   Continue Plan of Care with focus on rehabilitation and compensation for aphasia secondary to stroke.    Shreya Hung MS,  CCC-SLP  Speech-Language Pathologist  4/9/2024

## 2024-04-12 ENCOUNTER — CLINICAL SUPPORT (OUTPATIENT)
Dept: REHABILITATION | Facility: HOSPITAL | Age: 79
End: 2024-04-12
Payer: MEDICARE

## 2024-04-12 DIAGNOSIS — R47.01 APHASIA: Primary | ICD-10-CM

## 2024-04-12 DIAGNOSIS — R48.2 APRAXIA: Primary | ICD-10-CM

## 2024-04-12 DIAGNOSIS — Z91.81 RISK FOR FALLS: ICD-10-CM

## 2024-04-12 DIAGNOSIS — I63.9 LACK OF COORDINATION DUE TO ACUTE CEREBROVASCULAR ACCIDENT (CVA): ICD-10-CM

## 2024-04-12 DIAGNOSIS — M62.81 MUSCLE WEAKNESS OF RIGHT UPPER EXTREMITY: Primary | ICD-10-CM

## 2024-04-12 DIAGNOSIS — R48.2 APRAXIA: ICD-10-CM

## 2024-04-12 DIAGNOSIS — R27.9 LACK OF COORDINATION DUE TO ACUTE CEREBROVASCULAR ACCIDENT (CVA): ICD-10-CM

## 2024-04-12 PROCEDURE — 97530 THERAPEUTIC ACTIVITIES: CPT | Mod: PN

## 2024-04-12 PROCEDURE — 97110 THERAPEUTIC EXERCISES: CPT | Mod: PN,CQ

## 2024-04-12 PROCEDURE — 92507 TX SP LANG VOICE COMM INDIV: CPT | Mod: PN

## 2024-04-12 PROCEDURE — 97112 NEUROMUSCULAR REEDUCATION: CPT | Mod: PN,CQ

## 2024-04-12 PROCEDURE — 97110 THERAPEUTIC EXERCISES: CPT | Mod: PN

## 2024-04-12 NOTE — PROGRESS NOTES
"[facility]  Occupational Therapy Treatment Note     Date: 4/12/2024  Name: Prema Phillips  Clinic Number: [id number]    Therapy Diagnosis:   Encounter Diagnoses   Name Primary?    Muscle weakness of right upper extremity Yes    Lack of coordination due to acute cerebrovascular accident (CVA)        Physician: Odette Zhang MD    Physician Orders: Eval and Treat  Medical Diagnosis: M25.60,R53.1 (ICD-10-CM) - Decreased range of motion with decreased strength I63.412 (ICD-10-CM) - Cerebrovascular accident (CVA) due to embolism of left middle cerebral artery I67.89,G81.91 (ICD-10-CM) - Hemiparesis of right dominant side due to acute cerebrovascular disease  Evaluation Date: 3/27/2024  Insurance Authorization Period Expiration: 12/31/2024  Plan of Care Certification Period: 05/24/2024  Visit # / Visits authorized: 3 / 20  FOTO: 0 / 3     Precautions:  Standard, Fall, and aphasia, apraxia      Time In: 12:00 pm  Time Out: 12:45 pm  Total Billable Time: 45 minutes     Subjective     Patient reports: "I am pooped out."   She was compliant with home exercise program given last session.   Response to previous treatment: good   Functional change: improved communication skills     Pain: 0/10  Location: no pain reported     Objective     Physical Exam:  Postural examination/scapula alignment:  upright posture, mild winging of right scapula with good muscle activation with tactile cues   Joint integrity: intact   Skin integrity: intact   Edema: no swelling noted and no edema reported by     Palpation: no visible signs of pain with palpation   left active range of motion and manual muscle test within normal limits.     Joint Evaluation  AROM  3/27/2024 PROM   3/27/2024 MMS   3/27/2024     Right Right Right   Scapular Elevation     3/5   Scapular Retraction     3/5   Shoulder Flex 0-180 121 130 3-/5   Shoulder Extension 0-80 60   3-/5   Shoulder Abd 0-180 130   3/5   Shoulder ER 0-90     3/5 "   Shoulder IR 0-90 WNL   3/5   Elbow Flexion 0-150 150   3/5   Elbow Extension 0   3/5   Wrist Flex 0-80 90   3/5   Wrist Ext 0-70 50   3-/5   Supination 0-80 WNL   3/5   Pronation 0-80 WNL   3/5   *WNL - Within Normal Limits  *NT = Not Tested     Fist: tight right hand ;       Strength: (FLOR Dynamometer in lbs.) Average 3 trials, Position II:       3/27/2024 3/27/2024   Rung II Right Left   Trial 1 19# 20#   Trial 2 14# 22#   Trial 3 15# 26#   Average 16# 22.6#      Normal  Average Values  Female Right Left Male: Right Left   20-29 66 lbs 62 lbs         94 lbs 86 lbs   30-39 68 lbs 64 lbs 90 lbs 82 lbs   40-49 64 lbs 62 lbs 80 lbs 74 lbs   50-59 62 lbs 57 lbs 72 lbs 65 lbs   60-69 53 lbs 51 lbs 63 lbs 56 lbs   70+ 44 lbs 42 lbs 54 lbs 48 lbs   SD = +/- 19lbs         Pinch Strength (Measured in lbs)       3/27/2024 3/27/2024     Right Left   Key Pinch 5 # 7 #   3pt Pinch 3 # 5 #   2pt Pinch 3 # 6 #         Fine/Gross Motor Coordination     Assessment   Right   3/27/2024 Left  3/27/2024   9 hole peg test 9 pegs in/out for time 2:25  2 cues for sequencing  :48   SMITHA (Rapid Alternating Movements)      impaired - moderate    intact   Finger to Nose (5 times)   impaired - moderate intact   Finger Flicks (coordination moving from digit flexion to digit extension)   impaired - minimum intact   *WNL - Within Normal Limits  *NT = Not Tested     Tone:  Modified Jessa Scale:   0 - No increase in muscle tone     Sensation:  Prema  was unable to complete sensory assessment at this time due to aphasia      Balance:   Static Sit - NORMAL: No deviations seen in posture held statically  Dynamic sit- NORMAL: No deviations seen in posture held statically  See physical therapy eval for detailed balance assessment      Endurance Deficit: moderate    Treatment     Prema received the treatments listed below:      neuromuscular re-education activities to improve: Coordination, Kinesthetic, Sense, Proprioception, and Posture for 10  minutes. The following activities were included:  - home exercise program with green putty - gross grasp and 2pt pinch  x10 each bilateral upper extremity     therapeutic activities to improve functional performance for 35 minutes, including:  - sorting pegs and removing with resistance gripper 25# right upper extremity   - mental blox activity cards 1-10  - geometric shapes building blocks   - handwriting ABCs     Patient Education and Home Exercises     Education provided:   - green putty   - Progress towards goals     Written Home Exercises Provided: yes.  Exercises were reviewed and Prema was able to demonstrate them prior to the end of the session.  Prema demonstrated good  understanding of the home exercise program provided. See electronic medical record under Patient Instructions for exercises provided during therapy sessions.       Assessment     Prema tolerated today's session well. She exhibited difficulty with picking up triangles/odd shaped items from box, spatial awareness, construction ataxia and problem solving with mental blox. Cues for sequencing and task completion and noted improvement as activity progressed. She also exhibited improved letter formation compared to previous session but did have difficulty with K, N, P, Q, S and U. Home exercise program issued with green putty and reviewed with .     Prema is progressing well towards her goals and there are no updates to goals at this time. Pt prognosis is Good.     Patient will continue to benefit from skilled outpatient occupational therapy to address the deficits listed in the problem list on initial evaluation provide patient/family education and to maximize patient's level of independence in the home and community environment.     Patient's spiritual, cultural and educational needs considered and patient agreeable to plan of care and goals.    Anticipated barriers to occupational therapy: cognitive deficits     Goals:  Short Term Goals: 4  weeks   - Pt will report 50% compliance with home exercise program in order to maintain progress attained during therapy. Not met, progressing  - Pt will improve active range of motion of right shoulder flexion/abduction to 120/140 degrees in order to increase ease with bathing and dressing tasks. Not met, progressing  - Pt will increase right/left  strength to 22/30# in order to improve ease when dressing, bathing and completing gardening tasks. Not met, progressing  - Pt will decrease time on 9HPT to under 2 minutes using right hand  to improve fine motor speed and coordination needed to perform home maintenance, grooming and hygiene, money manipulation. Not met, progressing     Long Term Goals: 8 weeks   - Pt will report independence with home exercise program in order to maintain progress attained during therapy. Not met, progressing  - Pt will increase MMT for bilateral shoulder movements in all planes  to 4/5 to increase upper body strength needed for home management tasks, high level IADLs and leisure related activities. Not met, progressing  - Pt will increase bilateral  strength to 40# in order to improve ease with functional tasks such as gardening. Not met, progressing  - Pt will improve right/left key/3pt/2pt pinch by 2-3# in order to increase ease when dressing and grooming. Not met, progressing  - Pt will complete simulated instrumental activities of daily living activity with less than 2 cues for completion in order to return to prior level of function and decrease caregiver burden. Not met, progressing    Plan     Updates/Grading for next session: continue with plan of care     Corinne Rapier, OT   4/12/2024

## 2024-04-12 NOTE — PROGRESS NOTES
[facility]   Physical Therapy Treatment Note      Name: Prema Phillips  Clinic Number: [id number]    Therapy Diagnosis:   Encounter Diagnoses   Name Primary?    Apraxia Yes    Risk for falls      Physician: Odette Zhang MD    Visit Date: 4/12/2024    Physician Orders: PT Eval and Treat   Medical Diagnosis from Referral: Decreased range of motion with decreased strength [M25.60, R53.1], Cerebrovascular accident (CVA) due to embolism of left middle cerebral artery [I63.412], Hemiparesis of right dominant side due to acute cerebrovascular disease [I67.89, G81.91]   Evaluation Date: 3/27/2024  Authorization Period Expiration: 12/31/2024  Plan of Care Expiration: 5/24/2024  Progress Note Due: 4/27/2024  Date of Surgery: N/A  Visit # / Visits authorized: 2/20 (+eval)  FOTO: 1/3     Precautions: Standard, Fall, and Aphasia (per SLP - consider written instructions as patient demonstrated good reading comprehension during speech eval)     Time In: 10:15 AM  Time Out: 11:55 AM  Total Billable Time: 40 minutes (2TE, 1 NR)      PTA Visit #: 1/5     Subjective     Patient reports: Indicated she is doing well. Difficulty answering if she was able to complete home exercise program or not due to aphasia.  She will be compliant with home exercise program.  Response to previous treatment: eval only, no adverse reactions  Functional change: ongoing    Pain: 0/10  Location:  n/a     Objective      Objective Measures updated at progress report unless specified.     Treatment     Prema received the treatments listed below:      therapeutic exercises to develop strength and endurance for 20 minutes including:  Playviewsfit recumbent stepper x6 min L3.5 twin peaks setting for endurance/activity tolerance assessment and training    Home exercise program review:   - Standing reciprocal hip flexion marches with light touchdown support 2x10 bilateral 2# ankle wt  - Sit to stands from chair 2 x10  - Standing calf raises  "2 x 15 with occasional touchdown support    - standing toe raises 2 x 15 with occasional touchdown support  - Standing hip abductions 2 x 15  2# ankle wt  - standing hamstring curls  2# ankle wt    neuromuscular re-education activities to improve: Balance and Coordination for 20 minutes. The following activities were included:  Narrow base of support eyes closed trials  Tandem  static 3x30s bilateral      x 10 foot lengths near ballet bar:  Tandem walking x 8 lengths - occasional touch down on bar  3s marches  x 6 lengths - occasional touch down on bar  Hurdles (4 medium) forwards - good clearance and spacing  Hurdles (4 medium) sidestepping - challenging clearance and step sequencing  Backwards walking - improved with visual demo    4" step ups: reciprocal forward 2x10 B                     : lateral up and over x10 * unable to complete correctly despite max cuing    Airex:   Narrow base of support with vertical and horizontal head turns and eyes closed trials         Patient Education and Home Exercises       Education provided: Reinforced home exercise program provided at [facility].     Written Home Exercises Provided: Patient instructed to cont prior HEP. Exercises were reviewed and Prema was able to demonstrate them prior to the end of the session.  Prema demonstrated good  understanding of the education provided. See Electronic Medical Record under Patient Instructions for exercises provided during therapy sessions    Assessment     Prema arrived to session without any complaints, though subjective report somewhat limited due to aphasia, and was agreeable to treatment.  She continues to respond well to visual cuing as opposed to verbal cuing.  Sometimes side by side performance of more complicated sequencing is helpful.  Progressions noted in bold, completed without adverse response.  No difficulty with tandem balance this session.  Most challenged with lateral step overs today and unable to complete correctly despite " max visual and tactile cuing.  She would benefit from continued PT services to improve safe functional mobility.      Prema Is progressing well towards her goals.   Patient prognosis is Good.     Patient will continue to benefit from skilled outpatient physical therapy to address the deficits listed in the problem list box on initial evaluation, provide pt/family education and to maximize pt's level of independence in the home and community environment.     Patient's spiritual, cultural and educational needs considered and pt agreeable to plan of care and goals.     Anticipated barriers to physical therapy: asphasia    Goals:  Short Term Goals: 4 weeks   Patient will be compliant with HEP in order to maximize PT benefits  Patient will complete >/=10 seconds of single leg stance on each leg in order to improve static postural control during standing ADLs  Patient will complete TUG in </= 13 seconds in order to reduce risk for falls and improve safety with functional mobility  Patient will improve self-selected walking speed to >/=0.8 m/s in order to improve safety with community mobility     Long Term Goals: 8 weeks   Patient will score >/= 59% on FOTO survey in order to improve self-perception of functional mobility deficits  Patient will complete TUG in </= 11 seconds in order to reduce risk for falls and improve safety with functional mobility  Patient will score </= 15 seconds on Five Time Sit to  order to improve bilateral lower extremity endurance and muscular power for transfers   Patient will improve self-selected walking speed to >/=1.0 m/s in order to improve safety with community mobility  Patient will perform 1 floor <> stand transfer with bilateral upper extremity support in order to demonstrate safe functional mobility in case of future fall  Patient will begin some form of home/community fitness in order to sustain progress gained in PT       Plan     Dynamic balance with tandem and narrow base  of support - functional balance and tasks for ease of understanding.  Continue plan of care (POC).      Tami Gastelum, PTA

## 2024-04-12 NOTE — PROGRESS NOTES
"[facility]  Speech Therapy Treatment Note- Neurological Rehabilitation  Date: 4/12/2024     Name: Prema Phillips   MRN: [id number]   Therapy Diagnosis:   Encounter Diagnoses   Name Primary?    Aphasia Yes    Apraxia      Physician: Odette Zhang MD  Physician Orders: Ambulatory Referral to Speech Therapy   Medical Diagnosis: M25.60,R53.1 (ICD-10-CM) - Decreased range of motion with decreased strength I63.412 (ICD-10-CM) - Cerebrovascular accident (CVA) due to embolism of left middle cerebral artery I67.89,G81.91 (ICD-10-CM) - Hemiparesis of right dominant side due to acute cerebrovascular disease    Visit #/ Visits Authorized: 5/20  Date of Evaluation:  3/27/24  Insurance Authorization Period: 3/27/24-12/31/24  Plan of Care Expiration Date:    5/22/24  Extended Plan of Care:  n/a   Progress Note: 4/27/24     Time In:  1030  Time Out:  1115  Total Billable Time:  45 minutes     Precautions: Communication  Subjective:   Patient reports: "Una today" and "All of a sudden my brain just" but did not finish her sentence  She was compliant to home exercise program. In a roundabout way, she mentions drawing lines to different objects  Response to previous treatment: good  Pain Scale: Patient unable to indicate pain secondary to aphasia. No pain behaviors observed throughout the session.  Objective:   UNTIMED  Procedure   Speech- Language- Voice Therapy     Short Term Goals: (4 weeks) Current Progress:   Patient will follow simple 1-2 step commands presented visually with >90% accuracy for 2 consecutive sessions to enhance her receptive language skills.    Progressing/ Not Met 4/12/2024 Tactus therapy Advanced Comprehension Level 1 (1 step objects) completed when reading silently completed with 70% accuracy independently (fan and star proved to be errorful), previously 90% acc Independently.    Probed 2 step commands presented visually, completed with 50% accuracy independently.   2. Patient will " "follow simple 1 step commands presented verbally with 75% accuracy and 1 repetition provided to enhance her receptive language skills.     Progressing/ Not Met 4/12/2024 Did not address today, 50% accuracy independently previously       3. Patient will read single words aloud with 50% accuracy and 1 phonemic cue in order to bridge to higher level reading tasks.     Progressing/ Not Met 4/12/2024 Patient read monosyllabic words on first attempt with 53% accuracy independently (10/19 trials), previously 65% accuracy independently    Patient read 2 syllable words: 40% accuracy independently (4/10 trials), previously 5/7 accuracy    For purposes of higher level language, probed 4 word phrases which she read with 25% accuracy (1/4 trials)   4. Patient will repeat monosyllabic, common words with 50% accuracy when provided with a visual cue to increase her spoken language abilities.     Progressing/ Not Met 4/12/2024 Repeated monosyllabic words with 28% accuracy independently (5/18 trials), previously 50% acc Independently (5/10 trials)       5. Patient will complete rote speech tasks with >75% accuracy in unison with speech-language pathologist to increase spoken language abilities.     Progressing/ Not Met 4/12/2024 Counted 1-10 on hands + verbal production independently with 30% accuracy independently and intelligible, 50% with phonemic paraphasias. Repeated 2 additional times in unison with speech-language pathologist which was helpful.     When prompted with our father or hail jadiel preference she said "jadiel, yeah." 3 true words. Ended the prayer with forever and ever and ever.    Days of the week: 4/7 with visual aid of days written on paper     6. Patient will answer simple open-ended questions with relevant responses with >75% accuracy with visual or auditory stimuli to increase spoken language output.     Progressing/ Not Met 4/12/2024 40% accuracy independently  Responsive naming simple questions utilized       "     Long Term Goals: (8 weeks) Current Progress:   Patient will increase her social participation in conversation with > 5 utterances produced independently.       2. Patient will state basic wants/needs/concerns >90% of the time independently through use of various modalities in order to increase her social participation with others.          Patient Education/Response:   Patient educated regarding the followin. [place] speech task important for speech production  2. Aphasia and prognosis - recovery and intensity of therapy (discussed with patient's )  3. [email or web address] Cynthia    Home program established: yes-packet of worksheets sent home today with patient and her daughter Una  Patient verbalized understanding to all above education provided.     See Electronic Medical Record under Patient Instructions for exercises provided throughout therapy.  Assessment:   Prema participated in various receptive and expressive language tasks through use of following written commands, reading 1 and 2 syllable words, attempting phrases, as well as writing to enhance her speech-language abilities. Improvement noted reading monosyllabic and bi syllabic words, similar to previous session. Prema's reading abilities continue to be a relative strength for her.  She continues with impairments in both receptive and expressive language skills,  however, has excellent potential for rehabilitation. Her spontaneous speech was improved today from previous sessions, with improvements noted in her independent initiation of speech.     Prema is progressing well towards her goals. Current goals remain appropriate. Goals to be updated as necessary.     Patient prognosis is Good. Patient will continue to benefit from skilled outpatient speech and language therapy to address the deficits listed in the problem list on initial evaluation, provide patient/family education and to maximize patient's level of independence in the home and  community environment.   Medical necessity is demonstrated by the following IMPAIRMENTS:  Aphasia: Patient with few true words in all situations severely limiting functional communication with both familiar and unfamiliar communication partners.   Barriers to Therapy: none  Patient's spiritual, cultural and educational needs considered and patient agreeable to plan of care and goals.  Plan:   Continue Plan of Care with focus on rehabilitation and compensation for aphasia secondary to stroke.    Nori Yin MS, CCC-SLP, CBIS  Speech-Language Pathologist  Certified Brain Injury Specialist  4/12/2024

## 2024-04-12 NOTE — PATIENT INSTRUCTIONS
Rules with the putty:   Do not leave it in the sun/car  Do not get it on fabric/clothes, it will not come out  Do not let children/pets play with it because it can be toxic

## 2024-04-15 ENCOUNTER — CLINICAL SUPPORT (OUTPATIENT)
Dept: REHABILITATION | Facility: HOSPITAL | Age: 79
End: 2024-04-15
Payer: MEDICARE

## 2024-04-15 DIAGNOSIS — R47.01 APHASIA: Primary | ICD-10-CM

## 2024-04-15 DIAGNOSIS — R48.2 APRAXIA: ICD-10-CM

## 2024-04-15 PROCEDURE — 92507 TX SP LANG VOICE COMM INDIV: CPT | Mod: PN

## 2024-04-16 ENCOUNTER — CLINICAL SUPPORT (OUTPATIENT)
Dept: REHABILITATION | Facility: HOSPITAL | Age: 79
End: 2024-04-16
Payer: MEDICARE

## 2024-04-16 DIAGNOSIS — R47.01 APHASIA: Primary | ICD-10-CM

## 2024-04-16 DIAGNOSIS — R48.2 APRAXIA: Primary | ICD-10-CM

## 2024-04-16 DIAGNOSIS — Z91.81 RISK FOR FALLS: ICD-10-CM

## 2024-04-16 DIAGNOSIS — R48.2 APRAXIA: ICD-10-CM

## 2024-04-16 PROCEDURE — 97112 NEUROMUSCULAR REEDUCATION: CPT | Mod: PN

## 2024-04-16 PROCEDURE — 97110 THERAPEUTIC EXERCISES: CPT | Mod: PN,59

## 2024-04-16 PROCEDURE — 92507 TX SP LANG VOICE COMM INDIV: CPT | Mod: PN

## 2024-04-16 NOTE — PROGRESS NOTES
[facility]   Physical Therapy Treatment Note      Name: Prema Phillips  Clinic Number: [id number]    Therapy Diagnosis:   Encounter Diagnoses   Name Primary?    Apraxia Yes    Risk for falls        Physician: Odette Zhang MD    Visit Date: 4/16/2024    Physician Orders: PT Eval and Treat   Medical Diagnosis from Referral: Decreased range of motion with decreased strength [M25.60, R53.1], Cerebrovascular accident (CVA) due to embolism of left middle cerebral artery [I63.412], Hemiparesis of right dominant side due to acute cerebrovascular disease [I67.89, G81.91]   Evaluation Date: 3/27/2024  Authorization Period Expiration: 12/31/2024  Plan of Care Expiration: 5/24/2024  Progress Note Due: 4/27/2024  Date of Surgery: N/A  Visit # / Visits authorized: 3/20 (+eval)  FOTO: 1/3     Precautions: Standard, Fall, and Aphasia (per SLP - consider written instructions as patient demonstrated good reading comprehension during speech eval)     Time In: 1030  Time Out: 1115  Total Billable Time: 45 minutes ( 2TE, 1 NR)      PTA Visit #: 1/5     Subjective     Patient reports: Limited communication with Aphasia but pt indicated she has not yet been doing home exercise program. Only here.   She will be compliant with home exercise program.  Response to previous treatment: eval only, no adverse reactions  Functional change: ongoing    Pain: 0/10  Location:  n/a     Objective      Objective Measures updated at progress report unless specified.     Treatment     Prema received the treatments listed below:      therapeutic exercises to develop strength and endurance for 25 minutes including:  Jellifit recumbent stepper x8 min L5 sprint hill setting for endurance/activity tolerance assessment and training    - Standing reciprocal hip flexion marches with light touchdown support 3x10 bilateral 2# ankle wt  - Sit to stands from chair  x10  - Standing calf raises 2 x 20 with occasional touchdown support  -  "standing toe raises 2 x 20 with occasional touchdown support  - Standing hip abductions 2 x 15  2# ankle wt  - standin ghip extension 2 x 15 2# ankle weight     neuromuscular re-education activities to improve: Balance and Coordination for 20 minutes. The following activities were included:     Tandem  static 3x30s bilateral      x 10 foot lengths near ballet bar:  Tandem walking x 8 lengths - occasional touch down on bar  3s marches  x 6 lengths - occasional touch down on bar  Backwards walking - cues for larger step lengths    Airex beam:  tandem x 4 laps    Sidestepping x 4 laps    Bosu round side up: split stance static holds - eyes open and eyes closed  2 feet static balance     Single leg stance trials 3 x 30s bilateral         Not today:  Hurdles (4 medium) forwards - good clearance and spacing  Hurdles (4 medium) sidestepping - challenging clearance and step sequencing  Backwards walking - improved with visual demo  4" step ups: reciprocal forward 2x10 B                     : lateral up and over x10 * unable to complete correctly despite max cuing  Airex:   Narrow base of support with vertical and horizontal head turns and eyes closed trials         Patient Education and Home Exercises       Education provided: Reinforced home exercise program provided at [facility].     Written Home Exercises Provided: Patient instructed to cont prior HEP. Exercises were reviewed and Prema was able to demonstrate them prior to the end of the session.  Prema demonstrated good  understanding of the education provided. See Electronic Medical Record under Patient Instructions for exercises provided during therapy sessions    Assessment     Prema did well with today's session. She continues to do well with visual cues and only needed occasional repetition of instruction. She is showing more stability with single leg stance trials and balance overall. She was well challenged by the airex beam and standing on Bosu. Continue plan of care " (POC) for balance and strength. Reinforced need for home exercise program compliance at home for shift to more balance tasks here.     Prema Is progressing well towards her goals.   Patient prognosis is Good.     Patient will continue to benefit from skilled outpatient physical therapy to address the deficits listed in the problem list box on initial evaluation, provide pt/family education and to maximize pt's level of independence in the home and community environment.     Patient's spiritual, cultural and educational needs considered and pt agreeable to plan of care and goals.     Anticipated barriers to physical therapy: asphasia    Goals:  Short Term Goals: 4 weeks   Patient will be compliant with HEP in order to maximize PT benefits  Patient will complete >/=10 seconds of single leg stance on each leg in order to improve static postural control during standing ADLs  Patient will complete TUG in </= 13 seconds in order to reduce risk for falls and improve safety with functional mobility  Patient will improve self-selected walking speed to >/=0.8 m/s in order to improve safety with community mobility     Long Term Goals: 8 weeks   Patient will score >/= 59% on FOTO survey in order to improve self-perception of functional mobility deficits  Patient will complete TUG in </= 11 seconds in order to reduce risk for falls and improve safety with functional mobility  Patient will score </= 15 seconds on Five Time Sit to  order to improve bilateral lower extremity endurance and muscular power for transfers   Patient will improve self-selected walking speed to >/=1.0 m/s in order to improve safety with community mobility  Patient will perform 1 floor <> stand transfer with bilateral upper extremity support in order to demonstrate safe functional mobility in case of future fall  Patient will begin some form of home/community fitness in order to sustain progress gained in PT       Plan     Dynamic balance with tandem  and narrow base of support - functional balance and tasks for ease of understanding.  Continue plan of care (POC).      Audrey Prado, PT

## 2024-04-16 NOTE — PROGRESS NOTES
"[facility]  Speech Therapy Treatment Note- Neurological Rehabilitation  Date: 4/16/2024     Name: Prema Phillips   MRN: [id number]   Therapy Diagnosis:   Encounter Diagnoses   Name Primary?    Aphasia Yes    Apraxia      Physician: Odette Zhang MD  Physician Orders: Ambulatory Referral to Speech Therapy   Medical Diagnosis: M25.60,R53.1 (ICD-10-CM) - Decreased range of motion with decreased strength I63.412 (ICD-10-CM) - Cerebrovascular accident (CVA) due to embolism of left middle cerebral artery I67.89,G81.91 (ICD-10-CM) - Hemiparesis of right dominant side due to acute cerebrovascular disease    Visit #/ Visits Authorized: 7/20  Date of Evaluation:  3/27/24  Insurance Authorization Period: 3/27/24-12/31/24  Plan of Care Expiration Date:    5/22/24  Extended Plan of Care:  n/a   Progress Note: 4/27/24     Time In:  1115  Time Out:  1200  Total Billable Time:  45 minutes     Precautions: Communication  Subjective:   Patient reports: "I'm not ready yet"  She was compliant to home exercise program.   Response to previous treatment: good  Pain Scale: Patient unable to indicate pain secondary to aphasia. No pain behaviors observed throughout the session.  Objective:   UNTIMED  Procedure   Speech- Language- Voice Therapy     Short Term Goals: (4 weeks) Current Progress:   Patient will follow simple 1-2 step commands presented visually with >90% accuracy for 2 consecutive sessions to enhance her receptive language skills.    Progressing/ Not Met 4/17/2024 Did not address today     2. Patient will follow simple 1 step commands presented verbally with 75% accuracy and 1 repetition provided to enhance her receptive language skills.     Progressing/ Not Met 4/17/2024 Followed 1 step body commands with 43% accuracy independently (3/7 trials), 50% accuracy independently previously       3. Patient will read single words aloud with 50% accuracy and 1 phonemic cue in order to bridge to higher level reading " tasks.     Progressing/ Not Met 2024 Patient read monosyllabic words on first attempt with 53% (8/15) independently, not improving with phonemic cue,  previously 60% accuracy independently.         4. Patient will repeat monosyllabic, common words with 50% accuracy when provided with a visual cue to increase her spoken language abilities.     Progressing/ Not Met 2024 Repeated monosyllabic words with 58% accuracy independently on first attempt (7/12 trials), previously 60% acc Independently   5. Patient will complete rote speech tasks with >75% accuracy in unison with speech-language pathologist to increase spoken language abilities.     Progressing/ Not Met 2024 Patient independently stated the days of the week with 57% accuracy. Speech-language pathologist wrote days of the week on paper as visual aid which she then produced with 71% accuracy.      6. Patient will answer simple open-ended questions with relevant responses with >75% accuracy with visual or auditory stimuli to increase spoken language output.     Progressing/ Not Met 2024 Answered open-ended questions with 40% accuracy independently, phonemic paraphasias present. Previously 40% acc    Noted improvements in initiation of speech today.       Long Term Goals: (8 weeks) Current Progress:   Patient will increase her social participation in conversation with > 5 utterances produced independently.    Progressing, not met 2024    2. Patient will state basic wants/needs/concerns >90% of the time independently through use of various modalities in order to increase her social participation with others.     Progressing, not met 2024      Patient Education/Response:   Patient educated regarding the followin. Silvis speech task important for speech production  2. Aphasia and prognosis - recovery and intensity of therapy (discussed with patient's )  3. [facility]    Home program established: yes-packet of worksheets  sent home today with patient and her daughter Una  Patient verbalized understanding to all above education provided.     See Electronic Medical Record under Patient Instructions for exercises provided throughout therapy.  Assessment:   Ms. Lloyd participated in various receptive and expressive language tasks through use of following verbal commands, reading 1 syllable words, and answering open-ended questions. Stable performance was noted with reading word level information. She did produce more spontaneous speech utterances today with an increase in true words as well. Neologisms remain present, though patient is aware and there are attempts to self-correct. She continues with impairments in both receptive and expressive language skills,  however, has excellent potential for rehabilitation.     Prema is progressing well towards her goals. Current goals remain appropriate. Goals to be updated as necessary.     Patient prognosis is Good. Patient will continue to benefit from skilled outpatient speech and language therapy to address the deficits listed in the problem list on initial evaluation, provide patient/family education and to maximize patient's level of independence in the home and community environment.   Medical necessity is demonstrated by the following IMPAIRMENTS:  Aphasia: Patient with few true words in all situations severely limiting functional communication with both familiar and unfamiliar communication partners.   Barriers to Therapy: none  Patient's spiritual, cultural and educational needs considered and patient agreeable to plan of care and goals.  Plan:   Continue Plan of Care with focus on rehabilitation and compensation for aphasia secondary to stroke.    Nori Yin MS, CCC-SLP, CBIS  Speech-Language Pathologist  Certified Brain Injury Specialist  4/17/2024

## 2024-04-17 ENCOUNTER — CLINICAL SUPPORT (OUTPATIENT)
Dept: REHABILITATION | Facility: HOSPITAL | Age: 79
End: 2024-04-17
Payer: MEDICARE

## 2024-04-17 DIAGNOSIS — R48.2 APRAXIA: ICD-10-CM

## 2024-04-17 DIAGNOSIS — I63.9 LACK OF COORDINATION DUE TO ACUTE CEREBROVASCULAR ACCIDENT (CVA): ICD-10-CM

## 2024-04-17 DIAGNOSIS — M62.81 MUSCLE WEAKNESS OF RIGHT UPPER EXTREMITY: Primary | ICD-10-CM

## 2024-04-17 DIAGNOSIS — R47.01 APHASIA: Primary | ICD-10-CM

## 2024-04-17 DIAGNOSIS — R27.9 LACK OF COORDINATION DUE TO ACUTE CEREBROVASCULAR ACCIDENT (CVA): ICD-10-CM

## 2024-04-17 PROCEDURE — 92507 TX SP LANG VOICE COMM INDIV: CPT | Mod: PN

## 2024-04-17 PROCEDURE — 97530 THERAPEUTIC ACTIVITIES: CPT | Mod: PN

## 2024-04-17 PROCEDURE — 97112 NEUROMUSCULAR REEDUCATION: CPT | Mod: PN,59

## 2024-04-17 NOTE — PROGRESS NOTES
"[facility]  Speech Therapy Treatment Note- Neurological Rehabilitation  Date: 4/17/2024     Name: Prema Phillips   MRN: [id number]   Therapy Diagnosis:   Encounter Diagnoses   Name Primary?    Aphasia Yes    Apraxia      Physician: Odette Zhang MD  Physician Orders: Ambulatory Referral to Speech Therapy   Medical Diagnosis: M25.60,R53.1 (ICD-10-CM) - Decreased range of motion with decreased strength I63.412 (ICD-10-CM) - Cerebrovascular accident (CVA) due to embolism of left middle cerebral artery I67.89,G81.91 (ICD-10-CM) - Hemiparesis of right dominant side due to acute cerebrovascular disease    Visit #/ Visits Authorized: 8/20  Date of Evaluation:  3/27/24  Insurance Authorization Period: 3/27/24-12/31/24  Plan of Care Expiration Date:    5/22/24  Extended Plan of Care:  n/a   Progress Note: 4/27/24     Time In:  1430  Time Out:  1515  Total Billable Time:  45 minutes     Precautions: Communication  Subjective:   Patient reports: "doing better"  She was compliant to home exercise program.   Response to previous treatment: good  Pain Scale: Patient unable to indicate pain secondary to aphasia. No pain behaviors observed throughout the session.  Objective:   UNTIMED  Procedure   Speech- Language- Voice Therapy     Short Term Goals: (4 weeks) Current Progress:   Patient will follow simple 1-2 step commands presented visually with >90% accuracy for 2 consecutive sessions to enhance her receptive language skills.    Progressing/ Not Met 4/18/2024 Tact Therapy Advanced Comprehension:   1 step directions presented visually: 100% accuracy independently (10/10 trials)    2 step directions presented visually: 20% accuracy independently (1/5 trials); same objects as 1 step commands but the information of 2 commands may be too much to process; semantic cueing led to 60% accuracy    2. Patient will follow simple 1 step commands presented verbally with 75% accuracy and 1 repetition provided to enhance " her receptive language skills.     Progressing/ Not Met 4/18/2024 Followed 1 step body commands with 60% accuracy independently (9/15 trials), previously 43% accuracy independently (3/7 trials)       3. Patient will read single words aloud with 50% accuracy and 1 phonemic cue in order to bridge to higher level reading tasks.   MET 4/17/24  Increase complexity next session Patient read monosyllabic words on first attempt with 90% accuracy independently, previously 53% (8/15) independently.     Probed bisyllabic words - 40% accuracy independently (2/5 trials)   4. Patient will repeat monosyllabic, common words with 50% accuracy when provided with a visual cue to increase her spoken language abilities.     Progressing/ Not Met 4/18/2024 Did not address today; previously 58% accuracy independently on first attempt (7/12 trials).        5. Patient will complete rote speech tasks with >75% accuracy in unison with speech-language pathologist to increase spoken language abilities.     Progressing/ Not Met 4/18/2024 Patient independently stated the days of the week with 57% accuracy. Speech-language pathologist wrote days of the week on paper as visual aid which she then produced with 71% accuracy.      6. Patient will answer simple open-ended questions with relevant responses with >75% accuracy with visual or auditory stimuli to increase spoken language output.     Progressing/ Not Met 4/18/2024 Answered open-ended questions with 40% accuracy independently, phonemic paraphasias present. Previously 40% acc    Noted improvements in initiation of spontaneous speech today.     Long Term Goals: (8 weeks) Current Progress:   Patient will increase her social participation in conversation with > 5 utterances produced independently.    Progressing, not met 4/17/2024    2. Patient will state basic wants/needs/concerns >90% of the time independently through use of various modalities in order to increase her social participation with  others.     Progressing, not met 2024      Patient Education/Response:   Patient educated regarding the followin. Louann speech task important for speech production  2. Aphasia and prognosis - recovery and intensity of therapy (discussed with patient's )  3. [employer] Cynthia    Home program established: yes-packet of worksheets sent home today with patient and her daughter Una  Patient verbalized understanding to all above education provided.     See Electronic Medical Record under Patient Instructions for exercises provided throughout therapy.  Assessment:   Ms. Lloyd participated in various receptive and expressive language tasks through use of following verbal and visual commands, reading 1 and 2 syllable words, and answering open-ended questions. There was increased performance in reading monosyllabic words and she has met this short-term goal with plans to advance the complexity. The patient has continued to perform well with following 1-step commands presented visually and there is notable decrease in these abilities with 2-step commands. Spontaneous speech continues to improve, noting patient held a conversation today with neologisms and paraphasias present, but in a known context, the speech-language pathologist was able to follow her storyline and cohesion. She continues with impairments in both receptive and expressive language skills,  however, has excellent potential for rehabilitation.     Prema is progressing well towards her goals. Current goals remain appropriate. Goals to be updated as necessary.     Patient prognosis is Good. Patient will continue to benefit from skilled outpatient speech and language therapy to address the deficits listed in the problem list on initial evaluation, provide patient/family education and to maximize patient's level of independence in the home and community environment.   Medical necessity is demonstrated by the following IMPAIRMENTS:  Aphasia:  Patient with few true words in all situations severely limiting functional communication with both familiar and unfamiliar communication partners.   Barriers to Therapy: none  Patient's spiritual, cultural and educational needs considered and patient agreeable to plan of care and goals.  Plan:   Continue Plan of Care with focus on rehabilitation and compensation for aphasia secondary to stroke.    Noir Yin MS, CCC-SLP, CBIS  Speech-Language Pathologist  Certified Brain Injury Specialist  4/18/2024

## 2024-04-17 NOTE — PROGRESS NOTES
"[facility]  Occupational Therapy Treatment Note     Date: 4/17/2024  Name: Prema Phillips  Clinic Number: [id number]    Therapy Diagnosis:   Encounter Diagnoses   Name Primary?    Muscle weakness of right upper extremity Yes    Lack of coordination due to acute cerebrovascular accident (CVA)      Physician: Odette Zhang MD    Physician Orders: Eval and Treat  Medical Diagnosis: M25.60,R53.1 (ICD-10-CM) - Decreased range of motion with decreased strength I63.412 (ICD-10-CM) - Cerebrovascular accident (CVA) due to embolism of left middle cerebral artery I67.89,G81.91 (ICD-10-CM) - Hemiparesis of right dominant side due to acute cerebrovascular disease  Evaluation Date: 3/27/2024  Insurance Authorization Period Expiration: 12/31/2024  Plan of Care Certification Period: 05/24/2024  Visit # / Visits authorized: 4 / 20  FOTO: 0 / 3     Precautions:  Standard, Fall, and aphasia, apraxia      Time In: 1:45 pm  Time Out: 2:30 pm  Total Billable Time: 45 minutes     Subjective     Patient reports: "I am good. I am doing more at home"   She was compliant with home exercise program given last session.   Response to previous treatment: good   Functional change: continued improved communication skills     Pain: 0/10  Location: no pain reported     Objective     Physical Exam:  Postural examination/scapula alignment:  upright posture, mild winging of right scapula with good muscle activation with tactile cues   left active range of motion and manual muscle test within normal limits.     Joint Evaluation  AROM  3/27/2024 PROM   3/27/2024 MMS   3/27/2024     Right Right Right   Scapular Elevation     3/5   Scapular Retraction     3/5   Shoulder Flex 0-180 121 130 3-/5   Shoulder Extension 0-80 60   3-/5   Shoulder Abd 0-180 130   3/5   Shoulder ER 0-90     3/5   Shoulder IR 0-90 WNL   3/5   Elbow Flexion 0-150 150   3/5   Elbow Extension 0   3/5   Wrist Flex 0-80 90   3/5   Wrist Ext 0-70 50   3-/5 "   Supination 0-80 WNL   3/5   Pronation 0-80 WNL   3/5   *WNL - Within Normal Limits  *NT = Not Tested     Fist: tight right hand ;       Strength: (FLOR Dynamometer in lbs.) Average 3 trials, Position II:       3/27/2024 3/27/2024   Rung II Right Left   Trial 1 19# 20#   Trial 2 14# 22#   Trial 3 15# 26#   Average 16# 22.6#      Normal  Average Values  Female Right Left Male: Right Left   20-29 66 lbs 62 lbs         94 lbs 86 lbs   30-39 68 lbs 64 lbs 90 lbs 82 lbs   40-49 64 lbs 62 lbs 80 lbs 74 lbs   50-59 62 lbs 57 lbs 72 lbs 65 lbs   60-69 53 lbs 51 lbs 63 lbs 56 lbs   70+ 44 lbs 42 lbs 54 lbs 48 lbs   SD = +/- 19lbs         Pinch Strength (Measured in lbs)       3/27/2024 3/27/2024     Right Left   Key Pinch 5 # 7 #   3pt Pinch 3 # 5 #   2pt Pinch 3 # 6 #         Fine/Gross Motor Coordination     Assessment   Right   3/27/2024 Left  3/27/2024   9 hole peg test 9 pegs in/out for time 2:25  2 cues for sequencing  :48   SMITHA (Rapid Alternating Movements)      impaired - moderate    intact   Finger to Nose (5 times)   impaired - moderate intact   Finger Flicks (coordination moving from digit flexion to digit extension)   impaired - minimum intact   *WNL - Within Normal Limits  *NT = Not Tested     Tone:  Modified Jessa Scale:   0 - No increase in muscle tone     Sensation:  Prema  was unable to complete sensory assessment at this time due to aphasia      Balance:   Static Sit - NORMAL: No deviations seen in posture held statically  Dynamic sit- NORMAL: No deviations seen in posture held statically  See physical therapy eval for detailed balance assessment      Endurance Deficit: moderate    Treatment     Prema received the treatments listed below:      neuromuscular re-education activities to improve: Coordination, Kinesthetic, Sense, Proprioception, and Posture for 30 minutes. The following activities were included:  - Upper Body Ergometer (UBE) L2.5 for 6 minutes at 40 RPM to provide proprioceptive  awareness, postural stability, strength, activity tolerance, and reciprocal patterns with bimanual coordination completed to improve use of bilateral upper extremities in order to prepare for therapeutic exercises/activities. Cues provided as needed for postural stability/positioning  - shoulder extension 2x15 red theraband   - shoulder row 2x15 red theraband   - shoulder external rotation 2x15 red theraband   - towel slides clockwise/counter clockwise 2x10 each     therapeutic activities to improve functional performance for 15 minutes, including:  - putty + marbles   - simulated cooking task with putt, roller and cookie cutter     Patient Education and Home Exercises     Education provided:   - green putty   - Progress towards goals     Written Home Exercises Provided: yes.  Exercises were reviewed and Prema was able to demonstrate them prior to the end of the session.  Prema demonstrated good  understanding of the home exercise program provided. See electronic medical record under Patient Instructions for exercises provided during therapy sessions.       Assessment     Prema tolerated today's session well. Bimanual coordination tasks, motor planning, sequencing and fine motor coordination in right upper extremity to facilitate proprioception and neuro re-ed with activities of daily living. Tactile and verbal cues for motor planning, problem solving and directions. She was formulating more sentences this session but still presents with expressive aphasia. Improvement noted with activities as tasks progressed.     Prema is progressing well towards her goals and there are no updates to goals at this time. Pt prognosis is Good.     Patient will continue to benefit from skilled outpatient occupational therapy to address the deficits listed in the problem list on initial evaluation provide patient/family education and to maximize patient's level of independence in the home and community environment.     Patient's  spiritual, cultural and educational needs considered and patient agreeable to plan of care and goals.    Anticipated barriers to occupational therapy: cognitive deficits     Goals:  Short Term Goals: 4 weeks   - Pt will report 50% compliance with home exercise program in order to maintain progress attained during therapy. Not met, progressing  - Pt will improve active range of motion of right shoulder flexion/abduction to 120/140 degrees in order to increase ease with bathing and dressing tasks. Not met, progressing  - Pt will increase right/left  strength to 22/30# in order to improve ease when dressing, bathing and completing gardening tasks. Not met, progressing  - Pt will decrease time on 9HPT to under 2 minutes using right hand  to improve fine motor speed and coordination needed to perform home maintenance, grooming and hygiene, money manipulation. Not met, progressing     Long Term Goals: 8 weeks   - Pt will report independence with home exercise program in order to maintain progress attained during therapy. Not met, progressing  - Pt will increase MMT for bilateral shoulder movements in all planes  to 4/5 to increase upper body strength needed for home management tasks, high level IADLs and leisure related activities. Not met, progressing  - Pt will increase bilateral  strength to 40# in order to improve ease with functional tasks such as gardening. Not met, progressing  - Pt will improve right/left key/3pt/2pt pinch by 2-3# in order to increase ease when dressing and grooming. Not met, progressing  - Pt will complete simulated instrumental activities of daily living activity with less than 2 cues for completion in order to return to prior level of function and decrease caregiver burden. Not met, progressing    Plan     Updates/Grading for next session: continue with plan of care     Corinne Rapier, OT   4/17/2024

## 2024-04-18 ENCOUNTER — OFFICE VISIT (OUTPATIENT)
Dept: NEUROLOGY | Facility: CLINIC | Age: 79
End: 2024-04-18
Payer: MEDICARE

## 2024-04-18 ENCOUNTER — PATIENT OUTREACH (OUTPATIENT)
Dept: ADMINISTRATIVE | Facility: HOSPITAL | Age: 79
End: 2024-04-18
Payer: MEDICARE

## 2024-04-18 VITALS — SYSTOLIC BLOOD PRESSURE: 99 MMHG | HEART RATE: 82 BPM | DIASTOLIC BLOOD PRESSURE: 63 MMHG

## 2024-04-18 DIAGNOSIS — I63.412 CEREBROVASCULAR ACCIDENT (CVA) DUE TO EMBOLISM OF LEFT MIDDLE CEREBRAL ARTERY: ICD-10-CM

## 2024-04-18 DIAGNOSIS — I63.9 CEREBRAL INFARCTION, UNSPECIFIED MECHANISM: ICD-10-CM

## 2024-04-18 PROCEDURE — 3288F FALL RISK ASSESSMENT DOCD: CPT | Mod: CPTII,GC,S$GLB, | Performed by: PSYCHIATRY & NEUROLOGY

## 2024-04-18 PROCEDURE — 1101F PT FALLS ASSESS-DOCD LE1/YR: CPT | Mod: CPTII,GC,S$GLB, | Performed by: PSYCHIATRY & NEUROLOGY

## 2024-04-18 PROCEDURE — 3074F SYST BP LT 130 MM HG: CPT | Mod: CPTII,GC,S$GLB, | Performed by: PSYCHIATRY & NEUROLOGY

## 2024-04-18 PROCEDURE — 3078F DIAST BP <80 MM HG: CPT | Mod: CPTII,GC,S$GLB, | Performed by: PSYCHIATRY & NEUROLOGY

## 2024-04-18 PROCEDURE — 99999 PR PBB SHADOW E&M-EST. PATIENT-LVL III: CPT | Mod: PBBFAC,GC,,

## 2024-04-18 PROCEDURE — 99214 OFFICE O/P EST MOD 30 MIN: CPT | Mod: GC,S$GLB,, | Performed by: PSYCHIATRY & NEUROLOGY

## 2024-04-18 PROCEDURE — 1159F MED LIST DOCD IN RCRD: CPT | Mod: CPTII,GC,S$GLB, | Performed by: PSYCHIATRY & NEUROLOGY

## 2024-04-18 PROCEDURE — 1126F AMNT PAIN NOTED NONE PRSNT: CPT | Mod: CPTII,GC,S$GLB, | Performed by: PSYCHIATRY & NEUROLOGY

## 2024-04-18 NOTE — PROGRESS NOTES
[facility] - NEUROLOGY [address]TH [place] [facility], [place]    Date: 4/18/24  Patient Name: Prema Phillips   MRN: [id number]   PCP: Odette Zhang  Referring Provider: Chyna Zavala NP    Assessment:   Prema Phillips is a 79 y.o. female with history of left MCA stroke, migraine,  hypothyroidism, RBBB, and hyperlipidemia  presenting for post stroke follow up. She is making progress with weekly pt/ot/slp sessions. Course of plavix is to end and she will continue aspirin monotherapy thereafter. CTA without evidence of LVO and MRI with diffusion restriction within L M2 territory. S/p thrombectomy TICI3C. Echo EF 55-60% without evidence of cardioembolic source. No sign of arrhythmia per EKG, though RBBB. Given current etiology of ESUS recommend  obtaining 30 day cardiac event monitor to rule out other potential cardioembolic source.     Plan:     - Continue aspirin 81 mg daily  - Lexapro 20 mg  - Atorvastatin 40 mg   - Counseled on prevention of future stroke with current medical therapy  - Adhere to mediterranean diet   - Patient does not have history of diabetes nor smoking and she is aware of those risks  - Ordered 30 day cardiac event monitor  - RTC in 4 months         Problem List Items Addressed This Visit          Neuro    Cerebrovascular accident (CVA) due to embolism of left middle cerebral artery    Relevant Orders    Cardiac event monitor     Other Visit Diagnoses       Cerebral infarction, unspecified mechanism        Relevant Orders    Cardiac event monitor            Wiley Cooper MD    Patient note was created using MModal Dictation.  Any errors in syntax or even information may not have been identified and edited on initial review prior to signing this note.  Subjective:   Patient seen in consultation at the request of Chyna Zavala NP for the evaluation of post stroke follow up. A copy of this note will be sent to the referring physician.        HPI:   Ms. Prema MANLEY  Alan is a 79 y.o. female with history of left MCA stroke, migraine,  hypothyroidism, RBBB, and hyperlipidemia  presenting for post stroke follow up. She suffered a stroke 03/2024 with primary deficit including right sided weakness and aphasia. She underwent interventional thrombectomy with TICI3C for L M2 occlusion. She is on aspirin 81 mg and plavix 75 mg for 30 days. Her course of plavix is to end on our follow up and she will continue aspirin monotherapy thereafter. She was discharged to in patient rehab and continues to make gradual progress. She works with PT/OT twice weekly and SLP three times weekly. No reports of trouble with sleep though she does feel depressed at times per  when struggling to form sentences. She is currently on lexapro 20 mg. She denies any headache, chest pain, palpitations, nausea, vomiting, or diarrhea. She reports pain in the right leg and is using otc medications with mild relief.         PAST MEDICAL HISTORY:  Past Medical History:   Diagnosis Date    Acute ischemic left MCA stroke 3/7/2024    Allergic rhinitis 5/31/2016    Carotid artery plaque 11/18/2013    History of total hysterectomy with bilateral salpingo-oophorectomy (BSO) 5/31/2016    Hyperlipidemia 7/17/2012    Hypothyroid     Insomnia     Macular degeneration, right eye 5/31/2016    Migraine headache     Osteopenia of the elderly 10/20/2015    Postmenopausal HRT (hormone replacement therapy)     RBBB 7/17/2012       PAST SURGICAL HISTORY:  Past Surgical History:   Procedure Laterality Date    APPENDECTOMY      Catatact surgery Right 06/19/2019    without any complications    COLONOSCOPY  08/27/2019    repeat in 5 years    EYE SURGERY Bilateral     cataracts extraction    HYSTERECTOMY      STAPEDES SURGERY  2006    TOTAL ABDOMINAL HYSTERECTOMY W/ BILATERAL SALPINGOOPHORECTOMY      TUBAL LIGATION         CURRENT MEDS:  Current Outpatient Medications   Medication Sig Dispense Refill    amitriptyline (ELAVIL) 25 MG  tablet TAKE 1/2-1 TABLET BY MOUTH NIGHTLY FOR INSOMNIA. OK TO TAKE WITH THE LOW DOSE OF ATIVAN 90 tablet 4    aspirin (ECOTRIN) 81 MG EC tablet Take 81 mg by mouth once daily.      atorvastatin (LIPITOR) 40 MG tablet Take 1 tablet (40 mg total) by mouth once daily. 90 tablet 3    clopidogreL (PLAVIX) 75 mg tablet Take 1 tablet (75 mg total) by mouth once daily. 30 tablet 11    EScitalopram oxalate (LEXAPRO) 20 MG tablet Take 1 tablet (20 mg total) by mouth once daily. 90 tablet 3    levothyroxine (SYNTHROID) 75 MCG tablet Take 1 tablet (75 mcg total) by mouth before breakfast. 30 tablet 11    omeprazole (PRILOSEC) 20 MG capsule TAKE 1 CAPSULE BY MOUTH IN THE MORNING 90 capsule 2     No current facility-administered medications for this visit.       ALLERGIES:  Review of patient's allergies indicates:   Allergen Reactions    Neomycin Other (See Comments) and Swelling     Other reaction(s): Unknown    Carbamazepine Rash     Rash per 3/02 RN telephone encounter. On interview, pt/family unable to say if she continued or stopped taking medication       FAMILY HISTORY:  Family History   Problem Relation Name Age of Onset    Heart disease Father      COPD Father      Heart attack Father      Alzheimer's disease Mother      Cancer Sister Mine         breast cancer    Breast cancer Sister Mine     Alzheimer's disease Sister Mine     Other Brother Ricky         TIA    Hyperlipidemia Brother Ricky     Rheumatic fever Brother Ricky         as a child    Nephrolithiasis Daughter Una     Depression Son Emanual     Post-traumatic stress disorder Son Emanual     Cancer Maternal Aunt          breast cancer    Cancer Maternal Grandmother          breast cancer    Cancer Cousin          colon cancer       SOCIAL HISTORY:  Social History     Tobacco Use    Smoking status: Never    Smokeless tobacco: Never   Substance Use Topics    Alcohol use: Yes     Comment: occasionally    Drug use: No       Review of Systems:  12  system review of systems is negative except for the symptoms mentioned in HPI.      Objective:     Vitals:    04/18/24 1514   BP: 99/63   Pulse: 82     General: NAD, well nourished   Eyes: no tearing, discharge, no erythema   ENT: moist mucous membranes of the oral cavity, nares patent    Neck: Supple, full range of motion  Cardiovascular: Warm and well perfused, pulses equal and symmetrical  Lungs: Normal work of breathing, normal chest wall excursions  Skin: No rash, lesions, or breakdown on exposed skin  Psychiatry: Mood and affect are appropriate   Abdomen: soft, non tender, non distended  Extremeties: No cyanosis, clubbing or edema.    Neurological   MENTAL STATUS: Alert and oriented to person, place, and time. Notable expressive and anomic aphasia. Attention and concentration within normal limits. Mild dysarthria.   CRANIAL NERVES: Visual fields intact. PERRL. EOMI. Facial sensation intact. Face symmetrical. Uses hearing aid. Full shoulder shrug bilaterally.   SENSORY: Sensation is intact to light touch throughout.  Joint position perception intact. Negative Romberg.   MOTOR: Normal bulk and tone. Right pronator drift.  5/5 deltoid, biceps, triceps, interosseous, hand  in the LUE, 4/5 in RUE. 5/5 iliopsoas, knee extension/flexion, foot dorsi/plantarflexion in LLE, 4/5 RLE.    REFLEXES: Symmetric and 2+ throughout. Toes down going bilaterally.   CEREBELLAR/COORDINATION/GAIT: Gait steady with normal arm swing and stride length.  Able to perform FNF but has difficulty with comprehension of commands. No sign of tremor or ataxia.

## 2024-04-18 NOTE — PROGRESS NOTES
Population Health Chart Review & Patient Outreach Details      Additional [facility] Health Notes:    Eye exam hyperlinked       Updates Requested / Reviewed:      Updated Care Coordination Note, Care Everywhere, , Care Team Updated, and Immunizations Reconciliation Completed or Queried: [facility] Topics Overdue:      [facility] Score: 0     Patient is not due for any topics at this time.    Shingles/Zoster Vaccine  RSV Vaccine                  Health Maintenance Topic(s) Outreach Outcomes & Actions Taken:    Eye Exam - Outreach Outcomes & Actions Taken  : Diabetic Eye External Records Uploaded, Care Team & History Updated if Applicable

## 2024-04-18 NOTE — PROGRESS NOTES
"[facility]  Speech Therapy Treatment Note- Neurological Rehabilitation  Date: 4/19/2024     Name: Prema Phillips   MRN: [id number]   Therapy Diagnosis:   Encounter Diagnoses   Name Primary?    Aphasia Yes    Apraxia      Physician: Odtete Zhang MD  Physician Orders: Ambulatory Referral to Speech Therapy   Medical Diagnosis: M25.60,R53.1 (ICD-10-CM) - Decreased range of motion with decreased strength I63.412 (ICD-10-CM) - Cerebrovascular accident (CVA) due to embolism of left middle cerebral artery I67.89,G81.91 (ICD-10-CM) - Hemiparesis of right dominant side due to acute cerebrovascular disease    Visit #/ Visits Authorized: 9/20  Date of Evaluation:  3/27/24  Insurance Authorization Period: 3/27/24-12/31/24  Plan of Care Expiration Date:    5/22/24  Extended Plan of Care:  n/a   Progress Note: 4/27/24     Time In:  1101  Time Out:  1145  Total Billable Time:  44 minutes     Precautions: Communication  Subjective:   Patient reports: "I didn't think about that. I forgot about it at home" referencing her prayer book  She was compliant to home exercise program.   Response to previous treatment: good  Pain Scale: Patient unable to indicate pain secondary to aphasia. No pain behaviors observed throughout the session.  Objective:   UNTIMED  Procedure   Speech- Language- Voice Therapy     Short Term Goals: (4 weeks) Current Progress:   Patient will follow simple 1-2 step commands presented visually with >90% accuracy for 2 consecutive sessions to enhance her receptive language skills.    Progressing/ Not Met 4/19/2024 [facility] Advanced Comprehension 2 step directions presented visually: 70% accuracy independently (/10 trials), previously 20% accuracy independently   2. Patient will follow simple 1 step commands presented verbally with 75% accuracy and 1 repetition provided to enhance her receptive language skills.     Progressing/ Not Met 4/19/2024 [facility] Advanced Comprehension Level 1 " (1 step objects) presented verbally with 33% accuracy independently (2/6), increasing to 50% with semantic cues    previously  Followed 1 step body commands with 60% accuracy independently (9/15 trials), previously 43% accuracy independently (3/7 trials)   3. Patient will read single words aloud with 50% accuracy and 1 phonemic cue in order to bridge to higher level reading tasks.   Progressing/ Not Met 4/19/2024 Patient read bisyllabic words on first attempt with 73% accuracy independently, previously 40% (2/5 trials)        4. Patient will repeat monosyllabic, common words with 50% accuracy when provided with a visual cue to increase her spoken language abilities.     Progressing/ Not Met 4/19/2024 Repeated 10% of the 1 syllable words presented in a visual context/picture to reiterate the words. 1/10 trials completed - patient with tangential speech across tasks.  Previously 58% accuracy independently on first attempt (7/12 trials).      5. Patient will complete rote speech tasks with >75% accuracy in unison with speech-language pathologist to increase spoken language abilities.    Progressing/ Not Met 4/19/2024 Counted 1-10 with 70% accuracy with visual aid  Days of the week with visual aid with 71% accuracy (5/7) - same as prior session     6. Patient will answer simple open-ended questions with relevant responses with >75% accuracy with visual or auditory stimuli to increase spoken language output.     Progressing/ Not Met 4/19/2024 Answered open-ended questions with 50% accuracy, independently, phonemic paraphasias and neologisms present. Previously 40% accuracy - improvements noted in cohesion and in known contexts, speech-language pathologist could follow along with what she was saying     Long Term Goals: (8 weeks) Current Progress:   Patient will increase her social participation in conversation with > 5 utterances produced independently.    Progressing, not met 4/19/2024    2. Patient will state basic  wants/needs/concerns >90% of the time independently through use of various modalities in order to increase her social participation with others.     Progressing, not met 2024      Patient Education/Response:   Patient educated regarding the followin. [place] speech task important for speech production  2. Aphasia and prognosis - recovery and intensity of therapy (discussed with patient's )  3. [employer] [facility] [facility] [place]    Home program established: yes-provided yes/no questions worksheets  Patient verbalized understanding to all above education provided.     See Electronic Medical Record under Patient Instructions for exercises provided throughout therapy.  Assessment:   Ms. Lloyd participated in various receptive and expressive language tasks through use of following verbal and visual commands, reading 2 syllable words, and answering open-ended questions. Following commands when presented verbally continues to be a challenge for the patient even with repetition. She benefits from visual supports. In a known context, speech-language pathologist was able to follow along with the topic of her choice for conversational speech and despite paraphasias and occasional neologisms, cohesion was evident.    Prema is progressing well towards her goals. Current goals remain appropriate. Goals to be updated as necessary.     Patient prognosis is Good. Patient will continue to benefit from skilled outpatient speech and language therapy to address the deficits listed in the problem list on initial evaluation, provide patient/family education and to maximize patient's level of independence in the home and community environment.   Medical necessity is demonstrated by the following IMPAIRMENTS:  Aphasia: Patient with few true words in all situations severely limiting functional communication with both familiar and unfamiliar communication partners.   Barriers to Therapy: none  Patient's spiritual, cultural and educational needs  considered and patient agreeable to plan of care and goals.  Plan:   Continue Plan of Care with focus on rehabilitation and compensation for aphasia secondary to stroke.    Nori Yin MS, CCC-SLP, CBIS  Speech-Language Pathologist  Certified Brain Injury Specialist  4/19/2024

## 2024-04-19 ENCOUNTER — CLINICAL SUPPORT (OUTPATIENT)
Dept: REHABILITATION | Facility: HOSPITAL | Age: 79
End: 2024-04-19
Payer: MEDICARE

## 2024-04-19 DIAGNOSIS — R27.9 LACK OF COORDINATION DUE TO ACUTE CEREBROVASCULAR ACCIDENT (CVA): ICD-10-CM

## 2024-04-19 DIAGNOSIS — I63.9 LACK OF COORDINATION DUE TO ACUTE CEREBROVASCULAR ACCIDENT (CVA): ICD-10-CM

## 2024-04-19 DIAGNOSIS — R47.01 APHASIA: Primary | ICD-10-CM

## 2024-04-19 DIAGNOSIS — M62.81 MUSCLE WEAKNESS OF RIGHT UPPER EXTREMITY: Primary | ICD-10-CM

## 2024-04-19 DIAGNOSIS — R48.2 APRAXIA: ICD-10-CM

## 2024-04-19 PROCEDURE — 92507 TX SP LANG VOICE COMM INDIV: CPT | Mod: PN

## 2024-04-19 PROCEDURE — 97110 THERAPEUTIC EXERCISES: CPT | Mod: PN

## 2024-04-19 PROCEDURE — 97530 THERAPEUTIC ACTIVITIES: CPT | Mod: PN

## 2024-04-19 NOTE — PROGRESS NOTES
"[facility]  Occupational Therapy Treatment Note     Date: 4/19/2024  Name: Prema Phillips  Clinic Number: [id number]    Therapy Diagnosis:   Encounter Diagnoses   Name Primary?    Muscle weakness of right upper extremity Yes    Lack of coordination due to acute cerebrovascular accident (CVA)        Physician: Odette Zhang MD    Physician Orders: Eval and Treat  Medical Diagnosis: M25.60,R53.1 (ICD-10-CM) - Decreased range of motion with decreased strength I63.412 (ICD-10-CM) - Cerebrovascular accident (CVA) due to embolism of left middle cerebral artery I67.89,G81.91 (ICD-10-CM) - Hemiparesis of right dominant side due to acute cerebrovascular disease  Evaluation Date: 3/27/2024  Insurance Authorization Period Expiration: 12/31/2024  Plan of Care Certification Period: 05/24/2024  Visit # / Visits authorized: 4 / 20  FOTO: 0 / 3     Precautions:  Standard, Fall, and aphasia, apraxia      Time In: 10:15 am  Time Out: 11:00 am  Total Billable Time: 45 minutes     Subjective     Patient reports: "I am good It is getting better."   She was compliant with home exercise program given last session.   Response to previous treatment: good   Functional change: no change to report    Pain: 0/10  Location: no pain reported     Objective     Physical Exam:  Postural examination/scapula alignment:  upright posture, mild winging of right scapula with good muscle activation with tactile cues   left active range of motion and manual muscle test within normal limits.     Joint Evaluation  AROM  3/27/2024 PROM   3/27/2024 MMS   3/27/2024     Right Right Right   Scapular Elevation     3/5   Scapular Retraction     3/5   Shoulder Flex 0-180 121 130 3-/5   Shoulder Extension 0-80 60   3-/5   Shoulder Abd 0-180 130   3/5   Shoulder ER 0-90     3/5   Shoulder IR 0-90 WNL   3/5   Elbow Flexion 0-150 150   3/5   Elbow Extension 0   3/5   Wrist Flex 0-80 90   3/5   Wrist Ext 0-70 50   3-/5   Supination 0-80 WNL  "  3/5   Pronation 0-80 WNL   3/5   *WNL - Within Normal Limits  *NT = Not Tested     Fist: tight right hand ;       Strength: (FLOR Dynamometer in lbs.) Average 3 trials, Position II:       3/27/2024 3/27/2024   Rung II Right Left   Trial 1 19# 20#   Trial 2 14# 22#   Trial 3 15# 26#   Average 16# 22.6#      Normal  Average Values  Female Right Left Male: Right Left   20-29 66 lbs 62 lbs         94 lbs 86 lbs   30-39 68 lbs 64 lbs 90 lbs 82 lbs   40-49 64 lbs 62 lbs 80 lbs 74 lbs   50-59 62 lbs 57 lbs 72 lbs 65 lbs   60-69 53 lbs 51 lbs 63 lbs 56 lbs   70+ 44 lbs 42 lbs 54 lbs 48 lbs   SD = +/- 19lbs         Pinch Strength (Measured in lbs)       3/27/2024 3/27/2024     Right Left   Key Pinch 5 # 7 #   3pt Pinch 3 # 5 #   2pt Pinch 3 # 6 #         Fine/Gross Motor Coordination     Assessment   Right   3/27/2024 Left  3/27/2024   9 hole peg test 9 pegs in/out for time 2:25  2 cues for sequencing  :48   SMITHA (Rapid Alternating Movements)      impaired - moderate    intact   Finger to Nose (5 times)   impaired - moderate intact   Finger Flicks (coordination moving from digit flexion to digit extension)   impaired - minimum intact   *WNL - Within Normal Limits  *NT = Not Tested     Tone:  Modified Jessa Scale:   0 - No increase in muscle tone     Sensation:  Prema  was unable to complete sensory assessment at this time due to aphasia      Balance:   Static Sit - NORMAL: No deviations seen in posture held statically  Dynamic sit- NORMAL: No deviations seen in posture held statically  See physical therapy eval for detailed balance assessment      Endurance Deficit: moderate    Treatment     Prema received the treatments listed below:      Therapeutic exercises to develop endurance and ROM for 8 minutes, including:  - wrist maze x2 minutes   - isospheres x1 minute   - towel walks 3x5 with hand over hand and verbal cues     therapeutic activities to improve functional performance for 37 minutes, including:  - connect 4  sequencing red/yellow 1# wrist weight    - 4 errors with sequencing  - connect 4 in hand manipulation 2 coins at a time 1# wrist weight   - sequence game - matching, fine motor coordination and scanning   - handwriting with popsicle sticks - copying letters/building     Patient Education and Home Exercises     Education provided:   - green putty   - Progress towards goals     Written Home Exercises Provided: yes.  Exercises were reviewed and Prema was able to demonstrate them prior to the end of the session.  Prema demonstrated good  understanding of the home exercise program provided. See electronic medical record under Patient Instructions for exercises provided during therapy sessions.       Assessment     Prema tolerated today's session well. She exhibited difficulty with high level in hand manipulation task using isospheres, improvement with grading task to connect 4 pieces as she was able to translate the object. 4 sequencing errors with alternating colors noted and 75% awareness from Prema to correct. She was able to recreate letters with popsicle sticks, exhibiting difficulty with letters M, W, F, E and A. Use of right upper extremity throughout task.     Prema is progressing well towards her goals and there are no updates to goals at this time. Pt prognosis is Good.     Patient will continue to benefit from skilled outpatient occupational therapy to address the deficits listed in the problem list on initial evaluation provide patient/family education and to maximize patient's level of independence in the home and community environment.     Patient's spiritual, cultural and educational needs considered and patient agreeable to plan of care and goals.    Anticipated barriers to occupational therapy: cognitive deficits     Goals:  Short Term Goals: 4 weeks   - Pt will report 50% compliance with home exercise program in order to maintain progress attained during therapy. Not met, progressing  - Pt will improve  active range of motion of right shoulder flexion/abduction to 120/140 degrees in order to increase ease with bathing and dressing tasks. Not met, progressing  - Pt will increase right/left  strength to 22/30# in order to improve ease when dressing, bathing and completing gardening tasks. Not met, progressing  - Pt will decrease time on 9HPT to under 2 minutes using right hand  to improve fine motor speed and coordination needed to perform home maintenance, grooming and hygiene, money manipulation. Not met, progressing     Long Term Goals: 8 weeks   - Pt will report independence with home exercise program in order to maintain progress attained during therapy. Not met, progressing  - Pt will increase MMT for bilateral shoulder movements in all planes  to 4/5 to increase upper body strength needed for home management tasks, high level IADLs and leisure related activities. Not met, progressing  - Pt will increase bilateral  strength to 40# in order to improve ease with functional tasks such as gardening. Not met, progressing  - Pt will improve right/left key/3pt/2pt pinch by 2-3# in order to increase ease when dressing and grooming. Not met, progressing  - Pt will complete simulated instrumental activities of daily living activity with less than 2 cues for completion in order to return to prior level of function and decrease caregiver burden. Not met, progressing    Plan     Updates/Grading for next session: continue with plan of care     Corinne Rapier, OT   4/19/2024

## 2024-04-22 ENCOUNTER — CLINICAL SUPPORT (OUTPATIENT)
Dept: REHABILITATION | Facility: HOSPITAL | Age: 79
End: 2024-04-22
Payer: MEDICARE

## 2024-04-22 DIAGNOSIS — I63.9 LACK OF COORDINATION DUE TO ACUTE CEREBROVASCULAR ACCIDENT (CVA): ICD-10-CM

## 2024-04-22 DIAGNOSIS — Z91.81 RISK FOR FALLS: ICD-10-CM

## 2024-04-22 DIAGNOSIS — M62.81 MUSCLE WEAKNESS OF RIGHT UPPER EXTREMITY: Primary | ICD-10-CM

## 2024-04-22 DIAGNOSIS — R27.9 LACK OF COORDINATION DUE TO ACUTE CEREBROVASCULAR ACCIDENT (CVA): ICD-10-CM

## 2024-04-22 DIAGNOSIS — R48.2 APRAXIA: Primary | ICD-10-CM

## 2024-04-22 DIAGNOSIS — R47.01 APHASIA: Primary | ICD-10-CM

## 2024-04-22 DIAGNOSIS — R48.2 APRAXIA: ICD-10-CM

## 2024-04-22 PROCEDURE — 97112 NEUROMUSCULAR REEDUCATION: CPT | Mod: PN,CQ,59

## 2024-04-22 PROCEDURE — 97530 THERAPEUTIC ACTIVITIES: CPT | Mod: PN,59

## 2024-04-22 PROCEDURE — 92507 TX SP LANG VOICE COMM INDIV: CPT | Mod: PN

## 2024-04-22 PROCEDURE — 97110 THERAPEUTIC EXERCISES: CPT | Mod: PN,CQ

## 2024-04-22 NOTE — PROGRESS NOTES
[facility]   Physical Therapy Treatment Note      Name: Prema Phillips  Clinic Number: [id number]    Therapy Diagnosis:   Encounter Diagnoses   Name Primary?    Apraxia Yes    Risk for falls        Physician: Odette Zhang MD    Visit Date: 4/22/2024    Physician Orders: PT Eval and Treat   Medical Diagnosis from Referral: Decreased range of motion with decreased strength [M25.60, R53.1], Cerebrovascular accident (CVA) due to embolism of left middle cerebral artery [I63.412], Hemiparesis of right dominant side due to acute cerebrovascular disease [I67.89, G81.91]   Evaluation Date: 3/27/2024  Authorization Period Expiration: 12/31/2024  Plan of Care Expiration: 5/24/2024  Progress Note Due: 4/27/2024  Date of Surgery: N/A  Visit # / Visits authorized: 4/20 (+eval)  FOTO: 1/3     Precautions: Standard, Fall, and Aphasia (per SLP - consider written instructions as patient demonstrated good reading comprehension during speech eval)     Time In: 1:45 PM  Time Out: 2:30 PM  Total Billable Time: 45 minutes ( 2TE, 1 NR)      PTA Visit #: 1/5     Subjective     Patient reports: seemingly some left sided lower leg pain possibly from walking a lot this weekend, not specific to scale, ultimately subjective limited by aphasia   She will be compliant with home exercise program.  Response to previous treatment: no adverse reactions  Functional change: ongoing    Pain: 0/10  Location:  n/a     Objective      Objective Measures updated at progress report unless specified.     Treatment     Prema received the treatments listed below:      therapeutic exercises to develop strength and endurance for 25 minutes including:  [employer] recumbent stepper x8 min L5 sprint hill setting for endurance/activity tolerance assessment and training    - Standing reciprocal hip flexion marches with light touchdown support 3x10 bilateral 2# ankle weight  - Sit to stands from chair  x10  - Standing calf raises 2 x 20 with  "occasional touchdown support  - standing toe raises 2 x 20 with occasional touchdown support  - Standing hip abduction 2 x 15  2# ankle weight  - standing hip extension 2 x 15 2# ankle weight     neuromuscular re-education activities to improve: Balance and Coordination for 20 minutes. The following activities were included:     Tandem  static 3x30s bilateral      x 10 foot lengths near ballet bar:  Tandem walking x 8 lengths - occasional touch down on bar  3s marches  x 6 lengths - occasional touch down on bar  Backwards walking - cues for larger step lengths    Airex beam:  tandem x 4 laps    Sidestepping x 4 laps    Bosu round side up: split stance static holds - eyes open and eyes closed  2 feet static balance     Single leg stance trials 3 x 30s bilateral         Not today:  Hurdles (4 medium) forwards - good clearance and spacing  Hurdles (4 medium) sidestepping - challenging clearance and step sequencing  Backwards walking - improved with visual demo  4" step ups: reciprocal forward 2x10 B                     : lateral up and over x10 * unable to complete correctly despite max cuing  Airex:   Narrow base of support with vertical and horizontal head turns and eyes closed trials     Patient Education and Home Exercises       Education provided: Reinforced home exercise program provided at [facility].     Written Home Exercises Provided: Patient instructed to cont prior HEP. Exercises were reviewed and Prema was able to demonstrate them prior to the end of the session.  Prema demonstrated good  understanding of the education provided. See Electronic Medical Record under Patient Instructions for exercises provided during therapy sessions    Assessment     Prema arrived to session with some complaints of left lower leg pain but was not specific to scale.  Subjective limited by aphasia.  Good tolerance of treatment today with patient able to complete majority of balance without any UE support and no loss of balance " observed.  Very responsive to visual demonstration for task reinforcement.  Only really challenged with static balance on bosu today with a few instances of touchdown support required.  She would benefit from continued PT services to improve safe functional mobility.          Prema Is progressing well towards her goals.   Patient prognosis is Good.     Patient will continue to benefit from skilled outpatient physical therapy to address the deficits listed in the problem list box on initial evaluation, provide pt/family education and to maximize pt's level of independence in the home and community environment.     Patient's spiritual, cultural and educational needs considered and pt agreeable to plan of care and goals.     Anticipated barriers to physical therapy: asphasia    Goals:  Short Term Goals: 4 weeks   Patient will be compliant with HEP in order to maximize PT benefits  Patient will complete >/=10 seconds of single leg stance on each leg in order to improve static postural control during standing ADLs  Patient will complete TUG in </= 13 seconds in order to reduce risk for falls and improve safety with functional mobility  Patient will improve self-selected walking speed to >/=0.8 m/s in order to improve safety with community mobility     Long Term Goals: 8 weeks   Patient will score >/= 59% on FOTO survey in order to improve self-perception of functional mobility deficits  Patient will complete TUG in </= 11 seconds in order to reduce risk for falls and improve safety with functional mobility  Patient will score </= 15 seconds on Five Time Sit to  order to improve bilateral lower extremity endurance and muscular power for transfers   Patient will improve self-selected walking speed to >/=1.0 m/s in order to improve safety with community mobility  Patient will perform 1 floor <> stand transfer with bilateral upper extremity support in order to demonstrate safe functional mobility in case of future  fall  Patient will begin some form of home/community fitness in order to sustain progress gained in PT       Plan     Dynamic balance with tandem and narrow base of support - functional balance and tasks for ease of understanding.  Continue plan of care (POC).      Tami Gastelum, PTA

## 2024-04-22 NOTE — PROGRESS NOTES
"[facility]  Occupational Therapy Treatment Note     Date: 4/22/2024  Name: Prema Phillips  Clinic Number: [id number]    Therapy Diagnosis:   Encounter Diagnoses   Name Primary?    Muscle weakness of right upper extremity Yes    Lack of coordination due to acute cerebrovascular accident (CVA)        Physician: Odette Zhang MD    Physician Orders: Eval and Treat  Medical Diagnosis: M25.60,R53.1 (ICD-10-CM) - Decreased range of motion with decreased strength I63.412 (ICD-10-CM) - Cerebrovascular accident (CVA) due to embolism of left middle cerebral artery I67.89,G81.91 (ICD-10-CM) - Hemiparesis of right dominant side due to acute cerebrovascular disease  Evaluation Date: 3/27/2024  Insurance Authorization Period Expiration: 12/31/2024  Plan of Care Certification Period: 05/24/2024  Visit # / Visits authorized: 6 / 20  FOTO: 0 / 3     Precautions:  Standard, Fall, and aphasia, apraxia      Time In: 3:15 pm  Time Out: 4:00 pm  Total Billable Time: 45 minutes     Subjective     Patient reports: "I am good It is getting better."   She was compliant with home exercise program given last session.   Response to previous treatment: good   Functional change: no change to report    Pain: 0/10  Location: no pain reported     Objective     Physical Exam:  Postural examination/scapula alignment:  upright posture, mild winging of right scapula with good muscle activation with tactile cues   left active range of motion and manual muscle test within normal limits.     Joint Evaluation  AROM  3/27/2024 PROM   3/27/2024 MMS   3/27/2024 AROM  4/19/2024 MMS   4/19/2024     Right Right Right Right  Right    Scapular Elevation     3/5     Scapular Retraction     3/5  4/5   Shoulder Flex 0-180 121 130 3-/5 135 4/5   Shoulder Extension 0-80 60   3-/5  4/5   Shoulder Abd 0-180 130   3/5 180 4/5   Shoulder ER 0-90     3/5  4-/5   Shoulder IR 0-90 WNL   3/5  4/5   Elbow Flexion 0-150 150   3/5  4/5   Elbow " Extension 0   3/5  3+/5   Wrist Flex 0-80 90   3/5  3/5   Wrist Ext 0-70 50   3-/5  3/5   Supination 0-80 WNL   3/5  3/5   Pronation 0-80 WNL   3/5  3/5   *WNL - Within Normal Limits  *NT = Not Tested     Fist: tight right hand ;       Strength: (FLOR Dynamometer in lbs.) Average 3 trials, Position II:       3/27/2024 3/27/2024 4/19/2024 4/19/2024   Rung II Right Left Right  Left    Trial 1 19# 20# 30# 25#   Trial 2 14# 22# 33# 33#   Trial 3 15# 26# 27# 36#   Average 16# 22.6# 30# 31.3#      Normal  Average Values  Female Right Left Male: Right Left   20-29 66 lbs 62 lbs         94 lbs 86 lbs   30-39 68 lbs 64 lbs 90 lbs 82 lbs   40-49 64 lbs 62 lbs 80 lbs 74 lbs   50-59 62 lbs 57 lbs 72 lbs 65 lbs   60-69 53 lbs 51 lbs 63 lbs 56 lbs   70+ 44 lbs 42 lbs 54 lbs 48 lbs   SD = +/- 19lbs         Pinch Strength (Measured in lbs)       3/27/2024 3/27/2024 4/19/2024 4/19/2024     Right Left Right  Left    Key Pinch 5 # 7 # 8# 11#   3pt Pinch 3 # 5 # 7# 12#   2pt Pinch 3 # 6 # 5# 7#         Fine/Gross Motor Coordination     Assessment   Right   3/27/2024 Left  3/27/2024 Right   4/19/2024 Left   4/19/2024   9 hole peg test 9 pegs in/out for time 2:25  2 cues for sequencing  :48 1:01  No cues required :32   SMITHA (Rapid Alternating Movements)      impaired - moderate    intact Impaired - decreased ROM  Impaired - decreased ROM    Finger to Nose (5 times)   impaired - moderate intact Impaired - moderate - touched back of head rather than nose  Impaired - moderate - touched back of head rather than nose    Finger Flicks (coordination moving from digit flexion to digit extension)   impaired - minimum intact Intact - slowed but full range of motion  Intact - slowed but full range of motion    *WNL - Within Normal Limits  *NT = Not Tested     Endurance Deficit: moderate    Treatment     Prema received the treatments listed below:      therapeutic activities to improve functional performance for 45 minutes, including:  -  reassessment   - pegs with peg board   - coloring activity for eye hand coordination and prepare for handwriting drills   - handwriting activities / drills     Patient Education and Home Exercises     Education provided:   - handwriting drills   - green putty   - Progress towards goals     Written Home Exercises Provided: yes.  Exercises were reviewed and Prema was able to demonstrate them prior to the end of the session.  Prema demonstrated good  understanding of the home exercise program provided. See electronic medical record under Patient Instructions for exercises provided during therapy sessions.       Assessment     Prema tolerated today's session well. Reassessment performed and she exhibits steady increase with active range of motion, manual muscle strength,  and coordination. In addition, she is communicating more and able to follow directions. She continues to present with motor planning deficits, difficulty with letter formation and general weakness and coordination deficits in right upper extremity compared to left. She is compliant with home exercise program and is highly motivated. Issued home exercise program with handwriting tasks/drills and reviewed with pt and .     Prema is progressing well towards her goals and there are no updates to goals at this time. Pt prognosis is Good.     Patient will continue to benefit from skilled outpatient occupational therapy to address the deficits listed in the problem list on initial evaluation provide patient/family education and to maximize patient's level of independence in the home and community environment.     Patient's spiritual, cultural and educational needs considered and patient agreeable to plan of care and goals.    Anticipated barriers to occupational therapy: cognitive deficits     Goals:  Short Term Goals: 4 weeks   - Pt will report 50% compliance with home exercise program in order to maintain progress attained during therapy. Met  4/22/2024  - Pt will improve active range of motion of right shoulder flexion/abduction to 120/140 degrees in order to increase ease with bathing and dressing tasks. Met 4/22/2024  - Pt will increase right/left  strength to 22/30# in order to improve ease when dressing, bathing and completing gardening tasks. Met 4/22/2024  - Pt will decrease time on 9HPT to under 2 minutes using right hand  to improve fine motor speed and coordination needed to perform home maintenance, grooming and hygiene, money manipulation. Met 4/22/2024     Long Term Goals: 8 weeks   - Pt will report independence with home exercise program in order to maintain progress attained during therapy. Not met, progressing  - Pt will increase MMT for bilateral shoulder movements in all planes  to 4/5 to increase upper body strength needed for home management tasks, high level IADLs and leisure related activities. Not met, progressing  - Pt will increase bilateral  strength to 40# in order to improve ease with functional tasks such as gardening. Not met, progressing  - Pt will improve right/left key/3pt/2pt pinch by 2-3# in order to increase ease when dressing and grooming. Met 4/22/2024  - Pt will complete simulated instrumental activities of daily living activity with less than 2 cues for completion in order to return to prior level of function and decrease caregiver burden. Not met, progressing    Plan     Updates/Grading for next session: continue with plan of care     Corinne Rapier, OT   4/22/2024

## 2024-04-22 NOTE — PROGRESS NOTES
[facility]  Speech Therapy Treatment Note- Neurological Rehabilitation  Date: 4/22/2024     Name: Prema Phillips   MRN: [id number]   Therapy Diagnosis:   Encounter Diagnoses   Name Primary?    Aphasia Yes    Apraxia      Physician: Odette Zhang MD  Physician Orders: Ambulatory Referral to Speech Therapy   Medical Diagnosis: M25.60,R53.1 (ICD-10-CM) - Decreased range of motion with decreased strength I63.412 (ICD-10-CM) - Cerebrovascular accident (CVA) due to embolism of left middle cerebral artery I67.89,G81.91 (ICD-10-CM) - Hemiparesis of right dominant side due to acute cerebrovascular disease    Visit #/ Visits Authorized: 10/20  Date of Evaluation:  3/27/24  Insurance Authorization Period: 3/27/24-12/31/24  Plan of Care Expiration Date:    5/22/24  Extended Plan of Care:  n/a   Progress Note: 4/27/24     Time In:  2:35  Time Out:  3:12  Total Billable Time:  37 minutes     Precautions: Communication  Subjective:   Patient reports: I had a good time. (Referring to her weekend when asked).  She was compliant to home exercise program.   Response to previous treatment: good  Pain Scale: Patient unable to indicate pain secondary to aphasia. No pain behaviors observed throughout the session.  Objective:   UNTIMED  Procedure   Speech- Language- Voice Therapy     Short Term Goals: (4 weeks) Current Progress:   Patient will follow simple 1-2 step commands presented visually with >90% accuracy for 2 consecutive sessions to enhance her receptive language skills.    Progressing/ Not Met 4/22/2024 [facility] Advanced Comprehension 2 step directions presented visually: 80% accuracy independently (8/10 trials), previously 70% accuracy independently       2. Patient will follow simple 1 step commands presented verbally with 75% accuracy and 1 repetition provided to enhance her receptive language skills.     Progressing/ Not Met 4/22/2024 [facility] Advanced Comprehension Level 1 (1 step objects)  presented verbally with 60% accuracy independently (6/10), increasing to 80% with semantic cues    previously  Followed 1 step body commands with 20% acc (1/5) previously 60% accuracy independently (9/15 trials)       3. Patient will read single words aloud with 50% accuracy and 1 phonemic cue in order to bridge to higher level reading tasks.   Progressing/ Not Met 4/22/2024 Patient read bisyllabic words on first attempt with 100% accuracy independently, previously 73% (6/6 trials)     (Baby, brother, sister, mother, father, Rosa)       4. Patient will repeat monosyllabic, common words with 50% accuracy when provided with a visual cue to increase her spoken language abilities.     Progressing/ Not Met 4/22/2024 Repeated 20% of the 1 syllable words presented in a visual context/picture to reiterate the words.    5. Patient will complete rote speech tasks with >75% accuracy in unison with speech-language pathologist to increase spoken language abilities.    Progressing/ Not Met 4/22/2024 Counted 1-10 with 70% accuracy with visual aid on 3rd attempt; 10% on first attempt in unison    Days of the week with visual aid with 28% accuracy (2/7) - same as prior session     6. Patient will answer simple open-ended questions with relevant responses with >75% accuracy with visual or auditory stimuli to increase spoken language output.     Progressing/ Not Met 4/22/2024 Answered open-ended questions with 50% accuracy, independently, phonemic paraphasias and neologisms present.     Long Term Goals: (8 weeks) Current Progress:   Patient will increase her social participation in conversation with > 5 utterances produced independently.    Progressing, not met 4/22/2024    2. Patient will state basic wants/needs/concerns >90% of the time independently through use of various modalities in order to increase her social participation with others.     Progressing, not met 4/22/2024      Patient Education/Response:   Patient educated  regarding the followin. [place] speech task important for speech production  2. Aphasia and prognosis - recovery and intensity of therapy (discussed with patient's )  3. Seguricel Cynthia    Home program established: yes-provided yes/no questions worksheets  Patient verbalized understanding to all above education provided.     See Electronic Medical Record under Patient Instructions for exercises provided throughout therapy.  Assessment:   Ms. Lloyd participated in various receptive and expressive language tasks through use of following verbal and visual commands, reading 2 syllable words, and answering open-ended questions. Improvement in spontaneous conversation. Following commands when presented verbally continues to be a challenge for the patient even with repetition. She benefits from visual supports. In a known context, speech-language pathologist was able to follow along with the topic of her choice for conversational speech and despite paraphasias and occasional neologisms, cohesion was evident.    Prema is progressing well towards her goals. Current goals remain appropriate. Goals to be updated as necessary.     Patient prognosis is Good. Patient will continue to benefit from skilled outpatient speech and language therapy to address the deficits listed in the problem list on initial evaluation, provide patient/family education and to maximize patient's level of independence in the home and community environment.   Medical necessity is demonstrated by the following IMPAIRMENTS:  Aphasia: Patient with few true words in all situations severely limiting functional communication with both familiar and unfamiliar communication partners.   Barriers to Therapy: none  Patient's spiritual, cultural and educational needs considered and patient agreeable to plan of care and goals.  Plan:   Continue Plan of Care with focus on rehabilitation and compensation for aphasia secondary to stroke.    Shreya Hung MS,  CCC-SLP  Speech-Language Pathologist  4/22/2024

## 2024-04-24 ENCOUNTER — CLINICAL SUPPORT (OUTPATIENT)
Dept: REHABILITATION | Facility: HOSPITAL | Age: 79
End: 2024-04-24
Payer: MEDICARE

## 2024-04-24 DIAGNOSIS — M62.81 MUSCLE WEAKNESS OF RIGHT UPPER EXTREMITY: Primary | ICD-10-CM

## 2024-04-24 DIAGNOSIS — I63.9 LACK OF COORDINATION DUE TO ACUTE CEREBROVASCULAR ACCIDENT (CVA): ICD-10-CM

## 2024-04-24 DIAGNOSIS — Z91.81 RISK FOR FALLS: ICD-10-CM

## 2024-04-24 DIAGNOSIS — R27.9 LACK OF COORDINATION DUE TO ACUTE CEREBROVASCULAR ACCIDENT (CVA): ICD-10-CM

## 2024-04-24 DIAGNOSIS — R48.2 APRAXIA: Primary | ICD-10-CM

## 2024-04-24 DIAGNOSIS — R47.01 APHASIA: Primary | ICD-10-CM

## 2024-04-24 DIAGNOSIS — R48.2 APRAXIA: ICD-10-CM

## 2024-04-24 PROCEDURE — 97110 THERAPEUTIC EXERCISES: CPT | Mod: PN,CQ

## 2024-04-24 PROCEDURE — 97110 THERAPEUTIC EXERCISES: CPT | Mod: PN,59

## 2024-04-24 PROCEDURE — 97112 NEUROMUSCULAR REEDUCATION: CPT | Mod: PN,CQ

## 2024-04-24 PROCEDURE — 92507 TX SP LANG VOICE COMM INDIV: CPT | Mod: PN

## 2024-04-24 PROCEDURE — 97530 THERAPEUTIC ACTIVITIES: CPT | Mod: PN,59

## 2024-04-24 NOTE — PROGRESS NOTES
"[facility]  Speech Therapy PROGRESS Note- Neurological Rehabilitation  Date: 4/24/2024     Name: Prema Phillips   MRN: [id number]   Therapy Diagnosis:   Encounter Diagnoses   Name Primary?    Aphasia Yes    Apraxia      Physician: Odette Zhang MD  Physician Orders: Ambulatory Referral to Speech Therapy   Medical Diagnosis: M25.60,R53.1 (ICD-10-CM) - Decreased range of motion with decreased strength I63.412 (ICD-10-CM) - Cerebrovascular accident (CVA) due to embolism of left middle cerebral artery I67.89,G81.91 (ICD-10-CM) - Hemiparesis of right dominant side due to acute cerebrovascular disease    Visit #/ Visits Authorized: 11/20  Date of Evaluation:  3/27/24  Insurance Authorization Period: 3/27/24-12/31/24  Plan of Care Expiration Date:    5/22/24  Extended Plan of Care:  n/a   Progress Note: 5/24/24     Time In:  0932  Time Out:  1017  Total Billable Time:  45 minutes     Precautions: Communication  Subjective:   Patient reports: "That's not the right word" - patient states she is doing well  She was compliant to home exercise program.   Response to previous treatment: good  Pain Scale: Patient unable to indicate pain secondary to aphasia. No pain behaviors observed throughout the session.  Objective:   UNTIMED  Procedure   Speech- Language- Voice Therapy     Short Term Goals: (4 weeks) Current Progress:   Patient will follow simple 1-2 step commands presented visually with >90% accuracy for 2 consecutive sessions to enhance her receptive language skills.    Progressing/ Not Met 4/24/2024 [facility] Advanced Comprehension 2 step directions presented visually: 90% accuracy independently (9/10 trials), previously 80% accuracy independently    1/2 consecutive sessions met       2. Patient will follow simple 1 step commands presented verbally with 75% accuracy and 1 repetition provided to enhance her receptive language skills.     Progressing/ Not Met 4/24/2024 Did not address " today    Previously, [facility] Advanced Comprehension Level 1 (1 step objects) presented verbally with 60% accuracy independently (6/10), increasing to 80% with semantic cues     3. Patient will read single words aloud with 50% accuracy and 1 phonemic cue in order to bridge to higher level reading tasks.   Progressing/ Not Met 4/24/2024 Patient read bisyllabic words on first attempt with 50% accuracy independently (6/12), previously 100%  Monosyllabic words 75% accuracy independently (6/8 trials)     4. Patient will repeat monosyllabic, common words with 50% accuracy when provided with a visual cue to increase her spoken language abilities.     Progressing/ Not Met 4/24/2024 Repeated 30% accuracy on 1st attempt with visual picture and/or written word, previously 20% presented in a visual context/picture to reiterate the words.    Verbal prompts to remain quiet during repetition task as patient began with tangential speech   5. Patient will complete rote speech tasks with >75% accuracy in unison with speech-language pathologist to increase spoken language abilities.  Progressing/ Not Met 4/24/2024 Counted 1-10 with 70% accuracy independently on first attempt.    Sang happy birthday to her dog with only one phonemic paraphasia throughout the whole song - independent singing.   6. Patient will answer simple open-ended questions with relevant responses with >75% accuracy with visual or auditory stimuli to increase spoken language output.     Progressing/ Not Met 4/24/2024 Answered open-ended questions with 80% accuracy, independently, phonemic paraphasias and neologisms present.     Long Term Goals: (8 weeks) Current Progress:   Patient will increase her social participation in conversation with > 5 utterances produced independently.    Progressing, not met 4/24/2024    2. Patient will state basic wants/needs/concerns >90% of the time independently through use of various modalities in order to increase her social  participation with others.     Progressing, not met 2024      Patient Education/Response:   Patient educated regarding the followin. [place] speech task important for speech production  2. Aphasia and prognosis - recovery and intensity of therapy (discussed with patient's )  3. Mitralign Cynthia    Home program established: yes-provided yes/no questions worksheets  Patient verbalized understanding to all above education provided.     See Electronic Medical Record under Patient Instructions for exercises provided throughout therapy.  Assessment:   Reassessment was completed today focusing on assessing current abilities as outlined in the short-term goals. Patient has demonstrated improvements in following commands presented visually, meeting 1 of the 2 consecutive session requirements. With other auditory comprehension tasks, including repetition and following verbally presented commands, she has the most difficulty. Her expressive language skills have also improved with notable increases in spontaneous production to open-ended questions and in conversation. In known contexts, there is cohesion of thoughts noted though she remains with neologisms and phonemic paraphasias. She will continue to benefit from skilled speech therapy services.    Prema is progressing well towards her goals. Current goals remain appropriate. Goals to be updated as necessary.     Patient prognosis is Good. Patient will continue to benefit from skilled outpatient speech and language therapy to address the deficits listed in the problem list on initial evaluation, provide patient/family education and to maximize patient's level of independence in the home and community environment.   Medical necessity is demonstrated by the following IMPAIRMENTS:  Aphasia: Patient with few true words in all situations severely limiting functional communication with both familiar and unfamiliar communication partners.   Barriers to Therapy:  none  Patient's spiritual, cultural and educational needs considered and patient agreeable to plan of care and goals.  Plan:   Continue Plan of Care with focus on rehabilitation and compensation for aphasia secondary to stroke.    Nori Yin MS, CCC-SLP, CBIS  Speech-Language Pathologist  Certified Brain Injury Specialist  4/24/2024

## 2024-04-24 NOTE — PROGRESS NOTES
[facility]   Physical Therapy Treatment Note      Name: Prema Phillips  Clinic Number: [id number]    Therapy Diagnosis:   Encounter Diagnoses   Name Primary?    Apraxia Yes    Risk for falls        Physician: Odette Zhnag MD    Visit Date: 4/24/2024    Physician Orders: PT Eval and Treat   Medical Diagnosis from Referral: Decreased range of motion with decreased strength [M25.60, R53.1], Cerebrovascular accident (CVA) due to embolism of left middle cerebral artery [I63.412], Hemiparesis of right dominant side due to acute cerebrovascular disease [I67.89, G81.91]   Evaluation Date: 3/27/2024  Authorization Period Expiration: 12/31/2024  Plan of Care Expiration: 5/24/2024  Progress Note Due: 4/27/2024  Date of Surgery: N/A  Visit # / Visits authorized: 5/20 (+eval)  FOTO: 1/3     Precautions: Standard, Fall, and Aphasia (per SLP - consider written instructions as patient demonstrated good reading comprehension during speech eval)     Time In: 11:15 AM  Time Out: 12:00 PM  Total Billable Time: 45 minutes (2TE, 1 NR)    PTA Visit #: 2/5     Subjective     Patient reports: seemingly some left sided lower leg pain possibly from walking a lot this weekend, not specific to scale, subjective limited by aphasia   She will be compliant with home exercise program.  Response to previous treatment: no adverse reactions  Functional change: ongoing    Pain: 0/10  Location:  n/a     Objective      Objective Measures updated at progress report unless specified.     Treatment     Prema received the treatments listed below:      therapeutic exercises to develop strength and endurance for 25 minutes including:  GoFormzfit recumbent stepper x8 min L5 sprint hill setting for endurance/activity tolerance assessment and training    - Standing reciprocal hip flexion marches with light touchdown support 3x10 bilateral 2# ankle weight  - Sit to stands from chair  2x6  - Standing calf raises 2 x 20 with occasional  "touchdown support  - standing toe raises 2 x 20 with occasional touchdown support  - Standing hip abduction 2 x 15  2# ankle weight  - standing hip extension 2 x 15 2# ankle weight     neuromuscular re-education activities to improve: Balance and Coordination for 20 minutes. The following activities were included:     Tandem  static 3x30s bilateral      x 10 foot lengths near ballet bar:  Tandem walking x 8 lengths - occasional touch down on bar  3s marches  x 6 lengths - occasional touch down on bar  Backwards walking - cues for larger step lengths    Airex beam:  tandem x 4 laps    Sidestepping x 4 laps    Bosu round side up: split stance static holds - eyes open and eyes closed  2 feet static balance     Single leg stance trials 3 x 30s bilateral         Not today:  Hurdles (4 medium) forwards - good clearance and spacing  Hurdles (4 medium) sidestepping - challenging clearance and step sequencing  Backwards walking - improved with visual demo  4" step ups: reciprocal forward 2x10 B                     : lateral up and over x10 * unable to complete correctly despite max cuing  Airex:   Narrow base of support with vertical and horizontal head turns and eyes closed trials     Patient Education and Home Exercises       Education provided: Reinforced home exercise program provided at [facility].     Written Home Exercises Provided: Patient instructed to cont prior HEP. Exercises were reviewed and Prema was able to demonstrate them prior to the end of the session.  Prema demonstrated good  understanding of the education provided. See Electronic Medical Record under Patient Instructions for exercises provided during therapy sessions    Assessment     Prema arrived to session with some complaints of left lower leg pain but was not specific to scale.  Subjective limited by aphasia.  Good tolerance of treatment today with patient able to complete majority of balance without any UE support and no loss of balance observed.  Very " responsive to visual demonstration for task reinforcement.  Only really challenged with static balance on bosu today with a few instances of touchdown support required.  She would benefit from continued PT services to improve safe functional mobility.          Prema Is progressing well towards her goals.   Patient prognosis is Good.     Patient will continue to benefit from skilled outpatient physical therapy to address the deficits listed in the problem list box on initial evaluation, provide pt/family education and to maximize pt's level of independence in the home and community environment.     Patient's spiritual, cultural and educational needs considered and pt agreeable to plan of care and goals.     Anticipated barriers to physical therapy: asphasia    Goals:  Short Term Goals: 4 weeks   Patient will be compliant with HEP in order to maximize PT benefits  Patient will complete >/=10 seconds of single leg stance on each leg in order to improve static postural control during standing ADLs  Patient will complete TUG in </= 13 seconds in order to reduce risk for falls and improve safety with functional mobility  Patient will improve self-selected walking speed to >/=0.8 m/s in order to improve safety with community mobility     Long Term Goals: 8 weeks   Patient will score >/= 59% on FOTO survey in order to improve self-perception of functional mobility deficits  Patient will complete TUG in </= 11 seconds in order to reduce risk for falls and improve safety with functional mobility  Patient will score </= 15 seconds on Five Time Sit to  order to improve bilateral lower extremity endurance and muscular power for transfers   Patient will improve self-selected walking speed to >/=1.0 m/s in order to improve safety with community mobility  Patient will perform 1 floor <> stand transfer with bilateral upper extremity support in order to demonstrate safe functional mobility in case of future fall  Patient will  begin some form of home/community fitness in order to sustain progress gained in PT       Plan     Dynamic balance with tandem and narrow base of support - functional balance and tasks for ease of understanding.  Continue plan of care (POC).      Tami Gastelum, PTA

## 2024-04-24 NOTE — PROGRESS NOTES
"[facility]  Occupational Therapy Treatment Note     Date: 4/24/2024  Name: Prema Phillips  Clinic Number: [id number]    Therapy Diagnosis:   Encounter Diagnoses   Name Primary?    Muscle weakness of right upper extremity Yes    Lack of coordination due to acute cerebrovascular accident (CVA)        Physician: Odette Zhang MD    Physician Orders: Eval and Treat  Medical Diagnosis: M25.60,R53.1 (ICD-10-CM) - Decreased range of motion with decreased strength I63.412 (ICD-10-CM) - Cerebrovascular accident (CVA) due to embolism of left middle cerebral artery I67.89,G81.91 (ICD-10-CM) - Hemiparesis of right dominant side due to acute cerebrovascular disease  Evaluation Date: 3/27/2024  Insurance Authorization Period Expiration: 12/31/2024  Plan of Care Certification Period: 05/24/2024  Visit # / Visits authorized: 7 / 20  FOTO: 0 / 3     Precautions:  Standard, Fall, and aphasia, apraxia      Time In: 10:25 am  Time Out: 11:10 am  Total Billable Time: 45 minutes     Subjective     Patient reports: "this arm is still weird."   She was compliant with home exercise program given last session.   Response to previous treatment: good   Functional change: improved communication and movement/awareness of right upper extremity     Pain: 0/10  Location: no pain reported     Objective     Physical Exam:  Postural examination/scapula alignment:  upright posture, mild winging of right scapula with good muscle activation with tactile cues   left active range of motion and manual muscle test within normal limits.     Joint Evaluation  AROM  3/27/2024 PROM   3/27/2024 MMS   3/27/2024 AROM  4/19/2024 MMS   4/19/2024     Right Right Right Right  Right    Scapular Elevation     3/5     Scapular Retraction     3/5  4/5   Shoulder Flex 0-180 121 130 3-/5 135 4/5   Shoulder Extension 0-80 60   3-/5  4/5   Shoulder Abd 0-180 130   3/5 180 4/5   Shoulder ER 0-90     3/5  4-/5   Shoulder IR 0-90 WNL   3/5  4/5 "   Elbow Flexion 0-150 150   3/5  4/5   Elbow Extension 0   3/5  3+/5   Wrist Flex 0-80 90   3/5  3/5   Wrist Ext 0-70 50   3-/5  3/5   Supination 0-80 WNL   3/5  3/5   Pronation 0-80 WNL   3/5  3/5   *WNL - Within Normal Limits  *NT = Not Tested     Fist: tight right hand ;       Strength: (FLOR Dynamometer in lbs.) Average 3 trials, Position II:       3/27/2024 3/27/2024 4/19/2024 4/19/2024   Rung II Right Left Right  Left    Trial 1 19# 20# 30# 25#   Trial 2 14# 22# 33# 33#   Trial 3 15# 26# 27# 36#   Average 16# 22.6# 30# 31.3#      Normal  Average Values  Female Right Left Male: Right Left   20-29 66 lbs 62 lbs         94 lbs 86 lbs   30-39 68 lbs 64 lbs 90 lbs 82 lbs   40-49 64 lbs 62 lbs 80 lbs 74 lbs   50-59 62 lbs 57 lbs 72 lbs 65 lbs   60-69 53 lbs 51 lbs 63 lbs 56 lbs   70+ 44 lbs 42 lbs 54 lbs 48 lbs   SD = +/- 19lbs         Pinch Strength (Measured in lbs)       3/27/2024 3/27/2024 4/19/2024 4/19/2024     Right Left Right  Left    Key Pinch 5 # 7 # 8# 11#   3pt Pinch 3 # 5 # 7# 12#   2pt Pinch 3 # 6 # 5# 7#         Fine/Gross Motor Coordination     Assessment   Right   3/27/2024 Left  3/27/2024 Right   4/19/2024 Left   4/19/2024   9 hole peg test 9 pegs in/out for time 2:25  2 cues for sequencing  :48 1:01  No cues required :32   SMITHA (Rapid Alternating Movements)      impaired - moderate    intact Impaired - decreased ROM  Impaired - decreased ROM    Finger to Nose (5 times)   impaired - moderate intact Impaired - moderate - touched back of head rather than nose  Impaired - moderate - touched back of head rather than nose    Finger Flicks (coordination moving from digit flexion to digit extension)   impaired - minimum intact Intact - slowed but full range of motion  Intact - slowed but full range of motion    *WNL - Within Normal Limits  *NT = Not Tested     Endurance Deficit: moderate    Treatment     Prema received the treatments listed below:      Therapeutic exercises to develop strength,  endurance, and posture for 8 minutes, including:  Upper Body Ergometer (UBE) L2 for 8 minutes at 45 RPM to provide \proprioceptive awareness, postural stability, strength, activity tolerance, and reciprocal patterns with bimanual coordination completed to improve use of bilateral upper extremities in order to prepare for therapeutic exercises/activities. Cues provided as needed for postural stability/positioning    therapeutic activities to improve functional performance for 37 minutes, including:  - pipe tree figure 1 and 3  - tiles and image - train and fish     Patient Education and Home Exercises     Education provided:   - handwriting drills   - green putty   - Progress towards goals     Written Home Exercises Provided: yes.  Exercises were reviewed and Prema was able to demonstrate them prior to the end of the session.  Prema demonstrated good  understanding of the home exercise program provided. See electronic medical record under Patient Instructions for exercises provided during therapy sessions.       Assessment     Prema tolerated today's session well. UBE utilized to provide proprioceptive input, reciprocal patterns and input to right upper extremity. She was able to complete 8 minutes without rest breaks indicating improved endurance. Pipe tree activity to facilitate fine motor coordination, visual scanning, coordination, letter identification and following directions.  min cues for start of activity, able to independently complete harder figures as task progressed. Improved gross motor coordination and fine motor coordination with both therapeutic activities and increased challenge with recreating patterns from image to assist with letter formation, sequencing and problem solving.     Prema is progressing well towards her goals and there are no updates to goals at this time. Pt prognosis is Good.     Patient will continue to benefit from skilled outpatient occupational therapy to address the deficits  listed in the problem list on initial evaluation provide patient/family education and to maximize patient's level of independence in the home and community environment.     Patient's spiritual, cultural and educational needs considered and patient agreeable to plan of care and goals.    Anticipated barriers to occupational therapy: cognitive deficits     Goals:  Short Term Goals: 4 weeks   - Pt will report 50% compliance with home exercise program in order to maintain progress attained during therapy. Met 4/22/2024  - Pt will improve active range of motion of right shoulder flexion/abduction to 120/140 degrees in order to increase ease with bathing and dressing tasks. Met 4/22/2024  - Pt will increase right/left  strength to 22/30# in order to improve ease when dressing, bathing and completing gardening tasks. Met 4/22/2024  - Pt will decrease time on 9HPT to under 2 minutes using right hand  to improve fine motor speed and coordination needed to perform home maintenance, grooming and hygiene, money manipulation. Met 4/22/2024     Long Term Goals: 8 weeks   - Pt will report independence with home exercise program in order to maintain progress attained during therapy. Not met, progressing  - Pt will increase MMT for bilateral shoulder movements in all planes  to 4/5 to increase upper body strength needed for home management tasks, high level IADLs and leisure related activities. Not met, progressing  - Pt will increase bilateral  strength to 40# in order to improve ease with functional tasks such as gardening. Not met, progressing  - Pt will improve right/left key/3pt/2pt pinch by 2-3# in order to increase ease when dressing and grooming. Met 4/22/2024  - Pt will complete simulated instrumental activities of daily living activity with less than 2 cues for completion in order to return to prior level of function and decrease caregiver burden. Not met, progressing    Plan     Updates/Grading for next session:  continue with plan of care     Corinne Rapier, OT   4/24/2024

## 2024-04-26 ENCOUNTER — CLINICAL SUPPORT (OUTPATIENT)
Dept: REHABILITATION | Facility: HOSPITAL | Age: 79
End: 2024-04-26
Payer: MEDICARE

## 2024-04-26 DIAGNOSIS — R48.2 APRAXIA: ICD-10-CM

## 2024-04-26 DIAGNOSIS — R47.01 APHASIA: Primary | ICD-10-CM

## 2024-04-26 PROCEDURE — 92507 TX SP LANG VOICE COMM INDIV: CPT | Mod: PN

## 2024-04-26 NOTE — PROGRESS NOTES
"[facility]  Speech Therapy Treatment Note- Neurological Rehabilitation  Date: 4/26/2024     Name: Prema Phillips   MRN: [id number]   Therapy Diagnosis:   Encounter Diagnoses   Name Primary?    Aphasia Yes    Apraxia      Physician: Odette Zhang MD  Physician Orders: Ambulatory Referral to Speech Therapy   Medical Diagnosis: M25.60,R53.1 (ICD-10-CM) - Decreased range of motion with decreased strength I63.412 (ICD-10-CM) - Cerebrovascular accident (CVA) due to embolism of left middle cerebral artery I67.89,G81.91 (ICD-10-CM) - Hemiparesis of right dominant side due to acute cerebrovascular disease    Visit #/ Visits Authorized: 12/20  Date of Evaluation:  3/27/24  Insurance Authorization Period: 3/27/24-12/31/24  Plan of Care Expiration Date:    5/22/24  Extended Plan of Care:  n/a   Progress Note: 5/24/24     Time In:  1345  Time Out:  1430  Total Billable Time:  45 minutes     Precautions: Communication  Subjective:   Patient reports: "She calls me Prema!" Referencing her daughter  She was compliant to home exercise program.   Response to previous treatment: good  Pain Scale: Patient unable to indicate pain secondary to aphasia. No pain behaviors observed throughout the session.  Objective:   UNTIMED  Procedure   Speech- Language- Voice Therapy     Short Term Goals: (4 weeks) Current Progress:   Patient will follow simple 1-2 step commands presented visually with >90% accuracy for 2 consecutive sessions to enhance her receptive language skills.  MET 4/26/24 [facility] Advanced Comprehension 2 step directions presented visually: 90% accuracy independently (9/10 trials), previously 90% accuracy independently    2/2 consecutive sessions met   2. Patient will follow simple 1 step commands presented verbally with 75% accuracy and 1 repetition provided to enhance her receptive language skills.     Progressing/ Not Met 4/26/2024 Patient unable to follow simple 1 step commands presented verbally " despite effort. 0% accuracy independently, increasing to 50% with written word.    Previously, [facility] Advanced Comprehension Level 1 (1 step objects) presented verbally with 60% accuracy independently (6/10), increasing to 80% with semantic cues     3. Patient will read single words aloud with 50% accuracy and 1 phonemic cue in order to bridge to higher level reading tasks.   Progressing/ Not Met 2024 Patient read bisyllabic words on first attempt with 83% accuracy (5/6 trials), previously 50% accuracy independently (6/12)  Reiterated to look away, take a breath, and then come back to it which seemed to help. She recognizes the words and describes them appropriately.   4. Patient will repeat monosyllabic, common words with 50% accuracy when provided with a visual cue to increase her spoken language abilities.     Progressing/ Not Met 2024 Did not address today   5. Patient will complete rote speech tasks with >75% accuracy in unison with speech-language pathologist to increase spoken language abilities.  Progressing/ Not Met 2024 Counted 1-10 with 70% accuracy independently on first attempt, consistent with last session.   Stated 6/7 days of the week independently today   6. Patient will answer simple open-ended questions with relevant responses with >75% accuracy with visual or auditory stimuli to increase spoken language output.     Progressing/ Not Met 2024 Did not address today     Long Term Goals: (8 weeks) Current Progress:   Patient will increase her social participation in conversation with > 5 utterances produced independently.    Progressing, not met 2024    2. Patient will state basic wants/needs/concerns >90% of the time independently through use of various modalities in order to increase her social participation with others.     Progressing, not met 2024      Patient Education/Response:   Patient educated regarding the followin. [place] speech task important for  speech production  2. Aphasia and prognosis - recovery and intensity of therapy (discussed with patient's )  3. [employer] Cynthia    Home program established: yes-provided simple written command worksheets as well as the days of the week and numbers written down  Patient verbalized understanding to all above education provided.     See Electronic Medical Record under Patient Instructions for exercises provided throughout therapy.  Assessment:   Treatment session today focused on various receptive and expressive language tasks, including following two-step verbal commands, reading bisyllabic words, completing rote speech tasks, and attempting to follow 1 step verbal commands. She met the goal for following written instructions but has considerable difficulty with verbal commands despite effort. There are continued improvements in her spontaneous speech productions, with increased thought cohesion. Clarification questions are helpful to maintain conversational topic when paraphasias and/or neologisms are present.    Prema is progressing well towards her goals. Current goals remain appropriate. Goals to be updated as necessary.     Patient prognosis is Good. Patient will continue to benefit from skilled outpatient speech and language therapy to address the deficits listed in the problem list on initial evaluation, provide patient/family education and to maximize patient's level of independence in the home and community environment.   Medical necessity is demonstrated by the following IMPAIRMENTS:  Aphasia: Patient with few true words in all situations severely limiting functional communication with both familiar and unfamiliar communication partners.   Barriers to Therapy: none  Patient's spiritual, cultural and educational needs considered and patient agreeable to plan of care and goals.  Plan:   Continue Plan of Care with focus on rehabilitation and compensation for aphasia secondary to stroke.    Nori  Humphrey CORTEZ, CCC-SLP, CBIS  Speech-Language Pathologist  Certified Brain Injury Specialist  4/26/2024

## 2024-04-29 ENCOUNTER — CLINICAL SUPPORT (OUTPATIENT)
Dept: REHABILITATION | Facility: HOSPITAL | Age: 79
End: 2024-04-29
Payer: MEDICARE

## 2024-04-29 DIAGNOSIS — I63.9 LACK OF COORDINATION DUE TO ACUTE CEREBROVASCULAR ACCIDENT (CVA): ICD-10-CM

## 2024-04-29 DIAGNOSIS — M62.81 MUSCLE WEAKNESS OF RIGHT UPPER EXTREMITY: Primary | ICD-10-CM

## 2024-04-29 DIAGNOSIS — Z91.81 RISK FOR FALLS: ICD-10-CM

## 2024-04-29 DIAGNOSIS — R27.9 LACK OF COORDINATION DUE TO ACUTE CEREBROVASCULAR ACCIDENT (CVA): ICD-10-CM

## 2024-04-29 DIAGNOSIS — R48.2 APRAXIA: ICD-10-CM

## 2024-04-29 DIAGNOSIS — R47.01 APHASIA: Primary | ICD-10-CM

## 2024-04-29 DIAGNOSIS — R48.2 APRAXIA: Primary | ICD-10-CM

## 2024-04-29 PROCEDURE — 97110 THERAPEUTIC EXERCISES: CPT | Mod: PN

## 2024-04-29 PROCEDURE — 92507 TX SP LANG VOICE COMM INDIV: CPT | Mod: PN

## 2024-04-29 PROCEDURE — 97110 THERAPEUTIC EXERCISES: CPT | Mod: PN,CQ,59

## 2024-04-29 PROCEDURE — 97112 NEUROMUSCULAR REEDUCATION: CPT | Mod: PN,CQ

## 2024-04-29 PROCEDURE — 97530 THERAPEUTIC ACTIVITIES: CPT | Mod: PN

## 2024-04-29 NOTE — PROGRESS NOTES
[facility]   Physical Therapy Treatment Note      Name: Prema Phillips  Clinic Number: [id number]    Therapy Diagnosis:   Encounter Diagnoses   Name Primary?    Apraxia Yes    Risk for falls        Physician: Odette Zhang MD    Visit Date: 4/29/2024    Physician Orders: PT Eval and Treat   Medical Diagnosis from Referral: Decreased range of motion with decreased strength [M25.60, R53.1], Cerebrovascular accident (CVA) due to embolism of left middle cerebral artery [I63.412], Hemiparesis of right dominant side due to acute cerebrovascular disease [I67.89, G81.91]   Evaluation Date: 3/27/2024  Authorization Period Expiration: 12/31/2024  Plan of Care Expiration: 5/24/2024  Progress Note Due: 4/27/2024  Date of Surgery: N/A  Visit # / Visits authorized: 6/20 (+eval)  FOTO: 1/3     Precautions: Standard, Fall, and Aphasia (per SLP - consider written instructions as patient demonstrated good reading comprehension during speech eval)     Time In: 12:50 PM  Time Out: 1:35 PM  Total Billable Time: 45 minutes (2TE, 1 NR)    PTA Visit #: 3/5     Subjective     Patient reports: no complaints at this time  She will be compliant with home exercise program.  Response to previous treatment: no adverse reactions  Functional change: ongoing    Pain: 0/10  Location:  n/a     Objective      Objective Measures updated at progress report unless specified.     Treatment     Prema received the treatments listed below:      therapeutic exercises to develop strength and endurance for 25 minutes including:  [employer] recumbent stepper x8 min L5 sprint hill setting for endurance/activity tolerance assessment and training    - Standing reciprocal hip flexion marches with light touchdown support 3x10 bilateral 2# ankle weight  - Sit to stands from chair  2x8 holding red med ball today  - Standing calf raises 2 x 20 with occasional touchdown support  - standing toe raises 2 x 20 with occasional touchdown support  -  "Standing hip abduction 2 x 15  2# ankle weight  - standing hip extension 2 x 15 2# ankle weight     neuromuscular re-education activities to improve: Balance and Coordination for 20 minutes. The following activities were included:     Tandem  static 3x30s bilateral      x 10 foot lengths near ballet bar:  Tandem walking x 8 lengths - occasional touch down on bar  3s marches  x 6 lengths - occasional touch down on bar  Backwards walking - cues for larger step lengths    Airex beam:  tandem x 4 laps    Sidestepping x 4 laps    Bosu round side up: split stance static holds - eyes open and eyes closed  2 feet static balance     Single leg stance trials 3 x 30s bilateral         Not today:  Hurdles (4 medium) forwards - good clearance and spacing  Hurdles (4 medium) sidestepping - challenging clearance and step sequencing  Backwards walking - improved with visual demo  4" step ups: reciprocal forward 2x10 B                     : lateral up and over x10 * unable to complete correctly despite max cuing  Airex:   Narrow base of support with vertical and horizontal head turns and eyes closed trials     Patient Education and Home Exercises       Education provided: Reinforced home exercise program provided at [facility].     Written Home Exercises Provided: Patient instructed to cont prior HEP. Exercises were reviewed and Prema was able to demonstrate them prior to the end of the session.  Prema demonstrated good  understanding of the education provided. See Electronic Medical Record under Patient Instructions for exercises provided during therapy sessions    Assessment     Prema arrived to session without complaints and agreeable to treatment. Good tolerance of treatment today with patient able to complete majority of balance without any UE support and no loss of balance observed.  Very responsive to visual demonstration for task reinforcement.  Only really challenged with static balance on bosu today with a few instances of " touchdown support required.  Additional assistance for keeping track of reps today.  She would benefit from continued PT services to improve safe functional mobility.          Prema Is progressing well towards her goals.   Patient prognosis is Good.     Patient will continue to benefit from skilled outpatient physical therapy to address the deficits listed in the problem list box on initial evaluation, provide pt/family education and to maximize pt's level of independence in the home and community environment.     Patient's spiritual, cultural and educational needs considered and pt agreeable to plan of care and goals.     Anticipated barriers to physical therapy: asphasia    Goals:  Short Term Goals: 4 weeks   Patient will be compliant with HEP in order to maximize PT benefits  Patient will complete >/=10 seconds of single leg stance on each leg in order to improve static postural control during standing ADLs  Patient will complete TUG in </= 13 seconds in order to reduce risk for falls and improve safety with functional mobility  Patient will improve self-selected walking speed to >/=0.8 m/s in order to improve safety with community mobility     Long Term Goals: 8 weeks   Patient will score >/= 59% on FOTO survey in order to improve self-perception of functional mobility deficits  Patient will complete TUG in </= 11 seconds in order to reduce risk for falls and improve safety with functional mobility  Patient will score </= 15 seconds on Five Time Sit to  order to improve bilateral lower extremity endurance and muscular power for transfers   Patient will improve self-selected walking speed to >/=1.0 m/s in order to improve safety with community mobility  Patient will perform 1 floor <> stand transfer with bilateral upper extremity support in order to demonstrate safe functional mobility in case of future fall  Patient will begin some form of home/community fitness in order to sustain progress gained in PT        Plan     Dynamic balance with tandem and narrow base of support - functional balance and tasks for ease of understanding.  Continue plan of care (POC).      Tami Gastelum, PTA

## 2024-04-29 NOTE — PROGRESS NOTES
"[facility]  Occupational Therapy Treatment Note     Date: 4/29/2024  Name: Prema Phillips  Clinic Number: [id number]    Therapy Diagnosis:   Encounter Diagnoses   Name Primary?    Muscle weakness of right upper extremity Yes    Lack of coordination due to acute cerebrovascular accident (CVA)        Physician: Odette Zhang MD    Physician Orders: Eval and Treat  Medical Diagnosis: M25.60,R53.1 (ICD-10-CM) - Decreased range of motion with decreased strength I63.412 (ICD-10-CM) - Cerebrovascular accident (CVA) due to embolism of left middle cerebral artery I67.89,G81.91 (ICD-10-CM) - Hemiparesis of right dominant side due to acute cerebrovascular disease  Evaluation Date: 3/27/2024  Insurance Authorization Period Expiration: 12/31/2024  Plan of Care Certification Period: 05/24/2024  Visit # / Visits authorized: 8 / 20  FOTO: 0 / 3     Precautions:  Standard, Fall, and aphasia, apraxia      Time In: 1:45 pm  Time Out: 2:30 pm  Total Billable Time: 45 minutes     Subjective     Patient reports: "I am good."   She was compliant with home exercise program given last session.   Response to previous treatment: good   Functional change: no change to report      Pain: 0/10  Location: no pain reported     Objective     Physical Exam:  Postural examination/scapula alignment:  upright posture, mild winging of right scapula with good muscle activation with tactile cues   left active range of motion and manual muscle test within normal limits.     Joint Evaluation  AROM  3/27/2024 PROM   3/27/2024 MMS   3/27/2024 AROM  4/19/2024 MMS   4/19/2024     Right Right Right Right  Right    Scapular Elevation     3/5     Scapular Retraction     3/5  4/5   Shoulder Flex 0-180 121 130 3-/5 135 4/5   Shoulder Extension 0-80 60   3-/5  4/5   Shoulder Abd 0-180 130   3/5 180 4/5   Shoulder ER 0-90     3/5  4-/5   Shoulder IR 0-90 WNL   3/5  4/5   Elbow Flexion 0-150 150   3/5  4/5   Elbow Extension 0   3/5  3+/5 "   Wrist Flex 0-80 90   3/5  3/5   Wrist Ext 0-70 50   3-/5  3/5   Supination 0-80 WNL   3/5  3/5   Pronation 0-80 WNL   3/5  3/5   *WNL - Within Normal Limits  *NT = Not Tested     Fist: tight right hand ;       Strength: (FLOR Dynamometer in lbs.) Average 3 trials, Position II:       3/27/2024 3/27/2024 4/19/2024 4/19/2024   Rung II Right Left Right  Left    Trial 1 19# 20# 30# 25#   Trial 2 14# 22# 33# 33#   Trial 3 15# 26# 27# 36#   Average 16# 22.6# 30# 31.3#      Normal  Average Values  Female Right Left Male: Right Left   20-29 66 lbs 62 lbs         94 lbs 86 lbs   30-39 68 lbs 64 lbs 90 lbs 82 lbs   40-49 64 lbs 62 lbs 80 lbs 74 lbs   50-59 62 lbs 57 lbs 72 lbs 65 lbs   60-69 53 lbs 51 lbs 63 lbs 56 lbs   70+ 44 lbs 42 lbs 54 lbs 48 lbs   SD = +/- 19lbs         Pinch Strength (Measured in lbs)       3/27/2024 3/27/2024 4/19/2024 4/19/2024     Right Left Right  Left    Key Pinch 5 # 7 # 8# 11#   3pt Pinch 3 # 5 # 7# 12#   2pt Pinch 3 # 6 # 5# 7#         Fine/Gross Motor Coordination     Assessment   Right   3/27/2024 Left  3/27/2024 Right   4/19/2024 Left   4/19/2024   9 hole peg test 9 pegs in/out for time 2:25  2 cues for sequencing  :48 1:01  No cues required :32   SMITHA (Rapid Alternating Movements)      impaired - moderate    intact Impaired - decreased ROM  Impaired - decreased ROM    Finger to Nose (5 times)   impaired - moderate intact Impaired - moderate - touched back of head rather than nose  Impaired - moderate - touched back of head rather than nose    Finger Flicks (coordination moving from digit flexion to digit extension)   impaired - minimum intact Intact - slowed but full range of motion  Intact - slowed but full range of motion    *WNL - Within Normal Limits  *NT = Not Tested     Endurance Deficit: moderate    Treatment     Prema received the treatments listed below:      Therapeutic exercises to develop strength, endurance, and posture for 8 minutes, including:  Upper Body Ergometer (UBE)  L2 for 8 minutes at 45 RPM to provide \proprioceptive awareness, postural stability, strength, activity tolerance, and reciprocal patterns with bimanual coordination completed to improve use of bilateral upper extremities in order to prepare for therapeutic exercises/activities. Cues provided as needed for postural stability/positioning    therapeutic activities to improve functional performance for 37 minutes, including:  - wrist maze x2 minutes   - pom pom  right upper extremity 1 tub with resistance gripper   - handwriting - copying letters, verbalizing and sequencing and identifying letters     Patient Education and Home Exercises     Education provided:   - handwriting drills   - green putty   - Progress towards goals     Written Home Exercises Provided: yes.  Exercises were reviewed and Prema was able to demonstrate them prior to the end of the session.  Prema demonstrated good  understanding of the home exercise program provided. See electronic medical record under Patient Instructions for exercises provided during therapy sessions.       Assessment     Prema tolerated today's session well. She was able to complete  strengthening task with improved endurance in right upper extremity and increased stabilization, decreased rest break required. Handwriting drills performed and she was able to verbalize more letters accurately. She had difficulty with the end of the alphabet and identifying from list. Improved tripod grasp and control of pen as letters were more fluid and demonstrated increased legibility     Prema is progressing well towards her goals and there are no updates to goals at this time. Pt prognosis is Good.     Patient will continue to benefit from skilled outpatient occupational therapy to address the deficits listed in the problem list on initial evaluation provide patient/family education and to maximize patient's level of independence in the home and community environment.     Patient's  spiritual, cultural and educational needs considered and patient agreeable to plan of care and goals.    Anticipated barriers to occupational therapy: cognitive deficits     Goals:  Short Term Goals: 4 weeks   - Pt will report 50% compliance with home exercise program in order to maintain progress attained during therapy. Met 4/22/2024  - Pt will improve active range of motion of right shoulder flexion/abduction to 120/140 degrees in order to increase ease with bathing and dressing tasks. Met 4/22/2024  - Pt will increase right/left  strength to 22/30# in order to improve ease when dressing, bathing and completing gardening tasks. Met 4/22/2024  - Pt will decrease time on 9HPT to under 2 minutes using right hand  to improve fine motor speed and coordination needed to perform home maintenance, grooming and hygiene, money manipulation. Met 4/22/2024     Long Term Goals: 8 weeks   - Pt will report independence with home exercise program in order to maintain progress attained during therapy. Not met, progressing  - Pt will increase MMT for bilateral shoulder movements in all planes  to 4/5 to increase upper body strength needed for home management tasks, high level IADLs and leisure related activities. Not met, progressing  - Pt will increase bilateral  strength to 40# in order to improve ease with functional tasks such as gardening. Not met, progressing  - Pt will improve right/left key/3pt/2pt pinch by 2-3# in order to increase ease when dressing and grooming. Met 4/22/2024  - Pt will complete simulated instrumental activities of daily living activity with less than 2 cues for completion in order to return to prior level of function and decrease caregiver burden. Not met, progressing    Plan     Updates/Grading for next session: continue with plan of care     Corinne Rapier, OT   4/29/2024

## 2024-04-29 NOTE — PROGRESS NOTES
"[facility]  Speech Therapy Treatment Note- Neurological Rehabilitation  Date: 4/29/2024     Name: Perma Phillips   MRN: [id number]   Therapy Diagnosis:   Encounter Diagnoses   Name Primary?    Aphasia Yes    Apraxia      Physician: Odette Zhang MD  Physician Orders: Ambulatory Referral to Speech Therapy   Medical Diagnosis: M25.60,R53.1 (ICD-10-CM) - Decreased range of motion with decreased strength I63.412 (ICD-10-CM) - Cerebrovascular accident (CVA) due to embolism of left middle cerebral artery I67.89,G81.91 (ICD-10-CM) - Hemiparesis of right dominant side due to acute cerebrovascular disease    Visit #/ Visits Authorized: 13/20  Date of Evaluation:  3/27/24  Insurance Authorization Period: 3/27/24-12/31/24  Plan of Care Expiration Date:    5/22/24  Extended Plan of Care:  n/a   Progress Note: 5/24/24     Time In:  2:30  Time Out:  3:15  Total Billable Time:  45 minutes     Precautions: Communication  Subjective:   Patient reports: "doing good. Rain."  She was compliant to home exercise program.   Response to previous treatment: good  Pain Scale: Patient unable to indicate pain secondary to aphasia. No pain behaviors observed throughout the session.  Objective:   UNTIMED  Procedure   Speech- Language- Voice Therapy     Short Term Goals: (4 weeks) Current Progress:   Patient will follow simple 1-2 step commands presented visually with >90% accuracy for 2 consecutive sessions to enhance her receptive language skills.  MET 4/26/24 [facility] Advanced Comprehension 2 step directions presented visually: 90% accuracy independently (9/10 trials), previously 90% accuracy independently    2/2 consecutive sessions met   2. Patient will follow simple 1 step commands presented verbally with 75% accuracy and 1 repetition provided to enhance her receptive language skills.     Progressing/ Not Met 4/29/2024 Patient unable to follow simple 1 step commands presented verbally despite effort. 0% accuracy " "independently, increasing to 20% (1/5) with written word and visual cue.    Previously, [facility] Advanced Comprehension Level 1 (1 step objects) presented verbally with 40% accuracy independently (4/10), increasing to 60% with semantic cues     3. Patient will read single words aloud with 50% accuracy and 1 phonemic cue in order to bridge to higher level reading tasks.   Progressing/ Not Met 4/29/2024 Patient read bisyllabic words on first attempt with 60% accuracy (6/10 trials), previously 83% accuracy independently (5/6)     4. Patient will repeat monosyllabic, common words with 50% accuracy when provided with a visual cue to increase her spoken language abilities.     Progressing/ Not Met 4/29/2024 Patient read monosyllabic, common words with 6/15 acc Independently; 8/15 acc with min A.    5. Patient will complete rote speech tasks with >75% accuracy in unison with speech-language pathologist to increase spoken language abilities.  Progressing/ Not Met 4/29/2024 Counted 1-10 with 70% accuracy independently on first attempt, consistent with last two sessions.   Stated 3/7 days of the week with mod A today. Tried without visuals; then tried again with written words.    6. Patient will answer simple open-ended questions with relevant responses with >75% accuracy with visual or auditory stimuli to increase spoken language output.     Progressing/ Not Met 4/29/2024 Who brought you today?  "I was happy"  2. Tell me your name.  "I was happy about being happy."  3. How are you?  "I don't have a mask. I'm happy about what other people have."  4. How's the weather? "It's emma out right now. The weather was emma (jargon) rain."  5. Are you hungry? "Oh yes. He bought some kind of oh ugh you know chicken."  6. What'd you have for lunch? "I guess he had chicken."  7.What's your favorite food?  "I would say (jargon) apple. (Jargon). Banana because I like that probably. My  likes cake."  8. Are you driving? "Not " "at this moment."  9. Who drives? "My . He does pretty good."  10. How many kids do you have? "At my house, my , my daughter, and also she comes in there. All 3 of them at my house. "    Lots of extra jargon throughout.      Long Term Goals: (8 weeks) Current Progress:   Patient will increase her social participation in conversation with > 5 utterances produced independently.    Progressing, not met 2024    2. Patient will state basic wants/needs/concerns >90% of the time independently through use of various modalities in order to increase her social participation with others.     Progressing, not met 2024      Patient Education/Response:   Patient educated regarding the followin. [place] speech task important for speech production  2. Aphasia and prognosis - recovery and intensity of therapy (discussed with patient's )  3. [employer] Cynthia    Home program established: yes-provided simple written command worksheets as well as the days of the week and numbers written down  Patient verbalized understanding to all above education provided.     See Electronic Medical Record under Patient Instructions for exercises provided throughout therapy.  Assessment:   Treatment session today focused on various receptive and expressive language tasks, including following two-step verbal commands, reading bisyllabic words, completing rote speech tasks, and attempting to follow 1 step verbal commands.  There are continued improvements in her spontaneous speech productions, with increased thought cohesion. Clarification questions are helpful to maintain conversational topic when paraphasias and/or neologisms are present.   reports improvements at home as well. Patient and  remain positive and optimistic.     Prema is progressing well towards her goals. Current goals remain appropriate. Goals to be updated as necessary.     Patient prognosis is Good. Patient will continue to benefit from " skilled outpatient speech and language therapy to address the deficits listed in the problem list on initial evaluation, provide patient/family education and to maximize patient's level of independence in the home and community environment.   Medical necessity is demonstrated by the following IMPAIRMENTS:  Aphasia: Patient with few true words in all situations severely limiting functional communication with both familiar and unfamiliar communication partners.   Barriers to Therapy: none  Patient's spiritual, cultural and educational needs considered and patient agreeable to plan of care and goals.  Plan:   Continue Plan of Care with focus on rehabilitation and compensation for aphasia secondary to stroke.    Shreya Hung MS, CCC-SLP  Speech-Language Pathologist  4/29/2024

## 2024-04-30 NOTE — PROGRESS NOTES
"[facility]  Speech Therapy Treatment Note- Neurological Rehabilitation  Date: 5/1/2024     Name: Prema Phillips   MRN: [id number]   Therapy Diagnosis:   Encounter Diagnoses   Name Primary?    Aphasia Yes    Apraxia      Physician: Odette Zhang MD  Physician Orders: Ambulatory Referral to Speech Therapy   Medical Diagnosis: M25.60,R53.1 (ICD-10-CM) - Decreased range of motion with decreased strength I63.412 (ICD-10-CM) - Cerebrovascular accident (CVA) due to embolism of left middle cerebral artery I67.89,G81.91 (ICD-10-CM) - Hemiparesis of right dominant side due to acute cerebrovascular disease    Visit #/ Visits Authorized: 14/20  Date of Evaluation:  3/27/24  Insurance Authorization Period: 3/27/24-12/31/24  Plan of Care Expiration Date:    5/22/24  Extended Plan of Care:  n/a   Progress Note: 5/24/24     Time In:  1434  Time Out:  1514  Total Billable Time:  45 minutes     Precautions: Communication  Subjective:   Patient reports: "Look here. I did it" in reference to her home program  She was compliant to home exercise program.   Response to previous treatment: good  Pain Scale: Patient unable to indicate pain secondary to aphasia. No pain behaviors observed throughout the session.  Objective:   UNTIMED  Procedure   Speech- Language- Voice Therapy     Short Term Goals: (4 weeks) Current Progress:   Patient will follow simple 1-2 step commands presented visually with >90% accuracy for 2 consecutive sessions to enhance her receptive language skills.  MET 4/26/24    2. Patient will follow simple 1 step commands presented verbally with 75% accuracy and 1 repetition provided to enhance her receptive language skills.     Progressing/ Not Met 5/1/2024 Patient followed simple 1 step body and directional commands presented verbally with 60% accuracy independently, previously 0% accuracy independently, increasing to 100% with written word (shoulder, ear)    [facility] Advanced Comprehension Level " "1 1 step objects presented with both visual and verbal information - completed with quicker response times and with 80% accuracy independently as compared to 0% with only verbal presentation.   3. Patient will read single words aloud with 50% accuracy and 1 phonemic cue in order to bridge to higher level reading tasks.   Progressing/ Not Met 5/1/2024 Patient read bisyllabic words on first attempt with 53% accuracy (8/15 trials) independently, previously 60% accuracy (6/10 trials)     4. Patient will repeat monosyllabic, common words with 50% accuracy when provided with a visual cue to increase her spoken language abilities.     Progressing/ Not Met 5/1/2024 Did not address today    5. Patient will complete rote speech tasks with >75% accuracy in unison with speech-language pathologist to increase spoken language abilities.  Progressing/ Not Met 5/1/2024 Counted 1-10 with 50% accuracy independently, previously 70% accuracy independently on first attempt.  Stated 5/7 days of the week, previously 3/7 (independent completion today)   6. Patient will answer simple open-ended questions with relevant responses with >75% accuracy with visual or auditory stimuli to increase spoken language output.  Progressing/ Not Met 5/1/2024 What is your daughter's name? -  Who is Maldonado? -  When is your birthday? I know it's January  What's your address? Answered with "[place] page" then picked out her address, city, and zip code in a yes/no format with random cities and zip cods correctly.       Long Term Goals: (8 weeks) Current Progress:   Patient will increase her social participation in conversation with > 5 utterances produced independently.    Progressing, not met 5/1/2024    2. Patient will state basic wants/needs/concerns >90% of the time independently through use of various modalities in order to increase her social participation with others.     Progressing, not met 5/1/2024      Patient Education/Response:   Patient educated " "regarding the followin. [place] speech task important for speech production  2. Aphasia and prognosis - recovery and intensity of therapy (discussed with patient's )  3. [employer] [facility] Cynthia    Home program established: yes-provided simple written command worksheets as well as the days of the week and numbers written down  Patient verbalized understanding to all above education provided.     See Electronic Medical Record under Patient Instructions for exercises provided throughout therapy.  Assessment:   Treatment session today focused on various receptive and expressive language tasks, including answering open-ended questions, reading bisyllabic words, completing rote speech tasks, and attempting to follow 1 step verbal commands.  She performed well when there were both auditory and visual stimuli presented. She is pleased with her progress thus far - attempted to have her repeat "Jony", her dog's name, per the 's request but we were unsuccessful today despite multiple attempts and cues.  Prema is progressing well towards her goals. Current goals remain appropriate. Goals to be updated as necessary.     Patient prognosis is Good. Patient will continue to benefit from skilled outpatient speech and language therapy to address the deficits listed in the problem list on initial evaluation, provide patient/family education and to maximize patient's level of independence in the home and community environment.   Medical necessity is demonstrated by the following IMPAIRMENTS:  Aphasia: Patient with few true words in all situations severely limiting functional communication with both familiar and unfamiliar communication partners.   Barriers to Therapy: none  Patient's spiritual, cultural and educational needs considered and patient agreeable to plan of care and goals.  Plan:   Continue Plan of Care with focus on rehabilitation and compensation for aphasia secondary to stroke.    Nori Yin MS, CCC-SLP, " CBIS  Speech-Language Pathologist  Certified Brain Injury Specialist  5/1/2024

## 2024-05-01 ENCOUNTER — CLINICAL SUPPORT (OUTPATIENT)
Dept: REHABILITATION | Facility: HOSPITAL | Age: 79
End: 2024-05-01
Payer: MEDICARE

## 2024-05-01 DIAGNOSIS — R48.2 APRAXIA: ICD-10-CM

## 2024-05-01 DIAGNOSIS — I63.9 LACK OF COORDINATION DUE TO ACUTE CEREBROVASCULAR ACCIDENT (CVA): ICD-10-CM

## 2024-05-01 DIAGNOSIS — R48.2 APRAXIA: Primary | ICD-10-CM

## 2024-05-01 DIAGNOSIS — Z91.81 RISK FOR FALLS: ICD-10-CM

## 2024-05-01 DIAGNOSIS — R47.01 APHASIA: Primary | ICD-10-CM

## 2024-05-01 DIAGNOSIS — M62.81 MUSCLE WEAKNESS OF RIGHT UPPER EXTREMITY: Primary | ICD-10-CM

## 2024-05-01 DIAGNOSIS — R27.9 LACK OF COORDINATION DUE TO ACUTE CEREBROVASCULAR ACCIDENT (CVA): ICD-10-CM

## 2024-05-01 PROCEDURE — 97110 THERAPEUTIC EXERCISES: CPT | Mod: PN

## 2024-05-01 PROCEDURE — 97530 THERAPEUTIC ACTIVITIES: CPT | Mod: PN,59

## 2024-05-01 PROCEDURE — 97112 NEUROMUSCULAR REEDUCATION: CPT | Mod: PN,59

## 2024-05-01 PROCEDURE — 92507 TX SP LANG VOICE COMM INDIV: CPT | Mod: PN

## 2024-05-01 PROCEDURE — 97530 THERAPEUTIC ACTIVITIES: CPT | Mod: PN

## 2024-05-01 NOTE — PROGRESS NOTES
"[facility]  Occupational Therapy Treatment Note     Date: 5/1/2024  Name: Prema Phillips  Clinic Number: [id number]    Therapy Diagnosis:   Encounter Diagnoses   Name Primary?    Muscle weakness of right upper extremity Yes    Lack of coordination due to acute cerebrovascular accident (CVA)      Physician: Odette Zhang MD    Physician Orders: Eval and Treat  Medical Diagnosis: M25.60,R53.1 (ICD-10-CM) - Decreased range of motion with decreased strength I63.412 (ICD-10-CM) - Cerebrovascular accident (CVA) due to embolism of left middle cerebral artery I67.89,G81.91 (ICD-10-CM) - Hemiparesis of right dominant side due to acute cerebrovascular disease  Evaluation Date: 3/27/2024  Insurance Authorization Period Expiration: 12/31/2024  Plan of Care Certification Period: 05/24/2024  Visit # / Visits authorized: 9 / 20  FOTO: 0 / 3     Precautions:  Standard, Fall, and aphasia, apraxia      Time In: 1:45 pm  Time Out: 2:30 pm  Total Billable Time: 45 minutes     Subjective     Patient reports: "I am good."   She was compliant with home exercise program given last session.   Response to previous treatment: good   Functional change: no change to report      Pain: 0/10  Location: no pain reported     Objective     Physical Exam:  Postural examination/scapula alignment:  upright posture, mild winging of right scapula with good muscle activation with tactile cues   left active range of motion and manual muscle test within normal limits.     Joint Evaluation  AROM  3/27/2024 PROM   3/27/2024 MMS   3/27/2024 AROM  4/19/2024 MMS   4/19/2024     Right Right Right Right  Right    Scapular Elevation     3/5     Scapular Retraction     3/5  4/5   Shoulder Flex 0-180 121 130 3-/5 135 4/5   Shoulder Extension 0-80 60   3-/5  4/5   Shoulder Abd 0-180 130   3/5 180 4/5   Shoulder ER 0-90     3/5  4-/5   Shoulder IR 0-90 WNL   3/5  4/5   Elbow Flexion 0-150 150   3/5  4/5   Elbow Extension 0   3/5  3+/5 "   Wrist Flex 0-80 90   3/5  3/5   Wrist Ext 0-70 50   3-/5  3/5   Supination 0-80 WNL   3/5  3/5   Pronation 0-80 WNL   3/5  3/5   *WNL - Within Normal Limits  *NT = Not Tested     Fist: tight right hand ;       Strength: (FLOR Dynamometer in lbs.) Average 3 trials, Position II:       3/27/2024 3/27/2024 4/19/2024 4/19/2024   Rung II Right Left Right  Left    Trial 1 19# 20# 30# 25#   Trial 2 14# 22# 33# 33#   Trial 3 15# 26# 27# 36#   Average 16# 22.6# 30# 31.3#      Normal  Average Values  Female Right Left Male: Right Left   20-29 66 lbs 62 lbs         94 lbs 86 lbs   30-39 68 lbs 64 lbs 90 lbs 82 lbs   40-49 64 lbs 62 lbs 80 lbs 74 lbs   50-59 62 lbs 57 lbs 72 lbs 65 lbs   60-69 53 lbs 51 lbs 63 lbs 56 lbs   70+ 44 lbs 42 lbs 54 lbs 48 lbs   SD = +/- 19lbs         Pinch Strength (Measured in lbs)       3/27/2024 3/27/2024 4/19/2024 4/19/2024     Right Left Right  Left    Key Pinch 5 # 7 # 8# 11#   3pt Pinch 3 # 5 # 7# 12#   2pt Pinch 3 # 6 # 5# 7#         Fine/Gross Motor Coordination     Assessment   Right   3/27/2024 Left  3/27/2024 Right   4/19/2024 Left   4/19/2024   9 hole peg test 9 pegs in/out for time 2:25  2 cues for sequencing  :48 1:01  No cues required :32   SMITHA (Rapid Alternating Movements)      impaired - moderate    intact Impaired - decreased ROM  Impaired - decreased ROM    Finger to Nose (5 times)   impaired - moderate intact Impaired - moderate - touched back of head rather than nose  Impaired - moderate - touched back of head rather than nose    Finger Flicks (coordination moving from digit flexion to digit extension)   impaired - minimum intact Intact - slowed but full range of motion  Intact - slowed but full range of motion    *WNL - Within Normal Limits  *NT = Not Tested     Endurance Deficit: moderate    Treatment     Prema received the treatments listed below:      Therapeutic exercises to develop strength, endurance, and posture for 6 minutes, including:  Upper Body Ergometer (UBE)  L2.5 for 6 minutes at 45 RPM to provide \proprioceptive awareness, postural stability, strength, activity tolerance, and reciprocal patterns with bimanual coordination completed to improve use of bilateral upper extremities in order to prepare for therapeutic exercises/activities. Cues provided as needed for postural stability/positioning    therapeutic activities to improve functional performance for 39 minutes, including:  - handwriting - identifying upper case, writing lower case   - sequencing beads - patterns     Patient Education and Home Exercises     Education provided:   - handwriting drills   - green putty   - Progress towards goals     Written Home Exercises Provided: yes.  Exercises were reviewed and Prema was able to demonstrate them prior to the end of the session.  Prema demonstrated good  understanding of the home exercise program provided. See electronic medical record under Patient Instructions for exercises provided during therapy sessions.       Assessment     Prema tolerated today's session well. Prema stated she was doing more at home but still has a hard time with writing. She was able to identify more letters for today's session, had difficulty and needed cues for letters M, Q, R, S, T, U, Z. She was able to copy lower case letters but required increased time for all as added component of verbalizing letters was difficult. She also exhibited difficulty with sequencing beads, mixing up shapes and order of patterns.     Prema is progressing well towards her goals and there are no updates to goals at this time. Pt prognosis is Good.     Patient will continue to benefit from skilled outpatient occupational therapy to address the deficits listed in the problem list on initial evaluation provide patient/family education and to maximize patient's level of independence in the home and community environment.     Patient's spiritual, cultural and educational needs considered and patient agreeable to plan of  care and goals.    Anticipated barriers to occupational therapy: cognitive deficits     Goals:  Short Term Goals: 4 weeks   - Pt will report 50% compliance with home exercise program in order to maintain progress attained during therapy. Met 4/22/2024  - Pt will improve active range of motion of right shoulder flexion/abduction to 120/140 degrees in order to increase ease with bathing and dressing tasks. Met 4/22/2024  - Pt will increase right/left  strength to 22/30# in order to improve ease when dressing, bathing and completing gardening tasks. Met 4/22/2024  - Pt will decrease time on 9HPT to under 2 minutes using right hand  to improve fine motor speed and coordination needed to perform home maintenance, grooming and hygiene, money manipulation. Met 4/22/2024     Long Term Goals: 8 weeks   - Pt will report independence with home exercise program in order to maintain progress attained during therapy. Not met, progressing  - Pt will increase MMT for bilateral shoulder movements in all planes  to 4/5 to increase upper body strength needed for home management tasks, high level IADLs and leisure related activities. Not met, progressing  - Pt will increase bilateral  strength to 40# in order to improve ease with functional tasks such as gardening. Not met, progressing  - Pt will improve right/left key/3pt/2pt pinch by 2-3# in order to increase ease when dressing and grooming. Met 4/22/2024  - Pt will complete simulated instrumental activities of daily living activity with less than 2 cues for completion in order to return to prior level of function and decrease caregiver burden. Not met, progressing    Plan     Updates/Grading for next session: continue with plan of care     Corinne Rapier, OT   5/1/2024

## 2024-05-01 NOTE — PROGRESS NOTES
[facility]   Physical Therapy Treatment Note      Name: Prema Phillips  Clinic Number: [id number]    Therapy Diagnosis:   Encounter Diagnoses   Name Primary?    Apraxia Yes    Risk for falls      Physician: Odette Zhang MD    Visit Date: 5/1/2024    Physician Orders: PT Eval and Treat   Medical Diagnosis from Referral: Decreased range of motion with decreased strength [M25.60, R53.1], Cerebrovascular accident (CVA) due to embolism of left middle cerebral artery [I63.412], Hemiparesis of right dominant side due to acute cerebrovascular disease [I67.89, G81.91]   Evaluation Date: 3/27/2024  Authorization Period Expiration: 12/31/2024  Plan of Care Expiration: 5/24/2024  Progress Note Due: last assessed 5/1/2024  Date of Surgery: N/A  Visit # / Visits authorized: 7/20 (+eval)  FOTO: 1/3     Precautions: Standard, Fall, and Aphasia (per SLP - consider written instructions as patient demonstrated good reading comprehension during speech eval)     Time In: 1:06 PM  Time Out: 1:45 PM  Total Billable Time: 39 minutes (1TE, 1 NMR, 1 TA)    PTA Visit #: 0/5     Subjective     Patient reports: No new complaints today.  She will be compliant with home exercise program.  Response to previous treatment: no adverse reactions  Functional change: ongoing    Pain: 0/10  Location:  n/a     Objective      Objective Measures updated at progress report unless specified.     Single Leg Stance  - left lower extremity max: 14 seconds  - right lower extremity max: 20 seconds    Timed Up and Go: 10.0 seconds    Treatment     Prema received the treatments listed below:      Therapeutic exercises to develop strength and endurance for 9 minutes including:  - Scifit recumbent stepper x6 min L5 sprint hill setting for endurance/activity tolerance assessment and training  - Sit to stand from standard chair + red med ball reaches 2x10    Neuromuscular re-education activities to improve: Balance and Coordination for 15  "minutes. The following activities were included:  - Standing calf raises 2 x 20 with occasional touchdown support  - Single leg stance trials x 3 minutes total with intermittent touchdown support  - Static tandem stance 2x30" each leg leading  - Standing toe raises 2 x 20 with occasional touchdown support    Therapeutic activities to improve functional performance for 15 minutes including:  - TUG (see above)  - High knee marches open gym 4 lengths x 30 feet   - Retro walks open gym 4 lengths x 30 feet   - Abraham step overs forward (spaced ~4 feet apart) with mixed 6" and 9" hurdles 6 lengths x 20 feet each  - Step up and overs open gym; 4" step 2x10B      Not performed today  - Standing reciprocal hip flexion marches with light touchdown support 3x10 bilateral 2# ankle weight  - Sit to stands from chair  2x8 holding red med ball today  - Standing hip abduction 2 x 15  2# ankle weight  - standing hip extension 2 x 15 2# ankle weight   Tandem walking x 8 lengths - occasional touch down on bar  Airex beam:  tandem x 4 laps    Sidestepping x 4 laps  Bosu round side up: split stance static holds - eyes open and eyes closed  2 feet static balance      Patient Education and Home Exercises       Education provided: Reinforced home exercise program provided at [facility].     Written Home Exercises Provided: Patient instructed to cont prior HEP. Exercises were reviewed and Prema was able to demonstrate them prior to the end of the session.  Prema demonstrated good  understanding of the education provided. See Electronic Medical Record under Patient Instructions for exercises provided during therapy sessions    Assessment     Prema arrived to session without complaints and agreeable to treatment. She is progressing well toward goals for fall risk reduction per brief reassessment performed today. She completed majority of exercise in open gym today without loss of balance. Continues to respond well to visual demonstration for novel " tasks. She would benefit from continued PT services to achieve functional goals.           Prema Is progressing well towards her goals.   Patient prognosis is Good.     Patient will continue to benefit from skilled outpatient physical therapy to address the deficits listed in the problem list box on initial evaluation, provide pt/family education and to maximize pt's level of independence in the home and community environment.     Patient's spiritual, cultural and educational needs considered and pt agreeable to plan of care and goals.     Anticipated barriers to physical therapy: asphasia    Goals:  Short Term Goals: 4 weeks   Patient will be compliant with HEP in order to maximize PT benefits  Patient will complete >/=10 seconds of single leg stance on each leg in order to improve static postural control during standing ADLs (met)  Patient will complete TUG in </= 13 seconds in order to reduce risk for falls and improve safety with functional mobility (met)  Patient will improve self-selected walking speed to >/=0.8 m/s in order to improve safety with community mobility     Long Term Goals: 8 weeks   Patient will score >/= 59% on FOTO survey in order to improve self-perception of functional mobility deficits  Patient will complete TUG in </= 11 seconds in order to reduce risk for falls and improve safety with functional mobility (met)  Patient will score </= 15 seconds on Five Time Sit to  order to improve bilateral lower extremity endurance and muscular power for transfers   Patient will improve self-selected walking speed to >/=1.0 m/s in order to improve safety with community mobility  Patient will perform 1 floor <> stand transfer with bilateral upper extremity support in order to demonstrate safe functional mobility in case of future fall  Patient will begin some form of home/community fitness in order to sustain progress gained in PT       Plan     Dynamic balance with tandem and narrow base of  support - functional balance and tasks for ease of understanding.  Continue plan of care (POC).      INDIO REAL, PT

## 2024-05-03 ENCOUNTER — CLINICAL SUPPORT (OUTPATIENT)
Dept: REHABILITATION | Facility: HOSPITAL | Age: 79
End: 2024-05-03
Payer: MEDICARE

## 2024-05-03 DIAGNOSIS — R47.01 APHASIA: Primary | ICD-10-CM

## 2024-05-03 DIAGNOSIS — R48.2 APRAXIA: ICD-10-CM

## 2024-05-03 PROCEDURE — 92507 TX SP LANG VOICE COMM INDIV: CPT | Mod: PN

## 2024-05-03 NOTE — PROGRESS NOTES
[facility]  Speech Therapy Treatment Note- Neurological Rehabilitation  Date: 5/3/2024     Name: Prema Phillips   MRN: [id number]   Therapy Diagnosis:   Encounter Diagnoses   Name Primary?    Aphasia Yes    Apraxia      Physician: Odette Zhang MD  Physician Orders: Ambulatory Referral to Speech Therapy   Medical Diagnosis: M25.60,R53.1 (ICD-10-CM) - Decreased range of motion with decreased strength I63.412 (ICD-10-CM) - Cerebrovascular accident (CVA) due to embolism of left middle cerebral artery I67.89,G81.91 (ICD-10-CM) - Hemiparesis of right dominant side due to acute cerebrovascular disease    Visit #/ Visits Authorized: 15/20  Date of Evaluation:  3/27/24  Insurance Authorization Period: 3/27/24-12/31/24  Plan of Care Expiration Date:    5/22/24  Extended Plan of Care:  n/a   Progress Note: 5/24/24     Time In:  1345  Time Out:  1431  Total Billable Time:   46 minutes     Precautions: Communication  Subjective:   Patient reports: doing well;  reports that Prema had a good conversation with her sister in law earlier today with trying to explain her condition  She was compliant to home exercise program.   Response to previous treatment: good  Pain Scale: Patient unable to indicate pain secondary to aphasia. No pain behaviors observed throughout the session.  Objective:   UNTIMED  Procedure   Speech- Language- Voice Therapy     Short Term Goals: (4 weeks) Current Progress:   Patient will follow simple 1-2 step commands presented visually with >90% accuracy for 2 consecutive sessions to enhance her receptive language skills.  MET 4/26/24    2. Patient will follow simple 1 step commands presented verbally with 75% accuracy and 1 repetition provided to enhance her receptive language skills.     Progressing/ Not Met 5/6/2024 Did not address today, previously 60% accuracy independently.     Did not complete today, previously [employer] Advanced Comprehension Level 1 1 step objects  presented with both visual and verbal information - completed with quicker response times and with 80% accuracy independently as compared to 0% with only verbal presentation.   3. Patient will read single words aloud with 50% accuracy and 1 phonemic cue in order to bridge to higher level reading tasks.   Progressing/ Not Met 5/6/2024 Patient read bisyllabic words on first attempt with 70% accuracy independently (7/10 trials), previously 53% accuracy (8/15 trials) independently.    Attempted 3 word phrases - completed with 25% accuracy independently   4. Patient will repeat monosyllabic, common words with 50% accuracy when provided with a visual cue to increase her spoken language abilities.     Progressing/ Not Met 5/6/2024 Did not address today    5. Patient will complete rote speech tasks with >75% accuracy in unison with speech-language pathologist to increase spoken language abilities.  Progressing/ Not Met 5/6/2024 Counted 1-10 with 60% accuracy independently, previously 50% accuracy independently on first attempt.   6. Patient will answer simple open-ended questions with relevant responses with >75% accuracy with visual or auditory stimuli to increase spoken language output.  Progressing/ Not Met 5/6/2024 At beginning of session, patient was trying to describe something to the speech-language pathologist but unable to communicate it accurately - utilized visual drawings and written words to rule out topics. Eventually required for speech-language pathologist to clarify with her  that she was trying to describe her 3 items on the grocery list.       Long Term Goals: (8 weeks) Current Progress:   Patient will increase her social participation in conversation with > 5 utterances produced independently.    Progressing, not met 5/3/2024    2. Patient will state basic wants/needs/concerns >90% of the time independently through use of various modalities in order to increase her social participation with  others.     Progressing, not met 5/3/2024      Patient Education/Response:   Patient educated regarding the followin. [place] speech task important for speech production  2. Aphasia and prognosis - recovery and intensity of therapy (discussed with patient's )  3. [employer]    Home program established: yes-provided simple written command worksheets as well as the days of the week and numbers written down  Patient verbalized understanding to all above education provided.     See Electronic Medical Record under Patient Instructions for exercises provided throughout therapy.  Assessment:   Treatment session today focused on various receptive and expressive language tasks, including answering open-ended questions, reading bisyllabic words, completing rote speech tasks, and using compensatory strategies in conversational speech. The patient continues to show progress with her spontaneous speech production. However, it is filled with paraphasias and neologisms. It was helpful to try to gain a conversational topic prior to patient sharing information.  Prema is progressing well towards her goals. Current goals remain appropriate. Goals to be updated as necessary.     Patient prognosis is Good. Patient will continue to benefit from skilled outpatient speech and language therapy to address the deficits listed in the problem list on initial evaluation, provide patient/family education and to maximize patient's level of independence in the home and community environment.   Medical necessity is demonstrated by the following IMPAIRMENTS:  Aphasia: Patient with few true words in all situations severely limiting functional communication with both familiar and unfamiliar communication partners.   Barriers to Therapy: none  Patient's spiritual, cultural and educational needs considered and patient agreeable to plan of care and goals.  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    Nori Yin MS, CCC-SLP, CBIS  Speech-Language Pathologist  Certified Brain Injury Specialist  5/6/2024

## 2024-05-06 ENCOUNTER — CLINICAL SUPPORT (OUTPATIENT)
Dept: REHABILITATION | Facility: HOSPITAL | Age: 79
End: 2024-05-06
Payer: MEDICARE

## 2024-05-06 DIAGNOSIS — R48.2 APRAXIA: ICD-10-CM

## 2024-05-06 DIAGNOSIS — R47.01 APHASIA: Primary | ICD-10-CM

## 2024-05-06 DIAGNOSIS — I63.9 LACK OF COORDINATION DUE TO ACUTE CEREBROVASCULAR ACCIDENT (CVA): ICD-10-CM

## 2024-05-06 DIAGNOSIS — R27.9 LACK OF COORDINATION DUE TO ACUTE CEREBROVASCULAR ACCIDENT (CVA): ICD-10-CM

## 2024-05-06 DIAGNOSIS — M62.81 MUSCLE WEAKNESS OF RIGHT UPPER EXTREMITY: Primary | ICD-10-CM

## 2024-05-06 DIAGNOSIS — R48.2 APRAXIA: Primary | ICD-10-CM

## 2024-05-06 DIAGNOSIS — Z91.81 RISK FOR FALLS: ICD-10-CM

## 2024-05-06 PROCEDURE — 97112 NEUROMUSCULAR REEDUCATION: CPT | Mod: KX,PN,59

## 2024-05-06 PROCEDURE — 97530 THERAPEUTIC ACTIVITIES: CPT | Mod: KX,PN,59

## 2024-05-06 PROCEDURE — 97110 THERAPEUTIC EXERCISES: CPT | Mod: KX,PN

## 2024-05-06 PROCEDURE — 92507 TX SP LANG VOICE COMM INDIV: CPT | Mod: PN

## 2024-05-06 PROCEDURE — 97110 THERAPEUTIC EXERCISES: CPT | Mod: KX,PN,59

## 2024-05-06 NOTE — PROGRESS NOTES
[facility]   Physical Therapy Treatment Note      Name: Prema Phillips  Clinic Number: [id number]    Therapy Diagnosis:   Encounter Diagnoses   Name Primary?    Apraxia Yes    Risk for falls      Physician: Odette Zhang MD    Visit Date: 5/6/2024    Physician Orders: PT Eval and Treat   Medical Diagnosis from Referral: Decreased range of motion with decreased strength [M25.60, R53.1], Cerebrovascular accident (CVA) due to embolism of left middle cerebral artery [I63.412], Hemiparesis of right dominant side due to acute cerebrovascular disease [I67.89, G81.91]   Evaluation Date: 3/27/2024  Authorization Period Expiration: 12/31/2024  Plan of Care Expiration: 5/24/2024  Progress Note Due: last assessed 5/1/2024  Date of Surgery: N/A  Visit # / Visits authorized: 8/20 (+eval)  FOTO: 1/3 (do not reissued do to patient's inability to read unless proxy is present)     Precautions: Standard, Fall, and Aphasia (per SLP - consider written instructions as patient demonstrated good reading comprehension during speech eval)     Time In: 1:04 PM  Time Out: 1:45 PM  Total Billable Time: 41 minutes (1TE, 1 NMR, 1 TA)    PTA Visit #: 0/5     Subjective     Patient reports: No new complaints today.  She complained of one side being weak.  She will be compliant with home exercise program.  Response to previous treatment: no adverse reactions  Functional change: ongoing    Pain: 0/10  Location:  n/a     Objective      Objective Measures updated at progress report unless specified.     Treatment     Prema received the treatments listed below:      Therapeutic exercises to develop strength and endurance for 8 minutes including:  - Scifit recumbent stepper x6 min L5 sprint hill setting for endurance/activity tolerance assessment and training  - Sit to stand from standard chair + red med ball reaches 2x10    Neuromuscular re-education activities to improve: Balance and Coordination for 12 minutes. The  "following activities were included:  - Standing calf raises 2 x 20 with occasional touchdown support  - Single leg stance trials x 3 minutes total with intermittent touchdown support (max was ~12 secs on each LE with PT demo)  - Static tandem stance on blue foam 2x30" each leg leading  - Standing toe raises 2 x 20 with occasional touchdown support    Therapeutic activities to improve functional performance for 21 minutes including:  - High knee marches open gym 4 lengths x 30 feet   - Retro walks open gym 4 lengths x 30 feet   - Abraham step overs forward (spaced ~4 feet apart) with mixed 6" and 9" hurdles 6 lengths x 20 feet each  - Step up and overs open gym; 4" step 2x10B  Tandem walking x 8 lengths - occasional touch down on bar  Airex beam:  tandem x 4 laps    Sidestepping x 4 laps    Not performed today  - Standing reciprocal hip flexion marches with light touchdown support 3x10 bilateral 2# ankle weight  - Standing hip abduction 2 x 15  2# ankle weight  - standing hip extension 2 x 15 2# ankle weight   Bosu round side up: split stance static holds - eyes open and eyes closed  2 feet static balance      Patient Education and Home Exercises       Education provided: Reinforced home exercise program provided at [facility].     Written Home Exercises Provided: Patient instructed to cont prior HEP. Exercises were reviewed and Prema was able to demonstrate them prior to the end of the session.  Prema demonstrated good  understanding of the education provided. See Electronic Medical Record under Patient Instructions for exercises provided during therapy sessions    Assessment     Prema arrived to session without complaints and agreeable to treatment. Her higher level balance is improving without UE support. She completed majority of exercise in open gym today without loss of balance. Continues to respond well to visual demonstration for novel or challenging tasks. She would benefit from continued PT services to achieve " functional goals.           Prema Is progressing well towards her goals.   Patient prognosis is Good.     Patient will continue to benefit from skilled outpatient physical therapy to address the deficits listed in the problem list box on initial evaluation, provide pt/family education and to maximize pt's level of independence in the home and community environment.     Patient's spiritual, cultural and educational needs considered and pt agreeable to plan of care and goals.     Anticipated barriers to physical therapy: asphasia    Goals:  Short Term Goals: 4 weeks   Patient will be compliant with HEP in order to maximize PT benefits  Patient will complete >/=10 seconds of single leg stance on each leg in order to improve static postural control during standing ADLs (met)  Patient will complete TUG in </= 13 seconds in order to reduce risk for falls and improve safety with functional mobility (met)  Patient will improve self-selected walking speed to >/=0.8 m/s in order to improve safety with community mobility     Long Term Goals: 8 weeks   Patient will score >/= 59% on FOTO survey in order to improve self-perception of functional mobility deficits  Patient will complete TUG in </= 11 seconds in order to reduce risk for falls and improve safety with functional mobility (met)  Patient will score </= 15 seconds on Five Time Sit to  order to improve bilateral lower extremity endurance and muscular power for transfers   Patient will improve self-selected walking speed to >/=1.0 m/s in order to improve safety with community mobility  Patient will perform 1 floor <> stand transfer with bilateral upper extremity support in order to demonstrate safe functional mobility in case of future fall  Patient will begin some form of home/community fitness in order to sustain progress gained in PT       Plan     Dynamic balance with tandem and narrow base of support - functional balance and tasks for ease of  understanding.  Continue plan of care (POC).      Gaby Garcia, PT

## 2024-05-06 NOTE — PROGRESS NOTES
[facility]  Speech Therapy Treatment Note- Neurological Rehabilitation  Date: 5/6/2024     Name: Prema Phillips   MRN: [id number]   Therapy Diagnosis:   Encounter Diagnoses   Name Primary?    Aphasia Yes    Apraxia        Physician: Odette Zhang MD  Physician Orders: Ambulatory Referral to Speech Therapy   Medical Diagnosis: M25.60,R53.1 (ICD-10-CM) - Decreased range of motion with decreased strength I63.412 (ICD-10-CM) - Cerebrovascular accident (CVA) due to embolism of left middle cerebral artery I67.89,G81.91 (ICD-10-CM) - Hemiparesis of right dominant side due to acute cerebrovascular disease    Visit #/ Visits Authorized: 16/20  Date of Evaluation:  3/27/24  Insurance Authorization Period: 3/27/24-12/31/24  Plan of Care Expiration Date:    5/22/24  Extended Plan of Care:  n/a   Progress Note: 5/24/24     Time In:  1:00  Time Out:  1:43  Total Billable Time:   43 minutes     Precautions: Communication  Subjective:   Patient reports: doing well; did not go to [employer]; has not returned to [employer]. Has a bump on her forehead on the right side. Reports that she hit it today.   She was compliant to home exercise program.   Response to previous treatment: good  Pain Scale: Patient unable to indicate pain secondary to aphasia. No pain behaviors observed throughout the session.  Objective:   UNTIMED  Procedure   Speech- Language- Voice Therapy     Short Term Goals: (4 weeks) Current Progress:   Patient will follow simple 1-2 step commands presented visually with >90% accuracy for 2 consecutive sessions to enhance her receptive language skills.  MET 4/26/24    2. Patient will follow simple 1 step commands presented verbally with 75% accuracy and 1 repetition provided to enhance her receptive language skills.     Progressing/ Not Met 5/6/2024 Did not address today, previously 60% accuracy independently.     Completed Tactus Therapy Advanced Comprehension Level 1 1 step objects presented with both  visual and verbal information with 80% acc Independently, previously completed with 80% accuracy independently as compared to 0% with only verbal presentation.   3. Patient will read single words aloud with 50% accuracy and 1 phonemic cue in order to bridge to higher level reading tasks.   Progressing/ Not Met 5/6/2024 Patient read bisyllabic words on first attempt with 60% accuracy independently (6/10 trials) plus 2 approximations, previously 70% accuracy (7/10 trials) independently.    Attempted 3 word phrases - read 7 phrases with 2/7 acc Independently; previously 25% accuracy independently.  When analyzed individual words, patient was able to read 11/21 words Independently.    4. Patient will repeat monosyllabic, common words with 50% accuracy when provided with a visual cue to increase her spoken language abilities.     Progressing/ Not Met 5/6/2024 Able to repeat monosyllabic words with 30% (6/20) acc Independently; 55% acc (11/20) given visual cue    5. Patient will complete rote speech tasks with >75% accuracy in unison with speech-language pathologist to increase spoken language abilities.  Progressing/ Not Met 5/6/2024 Counted 1-10 with 80% accuracy independently, previously 50% accuracy independently on first attempt.  Stated 1,2,3,4,5,6,7,10,10,10 with mod A.   6. Patient will answer simple open-ended questions with relevant responses with >75% accuracy with visual or auditory stimuli to increase spoken language output.  Progressing/ Not Met 5/6/2024 At beginning of session, patient was discussing the fair that her daughter went to. She also mentioned that her  goes to [employer] but she's not ready to go back yet.  There is some deciphering occurring by the speech language pathologist as Prema's speech continues to be filled with paraphasias and neologisms. Increase in ability to communicate a message.       Long Term Goals: (8 weeks) Current Progress:   Patient will increase her social participation  in conversation with > 5 utterances produced independently.    Progressing, not met 2024    2. Patient will state basic wants/needs/concerns >90% of the time independently through use of various modalities in order to increase her social participation with others.     Progressing, not met 2024      Patient Education/Response:   Patient educated regarding the followin. [place] speech task important for speech production  2. Aphasia and prognosis - recovery and intensity of therapy (discussed with patient's )  3. [place]    Home program established: yes-provided simple written command worksheets as well as the days of the week and numbers written down  Patient verbalized understanding to all above education provided.     See Electronic Medical Record under Patient Instructions for exercises provided throughout therapy.  Assessment:   Treatment session today focused on various receptive and expressive language tasks, including answering open-ended questions, reading bisyllabic words, completing rote speech tasks, and using compensatory strategies in conversational speech. The patient continues to show progress with her spontaneous speech production. However, it continues to be filled with paraphasias and neologisms. It continues to be helpful to choose a conversational topic prior to patient sharing information. Improvement noted in ability to read monosyllabic and bisyllabic words.  When following directions, it is when pairing a verbal instruction with a visual.  Prema is progressing well towards her goals. Current goals remain appropriate. Goals to be updated as necessary.     Patient prognosis is Good. Patient will continue to benefit from skilled outpatient speech and language therapy to address the deficits listed in the problem list on initial evaluation, provide patient/family education and to maximize patient's level of independence in the home and community environment.   Medical  necessity is demonstrated by the following IMPAIRMENTS:  Aphasia: Patient with few true words in all situations severely limiting functional communication with both familiar and unfamiliar communication partners.   Barriers to Therapy: none  Patient's spiritual, cultural and educational needs considered and patient agreeable to plan of care and goals.  Plan:   Continue Plan of Care with focus on rehabilitation and compensation for aphasia secondary to stroke.    Shreya Hung MS, CCC-SLP  Speech-Language Pathologist  5/6/2024

## 2024-05-06 NOTE — PROGRESS NOTES
"[facility]  Occupational Therapy Treatment Note     Date: 5/6/2024  Name: Prema Phillips  Clinic Number: [id number]    Therapy Diagnosis:   Encounter Diagnoses   Name Primary?    Muscle weakness of right upper extremity Yes    Lack of coordination due to acute cerebrovascular accident (CVA)      Physician: Odette Zhang MD    Physician Orders: Eval and Treat  Medical Diagnosis: M25.60,R53.1 (ICD-10-CM) - Decreased range of motion with decreased strength I63.412 (ICD-10-CM) - Cerebrovascular accident (CVA) due to embolism of left middle cerebral artery I67.89,G81.91 (ICD-10-CM) - Hemiparesis of right dominant side due to acute cerebrovascular disease  Evaluation Date: 3/27/2024  Insurance Authorization Period Expiration: 12/31/2024  Plan of Care Certification Period: 05/24/2024  Visit # / Visits authorized: 10 / 20  FOTO: 0 / 3     Precautions:  Standard, Fall, and aphasia, apraxia      Time In: 2:30 pm  Time Out: 3:15 pm  Total Billable Time: 45 minutes     Subjective     Patient reports: "I am good." Pt and  reports she is doing activities of daily living and some instrumental activities of daily living independently  She was compliant with home exercise program given last session.   Response to previous treatment: good   Functional change: improved participation at home     Pain: 0/10  Location: no pain reported     Objective     Physical Exam:  Postural examination/scapula alignment:  upright posture, mild winging of right scapula with good muscle activation with tactile cues   left active range of motion and manual muscle test within normal limits.     Joint Evaluation  AROM  3/27/2024 PROM   3/27/2024 MMS   3/27/2024 AROM  4/19/2024 MMS   4/19/2024     Right Right Right Right  Right    Scapular Elevation     3/5     Scapular Retraction     3/5  4/5   Shoulder Flex 0-180 121 130 3-/5 135 4/5   Shoulder Extension 0-80 60   3-/5  4/5   Shoulder Abd 0-180 130   3/5 180 4/5 " Health Maintenance Summary     Topic Due On Due Status Completed On    PAP SMEAR - CERVICAL CANCER SCREENING Mar 26, 2018 Not Due Mar 26, 2015    Diabetes Eye Exam Nov 16, 2018 Not Due Nov 16, 2017    Glycohemoglobin A1C  (Diabetes Sugar)  Mar 27, 2018 Not Due Sep 27, 2017    GFR  (Kidney Function Test)  Oct 16, 2018 Not Due Oct 16, 2017    Immunization - TDAP Pregnancy  Completed Apr 13, 2017    Diabetes Foot Exam  Dec 11, 2018 Not Due Dec 11, 2017    IMMUNIZATION - DTaP/Tdap/Td Apr 13, 2027 Not Due Apr 13, 2017    Immunization-Influenza  Completed Oct 20, 2017    Depression Screening Jul 26, 2018 Not Due Jul 26, 2017          Patient is up to date, no discussion needed    Recent PHQ 2/9 Score    PHQ 2:  Date PHQ 2 Score   12/11/2017 0       PHQ 9:                Shoulder ER 0-90     3/5  4-/5   Shoulder IR 0-90 WNL   3/5  4/5   Elbow Flexion 0-150 150   3/5  4/5   Elbow Extension 0   3/5  3+/5   Wrist Flex 0-80 90   3/5  3/5   Wrist Ext 0-70 50   3-/5  3/5   Supination 0-80 WNL   3/5  3/5   Pronation 0-80 WNL   3/5  3/5   *WNL - Within Normal Limits  *NT = Not Tested     Fist: tight right hand ;       Strength: (FLOR Dynamometer in lbs.) Average 3 trials, Position II:       3/27/2024 3/27/2024 4/19/2024 4/19/2024   Rung II Right Left Right  Left    Trial 1 19# 20# 30# 25#   Trial 2 14# 22# 33# 33#   Trial 3 15# 26# 27# 36#   Average 16# 22.6# 30# 31.3#      Normal  Average Values  Female Right Left Male: Right Left   20-29 66 lbs 62 lbs         94 lbs 86 lbs   30-39 68 lbs 64 lbs 90 lbs 82 lbs   40-49 64 lbs 62 lbs 80 lbs 74 lbs   50-59 62 lbs 57 lbs 72 lbs 65 lbs   60-69 53 lbs 51 lbs 63 lbs 56 lbs   70+ 44 lbs 42 lbs 54 lbs 48 lbs   SD = +/- 19lbs         Pinch Strength (Measured in lbs)       3/27/2024 3/27/2024 4/19/2024 4/19/2024     Right Left Right  Left    Key Pinch 5 # 7 # 8# 11#   3pt Pinch 3 # 5 # 7# 12#   2pt Pinch 3 # 6 # 5# 7#         Fine/Gross Motor Coordination     Assessment   Right   3/27/2024 Left  3/27/2024 Right   4/19/2024 Left   4/19/2024   9 hole peg test 9 pegs in/out for time 2:25  2 cues for sequencing  :48 1:01  No cues required :32   SMITHA (Rapid Alternating Movements)      impaired - moderate    intact Impaired - decreased ROM  Impaired - decreased ROM    Finger to Nose (5 times)   impaired - moderate intact Impaired - moderate - touched back of head rather than nose  Impaired - moderate - touched back of head rather than nose    Finger Flicks (coordination moving from digit flexion to digit extension)   impaired - minimum intact Intact - slowed but full range of motion  Intact - slowed but full range of motion    *WNL - Within Normal Limits  *NT = Not Tested     Endurance Deficit: moderate    Treatment     Prema received the treatments  listed below:      Therapeutic exercises to develop strength, endurance, and posture for 10 minutes, including:  Upper Body Ergometer (UBE) L3 for 8 minutes at 45 RPM to provide \proprioceptive awareness, postural stability, strength, activity tolerance, and reciprocal patterns with bimanual coordination completed to improve use of bilateral upper extremities in order to prepare for therapeutic exercises/activities. Cues provided as needed for postural stability/positioning  - green flexbar twists/pronation/supination 2x10      therapeutic activities to improve functional performance for 35 minutes, including:  - handwriting - identifying upper case, writing lower case   - sequencing beads - patterns   - in hand manipulation with light golf balls on table x1 minute each hand   - locating marbles in putty - blue   - in hand manipulation - buttons, marbles, medium pom poms   - purdue pegboard     Patient Education and Home Exercises     Education provided:   - handwriting drills   - green putty   - Progress towards goals     Written Home Exercises Provided: yes.  Exercises were reviewed and Prema was able to demonstrate them prior to the end of the session.  Prema demonstrated good  understanding of the home exercise program provided. See electronic medical record under Patient Instructions for exercises provided during therapy sessions.       Assessment     Prema tolerated today's session well. She exhibited difficulty with in hand manipulation, coordination multiple steps with isolated digit movements and with novice tasks. Improvement with step by step demonstration and grading items to medium pom poms. Noted compensatory pinch of thumb and middle finger with fine motor coordination tasks, cue to incorporate index finger for 2pt pinch. Increased time for purdue pegboard     Prema is progressing well towards her goals and there are no updates to goals at this time. Pt prognosis is Good.     Patient will continue to  benefit from skilled outpatient occupational therapy to address the deficits listed in the problem list on initial evaluation provide patient/family education and to maximize patient's level of independence in the home and community environment.     Patient's spiritual, cultural and educational needs considered and patient agreeable to plan of care and goals.    Anticipated barriers to occupational therapy: cognitive deficits     Goals:  Short Term Goals: 4 weeks   - Pt will report 50% compliance with home exercise program in order to maintain progress attained during therapy. Met 4/22/2024  - Pt will improve active range of motion of right shoulder flexion/abduction to 120/140 degrees in order to increase ease with bathing and dressing tasks. Met 4/22/2024  - Pt will increase right/left  strength to 22/30# in order to improve ease when dressing, bathing and completing gardening tasks. Met 4/22/2024  - Pt will decrease time on 9HPT to under 2 minutes using right hand  to improve fine motor speed and coordination needed to perform home maintenance, grooming and hygiene, money manipulation. Met 4/22/2024     Long Term Goals: 8 weeks   - Pt will report independence with home exercise program in order to maintain progress attained during therapy. Not met, progressing  - Pt will increase MMT for bilateral shoulder movements in all planes  to 4/5 to increase upper body strength needed for home management tasks, high level IADLs and leisure related activities. Not met, progressing  - Pt will increase bilateral  strength to 40# in order to improve ease with functional tasks such as gardening. Not met, progressing  - Pt will improve right/left key/3pt/2pt pinch by 2-3# in order to increase ease when dressing and grooming. Met 4/22/2024  - Pt will complete simulated instrumental activities of daily living activity with less than 2 cues for completion in order to return to prior level of function and decrease caregiver  burden. Not met, progressing    Plan     Updates/Grading for next session: continue with plan of care     Corinne Rapier, OT   5/6/2024

## 2024-05-08 ENCOUNTER — CLINICAL SUPPORT (OUTPATIENT)
Dept: REHABILITATION | Facility: HOSPITAL | Age: 79
End: 2024-05-08
Payer: MEDICARE

## 2024-05-08 DIAGNOSIS — R48.2 APRAXIA: ICD-10-CM

## 2024-05-08 DIAGNOSIS — R47.01 APHASIA: Primary | ICD-10-CM

## 2024-05-08 DIAGNOSIS — R27.9 LACK OF COORDINATION DUE TO ACUTE CEREBROVASCULAR ACCIDENT (CVA): ICD-10-CM

## 2024-05-08 DIAGNOSIS — I63.9 LACK OF COORDINATION DUE TO ACUTE CEREBROVASCULAR ACCIDENT (CVA): ICD-10-CM

## 2024-05-08 DIAGNOSIS — M62.81 MUSCLE WEAKNESS OF RIGHT UPPER EXTREMITY: Primary | ICD-10-CM

## 2024-05-08 DIAGNOSIS — R48.2 APRAXIA: Primary | ICD-10-CM

## 2024-05-08 DIAGNOSIS — Z91.81 RISK FOR FALLS: ICD-10-CM

## 2024-05-08 PROCEDURE — 97530 THERAPEUTIC ACTIVITIES: CPT | Mod: PN,CQ,59

## 2024-05-08 PROCEDURE — 97110 THERAPEUTIC EXERCISES: CPT | Mod: KX,PN,59

## 2024-05-08 PROCEDURE — 97110 THERAPEUTIC EXERCISES: CPT | Mod: PN,CQ,59

## 2024-05-08 PROCEDURE — 97112 NEUROMUSCULAR REEDUCATION: CPT | Mod: PN,CQ

## 2024-05-08 PROCEDURE — 97530 THERAPEUTIC ACTIVITIES: CPT | Mod: KX,PN

## 2024-05-08 PROCEDURE — 92507 TX SP LANG VOICE COMM INDIV: CPT | Mod: KX,PN

## 2024-05-08 NOTE — PROGRESS NOTES
[facility]   Physical Therapy Treatment Note      Name: Prema Phillips  Clinic Number: [id number]    Therapy Diagnosis:   Encounter Diagnoses   Name Primary?    Apraxia Yes    Risk for falls      Physician: Odette Zhang MD    Visit Date: 5/8/2024    Physician Orders: PT Eval and Treat   Medical Diagnosis from Referral: Decreased range of motion with decreased strength [M25.60, R53.1], Cerebrovascular accident (CVA) due to embolism of left middle cerebral artery [I63.412], Hemiparesis of right dominant side due to acute cerebrovascular disease [I67.89, G81.91]   Evaluation Date: 3/27/2024  Authorization Period Expiration: 12/31/2024  Plan of Care Expiration: 5/24/2024  Progress Note Due: last assessed 5/1/2024  Date of Surgery: N/A  Visit # / Visits authorized: 9/20 (+eval)  FOTO: 1/3 (do not reissued do to patient's inability to read unless proxy is present)     Precautions: Standard, Fall, and Aphasia (per SLP - consider written instructions as patient demonstrated good reading comprehension during speech eval)     Time In: 11:15 AM  Time Out: 12:00 PM  Total Billable Time: 45 minutes (1TE, 1 NMR, 1 TA)    PTA Visit #: 1/5     Subjective     Patient reports: Right side, upper and lower extremities, still feels very weak  She will be compliant with home exercise program.  Response to previous treatment: no adverse reactions  Functional change: ongoing    Pain: 0/10  Location:  n/a     Objective      Objective Measures updated at progress report unless specified.     Treatment     Prema received the treatments listed below:      Therapeutic exercises to develop strength and endurance for 8 minutes including:  - Scifit recumbent stepper x6 min L5 sprint hill setting for endurance/activity tolerance assessment and training  - Sit to stand from standard chair + red med ball reaches 2x10    Neuromuscular re-education activities to improve: Balance and Coordination for 12 minutes. The  "following activities were included:  - Standing calf raises 2 x 20 with occasional touchdown support  - Single leg stance trials x 3 minutes total with intermittent touchdown support (max was ~12 secs on each LE with PT demo)  - Static tandem stance on blue foam 2x30" each leg leading  - Standing toe raises 2 x 20 with occasional touchdown support    Therapeutic activities to improve functional performance for 25 minutes including:  - High knee marches open gym 4 lengths x 30 feet   - Retro walks open gym 4 lengths x 30 feet   - Abraham step overs forward (spaced ~4 feet apart) with mixed 6" and 9" hurdles 6 lengths x 20 feet each  - Step up and overs open gym; 4" step 2x10B  - Tandem walking x 8 lengths - occasional touch down on bar  - Airex beam:  tandem x 4 laps    Sidestepping x 4 laps  - Step up with contralateral hip : 2x10 B    Not performed today  - Standing reciprocal hip flexion marches with light touchdown support 3x10 bilateral 2# ankle weight  - Standing hip abduction 2 x 15  2# ankle weight  - standing hip extension 2 x 15 2# ankle weight   Bosu round side up: split stance static holds - eyes open and eyes closed  2 feet static balance      Patient Education and Home Exercises       Education provided: Reinforced home exercise program provided at [facility].     Written Home Exercises Provided: Patient instructed to cont prior HEP. Exercises were reviewed and Prema was able to demonstrate them prior to the end of the session.  Prema demonstrated good  understanding of the education provided. See Electronic Medical Record under Patient Instructions for exercises provided during therapy sessions    Assessment     Prema arrived to session without pain complaints and agreeable to treatment. Good tolerance of treatment with less reliance on UE support with higher level balance.  She completed majority of exercise in open gym today without loss of balance. Continues to respond well to visual demonstration for " multi step activity. Appropriate levels of fatigue achieved. She would benefit from continued PT services to achieve functional goals.           Prema Is progressing well towards her goals.   Patient prognosis is Good.     Patient will continue to benefit from skilled outpatient physical therapy to address the deficits listed in the problem list box on initial evaluation, provide pt/family education and to maximize pt's level of independence in the home and community environment.     Patient's spiritual, cultural and educational needs considered and pt agreeable to plan of care and goals.     Anticipated barriers to physical therapy: asphasia    Goals:  Short Term Goals: 4 weeks   Patient will be compliant with HEP in order to maximize PT benefits  Patient will complete >/=10 seconds of single leg stance on each leg in order to improve static postural control during standing ADLs (met)  Patient will complete TUG in </= 13 seconds in order to reduce risk for falls and improve safety with functional mobility (met)  Patient will improve self-selected walking speed to >/=0.8 m/s in order to improve safety with community mobility     Long Term Goals: 8 weeks   Patient will score >/= 59% on FOTO survey in order to improve self-perception of functional mobility deficits  Patient will complete TUG in </= 11 seconds in order to reduce risk for falls and improve safety with functional mobility (met)  Patient will score </= 15 seconds on Five Time Sit to  order to improve bilateral lower extremity endurance and muscular power for transfers   Patient will improve self-selected walking speed to >/=1.0 m/s in order to improve safety with community mobility  Patient will perform 1 floor <> stand transfer with bilateral upper extremity support in order to demonstrate safe functional mobility in case of future fall  Patient will begin some form of home/community fitness in order to sustain progress gained in PT       Plan      Dynamic balance with tandem and narrow base of support - functional balance and tasks for ease of understanding.  Continue plan of care (POC).      Tami Gastelum, PTA

## 2024-05-08 NOTE — PROGRESS NOTES
"[facility]  Occupational Therapy Treatment Note     Date: 5/8/2024  Name: Prema Phillips  Clinic Number: [id number]    Therapy Diagnosis:   Encounter Diagnoses   Name Primary?    Muscle weakness of right upper extremity Yes    Lack of coordination due to acute cerebrovascular accident (CVA)      Physician: Odette Zhang MD    Physician Orders: Eval and Treat  Medical Diagnosis: M25.60,R53.1 (ICD-10-CM) - Decreased range of motion with decreased strength I63.412 (ICD-10-CM) - Cerebrovascular accident (CVA) due to embolism of left middle cerebral artery I67.89,G81.91 (ICD-10-CM) - Hemiparesis of right dominant side due to acute cerebrovascular disease  Evaluation Date: 3/27/2024  Insurance Authorization Period Expiration: 12/31/2024  Plan of Care Certification Period: 05/24/2024  Visit # / Visits authorized: 11 / 20  FOTO: 0 / 3     Precautions:  Standard, Fall, and aphasia, apraxia      Time In: 10:15 am  Time Out: 11:00 am  Total Billable Time: 45 minutes     Subjective     Patient reports: "I am good. It is about the same. Always feels different but doesn't stop me" referring to right upper extremity   She was compliant with home exercise program given last session.   Response to previous treatment: good   Functional change: improved participation at home     Pain: 0/10  Location: no pain reported     Objective     Physical Exam:  Postural examination/scapula alignment:  upright posture, mild winging of right scapula with good muscle activation with tactile cues   left active range of motion and manual muscle test within normal limits.     Joint Evaluation  AROM  3/27/2024 PROM   3/27/2024 MMS   3/27/2024 AROM  4/19/2024 MMS   4/19/2024     Right Right Right Right  Right    Scapular Elevation     3/5     Scapular Retraction     3/5  4/5   Shoulder Flex 0-180 121 130 3-/5 135 4/5   Shoulder Extension 0-80 60   3-/5  4/5   Shoulder Abd 0-180 130   3/5 180 4/5   Shoulder ER 0-90     3/5  " 4-/5   Shoulder IR 0-90 WNL   3/5  4/5   Elbow Flexion 0-150 150   3/5  4/5   Elbow Extension 0   3/5  3+/5   Wrist Flex 0-80 90   3/5  3/5   Wrist Ext 0-70 50   3-/5  3/5   Supination 0-80 WNL   3/5  3/5   Pronation 0-80 WNL   3/5  3/5   *WNL - Within Normal Limits  *NT = Not Tested     Fist: tight right hand ;       Strength: (FLOR Dynamometer in lbs.) Average 3 trials, Position II:       3/27/2024 3/27/2024 4/19/2024 4/19/2024   Rung II Right Left Right  Left    Trial 1 19# 20# 30# 25#   Trial 2 14# 22# 33# 33#   Trial 3 15# 26# 27# 36#   Average 16# 22.6# 30# 31.3#      Normal  Average Values  Female Right Left Male: Right Left   20-29 66 lbs 62 lbs         94 lbs 86 lbs   30-39 68 lbs 64 lbs 90 lbs 82 lbs   40-49 64 lbs 62 lbs 80 lbs 74 lbs   50-59 62 lbs 57 lbs 72 lbs 65 lbs   60-69 53 lbs 51 lbs 63 lbs 56 lbs   70+ 44 lbs 42 lbs 54 lbs 48 lbs   SD = +/- 19lbs         Pinch Strength (Measured in lbs)       3/27/2024 3/27/2024 4/19/2024 4/19/2024     Right Left Right  Left    Key Pinch 5 # 7 # 8# 11#   3pt Pinch 3 # 5 # 7# 12#   2pt Pinch 3 # 6 # 5# 7#         Fine/Gross Motor Coordination     Assessment   Right   3/27/2024 Left  3/27/2024 Right   4/19/2024 Left   4/19/2024   9 hole peg test 9 pegs in/out for time 2:25  2 cues for sequencing  :48 1:01  No cues required :32   SMITHA (Rapid Alternating Movements)      impaired - moderate    intact Impaired - decreased ROM  Impaired - decreased ROM    Finger to Nose (5 times)   impaired - moderate intact Impaired - moderate - touched back of head rather than nose  Impaired - moderate - touched back of head rather than nose    Finger Flicks (coordination moving from digit flexion to digit extension)   impaired - minimum intact Intact - slowed but full range of motion  Intact - slowed but full range of motion    *WNL - Within Normal Limits  *NT = Not Tested     Endurance Deficit: moderate    Treatment     Prema received the treatments listed below:      Therapeutic  exercises to develop strength, endurance, and posture for 20 minutes, including:  Upper Body Ergometer (UBE) L3 for 6 minutes at 35 RPM to provide \proprioceptive awareness, postural stability, strength, activity tolerance, and reciprocal patterns with bimanual coordination completed to improve use of bilateral upper extremities in order to prepare for therapeutic exercises/activities. Cues provided as needed for postural stability/positioning; completed all 6 minutes standing   - wall walks with ball x10   - Glenohumeral stabilization 1# dowel clockwise/counter clockwise circles 2x10   - green theraband shoulder extension 2x10   - green theraband shoulder rows 2x10     therapeutic activities to improve functional performance for 25 minutes, including:  - handwriting - on mirror patterns, name, ABCs   - tic tac toe 2 games        Patient Education and Home Exercises     Education provided:   - handwriting drills   - green putty   - Progress towards goals     Written Home Exercises Provided: yes.  Exercises were reviewed and Prema was able to demonstrate them prior to the end of the session.  Prema demonstrated good  understanding of the home exercise program provided. See electronic medical record under Patient Instructions for exercises provided during therapy sessions.       Assessment     Prema tolerated today's session well. Hand over hand provided for motor planning band exerises, glenohumeral exercises and handwriting. She exhibited improved motor planning as session progressed. Alphabet practice on mirror to challenge right shoulder; she was able to recall most letters but required visual cues for D, E, J, K, L, S, U, V, W, Y, Z. Overall steady progress and use of right upper extremity.     Prema is progressing well towards her goals and there are no updates to goals at this time. Pt prognosis is Good.     Patient will continue to benefit from skilled outpatient occupational therapy to address the deficits listed  in the problem list on initial evaluation provide patient/family education and to maximize patient's level of independence in the home and community environment.     Patient's spiritual, cultural and educational needs considered and patient agreeable to plan of care and goals.    Anticipated barriers to occupational therapy: cognitive deficits     Goals:  Short Term Goals: 4 weeks   - Pt will report 50% compliance with home exercise program in order to maintain progress attained during therapy. Met 4/22/2024  - Pt will improve active range of motion of right shoulder flexion/abduction to 120/140 degrees in order to increase ease with bathing and dressing tasks. Met 4/22/2024  - Pt will increase right/left  strength to 22/30# in order to improve ease when dressing, bathing and completing gardening tasks. Met 4/22/2024  - Pt will decrease time on 9HPT to under 2 minutes using right hand  to improve fine motor speed and coordination needed to perform home maintenance, grooming and hygiene, money manipulation. Met 4/22/2024     Long Term Goals: 8 weeks   - Pt will report independence with home exercise program in order to maintain progress attained during therapy. Not met, progressing  - Pt will increase MMT for bilateral shoulder movements in all planes  to 4/5 to increase upper body strength needed for home management tasks, high level IADLs and leisure related activities. Not met, progressing  - Pt will increase bilateral  strength to 40# in order to improve ease with functional tasks such as gardening. Not met, progressing  - Pt will improve right/left key/3pt/2pt pinch by 2-3# in order to increase ease when dressing and grooming. Met 4/22/2024  - Pt will complete simulated instrumental activities of daily living activity with less than 2 cues for completion in order to return to prior level of function and decrease caregiver burden. Not met, progressing    Plan     Updates/Grading for next session: continue  with plan of care     Corinne Rapier, OT   5/8/2024

## 2024-05-08 NOTE — PROGRESS NOTES
"[facility]  Speech Therapy Treatment Note- Neurological Rehabilitation  Date: 5/8/2024     Name: Prema Phillips   MRN: [id number]   Therapy Diagnosis:   Encounter Diagnoses   Name Primary?    Aphasia Yes    Apraxia      Physician: Odette Zhang MD  Physician Orders: Ambulatory Referral to Speech Therapy   Medical Diagnosis: M25.60,R53.1 (ICD-10-CM) - Decreased range of motion with decreased strength I63.412 (ICD-10-CM) - Cerebrovascular accident (CVA) due to embolism of left middle cerebral artery I67.89,G81.91 (ICD-10-CM) - Hemiparesis of right dominant side due to acute cerebrovascular disease    Visit #/ Visits Authorized: 17/20  Date of Evaluation:  3/27/24  Insurance Authorization Period: 3/27/24-12/31/24  Plan of Care Expiration Date:    5/22/24  Extended Plan of Care:  n/a   Progress Note: 5/24/24     Time In:  0931  Time Out:  1015  Total Billable Time:  44 minutes     Precautions: Communication  Subjective:   Patient reports: "I got the computer out and it was fine"; perseverating on "bite" today throughout session  She was compliant to home exercise program.   Response to previous treatment: good  Pain Scale: Patient unable to indicate pain secondary to aphasia. No pain behaviors observed throughout the session.  Objective:   UNTIMED  Procedure   Speech- Language- Voice Therapy     Short Term Goals: (4 weeks) Current Progress:   Patient will follow simple 1-2 step commands presented visually with >90% accuracy for 2 consecutive sessions to enhance her receptive language skills.  MET 4/26/24    2. Patient will follow simple 1 step commands presented verbally with 75% accuracy and 1 repetition provided to enhance her receptive language skills.     Progressing/ Not Met 5/8/2024 Did not address today, previously 60% accuracy independently.      3. Patient will read single words aloud with 50% accuracy and 1 phonemic cue in order to bridge to higher level reading tasks.   Progressing/ Not " Met 5/8/2024 Trialed Oral Reading for Language in Aphasia (SILVIA) today for 3 short phrases with good success - some paraphasias (bite for box). Read in unison with speech-language pathologist for 3/3 and then independently read 1/3    Read bisyllabic words with 60% accuracy independently (6/10 trials), stable from previous.   4. Patient will repeat monosyllabic, common words with 50% accuracy when provided with a visual cue to increase her spoken language abilities.     Progressing/ Not Met 5/8/2024 Able to repeat monosyllabic words with 30% (6/20) acc Independently; 55% acc (11/20) given visual cue    5. Patient will complete rote speech tasks with >75% accuracy in unison with speech-language pathologist to increase spoken language abilities.  Progressing/ Not Met 5/8/2024 Counted 1-10 with 70% accuracy independently, previously 80% accuracy independently on first attempt.  Speech-language pathologist wrote out the words of the numbers and she read them in order with 80% accuracy independently    Days of the week stated 5/7 (71%) with initiation cue  4/7 (57%) when reading the words     Months of the year stated 5/12 (42%) with initiation cue for first month, read 11/12 (92%) when written out for her   6. Patient will answer simple open-ended questions with relevant responses with >75% accuracy with visual or auditory stimuli to increase spoken language output.  Progressing/ Not Met 5/8/2024 Elaborate responses to wh- questions today, including her discussion on how she knows what she wants to say but it is not coming out right. She is mostly self-aware of her speech production errors, but is unable to self-correct independently and at times the listener is unable to grasp the idea she is talking to help communicate through this communication breakdown.      Attempted writing to a visual stimulus but patient copied the picture instead of writing the words related to the picture.    Long Term Goals: (8 weeks)  Current Progress:   Patient will increase her social participation in conversation with > 5 utterances produced independently.    Progressing, not met 2024    2. Patient will state basic wants/needs/concerns >90% of the time independently through use of various modalities in order to increase her social participation with others.     Progressing, not met 2024      Patient Education/Response:   Patient educated regarding the followin. Green Level speech task important for speech production  2. Aphasia and prognosis - recovery and intensity of therapy (discussed with patient's )  3. [employer]    Home program established: yes-provided simple written command worksheets as well as the days of the week and numbers written down  Patient verbalized understanding to all above education provided.     See Electronic Medical Record under Patient Instructions for exercises provided throughout therapy.  Assessment:   Treatment session today focused on various receptive and expressive language tasks, including answering open-ended questions, reading bisyllabic words, completing rote speech tasks, and using compensatory strategies in conversational speech. There were improvements noted in rote speech tasks today as well as with conversational speech, with the listener having less difficulty following conversational topics.  Prema is progressing well towards her goals. Current goals remain appropriate. Goals to be updated as necessary.     Patient prognosis is Good. Patient will continue to benefit from skilled outpatient speech and language therapy to address the deficits listed in the problem list on initial evaluation, provide patient/family education and to maximize patient's level of independence in the home and community environment.   Medical necessity is demonstrated by the following IMPAIRMENTS:  Aphasia: Patient with few true words in all situations severely limiting functional communication with both  familiar and unfamiliar communication partners.   Barriers to Therapy: none  Patient's spiritual, cultural and educational needs considered and patient agreeable to plan of care and goals.  Plan:   Continue Plan of Care with focus on rehabilitation and compensation for aphasia secondary to stroke.    Nori Yin MS, CCC-SLP, CBIS  Speech-Language Pathologist  Certified Brain Injury Specialist  5/8/2024

## 2024-05-10 ENCOUNTER — CLINICAL SUPPORT (OUTPATIENT)
Dept: REHABILITATION | Facility: HOSPITAL | Age: 79
End: 2024-05-10
Payer: MEDICARE

## 2024-05-10 DIAGNOSIS — R47.01 APHASIA: Primary | ICD-10-CM

## 2024-05-10 DIAGNOSIS — R48.2 APRAXIA: ICD-10-CM

## 2024-05-10 PROCEDURE — 92507 TX SP LANG VOICE COMM INDIV: CPT | Mod: KX,PN

## 2024-05-10 NOTE — PROGRESS NOTES
"  [facility]  Occupational Therapy Treatment Note    Date: 5/26/2023  Name: Laverne Humphries  [facility] Number: [id number]    Therapy Diagnosis:   Encounter Diagnoses   Name Primary?    Decreased range of motion of right shoulder Yes    Localized edema     Pain        Physician: Luis Angel Wheeler MD    Physician Orders:Eval and treat;  s/p RCR/lymphedema treatments into hand  Medical Diagnosis: Z98.890 (ICD-10-CM) - S/P right rotator cuff repair  Surgical Procedure and Date: 3/21/2023,   1. Right shoulder Arthroscopic massive complex rotator cuff repair    2. Right shoulder Arthroscopic extensive debridement   3. Right shoulder Arthroscopic lysis of adhesions   4. Right shoulder Arthroscopic subacromial decompression and bursectomy   5. Right shoulder arthroscopic loose body removal    Evaluation Date: 5/3/2023  Insurance Authorization Period Expiration: 05/07/2023  Plan of Care Expiration: 12 weeks; 7/28/2023  Date of Return to MD: 7/3/2023  Visit # / Visits authorized: 1 / 1  FOTO: (date and score)     Precautions:  Standard     Time In:     08:00  AM   Time Out:  09:00   AM   Total Appointment Time (timed & untimed codes): 60  minutes     9 weeks 4 day s/p (5/23/2023)   SUBJECTIVE     Pt reports: "my shoulder was hurting little bit so I took tylenol"   She was compliant with home exercise program given last session.   Response to previous treatment: increased composite fist, decreased edema   Functional change:  able to use both arms when washing hair     Pain: 0/10  Location: right hand     OBJECTIVE   Objective Measures updated at progress report unless specified.    Observation/Appearance: mod edema in hand      Edema. Measured in centimeters.    5/9/2023 5/9/2023     Left Right   2in. Above elbow       2in. Below elbow       Wrist Crease 14.0 cm  16.0 cm    Figure of 8       MCPs 18.4.0 cm  20.5 cm            Shoulder ROM. Measured in degrees    5/3/2022 5/3/2023 5/11/2023     Left " "[facility]  Speech Therapy Treatment Note- Neurological Rehabilitation  Date: 5/10/2024     Name: Prema Phillips   MRN: [id number]   Therapy Diagnosis:   Encounter Diagnoses   Name Primary?    Aphasia Yes    Apraxia      Physician: Odette Zhang MD  Physician Orders: Ambulatory Referral to Speech Therapy   Medical Diagnosis: M25.60,R53.1 (ICD-10-CM) - Decreased range of motion with decreased strength I63.412 (ICD-10-CM) - Cerebrovascular accident (CVA) due to embolism of left middle cerebral artery I67.89,G81.91 (ICD-10-CM) - Hemiparesis of right dominant side due to acute cerebrovascular disease    Visit #/ Visits Authorized: 18/20  Date of Evaluation:  3/27/24  Insurance Authorization Period: 3/27/24-12/31/24  Plan of Care Expiration Date:    5/22/24  Extended Plan of Care:  n/a   Progress Note: 5/24/24     Time In:  1347  Time Out:  1430  Total Billable Time: 43 minutes     Precautions: Communication  Subjective:   Patient reports: "Maldonado went to play golf today"; patient is doing well; asked speech-language pathologist about her son  She was compliant to home exercise program.   Response to previous treatment: good  Pain Scale: Patient unable to indicate pain secondary to aphasia. No pain behaviors observed throughout the session.  Objective:   UNTIMED  Procedure   Speech- Language- Voice Therapy     Short Term Goals: (4 weeks) Current Progress:   Patient will follow simple 1-2 step commands presented visually with >90% accuracy for 2 consecutive sessions to enhance her receptive language skills.  MET 4/26/24    2. Patient will follow simple 1 step commands presented verbally with 75% accuracy and 1 repetition provided to enhance her receptive language skills.     Progressing/ Not Met 5/10/2024 Did not address today, previously 60% accuracy independently.      3. Patient will read single words aloud with 50% accuracy and 1 phonemic cue in order to bridge to higher level reading tasks. " "  Progressing/ Not Met 5/10/2024 Read bisyllabic words with 33% accuracy (1/3 trials), previously 60% accuracy independently (6/10 trials).   4. Patient will repeat monosyllabic, common words with 50% accuracy when provided with a visual cue to increase her spoken language abilities.     Progressing/ Not Met 5/10/2024 Did not address today; previously repeated monosyllabic words with 30% (6/20) acc Independently; 55% acc (11/20) given visual cue     Patient wanted to focus on her dog's (Jony) name today - speech-language pathologist wrote out the name as "low key" for the way the word sounds when produced. Patient did well with the visual representation and produced the name for the first time today following several session attempts. After successful production x3, phonemic paraphasias returned. She is motivated to continue working on this at home.   5. Patient will complete rote speech tasks with >75% accuracy in unison with speech-language pathologist to increase spoken language abilities.  Progressing/ Not Met 5/10/2024 Counted 1-10 with 80% accuracy independently, previously 70% accuracy independently on first attempt.     Days of the week stated 4/7 (57%) with initiation cue, previously 5/7 (71%)  3/7 (43%) when reading the words, previously 4/7 (57%)    Not addressed today - Previously months of the year stated 5/12 (42%) with initiation cue for first month, read 11/12 (92%) when written out for her   6. Patient will answer simple open-ended questions with relevant responses with >75% accuracy with visual or auditory stimuli to increase spoken language output.  Progressing/ Not Met 5/10/2024 Attempted simple wh- questions but patient with off-target responses x2 so ceased task     Long Term Goals: (8 weeks) Current Progress:   Patient will increase her social participation in conversation with > 5 utterances produced independently.    Progressing, not met 5/10/2024    2. Patient will state basic " Right PROM Right PROM   Shoulder Flexion   85 degrees  90   Shoulder Abduction   NT 80   Shoulder Extension    NT NT    Shoulder IR   NT NT   Shoulder ER   NT 20         Elbow and Wrist ROM. Measured in degrees.    5/9/2023 5/9/2023     Left Right   Elbow Ext/Flex WNL/WNL  12/135    Supination/Pronation WNL/WNL  66/75   Wrist Ext/Flex 55/70 39/6   Wrist RD/UD             Hand ROM. Measured in degrees.    5/9/2023 5/9/2023     Left Right           Index: MP    68              PIP       50              DIP   30              PITTS   148           Long:  MP   65              PIP   58              DIP   62              PITTS   185           Ring:   MP   55              PIP   60              DIP   32              PITTS   147                  Strength (Dynamometer) and Pinch Strength (Pinch Gauge)  Measured in pounds.    5/9/2023 5/9/2023     Left Right   Rung II   Deferred    Key Pinch       3pt Pinch       2pt Pinch             Manual Muscle Test    5/9/2023 5/9/2023     Left Right   Wrist Extension        Wrist Flexion       Radial Deviation       Ulnar Deviation       Supination       Pronation       Elbow Extension       Elbow Flexion             Limitation/Restriction for FOTO initial eval; shoulder Survey     Therapist reviewed FOTO scores for Laverne Humphries on 5/3/2023.   FOTO documents entered into [employer] - see Media section.     Limitation Score: needs to be taken%             Treatment     Leovidia received the treatments listed below:     Supervised modalities: MHP for 10 minutes to promote increased blood flow       Manual therapy techniques: Manual Lymphatic Drainage were applied to the: RUE for 30 minutes, including:  - passive ROM to flexion/scaption/abduction/ER/IR  x 10 reps each (3 sets)  - gentle inferior mobs   - edema wrapping R hand       Therapeutic exercises to develop ROM for 15 minutes, including:  - shoulder rolls forward/backward x 10 reps each (2 sets)   - pendulums (CW/CCW, forward/backward,  side/side) x 10 reps each (2 sets)   - shoulder raises x 10 reps (2 sets)   - pulleys (scaption, flexion)   - scap retraction/protraction x 10 reps (2 sets)  - towel walks for grasping (1 min)       Patient Education and Home Exercises      Education provided:   - pillow under arm when sitting down watching TV   - Progress towards goals     Written Home Exercises Provided: Patient instructed to cont prior HEP.  Exercises were reviewed and Laverne was able to demonstrate them prior to the end of the session.  Laverne demonstrated good  understanding of the HEP provided. See EMR under Patient Instructions for exercises provided during therapy sessions.      ASSESSMENT     Able to alex min increase in shoulder passive ROM today. Able to get ~ 115 degrees PROM shoulder flexion today and ~125 scaption.     Laverne is progressing well towards her goals and there are no updates to goals at this time. Pt prognosis is fair.     Pt will continue to benefit from skilled outpatient occupational therapy to address the deficits listed in the problem list on initial evaluation, provide pt/family education and to maximize pt's level of independence in the home and community environment.     Pt's spiritual, cultural and educational needs considered and pt agreeable to plan of care and goals.    Anticipated barriers to occupational therapy: preexisting conditions     Goals:  Long term goals:   Pt will achieve shoulder AROM in flexion/scaption/abduction ~120 degrees   2.   Assess MMT when appropriate  3.   Pt will demo shoulder AROM WFL to perform daily and community activities   4.   Pt will report improvement in FOTO score by measuring 20% points.         Short term goals:   Pt will be complaint with HEP and pain management   Pt will achieve shoulder PROM in flexion and scaption ~110 degrees   Pt will report improvement in FOTO by decreasing 10% limitation points   Pt will achieve shoulder AAROM WFL to aid in ADLs   Pt will be  wants/needs/concerns >90% of the time independently through use of various modalities in order to increase her social participation with others.     Progressing, not met 5/10/2024      Patient Education/Response:   Patient educated regarding the followin. [place] speech task important for speech production  2. Aphasia and prognosis - recovery and intensity of therapy (discussed with patient's )  3. [facility]    Home program established: yes-provided simple written command worksheets as well as the days of the week and numbers written down  Patient verbalized understanding to all above education provided.     See Electronic Medical Record under Patient Instructions for exercises provided throughout therapy.  Assessment:   Treatment session today focused on various receptive and expressive language tasks, including answering open-ended questions, reading bisyllabic words, completing rote speech tasks, and using compensatory strategies in conversational speech. She was motivated to say her dog's name aloud accurately, so visual representation of the way the word sound was written for her. She eventually stated the name accurately several times but if moving on to another task, she was unable to state the name accurately independently. Her conversational speech topics continue to evolve and she is motivated to speak well. When provided with the topic or clarifying with her about the topic, this trained listener can follow along for the remainder of her speech. Prema is progressing well towards her goals. Current goals remain appropriate. Goals to be updated as necessary.     Patient prognosis is Good. Patient will continue to benefit from skilled outpatient speech and language therapy to address the deficits listed in the problem list on initial evaluation, provide patient/family education and to maximize patient's level of independence in the home and community environment.   Medical necessity is  able to demo full composite fist with R hand.    PLAN     Continue skilled occupational therapy with individualized plan of care focusing on improving functional independence with use of RUE    Updates/Grading for next session: cont per large massive type III protocol     Shaunna Stevenson, OT                                 demonstrated by the following IMPAIRMENTS:  Aphasia: Patient with few true words in all situations severely limiting functional communication with both familiar and unfamiliar communication partners.   Barriers to Therapy: none  Patient's spiritual, cultural and educational needs considered and patient agreeable to plan of care and goals.  Plan:   Continue Plan of Care with focus on rehabilitation and compensation for aphasia secondary to stroke.    Nori Yin MS, CCC-SLP, CBIS  Speech-Language Pathologist  Certified Brain Injury Specialist  5/10/2024

## 2024-05-15 ENCOUNTER — CLINICAL SUPPORT (OUTPATIENT)
Dept: REHABILITATION | Facility: HOSPITAL | Age: 79
End: 2024-05-15
Payer: MEDICARE

## 2024-05-15 DIAGNOSIS — R48.2 APRAXIA: ICD-10-CM

## 2024-05-15 DIAGNOSIS — Z91.81 RISK FOR FALLS: ICD-10-CM

## 2024-05-15 DIAGNOSIS — M62.81 MUSCLE WEAKNESS OF RIGHT UPPER EXTREMITY: Primary | ICD-10-CM

## 2024-05-15 DIAGNOSIS — R27.9 LACK OF COORDINATION DUE TO ACUTE CEREBROVASCULAR ACCIDENT (CVA): ICD-10-CM

## 2024-05-15 DIAGNOSIS — R48.2 APRAXIA: Primary | ICD-10-CM

## 2024-05-15 DIAGNOSIS — I63.9 LACK OF COORDINATION DUE TO ACUTE CEREBROVASCULAR ACCIDENT (CVA): ICD-10-CM

## 2024-05-15 DIAGNOSIS — R47.01 APHASIA: Primary | ICD-10-CM

## 2024-05-15 PROCEDURE — 97530 THERAPEUTIC ACTIVITIES: CPT | Mod: KX,PN,59

## 2024-05-15 PROCEDURE — 92507 TX SP LANG VOICE COMM INDIV: CPT | Mod: KX,PN

## 2024-05-15 PROCEDURE — 97110 THERAPEUTIC EXERCISES: CPT | Mod: KX,PN

## 2024-05-15 PROCEDURE — 97112 NEUROMUSCULAR REEDUCATION: CPT | Mod: KX,PN

## 2024-05-15 PROCEDURE — 97110 THERAPEUTIC EXERCISES: CPT | Mod: KX,PN,59

## 2024-05-15 NOTE — PROGRESS NOTES
"[facility]  Occupational Therapy Treatment Note     Date: 5/15/2024  Name: Prema Phillips  Clinic Number: [id number]    Therapy Diagnosis:   Encounter Diagnoses   Name Primary?    Muscle weakness of right upper extremity Yes    Lack of coordination due to acute cerebrovascular accident (CVA)      Physician: Odette Zhang MD    Physician Orders: Eval and Treat  Medical Diagnosis: M25.60,R53.1 (ICD-10-CM) - Decreased range of motion with decreased strength I63.412 (ICD-10-CM) - Cerebrovascular accident (CVA) due to embolism of left middle cerebral artery I67.89,G81.91 (ICD-10-CM) - Hemiparesis of right dominant side due to acute cerebrovascular disease  Evaluation Date: 3/27/2024  Insurance Authorization Period Expiration: 12/31/2024  Plan of Care Certification Period: 05/24/2024  Visit # / Visits authorized: 12 / 20  FOTO: 0 / 3     Precautions:  Standard, Fall, and aphasia, apraxia      Time In: 10:15 am  Time Out: 11:15 am  Total Billable Time: 60 minutes     Subjective     Patient reports: "I am good. It is good."   She was compliant with home exercise program given last session.   Response to previous treatment: good   Functional change: improved participation at home     Pain: 0/10  Location: no pain reported     Objective     Physical Exam:  Postural examination/scapula alignment:  upright posture, mild winging of right scapula with good muscle activation with tactile cues   left active range of motion and manual muscle test within normal limits.     Joint Evaluation  AROM  3/27/2024 PROM   3/27/2024 MMS   3/27/2024 AROM  4/19/2024 MMS   4/19/2024     Right Right Right Right  Right    Scapular Elevation     3/5     Scapular Retraction     3/5  4/5   Shoulder Flex 0-180 121 130 3-/5 135 4/5   Shoulder Extension 0-80 60   3-/5  4/5   Shoulder Abd 0-180 130   3/5 180 4/5   Shoulder ER 0-90     3/5  4-/5   Shoulder IR 0-90 WNL   3/5  4/5   Elbow Flexion 0-150 150   3/5  4/5   Elbow " Extension 0   3/5  3+/5   Wrist Flex 0-80 90   3/5  3/5   Wrist Ext 0-70 50   3-/5  3/5   Supination 0-80 WNL   3/5  3/5   Pronation 0-80 WNL   3/5  3/5   *WNL - Within Normal Limits  *NT = Not Tested     Fist: tight right hand ;       Strength: (FLOR Dynamometer in lbs.) Average 3 trials, Position II:       3/27/2024 3/27/2024 4/19/2024 4/19/2024   Rung II Right Left Right  Left    Trial 1 19# 20# 30# 25#   Trial 2 14# 22# 33# 33#   Trial 3 15# 26# 27# 36#   Average 16# 22.6# 30# 31.3#      Normal  Average Values  Female Right Left Male: Right Left   20-29 66 lbs 62 lbs         94 lbs 86 lbs   30-39 68 lbs 64 lbs 90 lbs 82 lbs   40-49 64 lbs 62 lbs 80 lbs 74 lbs   50-59 62 lbs 57 lbs 72 lbs 65 lbs   60-69 53 lbs 51 lbs 63 lbs 56 lbs   70+ 44 lbs 42 lbs 54 lbs 48 lbs   SD = +/- 19lbs         Pinch Strength (Measured in lbs)       3/27/2024 3/27/2024 4/19/2024 4/19/2024     Right Left Right  Left    Key Pinch 5 # 7 # 8# 11#   3pt Pinch 3 # 5 # 7# 12#   2pt Pinch 3 # 6 # 5# 7#         Fine/Gross Motor Coordination     Assessment   Right   3/27/2024 Left  3/27/2024 Right   4/19/2024 Left   4/19/2024   9 hole peg test 9 pegs in/out for time 2:25  2 cues for sequencing  :48 1:01  No cues required :32   SMITHA (Rapid Alternating Movements)      impaired - moderate    intact Impaired - decreased ROM  Impaired - decreased ROM    Finger to Nose (5 times)   impaired - moderate intact Impaired - moderate - touched back of head rather than nose  Impaired - moderate - touched back of head rather than nose    Finger Flicks (coordination moving from digit flexion to digit extension)   impaired - minimum intact Intact - slowed but full range of motion  Intact - slowed but full range of motion    *WNL - Within Normal Limits  *NT = Not Tested     Endurance Deficit: moderate    Treatment     Prema received the treatments listed below:      Therapeutic exercises to develop strength, endurance, and posture for 25 minutes, including:  -  scap elevation/retraction x10   - 3# dowel seated upper extremity exercises 2x10    - shoulder flexion/extension   - chest press   - clockwise/counter clockwise circles   - flexbar pronation/supination/twists 2x10     therapeutic activities to improve functional performance for 35 minutes, including:  - locating marbles in putty x4   - rolling green putty with rolling pin   - handwriting in putty   - handwriting on elevated surface upper and lower case letters    Patient Education and Home Exercises     Education provided:   - handwriting drills   - green putty   - Progress towards goals     Written Home Exercises Provided: yes.  Exercises were reviewed and Prema was able to demonstrate them prior to the end of the session.  Prema demonstrated good  understanding of the home exercise program provided. See electronic medical record under Patient Instructions for exercises provided during therapy sessions.       Assessment     Prema tolerated today's session well. Tactile cues provided for seated dowel exercises and flexbar to complete motor movements and increase ROM. Capitol and lowercase letter practice performed today after proprioceptive input to right upper extremity; she had difficulty recalling capitol letters and required visual cues. She also had difficulty with lowercase, perseverating on certain letters causing difficulty to form lower case l , q and r. Increased time for all tasks.     Prema is progressing well towards her goals and there are no updates to goals at this time. Pt prognosis is Good.     Patient will continue to benefit from skilled outpatient occupational therapy to address the deficits listed in the problem list on initial evaluation provide patient/family education and to maximize patient's level of independence in the home and community environment.     Patient's spiritual, cultural and educational needs considered and patient agreeable to plan of care and goals.    Anticipated barriers to  occupational therapy: cognitive deficits     Goals:  Short Term Goals: 4 weeks   - Pt will report 50% compliance with home exercise program in order to maintain progress attained during therapy. Met 4/22/2024  - Pt will improve active range of motion of right shoulder flexion/abduction to 120/140 degrees in order to increase ease with bathing and dressing tasks. Met 4/22/2024  - Pt will increase right/left  strength to 22/30# in order to improve ease when dressing, bathing and completing gardening tasks. Met 4/22/2024  - Pt will decrease time on 9HPT to under 2 minutes using right hand  to improve fine motor speed and coordination needed to perform home maintenance, grooming and hygiene, money manipulation. Met 4/22/2024     Long Term Goals: 8 weeks   - Pt will report independence with home exercise program in order to maintain progress attained during therapy. Not met, progressing  - Pt will increase MMT for bilateral shoulder movements in all planes  to 4/5 to increase upper body strength needed for home management tasks, high level IADLs and leisure related activities. Not met, progressing  - Pt will increase bilateral  strength to 40# in order to improve ease with functional tasks such as gardening. Not met, progressing  - Pt will improve right/left key/3pt/2pt pinch by 2-3# in order to increase ease when dressing and grooming. Met 4/22/2024  - Pt will complete simulated instrumental activities of daily living activity with less than 2 cues for completion in order to return to prior level of function and decrease caregiver burden. Not met, progressing    Plan     Updates/Grading for next session: continue with plan of care     Corinne Rapier, OT   5/15/2024

## 2024-05-15 NOTE — PROGRESS NOTES
[facility]   Physical Therapy Treatment Note      Name: Prema Phillips  Clinic Number: [id number]    Therapy Diagnosis:   Encounter Diagnoses   Name Primary?    Apraxia Yes    Risk for falls      Physician: Odette Zhang MD    Visit Date: 5/15/2024    Physician Orders: PT Eval and Treat   Medical Diagnosis from Referral: Decreased range of motion with decreased strength [M25.60, R53.1], Cerebrovascular accident (CVA) due to embolism of left middle cerebral artery [I63.412], Hemiparesis of right dominant side due to acute cerebrovascular disease [I67.89, G81.91]   Evaluation Date: 3/27/2024  Authorization Period Expiration: 12/31/2024  Plan of Care Expiration: 5/24/2024  Progress Note Due: last assessed 5/1/2024  Date of Surgery: N/A  Visit # / Visits authorized: 9/20 (+eval)  FOTO: 1/3 (do not reissued do to patient's inability to read unless proxy is present)     Precautions: Standard, Fall, and Aphasia (per SLP - consider written instructions as patient demonstrated good reading comprehension during speech eval)     Time In: 11:15 AM  Time Out: 12:00 PM  Total Billable Time: 45 minutes (1TE, 1 NMR, 1 TA) KX ALL MOVING FORWARD    PTA Visit #: 0/5     Subjective     Patient reports: No new complaints today.  She will be compliant with home exercise program.  Response to previous treatment: no adverse reactions  Functional change: ongoing    Pain: 0/10  Location:  n/a     Objective      Objective Measures updated at progress report unless specified.     Treatment     Prema received the treatments listed below:      Therapeutic exercises to develop strength and endurance for 15 minutes including:  - Scifit recumbent stepper x8 min L5 sprint hill setting for endurance/activity tolerance assessment and training  - Sit to stand from standard chair + red med ball reaches 2x10    Neuromuscular re-education activities to improve: Balance and Coordination for 10 minutes. The following activities  "were included:  - Standing calf raises 2 x 20 with occasional touchdown support  - Standing toe raises 2 x 20 with occasional touchdown support  - Full tandem stance 2x30" each leg leading  - Static stance on [place] incline pad 3x30"    Therapeutic activities to improve functional performance for 20 minutes including:  - High knee marches open gym 4 lengths x 30 feet + contralateral knee tap   - Abraham step overs and aerobic step stepovers forward (spaced ~4-6 feet apart) with mixed 12" and 9" hurdles and 4" and 6" step; 8 lengths x 25 feet each  - Tandem walking x 6 lengths - occasional touch down on bar  - Step up with contralateral hip : 2x10 B + 2# ankle weights     Patient Education and Home Exercises       Education provided: Reinforced home exercise program provided at [facility].     Written Home Exercises Provided: Patient instructed to cont prior HEP. Exercises were reviewed and Prema was able to demonstrate them prior to the end of the session.  Prema demonstrated good  understanding of the education provided. See Electronic Medical Record under Patient Instructions for exercises provided during therapy sessions    Assessment     Prema arrived to session without new complaints and agreeable to PT. Progressed obstacle avoidance and reciprocal limb coordination drills without adverse response. She tends to circumduct right lower extremity with higher level obstacle avoidance which was not responsive to verbal cues to correct. She does respond well to visual demonstration to novel tasks however. Considering low risk for falls and improving activity tolerance, she will likely be ready for discharge from physical therapy within the next few visits.    Prema Is progressing well towards her goals.   Patient prognosis is Good.     Patient will continue to benefit from skilled outpatient physical therapy to address the deficits listed in the problem list box on initial evaluation, provide pt/family education and to " maximize pt's level of independence in the home and community environment.     Patient's spiritual, cultural and educational needs considered and pt agreeable to plan of care and goals.     Anticipated barriers to physical therapy: asphasia    Goals:  Short Term Goals: 4 weeks   Patient will be compliant with HEP in order to maximize PT benefits  Patient will complete >/=10 seconds of single leg stance on each leg in order to improve static postural control during standing ADLs (met)  Patient will complete TUG in </= 13 seconds in order to reduce risk for falls and improve safety with functional mobility (met)  Patient will improve self-selected walking speed to >/=0.8 m/s in order to improve safety with community mobility     Long Term Goals: 8 weeks   Patient will score >/= 59% on FOTO survey in order to improve self-perception of functional mobility deficits  Patient will complete TUG in </= 11 seconds in order to reduce risk for falls and improve safety with functional mobility (met)  Patient will score </= 15 seconds on Five Time Sit to  order to improve bilateral lower extremity endurance and muscular power for transfers   Patient will improve self-selected walking speed to >/=1.0 m/s in order to improve safety with community mobility  Patient will perform 1 floor <> stand transfer with bilateral upper extremity support in order to demonstrate safe functional mobility in case of future fall  Patient will begin some form of home/community fitness in order to sustain progress gained in PT       Plan     Dynamic balance with tandem and narrow base of support - functional balance and tasks for ease of understanding.  Continue plan of care (POC).      INDIO REAL, PT

## 2024-05-15 NOTE — PROGRESS NOTES
"[facility]  Speech Therapy Treatment Note- Neurological Rehabilitation  Date: 5/15/2024     Name: Prema Phillips   MRN: [id number]   Therapy Diagnosis:   Encounter Diagnoses   Name Primary?    Aphasia Yes    Apraxia      Physician: Odette Zhang MD  Physician Orders: Ambulatory Referral to Speech Therapy   Medical Diagnosis: M25.60,R53.1 (ICD-10-CM) - Decreased range of motion with decreased strength I63.412 (ICD-10-CM) - Cerebrovascular accident (CVA) due to embolism of left middle cerebral artery I67.89,G81.91 (ICD-10-CM) - Hemiparesis of right dominant side due to acute cerebrovascular disease    Visit #/ Visits Authorized: 19/20  Date of Evaluation:  3/27/24  Insurance Authorization Period: 3/27/24-12/31/24  Plan of Care Expiration Date:    5/22/24  Extended Plan of Care:  n/a   Progress Note: 5/24/24     Time In:  1347  Time Out:  1430  Total Billable Time: 43 minutes     Precautions: Communication  Subjective:   Patient reports: doing well; nothing new to report; wasn't feeling well the other day "Una came yesterday"  She was compliant to home exercise program.   Response to previous treatment: good  Pain Scale: Patient unable to indicate pain secondary to aphasia. No pain behaviors observed throughout the session.  Objective:   UNTIMED  Procedure   Speech- Language- Voice Therapy     Short Term Goals: (4 weeks) Current Progress:   Patient will follow simple 1-2 step commands presented visually with >90% accuracy for 2 consecutive sessions to enhance her receptive language skills.  MET 4/26/24    2. Patient will follow simple 1 step commands presented verbally with 75% accuracy and 1 repetition provided to enhance her receptive language skills.   Progressing/ Not Met 5/15/2024 1 step visual commands with tangible objects in a field of 6 with 92% accuracy independently (22/24 trials), previously 60% accuracy independently.   Complete for one more session   3. Patient will read single " "words aloud with 50% accuracy and 1 phonemic cue in order to bridge to higher level reading tasks.   Progressing/ Not Met 5/15/2024 Read 1 syllable words with 61% accuracy independently (11/18 trials)    Read bisyllabic words with 60% (6/10 trials), previously 33% accuracy (1/3 trials)   4. Patient will repeat monosyllabic, common words with 50% accuracy when provided with a visual cue to increase her spoken language abilities.     Progressing/ Not Met 5/15/2024 Did not address today; previously repeated monosyllabic words with 30% (6/20) acc Independently; 55% acc (11/20) given visual cue     Patient wanted to focus on her dog's (Jony) name today - speech-language pathologist wrote out the name as "low key" for the way the word sounds when produced. 2/5 correct productions completed today with visual.   5. Patient will complete rote speech tasks with >75% accuracy in unison with speech-language pathologist to increase spoken language abilities.  Progressing/ Not Met 5/15/2024 Did not address today, previously counted 1-10 with 80% accuracy independently, previously 70% accuracy independently on first attempt.     Did not address today, previously days of the week stated 4/7 (57%) with initiation cue  3/7 (43%) when reading the words    Not addressed today - Previously months of the year stated 5/12 (42%) with initiation cue for first month, read 11/12 (92%) when written out for her   6. Patient will answer simple open-ended questions with relevant responses with >75% accuracy with visual or auditory stimuli to increase spoken language output.  Progressing/ Not Met 5/15/2024 Picture description task x4  Binary choice for subject, verb, and object with prompting and cueing from speech-language pathologist. Reviewed the 4 svo sentences following the first completion and patient seemed to understand the task after this second completion     Long Term Goals: (8 weeks) Current Progress:   Patient will increase her social " participation in conversation with > 5 utterances produced independently.    Progressing, not met 5/15/2024    2. Patient will state basic wants/needs/concerns >90% of the time independently through use of various modalities in order to increase her social participation with others.     Progressing, not met 5/15/2024      Patient Education/Response:   Patient educated regarding the followin. [place] speech task important for speech production  2. Aphasia and prognosis - recovery and intensity of therapy (discussed with patient's )  3. [employer] Cynthia    Home program established: yes-provided simple written command worksheets as well as the days of the week and numbers written down  Patient verbalized understanding to all above education provided.     See Electronic Medical Record under Patient Instructions for exercises provided throughout therapy.  Assessment:   Treatment session today focused on various receptive and expressive language tasks, including answering open-ended questions, reading monosyllabic and bisyllabic words, and using compensatory strategies in conversational speech. Her conversational speech topics continue to evolve and she is motivated to speak well. When provided with the topic or clarifying with her about the topic, this trained listener can follow along for the remainder of her speech. Completed descriptor tasks with visual pictures which she required more than 50% assistance to complete initially. Prema is progressing well towards her goals. Current goals remain appropriate. Goals to be updated as necessary.     Patient prognosis is Good. Patient will continue to benefit from skilled outpatient speech and language therapy to address the deficits listed in the problem list on initial evaluation, provide patient/family education and to maximize patient's level of independence in the home and community environment.   Medical necessity is demonstrated by the following  IMPAIRMENTS:  Aphasia: Patient with few true words in all situations severely limiting functional communication with both familiar and unfamiliar communication partners.   Barriers to Therapy: none  Patient's spiritual, cultural and educational needs considered and patient agreeable to plan of care and goals.  Plan:   Continue Plan of Care with focus on rehabilitation and compensation for aphasia secondary to stroke.    Nori Yin MS, CCC-SLP, CBIS  Speech-Language Pathologist  Certified Brain Injury Specialist  5/15/2024

## 2024-05-16 ENCOUNTER — CLINICAL SUPPORT (OUTPATIENT)
Dept: REHABILITATION | Facility: HOSPITAL | Age: 79
End: 2024-05-16
Payer: MEDICARE

## 2024-05-16 DIAGNOSIS — R48.2 APRAXIA: ICD-10-CM

## 2024-05-16 DIAGNOSIS — R47.01 APHASIA: Primary | ICD-10-CM

## 2024-05-16 PROCEDURE — 92507 TX SP LANG VOICE COMM INDIV: CPT | Mod: KX,PN

## 2024-05-16 NOTE — PROGRESS NOTES
[facility]  Speech Therapy Treatment Note- Neurological Rehabilitation  Date: 5/16/2024     Name: Prema Phillips   MRN: [id number]   Therapy Diagnosis:   Encounter Diagnoses   Name Primary?    Aphasia Yes    Apraxia      Physician: Odette Zhang MD  Physician Orders: Ambulatory Referral to Speech Therapy   Medical Diagnosis: M25.60,R53.1 (ICD-10-CM) - Decreased range of motion with decreased strength I63.412 (ICD-10-CM) - Cerebrovascular accident (CVA) due to embolism of left middle cerebral artery I67.89,G81.91 (ICD-10-CM) - Hemiparesis of right dominant side due to acute cerebrovascular disease    Visit #/ Visits Authorized: 20/20  Date of Evaluation:  3/27/24  Insurance Authorization Period: 3/27/24-12/31/24  Plan of Care Expiration Date:    5/22/24  Extended Plan of Care:  n/a   Progress Note: 5/24/24     Time In:  1301  Time Out:  1345  Total Billable Time: 44 minutes     Precautions: Communication  Subjective:   Patient reports: doing well; the weather is good.  She was compliant to home exercise program.   Response to previous treatment: good  Pain Scale: Patient unable to indicate pain secondary to aphasia. No pain behaviors observed throughout the session.  Objective:   UNTIMED  Procedure   Speech- Language- Voice Therapy     Short Term Goals: (4 weeks) Current Progress:   Patient will follow simple 1-2 step commands presented visually with >90% accuracy for 2 consecutive sessions to enhance her receptive language skills.  MET 4/26/24    2. Patient will follow simple 1 step commands presented verbally with 75% accuracy and 1 repetition provided to enhance her receptive language skills.    1 step visual commands with tangible objects in a field of 6 with 90% accuracy independently (18/20 trials), previously 92% accuracy independently.     Goal met 5/16/24   3. Patient will read single words aloud with 50% accuracy and 1 phonemic cue in order to bridge to higher level reading tasks.    Progressing/ Not Met 5/16/2024 Read 1 syllable words with (9/20) 45% accuracy independently; (12/20) 60% acc min-mod A; previously 61%    Read bisyllabic words with 70% (7/10 trials) Independently; 90% acc min A; previously 60% acc   4. Patient will repeat monosyllabic, common words with 50% accuracy when provided with a visual cue to increase her spoken language abilities.     Progressing/ Not Met 5/16/2024 Repeated monosyllabic words with 50% acc Independently (5/10); 90% acc (9/10) with visual cue.    Previously repeated monosyllabic words with 30% (6/20) acc Independently; 55% acc (11/20) given visual cue.    Repeated bi-syllabic words with 80% acc (8/10) Independently; 90% acc (9/10) with min A     5. Patient will complete rote speech tasks with >75% accuracy in unison with speech-language pathologist to increase spoken language abilities.  Progressing/ Not Met 5/16/2024 Did not address today, previously counted 1-10 with 80% accuracy independently, previously 70% accuracy independently on first attempt.     Did not address today, previously days of the week stated 4/7 (57%) with initiation cue  3/7 (43%) when reading the words    Not addressed today - Previously months of the year stated 5/12 (42%) with initiation cue for first month, read 11/12 (92%) when written out for her   6. Patient will answer simple open-ended questions with relevant responses with >75% accuracy with visual or auditory stimuli to increase spoken language output.  Progressing/ Not Met 5/16/2024 Picture description task x4  Binary choice for subject, verb, and object with prompting and cueing from speech-language pathologist. Reviewed the 4 svo sentences following the first completion and patient seemed to understand the task after this second completion     Long Term Goals: (8 weeks) Current Progress:   Patient will increase her social participation in conversation with > 5 utterances produced independently.    Progressing, not met  2024    2. Patient will state basic wants/needs/concerns >90% of the time independently through use of various modalities in order to increase her social participation with others.     Progressing, not met 2024      Patient Education/Response:   Patient educated regarding the followin. [place] speech task important for speech production  2. Aphasia and prognosis - recovery and intensity of therapy (discussed with patient's )  3. [place] Cynthia    Home program established: yes-provided simple written command worksheets as well as the days of the week and numbers written down  Patient verbalized understanding to all above education provided.     See Electronic Medical Record under Patient Instructions for exercises provided throughout therapy.  Assessment:   Treatment session today focused on various receptive and expressive language tasks, including answering open-ended questions, reading and repeating monosyllabic and bisyllabic words, and using compensatory strategies in conversational speech. Her conversational speech topics continue to evolve and she is motivated to speak well. When provided with the topic or clarifying with her about the topic, this trained listener can follow along for the remainder of her speech. Met goal for following directions today. Prema did really well repeating and reading bi-syllabic words today.  Prema is progressing well towards her goals. Current goals remain appropriate. Goals to be updated as necessary.     Patient prognosis is Good. Patient will continue to benefit from skilled outpatient speech and language therapy to address the deficits listed in the problem list on initial evaluation, provide patient/family education and to maximize patient's level of independence in the home and community environment.   Medical necessity is demonstrated by the following IMPAIRMENTS:  Aphasia: Patient with few true words in all situations severely limiting functional  communication with both familiar and unfamiliar communication partners.   Barriers to Therapy: none  Patient's spiritual, cultural and educational needs considered and patient agreeable to plan of care and goals.  Plan:   Continue Plan of Care with focus on rehabilitation and compensation for aphasia secondary to stroke.    Shreya Hung MS, CCC-SLP  Speech-Language Pathologist  5/16/2024

## 2024-05-16 NOTE — PROGRESS NOTES
[facility]  Speech Therapy Treatment Note- Neurological Rehabilitation  Date: 5/16/2024      Name: Prema Phillips   MRN: [id number]   Therapy Diagnosis:        Encounter Diagnoses   Name Primary?    Aphasia Yes    Apraxia        Physician: Odette Zhang MD  Physician Orders: Ambulatory Referral to Speech Therapy   Medical Diagnosis: M25.60,R53.1 (ICD-10-CM) - Decreased range of motion with decreased strength I63.412 (ICD-10-CM) - Cerebrovascular accident (CVA) due to embolism of left middle cerebral artery I67.89,G81.91 (ICD-10-CM) - Hemiparesis of right dominant side due to acute cerebrovascular disease     Visit #/ Visits Authorized: 20/20  Date of Evaluation:  3/27/24  Insurance Authorization Period: 3/27/24-12/31/24  Plan of Care Expiration Date:    5/22/24  Extended Plan of Care:  n/a   Progress Note: 5/24/24      Time In:  1301  Time Out:  1345  Total Billable Time: 44 minutes      Precautions: Communication  Subjective:   Patient reports: doing well; the weather is good.  She was compliant to home exercise program.   Response to previous treatment: good  Pain Scale: Patient unable to indicate pain secondary to aphasia. No pain behaviors observed throughout the session.  Objective:   UNTIMED  Procedure   Speech- Language- Voice Therapy      Short Term Goals: (4 weeks) Current Progress:   Patient will follow simple 1-2 step commands presented visually with >90% accuracy for 2 consecutive sessions to enhance her receptive language skills.  MET 4/26/24     2. Patient will follow simple 1 step commands presented verbally with 75% accuracy and 1 repetition provided to enhance her receptive language skills.     1 step visual commands with tangible objects in a field of 6 with 90% accuracy independently (18/20 trials), previously 92% accuracy independently.      Goal met 5/16/24   3. Patient will read single words aloud with 50% accuracy and 1 phonemic cue in order to bridge to higher level  reading tasks.   Progressing/ Not Met 5/16/2024 Read 1 syllable words with (9/20) 45% accuracy independently; (12/20) 60% acc min-mod A; previously 61%     Read bisyllabic words with 70% (7/10 trials) Independently; 90% acc min A; previously 60% acc   4. Patient will repeat monosyllabic, common words with 50% accuracy when provided with a visual cue to increase her spoken language abilities.      Progressing/ Not Met 5/16/2024 Repeated monosyllabic words with 50% acc Independently (5/10); 90% acc (9/10) with visual cue.     Previously repeated monosyllabic words with 30% (6/20) acc Independently; 55% acc (11/20) given visual cue.     Repeated bi-syllabic words with 80% acc (8/10) Independently; 90% acc (9/10) with min A      5. Patient will complete rote speech tasks with >75% accuracy in unison with speech-language pathologist to increase spoken language abilities.  Progressing/ Not Met 5/16/2024 Did not address today, previously counted 1-10 with 80% accuracy independently, previously 70% accuracy independently on first attempt.      Did not address today, previously days of the week stated 4/7 (57%) with initiation cue  3/7 (43%) when reading the words     Not addressed today - Previously months of the year stated 5/12 (42%) with initiation cue for first month, read 11/12 (92%) when written out for her   6. Patient will answer simple open-ended questions with relevant responses with >75% accuracy with visual or auditory stimuli to increase spoken language output.  Progressing/ Not Met 5/16/2024 Picture description task x4  Binary choice for subject, verb, and object with prompting and cueing from speech-language pathologist. Reviewed the 4 svo sentences following the first completion and patient seemed to understand the task after this second completion      Long Term Goals: (8 weeks) Current Progress:   Patient will increase her social participation in conversation with > 5 utterances produced independently.     Progressing, not met 2024    2. Patient will state basic wants/needs/concerns >90% of the time independently through use of various modalities in order to increase her social participation with others.     Progressing, not met 2024       Patient Education/Response:   Patient educated regarding the followin. [place] speech task important for speech production  2. Aphasia and prognosis - recovery and intensity of therapy (discussed with patient's )  3. [employer] Cynthia     Home program established: yes-provided simple written command worksheets as well as the days of the week and numbers written down  Patient verbalized understanding to all above education provided.      See Electronic Medical Record under Patient Instructions for exercises provided throughout therapy.  Assessment:   Treatment session today focused on various receptive and expressive language tasks, including answering open-ended questions, reading and repeating monosyllabic and bisyllabic words, and using compensatory strategies in conversational speech. Her conversational speech topics continue to evolve and she is motivated to speak well. When provided with the topic or clarifying with her about the topic, this trained listener can follow along for the remainder of her speech. Met goal for following directions today. Prema did really well repeating and reading bi-syllabic words today.  Prema is progressing well towards her goals. Current goals remain appropriate. Goals to be updated as necessary.      Patient prognosis is Good. Patient will continue to benefit from skilled outpatient speech and language therapy to address the deficits listed in the problem list on initial evaluation, provide patient/family education and to maximize patient's level of independence in the home and community environment.   Medical necessity is demonstrated by the following IMPAIRMENTS:  Aphasia: Patient with few true words in all situations severely  limiting functional communication with both familiar and unfamiliar communication partners.   Barriers to Therapy: none  Patient's spiritual, cultural and educational needs considered and patient agreeable to plan of care and goals.  Plan:   Continue Plan of Care with focus on rehabilitation and compensation for aphasia secondary to stroke.     Shreya Hung MS, CCC-SLP  Speech-Language Pathologist  5/16/2024

## 2024-05-17 ENCOUNTER — CLINICAL SUPPORT (OUTPATIENT)
Dept: REHABILITATION | Facility: HOSPITAL | Age: 79
End: 2024-05-17
Payer: MEDICARE

## 2024-05-17 DIAGNOSIS — M62.81 MUSCLE WEAKNESS OF RIGHT UPPER EXTREMITY: Primary | ICD-10-CM

## 2024-05-17 DIAGNOSIS — R27.9 LACK OF COORDINATION DUE TO ACUTE CEREBROVASCULAR ACCIDENT (CVA): ICD-10-CM

## 2024-05-17 DIAGNOSIS — I63.9 LACK OF COORDINATION DUE TO ACUTE CEREBROVASCULAR ACCIDENT (CVA): ICD-10-CM

## 2024-05-17 PROCEDURE — 97530 THERAPEUTIC ACTIVITIES: CPT | Mod: KX,PN

## 2024-05-17 NOTE — PROGRESS NOTES
"[facility]  Occupational Therapy Treatment Note     Date: 5/17/2024  Name: Prema Phillips  Clinic Number: [id number]    Therapy Diagnosis:   Encounter Diagnoses   Name Primary?    Muscle weakness of right upper extremity Yes    Lack of coordination due to acute cerebrovascular accident (CVA)        Physician: Odette Zhang MD    Physician Orders: Eval and Treat  Medical Diagnosis: M25.60,R53.1 (ICD-10-CM) - Decreased range of motion with decreased strength I63.412 (ICD-10-CM) - Cerebrovascular accident (CVA) due to embolism of left middle cerebral artery I67.89,G81.91 (ICD-10-CM) - Hemiparesis of right dominant side due to acute cerebrovascular disease  Evaluation Date: 3/27/2024  Insurance Authorization Period Expiration: 12/31/2024  Plan of Care Certification Period: 05/24/2024  Visit # / Visits authorized: 13 / 20  FOTO: 0 / 3     Precautions:  Standard, Fall, and aphasia, apraxia      Time In: 11:00 am  Time Out: 11:45 am  Total Billable Time: 45 minutes     Subjective     Patient reports: "Its frustrating. I can read at home but I can't do this' in reference to writing  She was compliant with home exercise program given last session.   Response to previous treatment: good   Functional change: improved participation at home     Pain: 0/10  Location: no pain reported     Objective     Physical Exam:  Postural examination/scapula alignment:  upright posture, mild winging of right scapula with good muscle activation with tactile cues   left active range of motion and manual muscle test within normal limits.     Joint Evaluation  AROM  3/27/2024 PROM   3/27/2024 MMS   3/27/2024 AROM  4/19/2024 MMS   4/19/2024     Right Right Right Right  Right    Scapular Elevation     3/5     Scapular Retraction     3/5  4/5   Shoulder Flex 0-180 121 130 3-/5 135 4/5   Shoulder Extension 0-80 60   3-/5  4/5   Shoulder Abd 0-180 130   3/5 180 4/5   Shoulder ER 0-90     3/5  4-/5   Shoulder IR 0-90 WNL  "  3/5  4/5   Elbow Flexion 0-150 150   3/5  4/5   Elbow Extension 0   3/5  3+/5   Wrist Flex 0-80 90   3/5  3/5   Wrist Ext 0-70 50   3-/5  3/5   Supination 0-80 WNL   3/5  3/5   Pronation 0-80 WNL   3/5  3/5   *WNL - Within Normal Limits  *NT = Not Tested     Fist: tight right hand ;       Strength: (FLOR Dynamometer in lbs.) Average 3 trials, Position II:       3/27/2024 3/27/2024 4/19/2024 4/19/2024   Rung II Right Left Right  Left    Trial 1 19# 20# 30# 25#   Trial 2 14# 22# 33# 33#   Trial 3 15# 26# 27# 36#   Average 16# 22.6# 30# 31.3#      Normal  Average Values  Female Right Left Male: Right Left   20-29 66 lbs 62 lbs         94 lbs 86 lbs   30-39 68 lbs 64 lbs 90 lbs 82 lbs   40-49 64 lbs 62 lbs 80 lbs 74 lbs   50-59 62 lbs 57 lbs 72 lbs 65 lbs   60-69 53 lbs 51 lbs 63 lbs 56 lbs   70+ 44 lbs 42 lbs 54 lbs 48 lbs   SD = +/- 19lbs         Pinch Strength (Measured in lbs)       3/27/2024 3/27/2024 4/19/2024 4/19/2024     Right Left Right  Left    Key Pinch 5 # 7 # 8# 11#   3pt Pinch 3 # 5 # 7# 12#   2pt Pinch 3 # 6 # 5# 7#         Fine/Gross Motor Coordination     Assessment   Right   3/27/2024 Left  3/27/2024 Right   4/19/2024 Left   4/19/2024   9 hole peg test 9 pegs in/out for time 2:25  2 cues for sequencing  :48 1:01  No cues required :32   SMITHA (Rapid Alternating Movements)      impaired - moderate    intact Impaired - decreased ROM  Impaired - decreased ROM    Finger to Nose (5 times)   impaired - moderate intact Impaired - moderate - touched back of head rather than nose  Impaired - moderate - touched back of head rather than nose    Finger Flicks (coordination moving from digit flexion to digit extension)   impaired - minimum intact Intact - slowed but full range of motion  Intact - slowed but full range of motion    *WNL - Within Normal Limits  *NT = Not Tested     Endurance Deficit: moderate    Treatment     Prema received the treatments listed below:      Therapeutic exercises to develop  strength, endurance, and posture for 5 minutes, including:  - Upper Body Ergometer (UBE) L3 for 5 minutes at 40 RPM to provide proprioceptive awareness, postural stability, strength, activity tolerance, and reciprocal patterns with bimanual coordination completed to improve use of bilateral upper extremities in order to prepare for therapeutic exercises activities. Cues provided as needed for postural stability/positioning    therapeutic activities to improve functional performance for 40 minutes, including:  - scavenger hunt - identifying objects, writing a list and locating in clinic   - handwriting worksheet with executive functioning skills - states and directions     Patient Education and Home Exercises     Education provided:   - handwriting drills   - green putty   - Progress towards goals     Written Home Exercises Provided: yes.  Exercises were reviewed and Prema was able to demonstrate them prior to the end of the session.  Prema demonstrated good  understanding of the home exercise program provided. See electronic medical record under Patient Instructions for exercises provided during therapy sessions.       Assessment     Prema tolerated today's session well. She required visual cue for handwriting tasks, unable to independently form words based on objects. No difficulty locating objects in click and retrieving from various surfaces. Cues for problem solving directions with [place] map and answering questions to challenge executive functioning and abstract thinking. Increased time for all activities.     Prema is progressing well towards her goals and there are no updates to goals at this time. Pt prognosis is Good.     Patient will continue to benefit from skilled outpatient occupational therapy to address the deficits listed in the problem list on initial evaluation provide patient/family education and to maximize patient's level of independence in the home and community environment.     Patient's spiritual,  cultural and educational needs considered and patient agreeable to plan of care and goals.    Anticipated barriers to occupational therapy: cognitive deficits     Goals:  Short Term Goals: 4 weeks   - Pt will report 50% compliance with home exercise program in order to maintain progress attained during therapy. Met 4/22/2024  - Pt will improve active range of motion of right shoulder flexion/abduction to 120/140 degrees in order to increase ease with bathing and dressing tasks. Met 4/22/2024  - Pt will increase right/left  strength to 22/30# in order to improve ease when dressing, bathing and completing gardening tasks. Met 4/22/2024  - Pt will decrease time on 9HPT to under 2 minutes using right hand  to improve fine motor speed and coordination needed to perform home maintenance, grooming and hygiene, money manipulation. Met 4/22/2024     Long Term Goals: 8 weeks   - Pt will report independence with home exercise program in order to maintain progress attained during therapy. Not met, progressing  - Pt will increase MMT for bilateral shoulder movements in all planes  to 4/5 to increase upper body strength needed for home management tasks, high level IADLs and leisure related activities. Not met, progressing  - Pt will increase bilateral  strength to 40# in order to improve ease with functional tasks such as gardening. Not met, progressing  - Pt will improve right/left key/3pt/2pt pinch by 2-3# in order to increase ease when dressing and grooming. Met 4/22/2024  - Pt will complete simulated instrumental activities of daily living activity with less than 2 cues for completion in order to return to prior level of function and decrease caregiver burden. Not met, progressing    Plan     Updates/Grading for next session: continue with plan of care     Corinne Rapier, OT   5/17/2024

## 2024-05-22 ENCOUNTER — CLINICAL SUPPORT (OUTPATIENT)
Dept: REHABILITATION | Facility: HOSPITAL | Age: 79
End: 2024-05-22
Payer: MEDICARE

## 2024-05-22 DIAGNOSIS — R48.2 APRAXIA: ICD-10-CM

## 2024-05-22 DIAGNOSIS — R27.9 LACK OF COORDINATION DUE TO ACUTE CEREBROVASCULAR ACCIDENT (CVA): ICD-10-CM

## 2024-05-22 DIAGNOSIS — M62.81 MUSCLE WEAKNESS OF RIGHT UPPER EXTREMITY: Primary | ICD-10-CM

## 2024-05-22 DIAGNOSIS — I63.9 LACK OF COORDINATION DUE TO ACUTE CEREBROVASCULAR ACCIDENT (CVA): ICD-10-CM

## 2024-05-22 DIAGNOSIS — R47.01 APHASIA: Primary | ICD-10-CM

## 2024-05-22 PROCEDURE — 97530 THERAPEUTIC ACTIVITIES: CPT | Mod: KX,PN

## 2024-05-22 PROCEDURE — 92507 TX SP LANG VOICE COMM INDIV: CPT | Mod: KX,PN

## 2024-05-22 NOTE — PROGRESS NOTES
"[facility]  Speech Therapy Treatment Note- Neurological Rehabilitation  Date: 5/16/2024      Name: Prema Phillips   MRN: [id number]   Therapy Diagnosis:        Encounter Diagnoses   Name Primary?    Aphasia Yes    Apraxia        Physician: Odette Zhagn MD  Physician Orders: Ambulatory Referral to Speech Therapy   Medical Diagnosis: M25.60,R53.1 (ICD-10-CM) - Decreased range of motion with decreased strength I63.412 (ICD-10-CM) - Cerebrovascular accident (CVA) due to embolism of left middle cerebral artery I67.89,G81.91 (ICD-10-CM) - Hemiparesis of right dominant side due to acute cerebrovascular disease     Visit #/ Visits Authorized: 21/20  Date of Evaluation:  3/27/24  Insurance Authorization Period: 3/27/24-12/31/24  Plan of Care Expiration Date:    5/22/24  Extended Plan of Care:  n/a   Progress Note: 6/22/24      Time In:  0932  Time Out:  1015  Total Billable Time: 43 minutes      Precautions: Communication  Subjective:   Patient reports: doing okay; some trouble with her phone; perseverated on "file" today  She was compliant to home exercise program.   Response to previous treatment: good  Pain Scale: Patient unable to indicate pain secondary to aphasia. No pain behaviors observed throughout the session.  Objective:   UNTIMED  Procedure   Speech- Language- Voice Therapy      Short Term Goals: (4 weeks) Current Progress:   Patient will follow simple 1-2 step commands presented visually with >90% accuracy for 2 consecutive sessions to enhance her receptive language skills.  MET 4/26/24     2. Patient will follow simple 1 step commands presented verbally with 75% accuracy and 1 repetition provided to enhance her receptive language skills.   MET 5/16/24       3. Patient will read single words aloud with 50% accuracy and 1 phonemic cue in order to bridge to higher level reading tasks.   Progressing/ Not Met 5/22/2024 Read 1 syllable words with 54% accuracy independently (7/13), previously 45% " accuracy independently     Read bisyllabic words with 40% (2/5), previously 70% (7/10 trials) Independently   4. Patient will repeat monosyllabic, common words with 50% accuracy when provided with a visual cue to increase her spoken language abilities.      Progressing/ Not Met 2024 Did not address today      5. Patient will complete rote speech tasks with >75% accuracy in unison with speech-language pathologist to increase spoken language abilities.  Progressing/ Not Met 2024 Did not address today, previously counted 1-10 with 80% accuracy independently, previously 70% accuracy independently on first attempt.      Did not address today, previously days of the week stated 4/7 (57%) with initiation cue  3/7 (43%) when reading the words     Not addressed today - Previously months of the year stated 5/12 (42%) with initiation cue for first month, read 11/12 (92%) when written out for her   6. Patient will answer simple open-ended questions with relevant responses with >75% accuracy with visual or auditory stimuli to increase spoken language output.  Progressing/ Not Met 2024 Picture description task x8 - required consistent prompting from speech-language pathologist; several non-true words produced    Wh- questions answered with 33% accuracy independently in response to visual stimuli (3/9 trials)      Long Term Goals: (8 weeks) Current Progress:   Patient will increase her social participation in conversation with > 5 utterances produced independently.    Progressing, not met 2024    2. Patient will state basic wants/needs/concerns >90% of the time independently through use of various modalities in order to increase her social participation with others.     Progressing, not met 2024       Patient Education/Response:   Patient educated regarding the followin. [place] speech task important for speech production  2. Aphasia and prognosis - recovery and intensity of therapy (discussed with  patient's )  3. [employer] Cynthia     Home program established: yes-provided simple written command worksheets as well as the days of the week and numbers written down  Patient verbalized understanding to all above education provided.      See Electronic Medical Record under Patient Instructions for exercises provided throughout therapy.  Assessment:   Treatment session today focused on various receptive and expressive language tasks, including answering open-ended questions, reading monosyllabic and bisyllabic words, and using compensatory strategies in conversational speech. Her conversational speech topics continue to evolve and she is motivated to speak well. When provided with the topic or clarifying with her about the topic, this trained listener can follow along for the remainder of her speech. Met goal for following directions today. Prema did really well repeating and reading bi-syllabic words today.  Prema is progressing well towards her goals. Current goals remain appropriate. Goals to be updated as necessary.      Patient prognosis is Good. Patient will continue to benefit from skilled outpatient speech and language therapy to address the deficits listed in the problem list on initial evaluation, provide patient/family education and to maximize patient's level of independence in the home and community environment.   Medical necessity is demonstrated by the following IMPAIRMENTS:  Aphasia: Patient with few true words in all situations severely limiting functional communication with both familiar and unfamiliar communication partners.   Barriers to Therapy: none  Patient's spiritual, cultural and educational needs considered and patient agreeable to plan of care and goals.  Plan:   Continue Plan of Care with focus on rehabilitation and compensation for aphasia secondary to stroke.  Updated plan of care today for 2x/week for 3 weeks     Nori Yin M.S., L-SLP,CCC-SLP, CBIS  Speech-Language  Pathologist  Certified Brain Injury Specialist  5/22/2024

## 2024-05-22 NOTE — PLAN OF CARE
[facility]  Speech Therapy Updated Plan of Care-Neurological Rehabilitation         Date: 5/22/2024   Name: Prema Phillips  Clinic Number: [id number]    Therapy Diagnosis:   Encounter Diagnoses   Name Primary?    Aphasia Yes    Apraxia      Physician: Odette Zhang MD    Physician Orders: Ambulatory Referral to Speech Therapy   Medical Diagnosis: M25.60,R53.1 (ICD-10-CM) - Decreased range of motion with decreased strength I63.412 (ICD-10-CM) - Cerebrovascular accident (CVA) due to embolism of left middle cerebral artery I67.89,G81.91 (ICD-10-CM) - Hemiparesis of right dominant side due to acute cerebrovascular disease    Visit #/ Visits Authorized:  21 /20   Evaluation Date: 3/27/24  Insurance Authorization Period: 3/27/24  Plan of Care Expiration:    5/22/24  New POC Certification Period:  7/3/2024    Total Visits Received: 21 + initial evaluation    Precautions:Communication  Subjective     Update: Mrs. Lloyd has participated well in all speech therapy visits and is motivated to progress towards her goals. The patient has excellent family support.    Objective     Update: see follow up note dated 5/22/2024    Assessment     Update: Prema Phillips presents to [facility] status post medical diagnosis of stroke. Demonstrates impairments including limitations as described in the problem list. Positive prognostic factors include interest, motivation, family support. Negative prognostic factors include n/a. She presents with expressive and receptive aphasia as characterized by difficulties with following complex commands, answering open-ended questions, producing intelligible speech in structured and spontaneous contexts, as well as difficulty with reading. The patient has continued to make progress towards all of her goals, specifically with reading and answering questions. She has met two of her short-term goals thus far. No barriers to therapy identified.. Patient will benefit from  skilled, outpatient rehabilitation speech therapy.    Rehab Potential: fair   Pt's spiritual, cultural, and educational needs considered and patient agreeable to plan of care and goals.    Education: Plan of Care, role of SLP in care, course of medical disease affect on therapy diagnosis , scheduling/ cancellation policy, and insurance limitations / visit limit      Previous Short Term Goals Status:    Short Term Goals: (4 weeks)   Patient will follow simple 1-2 step commands presented visually with >90% accuracy for 2 consecutive sessions to enhance her receptive language skills.  MET 4/26/24   2. Patient will follow simple 1 step commands presented verbally with 75% accuracy and 1 repetition provided to enhance her receptive language skills.   MET 5/16/24   3. Patient will read single words aloud with 50% accuracy and 1 phonemic cue in order to bridge to higher level reading tasks.   Progressing/ Not Met 5/22/2024   4. Patient will repeat monosyllabic, common words with 50% accuracy when provided with a visual cue to increase her spoken language abilities.      Progressing/ Not Met 5/22/2024   5. Patient will complete rote speech tasks with >75% accuracy in unison with speech-language pathologist to increase spoken language abilities.  Progressing/ Not Met 5/22/2024   6. Patient will answer simple open-ended questions with relevant responses with >75% accuracy with visual or auditory stimuli to increase spoken language output.  Progressing/ Not Met 5/22/2024     New Short Term Goals:    Short Term Goals: (4 weeks)   1. Patient will read single words aloud with 50% accuracy and 1 phonemic cue in order to bridge to higher level reading tasks.   Progressing/ Not Met 5/22/2024   2. Patient will repeat monosyllabic, common words with 50% accuracy when provided with a visual cue to increase her spoken language abilities.      Progressing/ Not Met 5/22/2024   3. Patient will complete rote speech tasks with >75% accuracy  in unison with speech-language pathologist to increase spoken language abilities.  Progressing/ Not Met 5/22/2024   4. Patient will answer simple open-ended questions with relevant responses with >75% accuracy with visual or auditory stimuli to increase spoken language output.  Progressing/ Not Met 5/22/2024     Long Term Goal Status:     Long Term Goals: (8 weeks)   Patient will increase her social participation in conversation with > 5 utterances produced independently.   Progressing/Not Met   2. Patient will state basic wants/needs/concerns >90% of the time independently through use of various modalities in order to increase her social participation with others.    Progressing/Not Met     Goals Previously Met:  Patient will follow simple 1-2 step commands presented visually with >90% accuracy for 2 consecutive sessions to enhance her receptive language skills.  MET 4/26/24    Patient will follow simple 1 step commands presented verbally with 75% accuracy and 1 repetition provided to enhance her receptive language skills.   MET 5/16/24     Reasons for Recertification of Therapy: continued progress towards goals     Plan     Updated Certification Period: 5/22/2024 to 7/3/2024    Recommended Treatment Plan: Patient will participate in the [facility] rehabilitation program for speech therapy 2 times per week to address her Communication deficits, to educate patient and their family, and to participate in a home exercise program.     Therapist's Name:  Nori Yin M.S., L-SLP,CCC-SLP, CBIS  Speech-Language Pathologist  Certified Brain Injury Specialist  5/22/2024     I CERTIFY THE NEED FOR THESE SERVICES FURNISHED UNDER THIS PLAN OF TREATMENT AND WHILE UNDER MY CARE      Physician Name: _______________________________    Physician Signature: ____________________________

## 2024-05-22 NOTE — PROGRESS NOTES
"[facility]  Occupational Therapy Treatment Note     Date: 5/22/2024  Name: Prema Phillips  Clinic Number: [id number]    Therapy Diagnosis:   Encounter Diagnoses   Name Primary?    Muscle weakness of right upper extremity Yes    Lack of coordination due to acute cerebrovascular accident (CVA)          Physician: Odette Zhang MD    Physician Orders: Eval and Treat  Medical Diagnosis: M25.60,R53.1 (ICD-10-CM) - Decreased range of motion with decreased strength I63.412 (ICD-10-CM) - Cerebrovascular accident (CVA) due to embolism of left middle cerebral artery I67.89,G81.91 (ICD-10-CM) - Hemiparesis of right dominant side due to acute cerebrovascular disease  Evaluation Date: 3/27/2024  Insurance Authorization Period Expiration: 12/31/2024  Plan of Care Certification Period: 05/24/2024  Visit # / Visits authorized: 14 / 20  FOTO: 0 / 3     Precautions:  Standard, Fall, and aphasia, apraxia      Time In: 10:15 am  Time Out: 11:10  am  Total Billable Time: 55 minutes     Subjective     Patient reports: "I am good. It is definitely getting much better."  stated "She was able to untangle my rosary the other day. I was impressed."   She was compliant with home exercise program given last session.   Response to previous treatment: good   Functional change: improved participation at home     Pain: 0/10  Location: no pain reported     Objective     Physical Exam:  Postural examination/scapula alignment:  upright posture, mild winging of right scapula with good muscle activation with tactile cues   left active range of motion and manual muscle test within normal limits.     Joint Evaluation  AROM  3/27/2024 PROM   3/27/2024 MMS   3/27/2024 AROM  4/19/2024 MMS   4/19/2024 MMS   5/22/2024     Right Right Right Right  Right  Right    Scapular Elevation     3/5      Scapular Retraction     3/5  4/5    Shoulder Flex 0-180 121 130 3-/5 135 4/5    Shoulder Extension 0-80 60   3-/5  4/5    Shoulder " Abd 0-180 130   3/5 180 4/5    Shoulder ER 0-90     3/5  4-/5    Shoulder IR 0-90 WNL   3/5  4/5    Elbow Flexion 0-150 150   3/5  4/5    Elbow Extension 0   3/5  3+/5    Wrist Flex 0-80 90   3/5  3/5    Wrist Ext 0-70 50   3-/5  3/5    Supination 0-80 WNL   3/5  3/5    Pronation 0-80 WNL   3/5  3/5    *WNL - Within Normal Limits  *NT = Not Tested     Fist: tight right hand ;       Strength: (FLOR Dynamometer in lbs.) Average 3 trials, Position II:       3/27/2024 3/27/2024 4/19/2024 4/19/2024 5/22/2024    Rung II Right Left Right  Left  Right     Trial 1 19# 20# 30# 25# 40# 35#   Trial 2 14# 22# 33# 33# 36# 35#   Trial 3 15# 26# 27# 36# 36# 34#   Average 16# 22.6# 30# 31.3#        Normal  Average Values  Female Right Left Male: Right Left   20-29 66 lbs 62 lbs         94 lbs 86 lbs   30-39 68 lbs 64 lbs 90 lbs 82 lbs   40-49 64 lbs 62 lbs 80 lbs 74 lbs   50-59 62 lbs 57 lbs 72 lbs 65 lbs   60-69 53 lbs 51 lbs 63 lbs 56 lbs   70+ 44 lbs 42 lbs 54 lbs 48 lbs   SD = +/- 19lbs         Pinch Strength (Measured in lbs)       3/27/2024 3/27/2024 4/19/2024 4/19/2024 5/22/2024 5/22/2024     Right Left Right  Left  Right  Left    Key Pinch 5 # 7 # 8# 11# 12# 12#   3pt Pinch 3 # 5 # 7# 12# 9# 9#   2pt Pinch 3 # 6 # 5# 7# 5# 7#         Fine/Gross Motor Coordination     Assessment   Right   3/27/2024 Left  3/27/2024 Right   4/19/2024 Left   4/19/2024 Right  5/22/2024    9 hole peg test 9 pegs in/out for time 2:25  2 cues for sequencing  :48 1:01  No cues required :32 :39    SMITHA (Rapid Alternating Movements)      impaired - moderate    intact Impaired - decreased ROM  Impaired - decreased ROM  Intact     Finger to Nose (5 times)   impaired - moderate intact Impaired - moderate - touched back of head rather than nose  Impaired - moderate - touched back of head rather than nose  Intact     Finger Flicks (coordination moving from digit flexion to digit extension)   impaired - minimum intact Intact - slowed but full range of  motion  Intact - slowed but full range of motion  Intact     *WNL - Within Normal Limits  *NT = Not Tested     Endurance Deficit: moderate    Treatment     Prema received the treatments listed below:     therapeutic activities to improve functional performance for 40 minutes, including:  - reassessment / discharge education and discussion with    - education on insurance hard cap   - pom pom  with green clothespin 1 tub     Patient Education and Home Exercises     Education provided:   - handwriting drills   - green putty   - Progress towards goals     Written Home Exercises Provided: yes.  Exercises were reviewed and Prema was able to demonstrate them prior to the end of the session.  Prema demonstrated good  understanding of the home exercise program provided. See electronic medical record under Patient Instructions for exercises provided during therapy sessions.       Assessment     Prema has made excellent progress with therapy meeting all but 1 long-term goal. Her coordination, manual muscle strength, active range of motion and fine motor coordination has improved significantly, allowing for return to activities of daily living and instrumental activities of daily living. She continues to have difficulty with handwriting due to cognitive impairments, identifying letters and memory. She exhibits good tripod grasp on pen and gross motor coordination with letter formation. Due to limited insurance visits, Prema is agreeable to discharge from Occupational Therapy at this time and will continue with speech therapy to address communication deficits. Thank you for your referral.     Patient's spiritual, cultural and educational needs considered and patient agreeable to plan of care and goals.    Anticipated barriers to occupational therapy: cognitive deficits     Goals:  Short Term Goals: 4 weeks   - Pt will report 50% compliance with home exercise program in order to maintain progress attained during  therapy. Met 4/22/2024  - Pt will improve active range of motion of right shoulder flexion/abduction to 120/140 degrees in order to increase ease with bathing and dressing tasks. Met 4/22/2024  - Pt will increase right/left  strength to 22/30# in order to improve ease when dressing, bathing and completing gardening tasks. Met 4/22/2024  - Pt will decrease time on 9HPT to under 2 minutes using right hand  to improve fine motor speed and coordination needed to perform home maintenance, grooming and hygiene, money manipulation. Met 4/22/2024     Long Term Goals: 8 weeks   - Pt will report independence with home exercise program in order to maintain progress attained during therapy. Met 5/22/2024  - Pt will increase MMT for bilateral shoulder movements in all planes  to 4/5 to increase upper body strength needed for home management tasks, high level IADLs and leisure related activities. Met 5/22/2024  - Pt will increase bilateral  strength to 40# in order to improve ease with functional tasks such as gardening. Not met   - Pt will improve right/left key/3pt/2pt pinch by 2-3# in order to increase ease when dressing and grooming. Met 4/22/2024  - Pt will complete simulated instrumental activities of daily living activity with less than 2 cues for completion in order to return to prior level of function and decrease caregiver burden. Met 5/22/2024 - laundry and cutting for meal prep, no cues required     Plan     Updates/Grading for next session: discharge from Occupational Therapy     Corinne Rapier, OT   5/22/2024

## 2024-05-24 ENCOUNTER — DOCUMENTATION ONLY (OUTPATIENT)
Dept: REHABILITATION | Facility: HOSPITAL | Age: 79
End: 2024-05-24

## 2024-05-24 ENCOUNTER — CLINICAL SUPPORT (OUTPATIENT)
Dept: REHABILITATION | Facility: HOSPITAL | Age: 79
End: 2024-05-24
Payer: MEDICARE

## 2024-05-24 DIAGNOSIS — R47.01 APHASIA: Primary | ICD-10-CM

## 2024-05-24 DIAGNOSIS — R48.2 APRAXIA: ICD-10-CM

## 2024-05-24 PROCEDURE — 92507 TX SP LANG VOICE COMM INDIV: CPT | Mod: KX,PN

## 2024-05-24 NOTE — PROGRESS NOTES
"[facility]  Speech Therapy Treatment Note- Neurological Rehabilitation  Date: 5/16/2024      Name: Prema Phillips   MRN: [id number]   Therapy Diagnosis:        Encounter Diagnoses   Name Primary?    Aphasia Yes    Apraxia        Physician: Odette Zhang MD  Physician Orders: Ambulatory Referral to Speech Therapy   Medical Diagnosis: M25.60,R53.1 (ICD-10-CM) - Decreased range of motion with decreased strength I63.412 (ICD-10-CM) - Cerebrovascular accident (CVA) due to embolism of left middle cerebral artery I67.89,G81.91 (ICD-10-CM) - Hemiparesis of right dominant side due to acute cerebrovascular disease     Visit #/ Visits Authorized: 22/20  Date of Evaluation:  3/27/24  Insurance Authorization Period: 3/27/24-12/31/24  Plan of Care Expiration Date:    5/22/24  Extended Plan of Care:  n/a   Progress Note: 6/22/24      Time In:  1147  Time Out:  1233  Total Billable Time: 46 minutes      Precautions: Communication  Subjective:   Patient reports: doing okay today; "some days are good"  She was compliant to home exercise program. Has been practicing with naming objects  Response to previous treatment: good  Pain Scale: Patient unable to indicate pain secondary to aphasia. No pain behaviors observed throughout the session.  Objective:   UNTIMED  Procedure   Speech- Language- Voice Therapy      Short Term Goals: (4 weeks) Progress   1. Patient will read single words aloud with 50% accuracy and 1 phonemic cue in order to bridge to higher level reading tasks.   Progressing/ Not Met 5/24/2024 Read single syllable words with 70% accuracy independently.   2 syllable words - 0% today despite effort and repetition     2. Patient will repeat monosyllabic, common words with 50% accuracy when provided with a visual cue to increase her spoken language abilities.   MET 5/24/24 Repeated monosyllabic words with 76% accuracy independently with visual cue (19/25 trials), significantly improved from previous " sessions.   Will increase complexity.   3. Patient will complete rote speech tasks with >75% accuracy in unison with speech-language pathologist to increase spoken language abilities.  Progressing/ Not Met 2024 1-10 - 70% accuracy in unison with speech-language pathologist   Days of the week - 57% accuracy in unison (4/7 trials)   Months of the year - 50% accuracy in unison (6/12)   4. Patient will answer simple open-ended questions with relevant responses with >75% accuracy with visual or auditory stimuli to increase spoken language output.  Progressing/ Not Met 2024 Answered wh- questions with 40% accuracy independently. Binary choice written for her which did not seem to increase score    5. The patient will repeat common words with 80% accuracy independently to enhance her speech production abilities.  Progressing/Not Met 2024 New goal      Long Term Goal Status:     Long Term Goals: (8 weeks)    Patient will increase her social participation in conversation with > 5 utterances produced independently.   Progressing/Not Met    2. Patient will state basic wants/needs/concerns >90% of the time independently through use of various modalities in order to increase her social participation with others.    Progressing/Not Met       Patient Education/Response:   Patient educated regarding the followin. [place] speech task important for speech production  2. Aphasia and prognosis - recovery and intensity of therapy (discussed with patient's )  3. [employer] [facility]     Home program established: yes-provided simple written command worksheets as well as the days of the week and numbers written down  Patient verbalized understanding to all above education provided.      See Electronic Medical Record under Patient Instructions for exercises provided throughout therapy.  Assessment:   Treatment session today focused on various receptive and expressive language tasks, including answering open-ended  questions, reading monosyllabic and bisyllabic words, participating in structured and spontaneous conversation, repeating words, and completing rote speech tasks. She met her short-term goal today for repeating monosyllabic words with a visual cue, increasing the complexity with a new goal today. She also Prema is progressing well towards her goals. Current goals remain appropriate. Goals to be updated as necessary.      Patient prognosis is Good. Patient will continue to benefit from skilled outpatient speech and language therapy to address the deficits listed in the problem list on initial evaluation, provide patient/family education and to maximize patient's level of independence in the home and community environment.   Medical necessity is demonstrated by the following IMPAIRMENTS:  Aphasia: Patient with few true words in all situations severely limiting functional communication with both familiar and unfamiliar communication partners.   Barriers to Therapy: none  Patient's spiritual, cultural and educational needs considered and patient agreeable to plan of care and goals.  Plan:   Continue Plan of Care with focus on rehabilitation and compensation for aphasia secondary to stroke.  1x/week for 5 weeks to use remaining visits      Nori Yin M.S., L-SLP,CCC-SLP, CBIS  Speech-Language Pathologist  Certified Brain Injury Specialist  5/24/2024

## 2024-05-24 NOTE — PROGRESS NOTES
Spoke to patient and her  after occupational therapy appointment this week. Because she has a hard cap on visits combined for physical/speech/occupational therapy and she is doing so well in physical therapy, both are agreeable to informal discharge from physical therapy (versus using an authorized visit for formal reassessment). Speech is now primary complaint. She is progressing very well towards her physical therapy goals. Both she and her  know they can contact me with any questions/concerns post-discharge.    Emy Montano, PT, DPT

## 2024-05-30 ENCOUNTER — CLINICAL SUPPORT (OUTPATIENT)
Dept: REHABILITATION | Facility: HOSPITAL | Age: 79
End: 2024-05-30
Payer: MEDICARE

## 2024-05-30 DIAGNOSIS — R47.01 APHASIA: Primary | ICD-10-CM

## 2024-05-30 DIAGNOSIS — R48.2 APRAXIA: ICD-10-CM

## 2024-05-30 PROCEDURE — 92507 TX SP LANG VOICE COMM INDIV: CPT | Mod: PN

## 2024-05-30 NOTE — PROGRESS NOTES
[facility]  Speech Therapy Treatment Note- Neurological Rehabilitation  Date: 5/30/2024      Name: Prema Phillips   MRN: [id number]   Therapy Diagnosis:        Encounter Diagnoses   Name Primary?    Aphasia Yes    Apraxia        Physician: Odette Zhang MD  Physician Orders: Ambulatory Referral to Speech Therapy   Medical Diagnosis: M25.60,R53.1 (ICD-10-CM) - Decreased range of motion with decreased strength I63.412 (ICD-10-CM) - Cerebrovascular accident (CVA) due to embolism of left middle cerebral artery I67.89,G81.91 (ICD-10-CM) - Hemiparesis of right dominant side due to acute cerebrovascular disease     Visit #/ Visits Authorized: 23/20  Date of Evaluation:  3/27/24  Insurance Authorization Period: 3/27/24-12/31/24  Plan of Care Expiration Date:    5/22/24  Extended Plan of Care:  n/a   Progress Note: 6/22/24      Time In:  2:40  Time Out:  3:15  Total Billable Time: 35 minutes      Precautions: Communication  Subjective:   Patient reports: doing okay today  She was compliant to home exercise program. Has been practicing with naming objects  Response to previous treatment: good  Pain Scale: Patient unable to indicate pain secondary to aphasia. No pain behaviors observed throughout the session.  Objective:   UNTIMED  Procedure   Speech- Language- Voice Therapy      Short Term Goals: (4 weeks) Progress   1. Patient will read single words aloud with 50% accuracy and 1 phonemic cue in order to bridge to higher level reading tasks.   Progressing/ Not Met 5/30/2024 Read single syllable words with 40% accuracy independently. 60% min A. (Decrease from last session.0    2 syllable words - Not addressed this session.      2. Patient will repeat monosyllabic, common words with 50% accuracy when provided with a visual cue to increase her spoken language abilities.   MET 5/24/24 Repeated monosyllabic words with 76% accuracy independently with visual cue (19/25 trials), significantly improved from  previous sessions.   Will increase complexity.   3. Patient will complete rote speech tasks with >75% accuracy in unison with speech-language pathologist to increase spoken language abilities.  Progressing/ Not Met 2024 1-10 - 60% accuracy in unison with speech-language pathologist   Days of the week - 57% accuracy in unison (4/7 trials) correctly stated , Monday, Tuesday, Wednesday  Months of the year - 83% accuracy Independently with initial cue (10/12); 11/12 min A   4. Patient will answer simple open-ended questions with relevant responses with >75% accuracy with visual or auditory stimuli to increase spoken language output.  Progressing/ Not Met 2024 Answered wh- questions with 50% accuracy independently. Binary choice written for her which did not seem to increase score    5. The patient will repeat common words with 80% accuracy independently to enhance her speech production abilities.  Progressing/Not Met 2024 Repeat words x 20 with 14/20 acc Independently (70%)      Long Term Goal Status:     Long Term Goals: (8 weeks)   Patient will increase her social participation in conversation with > 5 utterances produced independently.   Progressing/Not Met   2. Patient will state basic wants/needs/concerns >90% of the time independently through use of various modalities in order to increase her social participation with others.    Progressing/Not Met      Patient Education/Response:   Patient educated regarding the followin. [place] speech task important for speech production  2. Aphasia and prognosis - recovery and intensity of therapy (discussed with patient's )  3. [employer] [facility] Cynthia     Home program established: yes-provided simple written command worksheets as well as the days of the week and numbers written down  Patient verbalized understanding to all above education provided.      See Electronic Medical Record under Patient Instructions for exercises provided throughout  therapy.  Assessment:   Treatment session today focused on various receptive and expressive language tasks, including answering open-ended questions, reading monosyllabic and bisyllabic words, participating in structured and spontaneous conversation, repeating words, and completing rote speech tasks. Difficulty with reading single words today. Improvement noted on all other goals today. Significant improvement with months of the year today. She also Prema is progressing well towards her goals. Current goals remain appropriate. Goals to be updated as necessary.      Patient prognosis is Good. Patient will continue to benefit from skilled outpatient speech and language therapy to address the deficits listed in the problem list on initial evaluation, provide patient/family education and to maximize patient's level of independence in the home and community environment.   Medical necessity is demonstrated by the following IMPAIRMENTS:  Aphasia: Patient with few true words in all situations severely limiting functional communication with both familiar and unfamiliar communication partners.   Barriers to Therapy: none  Patient's spiritual, cultural and educational needs considered and patient agreeable to plan of care and goals.  Plan:   Continue Plan of Care with focus on rehabilitation and compensation for aphasia secondary to stroke.  1x/week for 5 weeks to use remaining visits      SUNITA Mccallum M.S.-SLP,CCC-SLP  Speech-Language Pathologist  5/30/2024

## 2024-06-04 ENCOUNTER — PATIENT MESSAGE (OUTPATIENT)
Dept: REHABILITATION | Facility: HOSPITAL | Age: 79
End: 2024-06-04
Payer: MEDICARE

## 2024-06-05 ENCOUNTER — CLINICAL SUPPORT (OUTPATIENT)
Dept: REHABILITATION | Facility: HOSPITAL | Age: 79
End: 2024-06-05
Payer: MEDICARE

## 2024-06-05 DIAGNOSIS — R47.01 APHASIA: Primary | ICD-10-CM

## 2024-06-05 DIAGNOSIS — R48.2 APRAXIA: ICD-10-CM

## 2024-06-05 PROCEDURE — 92507 TX SP LANG VOICE COMM INDIV: CPT | Mod: KX,PN

## 2024-06-05 NOTE — PROGRESS NOTES
[facility]  Speech Therapy Treatment Note- Neurological Rehabilitation  Date: 6/5/2024      Name: Prema Phillips   MRN: [id number]   Therapy Diagnosis:        Encounter Diagnoses   Name Primary?    Aphasia Yes    Apraxia        Physician: Odette Zhang MD  Physician Orders: Ambulatory Referral to Speech Therapy   Medical Diagnosis: M25.60,R53.1 (ICD-10-CM) - Decreased range of motion with decreased strength I63.412 (ICD-10-CM) - Cerebrovascular accident (CVA) due to embolism of left middle cerebral artery I67.89,G81.91 (ICD-10-CM) - Hemiparesis of right dominant side due to acute cerebrovascular disease     Visit #/ Visits Authorized: 24/20  Date of Evaluation:  3/27/24  Insurance Authorization Period: 3/27/24-12/31/24  Plan of Care Expiration Date:    7/3/24  Extended Plan of Care:  n/a   Progress Note: 6/22/24      Time In:  1110  Time Out:  1201  Total Billable Time: 51 minutes      Precautions: Communication  Subjective:   Patient reports: doing well since last visit; has been going to [employer]   She was compliant to home exercise program. Has been practicing with naming objects  Response to previous treatment: good  Pain Scale: Patient reported regular, usual pain of right side  Objective:   UNTIMED  Procedure   Speech- Language- Voice Therapy      Short Term Goals: (4 weeks) Progress   1. Patient will read single words aloud with 50% accuracy and 1 phonemic cue in order to bridge to higher level reading tasks.   Progressing/ Not Met 6/5/2024 Read single syllable words with 87% accuracy independently on first attempt, previously 40% accuracy independently    2 syllable words - Not addressed this session.    2. Patient will repeat monosyllabic, common words with 50% accuracy when provided with a visual cue to increase her spoken language abilities.   MET 5/24/24    3. Patient will complete rote speech tasks with >75% accuracy in unison with speech-language pathologist to increase spoken  language abilities.  Progressing/ Not Met 2024 1-10 - 60% accuracy independently  Days of the week - 0 % accuracy independently, attempted visual cue and initiating without success then reading and in unison with speech-language pathologist without success; previously 57% accuracy in unison (4/7 trials) correctly stated , Monday, Tuesday, Wednesday  Months of the year - 67% accuracy independently, previously 83% accuracy Independently with initial cue (10/12)   4. Patient will answer simple open-ended questions with relevant responses with >75% accuracy with visual or auditory stimuli to increase spoken language output.  Progressing/ Not Met 2024 Answered wh- questions with 38% accuracy independently (6/16 trials), previously 50% accuracy independently. Patient with tangential speech for most questions.   5. The patient will repeat common words with 80% accuracy independently to enhance her speech production abilities.  Progressing/Not Met 2024 Repeat words x 15 with 53% accuracy independently (8/15 trials), previously 70% accuracy independently       Long Term Goal Status:     Long Term Goals: (8 weeks)    Patient will increase her social participation in conversation with > 5 utterances produced independently.   Progressing/Not Met    2. Patient will state basic wants/needs/concerns >90% of the time independently through use of various modalities in order to increase her social participation with others.    Progressing/Not Met       Patient Education/Response:   Patient educated regarding the followin. [place] speech task important for speech production  2. Aphasia and prognosis - recovery and intensity of therapy (discussed with patient's )  3. [employer] [employer] [facility] Cyn[facility]     Home program established: yes-provided simple written command worksheets as well as the days of the week and numbers written down  Patient verbalized understanding to all above education provided.      See Electronic  Medical Record under Patient Instructions for exercises provided throughout therapy.  Assessment:   Treatment session today focused on various receptive and expressive language tasks, including answering open-ended questions, reading and repeating monosyllabic words, participating in structured and spontaneous conversation, and completing rote speech tasks. Difficulties noted with word finding in conversation as well as with answering open-ended questions. She also Prema is progressing well towards her goals. Current goals remain appropriate. Goals to be updated as necessary.      Patient prognosis is Good. Patient will continue to benefit from skilled outpatient speech and language therapy to address the deficits listed in the problem list on initial evaluation, provide patient/family education and to maximize patient's level of independence in the home and community environment.   Medical necessity is demonstrated by the following IMPAIRMENTS:  Aphasia: Patient with few true words in all situations severely limiting functional communication with both familiar and unfamiliar communication partners.   Barriers to Therapy: none  Patient's spiritual, cultural and educational needs considered and patient agreeable to plan of care and goals.  Plan:   Continue Plan of Care with focus on rehabilitation and compensation for aphasia secondary to stroke.  1x/week for 5 weeks to use remaining visits      Nori Yin M.S., L-SLP,CCC-SLP, CBIS  Speech-Language Pathologist  Certified Brain Injury Specialist  6/5/2024

## 2024-06-10 PROBLEM — I63.412 CEREBROVASCULAR ACCIDENT (CVA) DUE TO EMBOLISM OF LEFT MIDDLE CEREBRAL ARTERY: Status: RESOLVED | Noted: 2024-03-07 | Resolved: 2024-06-10

## 2024-06-10 RX ORDER — ATORVASTATIN CALCIUM 40 MG/1
40 TABLET, FILM COATED ORAL DAILY
Qty: 90 TABLET | Refills: 4 | Status: SHIPPED | OUTPATIENT
Start: 2024-06-10 | End: 2025-06-10

## 2024-06-10 NOTE — TELEPHONE ENCOUNTER
----- Message from Samanta Zayda sent at 6/8/2024  9:23 AM CDT -----  Type:  RX Refill Request    Who Called:  Pt's   Refill or New Rx: Refill   RX Name and Strength:atorvastatin (LIPITOR) 40 MG tablet  Preferred Pharmacy with phone number:[facility]/pharmacy #[id number] - [place] - [address] [facility]  Local or Mail Order: Local   Ordering Provider:Nori Yin  Would the patient rather a call back or a response via [facility]? Call  Best Call Back Number: [phone]  Additional Information:  Pt has been trying to get this Rx for a week, last provider still has not refilled. Pt is running very low. Caller states you can leave VM if they do not answer

## 2024-06-10 NOTE — TELEPHONE ENCOUNTER
No care due was identified.  [facility] Embedded Care Due Messages. Reference number: [id number].   6/10/2024 9:56:51 AM CDT

## 2024-06-17 ENCOUNTER — TELEPHONE (OUTPATIENT)
Dept: NEUROLOGY | Facility: HOSPITAL | Age: 79
End: 2024-06-17
Payer: MEDICARE

## 2024-06-17 PROBLEM — I63.9 APHASIA DUE TO ACUTE STROKE: Status: RESOLVED | Noted: 2024-03-11 | Resolved: 2024-06-17

## 2024-06-17 PROBLEM — R47.01 APHASIA DUE TO ACUTE STROKE: Status: RESOLVED | Noted: 2024-03-11 | Resolved: 2024-06-17

## 2024-06-17 NOTE — PROGRESS NOTES
Unsuccessful phone outreach re: Stroke Mobile Enrollment after 4 calls, LVM with my contact information

## 2024-06-18 ENCOUNTER — HOME CARE VISIT (OUTPATIENT)
Dept: NEUROLOGY | Facility: HOSPITAL | Age: 79
End: 2024-06-18
Payer: MEDICARE

## 2024-06-18 ENCOUNTER — TELEPHONE (OUTPATIENT)
Dept: FAMILY MEDICINE | Facility: CLINIC | Age: 79
End: 2024-06-18
Payer: MEDICARE

## 2024-06-18 NOTE — TELEPHONE ENCOUNTER
Called pt to try and confirm her appointment that is on hold on my schedule. Pt answered the phone but never made since about what she was saying, was mumbling a lot, something about a car and her foot, could not make it out clear then pt asked for me to call her back later and hung up

## 2024-06-26 ENCOUNTER — CLINICAL SUPPORT (OUTPATIENT)
Dept: REHABILITATION | Facility: HOSPITAL | Age: 79
End: 2024-06-26
Payer: MEDICARE

## 2024-06-26 DIAGNOSIS — R48.2 APRAXIA: ICD-10-CM

## 2024-06-26 DIAGNOSIS — R47.01 APHASIA: Primary | ICD-10-CM

## 2024-06-26 PROCEDURE — 92507 TX SP LANG VOICE COMM INDIV: CPT | Mod: KX,PN

## 2024-06-26 NOTE — PROGRESS NOTES
[facility]  Speech Therapy Progress Note- Neurological Rehabilitation  Date: 6/26/2024      Name: Perma Phillips   MRN: [id number]   Therapy Diagnosis:        Encounter Diagnoses   Name Primary?    Aphasia Yes    Apraxia        Physician: Odette Zhang MD  Physician Orders: Ambulatory Referral to Speech Therapy   Medical Diagnosis: M25.60,R53.1 (ICD-10-CM) - Decreased range of motion with decreased strength I63.412 (ICD-10-CM) - Cerebrovascular accident (CVA) due to embolism of left middle cerebral artery I67.89,G81.91 (ICD-10-CM) - Hemiparesis of right dominant side due to acute cerebrovascular disease     Visit #/ Visits Authorized: 25/30  Date of Evaluation:  3/27/24  Insurance Authorization Period: 3/27/24-12/31/24  Plan of Care Expiration Date:    7/3/24  Extended Plan of Care:  n/a   Progress Note: 7/26/24      Time In:  1434  Time Out:  1515  Total Billable Time:  41 minutes      Precautions: Communication  Subjective:   Patient reports: patient is well; nothing new to report since last session  She was compliant to home exercise program. Has been practicing with naming objects  Response to previous treatment: good  Pain Scale: Patient reported regular, usual pain of right side  Objective:   UNTIMED  Procedure   Speech- Language- Voice Therapy      Short Term Goals: (4 weeks) Progress   1. Patient will read single words aloud with 50% accuracy and 1 phonemic cue in order to bridge to higher level reading tasks.   Met 6/26/24 Read single syllable words with 50% accuracy independently (6/12) on first attempt, previously 60% accuracy independently.       2. Patient will repeat monosyllabic, common words with 50% accuracy when provided with a visual cue to increase her spoken language abilities.   MET 5/24/24    3. Patient will complete rote speech tasks with >75% accuracy in unison with speech-language pathologist to increase spoken language abilities.  Progressing/ Not Met 6/27/2024 1-10 -  60% accuracy in unison. 50% accuracy independently  Days of the week -90% accuracy in unison with speech-language pathologist with visual cue. 57% accuracy  independently stated Monday, Wednesday, Thursday, Friday; previously 0% accuracy independently   Months of the year - 33% accuracy independently (4/12 trials); previously 67% accuracy independently with intal cue.      4. Patient will answer simple open-ended questions with relevant responses with >75% accuracy with visual or auditory stimuli to increase spoken language output.  Progressing/ Not Met 2024 Answered wh- questions with 30% accuracy independently (6/10 trials), previously 38% accuracy independently.   5. The patient will repeat common words with 80% accuracy independently to enhance her speech production abilities.  Progressing/Not Met 2024 Repeat words with 62% accuracy independently (10/16 trials), previously 53% accuracy independently.      Long Term Goal Status:     Long Term Goals: (8 weeks)    Patient will increase her social participation in conversation with > 5 utterances produced independently.   Progressing/Not Met    2. Patient will state basic wants/needs/concerns >90% of the time independently through use of various modalities in order to increase her social participation with others.    Progressing/Not Met       Patient Education/Response:   Patient educated regarding the followin. [place] speech task important for speech production  2. Aphasia and prognosis - recovery and intensity of therapy (discussed with patient's )  3. [employer] [facility] Cynthia     Home program established: yes-provided simple written command worksheets as well as the days of the week and numbers written down  Patient verbalized understanding to all above education provided.      See Electronic Medical Record under Patient Instructions for exercises provided throughout therapy.  Assessment:   Treatment session today focused on various receptive and  expressive language tasks, including answering open-ended questions, reading and repeating monosyllabic words, participating in structured and spontaneous conversation, and completing rote speech tasks. Difficulties noted with word finding when answering open-ended questions and rote speech tasks. She preformed well on reading single words out loud. Prema is progressing well towards her goals. Current goals remain appropriate. Goals to be updated as necessary.      Patient prognosis is Good. Patient will continue to benefit from skilled outpatient speech and language therapy to address the deficits listed in the problem list on initial evaluation, provide patient/family education and to maximize patient's level of independence in the home and community environment.   Medical necessity is demonstrated by the following IMPAIRMENTS:  Aphasia: Patient with few true words in all situations severely limiting functional communication with both familiar and unfamiliar communication partners.   Barriers to Therapy: none  Patient's spiritual, cultural and educational needs considered and patient agreeable to plan of care and goals.  Plan:   Continue Plan of Care with focus on rehabilitation and compensation for aphasia secondary to stroke.  2 more visits remaining per insurance     DARY CampaA   Gradate Student Clinician     I certify that I was present in the room directing the student in service delivery and guiding them using my skilled judgment. As the co-signing therapist I have reviewed the students documentation and am responsible for the treatment, assessment, and plan.   Nori Yin M.S., L-SLP,CCC-SLP, CBIS  Speech-Language Pathologist  Certified Brain Injury Specialist  6/27/2024

## 2024-07-01 PROBLEM — I63.9 LACK OF COORDINATION DUE TO ACUTE CEREBROVASCULAR ACCIDENT (CVA): Status: RESOLVED | Noted: 2024-03-28 | Resolved: 2024-07-01

## 2024-07-01 PROBLEM — R27.9 LACK OF COORDINATION DUE TO ACUTE CEREBROVASCULAR ACCIDENT (CVA): Status: RESOLVED | Noted: 2024-03-28 | Resolved: 2024-07-01

## 2024-07-03 ENCOUNTER — HOSPITAL ENCOUNTER (OUTPATIENT)
Dept: RADIOLOGY | Facility: HOSPITAL | Age: 79
Discharge: HOME | End: 2024-07-03
Attending: FAMILY MEDICINE
Payer: MEDICARE

## 2024-07-03 ENCOUNTER — TELEPHONE (OUTPATIENT)
Dept: NEUROLOGY | Facility: CLINIC | Age: 79
End: 2024-07-03
Payer: MEDICARE

## 2024-07-03 ENCOUNTER — OFFICE VISIT (OUTPATIENT)
Dept: FAMILY MEDICINE | Facility: CLINIC | Age: 79
End: 2024-07-03
Payer: MEDICARE

## 2024-07-03 VITALS
HEART RATE: 93 BPM | OXYGEN SATURATION: 98 % | BODY MASS INDEX: 20.48 KG/M2 | HEIGHT: 66 IN | WEIGHT: 127.44 LBS | DIASTOLIC BLOOD PRESSURE: 72 MMHG | SYSTOLIC BLOOD PRESSURE: 118 MMHG | TEMPERATURE: 99 F

## 2024-07-03 DIAGNOSIS — M79.89 SWELLING OF LEFT RING FINGER: ICD-10-CM

## 2024-07-03 DIAGNOSIS — Z79.899 MEDICATION MANAGEMENT: ICD-10-CM

## 2024-07-03 DIAGNOSIS — R73.03 PREDIABETES: Chronic | ICD-10-CM

## 2024-07-03 DIAGNOSIS — Z79.82 LONG-TERM USE OF ASPIRIN THERAPY: ICD-10-CM

## 2024-07-03 DIAGNOSIS — Z79.02 VISIT FOR MONITORING PLAVIX THERAPY: ICD-10-CM

## 2024-07-03 DIAGNOSIS — Z51.81 VISIT FOR MONITORING PLAVIX THERAPY: ICD-10-CM

## 2024-07-03 DIAGNOSIS — M19.042 PRIMARY OSTEOARTHRITIS OF BOTH HANDS: ICD-10-CM

## 2024-07-03 DIAGNOSIS — I45.10 RBBB: ICD-10-CM

## 2024-07-03 DIAGNOSIS — E78.2 MIXED HYPERLIPIDEMIA: Chronic | ICD-10-CM

## 2024-07-03 DIAGNOSIS — G81.91 HEMIPARESIS OF RIGHT DOMINANT SIDE DUE TO ACUTE CEREBROVASCULAR DISEASE: Primary | ICD-10-CM

## 2024-07-03 DIAGNOSIS — M19.041 PRIMARY OSTEOARTHRITIS OF BOTH HANDS: ICD-10-CM

## 2024-07-03 DIAGNOSIS — I67.89 HEMIPARESIS OF RIGHT DOMINANT SIDE DUE TO ACUTE CEREBROVASCULAR DISEASE: Primary | ICD-10-CM

## 2024-07-03 DIAGNOSIS — E03.9 ACQUIRED HYPOTHYROIDISM: Chronic | ICD-10-CM

## 2024-07-03 DIAGNOSIS — Z86.73 HISTORY OF RECENT STROKE: ICD-10-CM

## 2024-07-03 DIAGNOSIS — R47.01 APHASIA: ICD-10-CM

## 2024-07-03 DIAGNOSIS — M85.80 OSTEOPENIA OF THE ELDERLY: ICD-10-CM

## 2024-07-03 DIAGNOSIS — F43.22 ADJUSTMENT REACTION WITH ANXIETY: ICD-10-CM

## 2024-07-03 DIAGNOSIS — I65.23 BILATERAL CAROTID ARTERY STENOSIS: Chronic | ICD-10-CM

## 2024-07-03 DIAGNOSIS — G47.00 INSOMNIA, UNSPECIFIED TYPE: ICD-10-CM

## 2024-07-03 DIAGNOSIS — R79.9 ABNORMAL FINDING OF BLOOD CHEMISTRY, UNSPECIFIED: ICD-10-CM

## 2024-07-03 PROCEDURE — 1160F RVW MEDS BY RX/DR IN RCRD: CPT | Mod: CPTII,S$GLB,, | Performed by: FAMILY MEDICINE

## 2024-07-03 PROCEDURE — 3288F FALL RISK ASSESSMENT DOCD: CPT | Mod: CPTII,S$GLB,, | Performed by: FAMILY MEDICINE

## 2024-07-03 PROCEDURE — G2211 COMPLEX E/M VISIT ADD ON: HCPCS | Mod: S$GLB,,, | Performed by: FAMILY MEDICINE

## 2024-07-03 PROCEDURE — 73140 X-RAY EXAM OF FINGER(S): CPT | Mod: 26,LT,, | Performed by: RADIOLOGY

## 2024-07-03 PROCEDURE — 99215 OFFICE O/P EST HI 40 MIN: CPT | Mod: S$GLB,,, | Performed by: FAMILY MEDICINE

## 2024-07-03 PROCEDURE — 3078F DIAST BP <80 MM HG: CPT | Mod: CPTII,S$GLB,, | Performed by: FAMILY MEDICINE

## 2024-07-03 PROCEDURE — 73140 X-RAY EXAM OF FINGER(S): CPT | Mod: TC,FY,LT

## 2024-07-03 PROCEDURE — 1125F AMNT PAIN NOTED PAIN PRSNT: CPT | Mod: CPTII,S$GLB,, | Performed by: FAMILY MEDICINE

## 2024-07-03 PROCEDURE — 1159F MED LIST DOCD IN RCRD: CPT | Mod: CPTII,S$GLB,, | Performed by: FAMILY MEDICINE

## 2024-07-03 PROCEDURE — 3074F SYST BP LT 130 MM HG: CPT | Mod: CPTII,S$GLB,, | Performed by: FAMILY MEDICINE

## 2024-07-03 PROCEDURE — 99999 PR PBB SHADOW E&M-EST. PATIENT-LVL IV: CPT | Mod: PBBFAC,,, | Performed by: FAMILY MEDICINE

## 2024-07-03 PROCEDURE — 1101F PT FALLS ASSESS-DOCD LE1/YR: CPT | Mod: CPTII,S$GLB,, | Performed by: FAMILY MEDICINE

## 2024-07-03 RX ORDER — VIT A/VIT C/VIT E/ZINC/COPPER 2148-113
1 TABLET ORAL 2 TIMES DAILY WITH MEALS
Start: 2024-07-03

## 2024-07-03 RX ORDER — UBIDECARENONE 30 MG
100 CAPSULE ORAL 2 TIMES DAILY
COMMUNITY

## 2024-07-03 RX ORDER — CHOLECALCIFEROL (VITAMIN D3) 25 MCG
1000 TABLET ORAL DAILY
COMMUNITY

## 2024-07-03 RX ORDER — METHYLPREDNISOLONE 4 MG/1
TABLET ORAL
Qty: 21 EACH | Refills: 0 | Status: SHIPPED | OUTPATIENT
Start: 2024-07-03 | End: 2024-07-24

## 2024-07-03 RX ORDER — LORAZEPAM 0.5 MG/1
0.5 TABLET ORAL DAILY PRN
Qty: 30 TABLET | Refills: 5 | Status: SHIPPED | OUTPATIENT
Start: 2024-07-03 | End: 2024-12-30

## 2024-07-03 NOTE — PROGRESS NOTES
Office Visit    Patient Name: Prema Phillips    : 1945  MRN: [id number]    Subjective:  Prema is a 79 y.o. female who presents today for:    Follow-up (3m)    Most recent office visit with me 2024.    79 y.o. patient of mine with  hypertension, hyperlipidemia, bilateral carotid stenosis, and RBBB, history of acute stroke who presents today for lab review and 3 month follow-up.      She was admitted 2024 to 2024 secondary to acute stroke.  She was then discharged to rehab and in rehab until 2024.    Since discharge from rehab she has been in intensive outpatient therapy.  She has now completed PT and OT and is just in speech therapy.  Continues to struggle with significant expressive aphasia though has seen notable improvements.    Had labs prior to office visit 2024 showing near normalization of mild anemia with hematocrit 36.8, otherwise unremarkable except for slightly decreased kidney function at EGFR of 57, normal electrolytes, A1c 5.5, normal vitamin levels.  LDL 87, pre-albumin 21.      Most recently seen by Neurology 2024   - Continue aspirin 81 mg daily  - Lexapro 20 mg  - Atorvastatin 40 mg   - Counseled on prevention of future stroke with current medical therapy  - Adhere to mediterranean diet   - Patient does not have history of diabetes nor smoking and she is aware of those risks  - Ordered 30 day cardiac event monitor  - RTC in 4 months     Of note, she never did receive her 30 day event monitor and does not have follow-up with Neurology scheduled.  There is some concern about her missing phone calls and her  plans to add his number to her demographic section of the chart.    Appetite is decent, and eating a good variety of food.   Has slacked off a bit with hydration-- drinking about 32 oz daily-- she complains of some urinary frequency when she drinks more water and this is in part why she decreased her consumption but open to drinking more especially with  slight decline in EGFR.  No burning, blood or UTI concerns.  No urine leakage note     Having some mild residual right-sided weakness and pain but this is not significant.  Does not feel like she needs additional formal physical therapy-plans to start going to the gym with her .    Acutely she has developed some increased pain and swelling of the left ring finger, particularly at the middle knuckle.  No history of trauma.  Does have history of underlying osteoarthritis.  No redness, warmth, drainage.    Mood has been overall stable, anxiety is decreasing some since her acute stroke episode.  Doing well on Lexapro 20 mg daily but does request a refill of p.r.n. Ativan which helps her when she is experiencing more severe anxiety episodes-generally takes 1 a day.        PAST MEDICAL HISTORY, SURGICAL/SOCIAL/FAMILY HISTORY REVIEWED AS PER CHART, WITH PERTINENT FINDINGS INCLUDED IN HISTORY SECTION OF NOTE.     Current Medications    Medication List with Changes/Refills   New Medications    LORAZEPAM (ATIVAN) 0.5 MG TABLET    Take 1 tablet (0.5 mg total) by mouth daily as needed for Anxiety.    METHYLPREDNISOLONE (MEDROL DOSEPACK) 4 MG TABLET    use as directed    VITAMINS A,C,E-ZINC-COPPER (PRESERVISION AREDS) 2,148 MCG-113 MG-45 MG-17.4MG TAB    Take 1 tablet by mouth 2 (two) times daily with meals.   Current Medications    AMITRIPTYLINE (ELAVIL) 25 MG TABLET    TAKE 1/2-1 TABLET BY MOUTH NIGHTLY FOR INSOMNIA. OK TO TAKE WITH THE LOW DOSE OF ATIVAN    ASPIRIN (ECOTRIN) 81 MG EC TABLET    Take 81 mg by mouth once daily.    ATORVASTATIN (LIPITOR) 40 MG TABLET    Take 1 tablet (40 mg total) by mouth once daily.    CLOPIDOGREL (PLAVIX) 75 MG TABLET    Take 1 tablet (75 mg total) by mouth once daily.    CO-ENZYME Q-10 30 MG CAPSULE    Take 100 mg by mouth 2 (two) times daily.    ESCITALOPRAM OXALATE (LEXAPRO) 20 MG TABLET    Take 1 tablet (20 mg total) by mouth once daily.    LEVOTHYROXINE (SYNTHROID) 75 MCG TABLET     "Take 1 tablet (75 mcg total) by mouth before breakfast.    OMEPRAZOLE (PRILOSEC) 20 MG CAPSULE    TAKE 1 CAPSULE BY MOUTH IN THE MORNING    VITAMIN D (VITAMIN D3) 1000 UNITS TAB    Take 1,000 Units by mouth once daily.       Allergies   Review of patient's allergies indicates:   Allergen Reactions    Neomycin Other (See Comments) and Swelling     Other reaction(s): Unknown    Carbamazepine Rash     Rash per 3/02 RN telephone encounter. On interview, pt/family unable to say if she continued or stopped taking medication         Review of Systems (Pertinent positives)  Review of Systems   Constitutional:  Negative for unexpected weight change.   Respiratory:  Negative for shortness of breath.    Cardiovascular:  Negative for chest pain and leg swelling.   Gastrointestinal:  Negative for nausea and vomiting.   Genitourinary:  Positive for frequency.   Musculoskeletal:  Positive for arthralgias and joint swelling.   Neurological:  Positive for speech difficulty. Negative for weakness.   Psychiatric/Behavioral:  Negative for dysphoric mood. The patient is not nervous/anxious.        /72 (BP Location: Right arm, Patient Position: Sitting, BP Method: Medium (Manual))   Pulse 93   Temp 99.2 °F (37.3 °C)   Ht 5' 6" (1.676 m)   Wt 57.8 kg (127 lb 6.8 oz)   SpO2 98%   BMI 20.57 kg/m²     Physical Exam      Assessment/Plan:  Prema Phillips is a 79 y.o. female who presents today for :        ICD-10-CM ICD-9-CM    1. Hemiparesis of right dominant side due to acute cerebrovascular disease  I67.89 436 Ambulatory referral/consult to Neurology    G81.91 342.91 Hemoglobin A1C      Comprehensive Metabolic Panel      Lipid Panel      CBC Auto Differential      Ferritin      TSH      2. History of recent stroke  Z86.73 V12.54 Cardiac event monitor      Hemoglobin A1C      Comprehensive Metabolic Panel      Lipid Panel      CBC Auto Differential      Ferritin      TSH      3. Prediabetes  R73.03 790.29 Hemoglobin A1C      " Comprehensive Metabolic Panel      Lipid Panel      CBC Auto Differential      Ferritin      TSH      Vitamin D      4. Bilateral carotid artery stenosis  I65.23 433.10      433.30       5. Long-term use of aspirin therapy  Z79.82 V58.66       6. Visit for monitoring Plavix therapy  Z51.81 V58.83     Z79.02 V58.63       7. Mixed hyperlipidemia  E78.2 272.2 Hemoglobin A1C      Comprehensive Metabolic Panel      Lipid Panel      CBC Auto Differential      Ferritin      TSH      8. RBBB  I45.10 426.4 Cardiac event monitor      9. Acquired hypothyroidism  E03.9 244.9 TSH      10. Osteopenia of the elderly  M85.80 733.90 Vitamin D      11. Insomnia, unspecified type  G47.00 780.52       12. Adjustment reaction with anxiety  F43.22 309.24 LORazepam (ATIVAN) 0.5 MG tablet      13. Aphasia  R47.01 784.3 Ambulatory referral/consult to Neurology      14. Swelling of left ring finger  M79.89 729.81 Ambulatory referral/consult to Orthopedics      X-Ray Finger 2 or More Views Left      methylPREDNISolone (MEDROL DOSEPACK) 4 mg tablet      15. Primary osteoarthritis of both hands  M19.041 715.14 Ambulatory referral/consult to Orthopedics    M19.042  X-Ray Finger 2 or More Views Left      methylPREDNISolone (MEDROL DOSEPACK) 4 mg tablet      16. Medication management  Z79.899 V58.69 Hemoglobin A1C      Comprehensive Metabolic Panel      Lipid Panel      CBC Auto Differential      Ferritin      TSH      Vitamin D      17. Abnormal finding of blood chemistry, unspecified  R79.9 790.6 Ferritin        APHASIA/HEMIPARESIS OF R (dominant) SIDE 2/2 ACUTE STROKE w/ Subsequent Small SAH:   Continuing DAPT w/ ASA 81/Plavix 75 and Lipitor 40. LDL 3/7/24 104-- LIPITOR INCREASED from 20-->40 w/ Repeat LIPID PANEL with LDL 84, LDL GOAL < 70.  Scheduled for repeat lipids in 4 months and if LDL remains greater than 70 will increase Lipitor versus changing to Crestor.  Will follow-up with Neurology before then and they may decide to increase  sooner.    S/P Rehab and OP PT/OT/Speech.  Has completed PT OT and is continuing speech therapy.  Still struggling with significant expressive aphasia but has made progress.  Followed  up [facility] 4/18/24 & referral placed for 4 month follow-up as well as ordering the 30 day cardiac event monitor that was advised but not yet performed.  Has known mild carotid stenosis that has been followed on prior ultrasounds, less than 50% and stable, continue high-intensity statin and dual antiplatelet therapy for now given recent stroke.     RBB: Incomplete, noted on most recent EKG, No concerns on ECHO with normal wall motion, systolic and diastolic function     PREDIABETES: A1c improved from 5.7-->5.5, low carb/ high protein diet advised with ongoing monitoring.    OSTEOARTHRITIS OF THE HANDS WITH ACUTE INCREASED SWELLING OF THE LEFT RING FINGER:  No symptoms of infection including lack of redness, warmth, drainage.  Swelling is centered at the proximal interphalangeal joint.  Still with decent range of motion of the finger and good capillary refill.  X-rays ordered, Medrol Dosepak advise and referral placed to Ortho Hand Dr. Lee for further evaluation given the significance of the joint swelling.     GASTRITIS: Symptoms stable on PRILOSEC 20, PPI labs monitored.  Recently normal B12, magnesium, vitamin-D to be rechecked with next labs.     INSOMNIA: Amitriptyline 25 helping with sleep.      HYPOTHYROIDISM: TSH 3.1 on Synthroid 75 mcg, recheck in 4 months     DEPRESSION/ ANXIETY: Lexapro 20, good family support--  and daughter.  Refill low-dose Ativan 0.5 mg to take p.r.n. which does help with situational anxiety and frustration surrounding her expressive aphasia.    Visit today included increased complexity associated with the care of the episodic problem of severe movement limiting swelling of the left finger addressed and managing the longitudinal care of the patient due to the serious and/or  complex managed problem(s) including ongoing expressive aphasia as a result of her stroke, coordination of care for her stroke follow-up including ongoing speech therapy and facilitating completion of stroke workup with appropriate neurology follow-up.    A total of 40 minutes was spent on this encounter that included explaining differentials, symptom counseling, review of recent results, and next steps of diagnosis and management plan, along with direct documentation of the encounter.      There are no Patient Instructions on file for this visit.      Follow up in about 4 weeks (around 7/31/2024) for to follow up on lab results, return as needed for new concerns.

## 2024-07-03 NOTE — TELEPHONE ENCOUNTER
----- Message from Chuyita Martinez sent at 7/3/2024  4:20 PM CDT -----  Regarding: Referral  Good afternoon,      Ms. Phillips have a referral from Dr. Zhang to see Dr. Brooks, with a diagnosis of: Hemiparesis of right dominant side due to acute cerebrovascular disease [I67.89,...  Aphasia [R47.01]         Could you please contact the patient to assist in scheduling an appointment.        Thank you, Have a great day!      Chuyita Martinez   Physician Referral Specialist.

## 2024-07-05 VITALS
SYSTOLIC BLOOD PRESSURE: 110 MMHG | HEART RATE: 101 BPM | RESPIRATION RATE: 16 BRPM | DIASTOLIC BLOOD PRESSURE: 64 MMHG | OXYGEN SATURATION: 97 %

## 2024-07-05 NOTE — PROGRESS NOTES
[facility]  Speech Therapy Progress Note- Neurological Rehabilitation  Date: 7/8/2024      Name: Prema Phillips   MRN: [id number]   Therapy Diagnosis:        Encounter Diagnoses   Name Primary?    Aphasia Yes    Apraxia        Physician: Odette Zhang MD  Physician Orders: Ambulatory Referral to Speech Therapy   Medical Diagnosis: M25.60,R53.1 (ICD-10-CM) - Decreased range of motion with decreased strength I63.412 (ICD-10-CM) - Cerebrovascular accident (CVA) due to embolism of left middle cerebral artery I67.89,G81.91 (ICD-10-CM) - Hemiparesis of right dominant side due to acute cerebrovascular disease     Visit #/ Visits Authorized: 26/30  Date of Evaluation:  3/27/24  Insurance Authorization Period: 3/27/24-12/31/24  Plan of Care Expiration Date:    7/3/24  Extended Plan of Care:  n/a   Progress Note: 7/26/24      Time In:  1347  Time Out:  1430  Total Billable Time:  43 minutes      Precautions: Communication  Subjective:   Patient reports: through some jargon, patient mentions doing well and having some right sided differences in her left leg and arm  She was compliant to home exercise program. Has been practicing with naming objects  Response to previous treatment: good  Pain Scale: Patient reported regular, usual pain of right side  Objective:   UNTIMED  Procedure   Speech- Language- Voice Therapy      Short Term Goals: (4 weeks) Progress   1. Patient will read single words aloud with 50% accuracy and 1 phonemic cue in order to bridge to higher level reading tasks.   Met 6/26/24        2. Patient will repeat monosyllabic, common words with 50% accuracy when provided with a visual cue to increase her spoken language abilities.   MET 5/24/24    3. Patient will complete rote speech tasks with >75% accuracy in unison with speech-language pathologist to increase spoken language abilities.  Progressing/ Not Met 7/8/2024 1-10 - 40% accuracy in unison with speech-language pathologist     Days of  "the week -86% accuracy in unison with speech-language pathologist     Months of the year - 75% accuracy in unison with speech-language pathologist    4. Patient will answer simple open-ended questions with relevant responses with >75% accuracy with visual or auditory stimuli to increase spoken language output.  Progressing/ Not Met 2024 Answered wh- questions with 60% accuracy independently (3/5 trials)   5. The patient will repeat common words with 80% accuracy independently to enhance her speech production abilities.  Progressing/Not Met 2024 Did not address today      Patient was having some difficulty with expressing recent events, as well as describing a food that she eats frequently for breakfast. Through use of description of "breakfast" and "frozen," communication partner was able to determine that she was trying to express "waffles." The patient feels she has made progress towards goals and is hopeful she will continue to make progress with time.    Long Term Goal Status:     Long Term Goals: (8 weeks)    Patient will increase her social participation in conversation with > 5 utterances produced independently.   Progressing/Not Met    2. Patient will state basic wants/needs/concerns >90% of the time independently through use of various modalities in order to increase her social participation with others.    Progressing/Not Met       Patient Education/Response:   Patient educated regarding the followin. [place] speech task important for speech production  2. Aphasia and prognosis - recovery and intensity of therapy (discussed with patient's )  3. Idea.metXiaoi Robert Therapy Cynthia     Home program established: yes-provided simple written command worksheets as well as the days of the week and numbers written down  Patient verbalized understanding to all above education provided.      See Electronic Medical Record under Patient Instructions for exercises provided throughout therapy.  Assessment:   Treatment " session today focused on various receptive and expressive language tasks, including answering open-ended questions, participating in structured and spontaneous conversation, and completing rote speech tasks. Difficulties noted with word finding when answering open-ended questions and rote speech tasks, which has been consistent across sessions. Prema is progressing well towards her goals. Current goals remain appropriate. Goals to be updated as necessary.      Patient prognosis is Good. Patient will continue to benefit from skilled outpatient speech and language therapy to address the deficits listed in the problem list on initial evaluation, provide patient/family education and to maximize patient's level of independence in the home and community environment.   Medical necessity is demonstrated by the following IMPAIRMENTS:  Aphasia: Patient with few true words in all situations severely limiting functional communication with both familiar and unfamiliar communication partners.   Barriers to Therapy: none  Patient's spiritual, cultural and educational needs considered and patient agreeable to plan of care and goals.  Plan:   Continue Plan of Care with focus on rehabilitation and compensation for aphasia secondary to stroke.  Discharge next session with home exercise program     Nori Yin M.S., L-SLP,CCC-SLP, CBIS  Speech-Language Pathologist  Certified Brain Injury Specialist  7/8/2024

## 2024-07-05 NOTE — PROGRESS NOTES
Greeted at door by pts . Pt is sitting on sofa , awake and alert. Expressive aphasia noted. Post CVA education provided, educated on fall prevention and safety precautions.

## 2024-07-08 ENCOUNTER — CLINICAL SUPPORT (OUTPATIENT)
Dept: REHABILITATION | Facility: HOSPITAL | Age: 79
End: 2024-07-08
Payer: MEDICARE

## 2024-07-08 DIAGNOSIS — R47.01 APHASIA: Primary | ICD-10-CM

## 2024-07-08 DIAGNOSIS — R48.2 APRAXIA: ICD-10-CM

## 2024-07-08 PROCEDURE — 92507 TX SP LANG VOICE COMM INDIV: CPT | Mod: KX,PN

## 2024-07-12 ENCOUNTER — TELEPHONE (OUTPATIENT)
Dept: NEUROLOGY | Facility: CLINIC | Age: 79
End: 2024-07-12
Payer: MEDICARE

## 2024-07-12 NOTE — TELEPHONE ENCOUNTER
----- Message from [employer] sent at 7/9/2024  2:20 PM CDT -----  Contact: pt   Type:  Sooner Apoointment Request    Caller is requesting a sooner appointment.  Caller declined first available appointment listed below.  Caller will not accept being placed on the waitlist and is requesting a message be sent to doctor.  Name of Caller:pt    When is the first available appointment? N/a  Symptoms:Hemiparesis of right dominant side due to acute cerebrovascular disease [I67.89,...  Aphasia [R47.01]     Would the patient rather a call back or a response via [facility]? call  Best Call Back Number: [phone]  Additional Information:

## 2024-07-15 ENCOUNTER — HOME CARE VISIT (OUTPATIENT)
Dept: NEUROLOGY | Facility: HOSPITAL | Age: 79
End: 2024-07-15
Payer: MEDICARE

## 2024-07-15 ENCOUNTER — CLINICAL SUPPORT (OUTPATIENT)
Dept: REHABILITATION | Facility: HOSPITAL | Age: 79
End: 2024-07-15
Payer: MEDICARE

## 2024-07-15 DIAGNOSIS — R47.01 APHASIA: Primary | ICD-10-CM

## 2024-07-15 DIAGNOSIS — R48.2 APRAXIA: ICD-10-CM

## 2024-07-15 PROCEDURE — 92507 TX SP LANG VOICE COMM INDIV: CPT | Mod: KX,PN

## 2024-07-15 NOTE — PROGRESS NOTES
"[facility]  Speech Therapy Progress Note- Neurological Rehabilitation  Date: 7/15/2024      Name: Prema Phillips   MRN: [id number]   Therapy Diagnosis:        Encounter Diagnoses   Name Primary?    Aphasia Yes    Apraxia        Physician: Odette Zhang MD  Physician Orders: Ambulatory Referral to Speech Therapy   Medical Diagnosis: M25.60,R53.1 (ICD-10-CM) - Decreased range of motion with decreased strength I63.412 (ICD-10-CM) - Cerebrovascular accident (CVA) due to embolism of left middle cerebral artery I67.89,G81.91 (ICD-10-CM) - Hemiparesis of right dominant side due to acute cerebrovascular disease     Visit #/ Visits Authorized: 27/30  Date of Evaluation:  3/27/24  Insurance Authorization Period: 3/27/24-12/31/24  Plan of Care Expiration Date:    7/3/24  Extended Plan of Care:  n/a   Progress Note: 7/26/24      Time In:  1031  Time Out:  1110  Total Billable Time:  39 minutes      Precautions: Communication  Subjective:   Patient reports: doing okay since last visit; "today's the last day huh?"  She was compliant to home exercise program. Has been practicing with naming objects  Response to previous treatment: good  Pain Scale: Patient reported regular, usual pain of right side  Objective:   UNTIMED  Procedure   Speech- Language- Voice Therapy      Short Term Goals: (4 weeks) Progress   1. Patient will read single words aloud with 50% accuracy and 1 phonemic cue in order to bridge to higher level reading tasks.   Met 6/26/24        2. Patient will repeat monosyllabic, common words with 50% accuracy when provided with a visual cue to increase her spoken language abilities.   MET 5/24/24    3. Patient will complete rote speech tasks with >75% accuracy in unison with speech-language pathologist to increase spoken language abilities.  NOT MET 7/15/24 1-10 - 40% accuracy in unison with speech-language pathologist, stable from previous; more difficulty with written numbers and trying to state " "them aloud in unison    Days of the week -57% accuracy in unison with speech-language pathologist , previously 86% accuracy    Months of the year - 83% accuracy independently with written aid (no assistance from speech-language pathologist); 75% accuracy in unison with speech-language pathologist    4. Patient will answer simple open-ended questions with relevant responses with >75% accuracy with visual or auditory stimuli to increase spoken language output.  NOT MET 7/15/24 Answered wh- questions with 40% accuracy independently (2/5 trials)    General personal questions answered with neologisms and off-topic responses x2     5. The patient will repeat common words with 80% accuracy independently to enhance her speech production abilities.  NOT MET 7/15/24 61% accuracy independently (22/36 trials) with occasional 1 repetition of word        "Well, I saw a fah, then the boat, and the boy, the boy is there, he's in real, that's a boy, the boy's drinking water, he's drinking water also, this little fun thing, a house, a trance, another little boy, he's got a fray and something on the sand or something, someone else on the water, on the water, on a boat, I said the boy, a horse or something sitting here. A big house and a house, they have a big walk in their water" - improvements noted in content today as compared to initial evaluation    PREVIOUSLY 3/27/24 Huseyin sawda there (unintelligible word) see, she I don't know if this is maydut, this is I guess a sever I don't know, dre, have a boat, I don't know, Oh have a, they got a" - neologisms present, some short generic phrases produced intelligibly    Simple Yes/No Questions (presented verbally):  3/20, previously 2/10 - patient obviously is picking up on content words as instead of providing yes/no answer, she then began to explain about the content word  Simple Yes/No Questions (presented visually): 10/10  Auditory Word Recognition              Objects: 5/5, stable from " previous              Shapes: 4/6, previously 0/2 - even with written word after auditory presented              Letters:  6/6, previously 3/6    Numbers: 4/6, previously not tested due to impairment    Colors: 5/6, previously not tested              Body Parts:  4/6, previously 2/6  Simple Verbal 1 step commands: 1/4, stable from previous  Repetition Monosyllabic Words: 0/2  Object Namin/10 verbal responses; patient independently gestured correct use of object for 8/10 trials  Sentence Completion: 0/4    Long Term Goals: (8 weeks)    Patient will increase her social participation in conversation with > 5 utterances produced independently.   Progressing/Not Met    2. Patient will state basic wants/needs/concerns >90% of the time independently through use of various modalities in order to increase her social participation with others.    Progressing/Not Met       Patient Education/Response:   Patient educated regarding the followin. [place] speech task important for speech production  2. Aphasia and prognosis - recovery and intensity of therapy (discussed with patient's )  3. [place] Therapy Cynthia     Home program established: yes-provided simple written command worksheets as well as the days of the week and numbers written down  Patient verbalized understanding to all above education provided.      See Electronic Medical Record under Patient Instructions for exercises provided throughout therapy.  Assessment:   Treatment session today focused on various receptive and expressive language tasks, including answering open-ended questions, participating in structured and spontaneous conversation, repeating common words, and completing rote speech tasks. Difficulties noted with word finding when answering open-ended questions and rote speech tasks, which has been consistent across sessions. Prema has made progress towards her short-term goals. Given insurance limitations, she will have to be discharged this date  despite her severe speech-language impairments.     Patient prognosis is Good. Patient will continue to benefit from skilled outpatient speech and language therapy to address the deficits listed in the problem list on initial evaluation, provide patient/family education and to maximize patient's level of independence in the home and community environment.   Medical necessity is demonstrated by the following IMPAIRMENTS:  Aphasia: Patient with few true words in all situations severely limiting functional communication with both familiar and unfamiliar communication partners.   Barriers to Therapy: none  Patient's spiritual, cultural and educational needs considered and patient agreeable to plan of care and goals.  Plan:   Discharge from speech therapy due to insurance limitations    Nori Yin M.S., L-SLP,CCC-SLP, CBIS  Speech-Language Pathologist  Certified Brain Injury Specialist  7/15/2024

## 2024-07-15 NOTE — PROGRESS NOTES
Spouse returned my call re: pending scheduled home visit appointment .  Mr. Lino states pt would prefer no home visits at this time due to home remodel/construction. He reports she is progressing well, attending speech therapy, no depressive s/s. He confirmed he has my contact information if pt has any needs.

## 2024-07-15 NOTE — PLAN OF CARE
[facility]  Speech Therapy Discharge Note    Name: Prema Phillips  Clinic Number: [id number]    Therapy Diagnosis:   Encounter Diagnoses   Name Primary?    Aphasia Yes    Apraxia      Physician: dOette Zhang MD    Physician Orders: Ambulatory Referral to Speech Therapy   Medical Diagnosis: M25.60,R53.1 (ICD-10-CM) - Decreased range of motion with decreased strength I63.412 (ICD-10-CM) - Cerebrovascular accident (CVA) due to embolism of left middle cerebral artery I67.89,G81.91 (ICD-10-CM) - Hemiparesis of right dominant side due to acute cerebrovascular disease  Evaluation Date: 3/27/24      Date of Last visit: 7/15/2024  Total Visits Received: 27 + initial evaluation    ASSESSMENT      Mrs. Lloyd has participated in several speech therapy sessions focusing on aphasia rehabilitation, including various receptive and expressive language tasks. Since initial evaluation, she has improved in the areas of basic repetition and rote speech tasks, as well as reading 1 syllable words. She continues to present with rather severe expressive language abilities, including production of neologisms and nonsense words. She seems to understand content words in conversation and within questions, but misses large contexts which hinders her active participation in speech-based tasks. Unfortunately, Mrs. Lloyd's insurance only allows for 50 treatment visits total (physical, occupational, and speech therapy) which she has used at this point. With reassessment today of tasks related to the initial evaluation, she has shown some improvements across both receptive and expressive language tasks. She was sent home with a booklet of speech-based activities for practice.    Discharge reason: Patient has completed allowable visits authorized by insurance    Goals:   Short Term Goals: (4 weeks)   1. Patient will read single words aloud with 50% accuracy and 1 phonemic cue in order to bridge to higher level reading  "tasks.   Met 6/26/24   2. Patient will repeat monosyllabic, common words with 50% accuracy when provided with a visual cue to increase her spoken language abilities.   MET 5/24/24   3. Patient will complete rote speech tasks with >75% accuracy in unison with speech-language pathologist to increase spoken language abilities.  NOT MET 7/15/24   4. Patient will answer simple open-ended questions with relevant responses with >75% accuracy with visual or auditory stimuli to increase spoken language output.  NOT MET 7/15/24   5. The patient will repeat common words with 80% accuracy independently to enhance her speech production abilities.  NOT MET 7/15/24      "Well, I saw a fah, then the boat, and the boy, the boy is there, he's in real, that's a boy, the boy's drinking water, he's drinking water also, this little fun thing, a house, a trance, another little boy, he's got a fray and something on the sand or something, someone else on the water, on the water, on a boat, I said the boy, a horse or something sitting here. A big house and a house, they have a big walk in their water" - improvements noted in content today as compared to initial evaluation     PREVIOUSLY 3/27/24 Huseyin sawda there (unintelligible word) see, she I don't know if this is maydut, this is I guess a sever I don't know, dre, have a boat, I don't know, Oh have a, they got a" - neologisms present, some short generic phrases produced intelligibly     Simple Yes/No Questions (presented verbally):  3/20, previously 2/10 - patient obviously is picking up on content words as instead of providing yes/no answer, she then began to explain about the content word  Simple Yes/No Questions (presented visually): 10/10  Auditory Word Recognition              Objects: 5/5, stable from previous              Shapes: 4/6, previously 0/2 - even with written word after auditory presented              Letters:  6/6, previously 3/6              Numbers: 4/6, previously not " tested due to impairment              Colors: 5/6, previously not tested              Body Parts:  4/6, previously 2/6  Simple Verbal 1 step commands: 1/4, stable from previous  Repetition Monosyllabic Words: 0/2  Object Namin/10 verbal responses; patient independently gestured correct use of object for 8/10 trials  Sentence Completion: 0/4     Long Term Goals: (8 weeks)     Patient will increase her social participation in conversation with > 5 utterances produced independently.  MET 7/15/24 Attempts to communicate throughout entirety of session - some are unintelligible or nonspecific, but she does produce these independently.   2. Patient will state basic wants/needs/concerns >90% of the time independently through use of various modalities in order to increase her social participation with others.   MET 7/15/24 Mrs. Lloyd communicates basic wants and needs in a roundabout context or through use of gestures. She does require clarification from listeners.     PLAN   This patient is discharged from Speech Therapy    Nori Yin M.S., L-SLP,CCC-SLP, CBIS  Speech-Language Pathologist  Certified Brain Injury Specialist  7/15/2024

## 2024-07-21 ENCOUNTER — DOCUMENTATION ONLY (OUTPATIENT)
Dept: NEUROLOGY | Facility: HOSPITAL | Age: 79
End: 2024-07-21
Payer: MEDICARE

## 2024-07-26 RX ORDER — CLOPIDOGREL BISULFATE 75 MG/1
75 TABLET ORAL DAILY
Qty: 30 TABLET | Refills: 0
Start: 2024-07-26 | End: 2025-07-26

## 2024-07-30 ENCOUNTER — CLINICAL SUPPORT (OUTPATIENT)
Dept: CARDIOLOGY | Facility: HOSPITAL | Age: 79
End: 2024-07-30
Attending: FAMILY MEDICINE
Payer: MEDICARE

## 2024-07-30 DIAGNOSIS — I45.10 RBBB: ICD-10-CM

## 2024-07-30 DIAGNOSIS — Z86.73 HISTORY OF RECENT STROKE: ICD-10-CM

## 2024-07-30 PROCEDURE — 93270 REMOTE 30 DAY ECG REV/REPORT: CPT

## 2024-07-30 PROCEDURE — 93271 ECG/MONITORING AND ANALYSIS: CPT

## 2024-08-01 ENCOUNTER — TELEPHONE (OUTPATIENT)
Dept: FAMILY MEDICINE | Facility: CLINIC | Age: 79
End: 2024-08-01
Payer: MEDICARE

## 2024-08-01 DIAGNOSIS — F43.22 ADJUSTMENT REACTION WITH ANXIETY: ICD-10-CM

## 2024-08-01 RX ORDER — LORAZEPAM 1 MG/1
1 TABLET ORAL DAILY PRN
Qty: 30 TABLET | Refills: 0 | Status: SHIPPED | OUTPATIENT
Start: 2024-08-01

## 2024-08-01 NOTE — TELEPHONE ENCOUNTER
----- Message from Alex Hickman sent at 8/1/2024  8:05 AM CDT -----  Contact: pt   Type: Requesting refill     Who Called: PT's      Regarding: pt would lie to up the dosage to 1 instead of 0.5mg     LORazepam (ATIVAN) 0.5 MG tablet 30 tablet     Would the patient rather a call back or a response via [facility]? Call back    Best Call Back Number:  [phone]    Pharmacy Information:  [facility]/pharmacy #[id number] - [place] - [address] [facility]   Phone: [phone]  Fax: [phone]

## 2024-08-01 NOTE — TELEPHONE ENCOUNTER
Covering for Dr. Zhang, will send 1 mg lorazepam to pharmacy for a month and will leave this for Dr. Zhang in case she decides to continue at that dose or reassess patient

## 2024-08-05 ENCOUNTER — TELEPHONE (OUTPATIENT)
Dept: FAMILY MEDICINE | Facility: CLINIC | Age: 79
End: 2024-08-05
Payer: MEDICARE

## 2024-08-05 DIAGNOSIS — G47.00 INSOMNIA, UNSPECIFIED TYPE: ICD-10-CM

## 2024-08-05 DIAGNOSIS — F43.22 ADJUSTMENT REACTION WITH ANXIETY: ICD-10-CM

## 2024-08-05 DIAGNOSIS — G43.709 CHRONIC MIGRAINE WITHOUT AURA WITHOUT STATUS MIGRAINOSUS, NOT INTRACTABLE: ICD-10-CM

## 2024-08-05 RX ORDER — AMITRIPTYLINE HYDROCHLORIDE 25 MG/1
TABLET, FILM COATED ORAL
Qty: 90 TABLET | Refills: 4 | Status: SHIPPED | OUTPATIENT
Start: 2024-08-05

## 2024-08-08 ENCOUNTER — OFFICE VISIT (OUTPATIENT)
Dept: NEUROLOGY | Facility: CLINIC | Age: 79
End: 2024-08-08
Payer: MEDICARE

## 2024-08-08 VITALS
BODY MASS INDEX: 20.48 KG/M2 | HEIGHT: 66 IN | DIASTOLIC BLOOD PRESSURE: 77 MMHG | SYSTOLIC BLOOD PRESSURE: 125 MMHG | WEIGHT: 127.44 LBS | HEART RATE: 79 BPM

## 2024-08-08 DIAGNOSIS — G81.91 HEMIPARESIS OF RIGHT DOMINANT SIDE DUE TO ACUTE CEREBROVASCULAR DISEASE: ICD-10-CM

## 2024-08-08 DIAGNOSIS — I67.89 HEMIPARESIS OF RIGHT DOMINANT SIDE DUE TO ACUTE CEREBROVASCULAR DISEASE: ICD-10-CM

## 2024-08-08 DIAGNOSIS — Z86.73 HISTORY OF RECENT STROKE: Primary | ICD-10-CM

## 2024-08-08 DIAGNOSIS — R47.01 APHASIA: ICD-10-CM

## 2024-08-08 PROBLEM — I60.9 SAH (SUBARACHNOID HEMORRHAGE): Status: RESOLVED | Noted: 2024-03-11 | Resolved: 2024-08-08

## 2024-08-08 PROBLEM — E87.1 HYPONATREMIA: Status: RESOLVED | Noted: 2024-03-12 | Resolved: 2024-08-08

## 2024-08-08 PROCEDURE — 99999 PR PBB SHADOW E&M-EST. PATIENT-LVL IV: CPT | Mod: PBBFAC,,, | Performed by: PSYCHIATRY & NEUROLOGY

## 2024-08-22 ENCOUNTER — OFFICE VISIT (OUTPATIENT)
Dept: ORTHOPEDICS | Facility: CLINIC | Age: 79
End: 2024-08-22
Payer: MEDICARE

## 2024-08-22 VITALS — WEIGHT: 127.88 LBS | BODY MASS INDEX: 20.55 KG/M2 | HEIGHT: 66 IN

## 2024-08-22 DIAGNOSIS — M79.89 SWELLING OF LEFT RING FINGER: ICD-10-CM

## 2024-08-22 DIAGNOSIS — M19.041 PRIMARY OSTEOARTHRITIS OF BOTH HANDS: ICD-10-CM

## 2024-08-22 DIAGNOSIS — M19.042 ARTHRITIS OF FINGER OF LEFT HAND: Primary | ICD-10-CM

## 2024-08-22 DIAGNOSIS — M19.042 PRIMARY OSTEOARTHRITIS OF BOTH HANDS: ICD-10-CM

## 2024-08-22 PROCEDURE — 99999 PR PBB SHADOW E&M-EST. PATIENT-LVL III: CPT | Mod: PBBFAC,,, | Performed by: ORTHOPAEDIC SURGERY

## 2024-08-22 RX ORDER — TRIAMCINOLONE ACETONIDE 40 MG/ML
20 INJECTION, SUSPENSION INTRA-ARTICULAR; INTRAMUSCULAR
Status: COMPLETED | OUTPATIENT
Start: 2024-08-22 | End: 2024-08-22

## 2024-08-22 RX ADMIN — TRIAMCINOLONE ACETONIDE 20 MG: 40 INJECTION, SUSPENSION INTRA-ARTICULAR; INTRAMUSCULAR at 01:08

## 2024-08-22 NOTE — PROGRESS NOTES
Subjective:      Patient ID: Prema Phillips is a 79 y.o. female.    Chief Complaint: Pain of the Left Hand (Ring finger )      HPI  Prema Phillips is a  79 y.o. female presenting today for pain and swelling of the left ring finger.  There was not a history of trauma.  Onset of symptoms began several months ago   She has noticed some stiffness as well   No numbness or tingling reported.      Review of patient's allergies indicates:   Allergen Reactions    Neomycin Other (See Comments) and Swelling     Other reaction(s): Unknown    Carbamazepine Rash     Rash per 3/02 RN telephone encounter. On interview, pt/family unable to say if she continued or stopped taking medication         Current Outpatient Medications   Medication Sig Dispense Refill    amitriptyline (ELAVIL) 25 MG tablet TAKE 1/2-1 TABLET BY MOUTH NIGHTLY FOR INSOMNIA. OK TO TAKE WITH THE LOW DOSE OF ATIVAN 90 tablet 4    aspirin (ECOTRIN) 81 MG EC tablet Take 81 mg by mouth once daily.      atorvastatin (LIPITOR) 40 MG tablet Take 1 tablet (40 mg total) by mouth once daily. 90 tablet 4    co-enzyme Q-10 30 mg capsule Take 100 mg by mouth 2 (two) times daily.      EScitalopram oxalate (LEXAPRO) 20 MG tablet Take 1 tablet (20 mg total) by mouth once daily. 90 tablet 3    levothyroxine (SYNTHROID) 75 MCG tablet Take 1 tablet (75 mcg total) by mouth before breakfast. 30 tablet 11    LORazepam (ATIVAN) 1 MG tablet Take 1 tablet (1 mg total) by mouth daily as needed for Anxiety. 30 tablet 5    omeprazole (PRILOSEC) 20 MG capsule TAKE 1 CAPSULE BY MOUTH IN THE MORNING 90 capsule 2    vitamin D (VITAMIN D3) 1000 units Tab Take 1,000 Units by mouth once daily.      vitamins A,C,E-zinc-copper (PRESERVISION AREDS) 2,148 mcg-113 mg-45 mg-17.4mg Tab Take 1 tablet by mouth 2 (two) times daily with meals.       No current facility-administered medications for this visit.       Past Medical History:   Diagnosis Date    Acute ischemic left MCA stroke 3/7/2024    Allergic  "rhinitis 5/31/2016    Carotid artery plaque 11/18/2013    History of total hysterectomy with bilateral salpingo-oophorectomy (BSO) 5/31/2016    Hyperlipidemia 7/17/2012    Hypothyroid     Insomnia     Macular degeneration, right eye 5/31/2016    Migraine headache     Osteopenia of the elderly 10/20/2015    Postmenopausal HRT (hormone replacement therapy)     RBBB 7/17/2012       Past Surgical History:   Procedure Laterality Date    APPENDECTOMY      Catatact surgery Right 06/19/2019    without any complications    COLONOSCOPY  08/27/2019    repeat in 5 years    EYE SURGERY Bilateral     cataracts extraction    HYSTERECTOMY      STAPEDES SURGERY  2006    TOTAL ABDOMINAL HYSTERECTOMY W/ BILATERAL SALPINGOOPHORECTOMY      TUBAL LIGATION         Review of Systems:  ROS    OBJECTIVE:     PHYSICAL EXAM:  Height: 5' 6" (167.6 cm) Weight: 58 kg (127 lb 13.9 oz)  Vitals:    08/22/24 1306   Weight: 58 kg (127 lb 13.9 oz)   Height: 5' 6" (1.676 m)   PainSc:   3     Well developed, well nourished female in no acute distress  Alert and oriented x 3  HEENT- Normal exam  Lungs- Clear to auscultation  Heart- Regular rate and rhythm  Abdomen- Soft nontender  Extremity exam- examination left hand the PIP joint of the left ring finger is swollen has some deformity   Bony enlargement is noted there is some ulnar deviation of about 20°   Range of motion is limited to about 60° no gross instability but there is tenderness       RADIOGRAPHS:  AP lateral x-ray left ring finger shows severe arthritic change with deformity of the PIP joint  Comments: I have personally reviewed the imaging and I agree with the above radiologist's report.    ASSESSMENT/PLAN:     IMPRESSION:  PIP arthritis severe left ring finger    PLAN:  I explained the nature of the problem to the patient   Recommended injection  Patient then identified the left ring finger injected PIP joint with combination Kenalog 20 mg 0.5 cc xylocaine sterile technique   Tolerated the " procedure well without complication   Recommended Advil or Motrin by mouth  Gentle range of motion prevent stiffness   Follow-up in 1-2 months       - We talked at length about the anatomy and pathophysiology of   Encounter Diagnoses   Name Primary?    Swelling of left ring finger     Primary osteoarthritis of both hands     Arthritis of finger of left hand Yes           Disclaimer: This note has been generated using voice-recognition software. There may be typographical errors that have been missed during proof-reading.

## 2024-09-20 ENCOUNTER — TELEPHONE (OUTPATIENT)
Dept: FAMILY MEDICINE | Facility: CLINIC | Age: 79
End: 2024-09-20
Payer: MEDICARE

## 2024-09-20 DIAGNOSIS — Z12.31 ENCOUNTER FOR SCREENING MAMMOGRAM FOR BREAST CANCER: Primary | ICD-10-CM

## 2024-09-20 NOTE — TELEPHONE ENCOUNTER
----- Message from Kandy Roman sent at 9/20/2024  8:46 AM CDT -----  Contact: [phone] Patient  Caller is requesting to schedule their annual screening mammogram appointment. Order is not listed in Epic.  Please enter order and contact patient to schedule.    Would the patient like a call back, or a response through their [facility] portal?:   Call Back Please. Thank you    Where would they like the mammogram performed?: In [place]    Comments: Pt had the last one 3 years ago at [facility].

## 2024-10-03 ENCOUNTER — OFFICE VISIT (OUTPATIENT)
Dept: ORTHOPEDICS | Facility: CLINIC | Age: 79
End: 2024-10-03
Payer: COMMERCIAL

## 2024-10-03 VITALS — HEIGHT: 66 IN | BODY MASS INDEX: 20.5 KG/M2

## 2024-10-03 DIAGNOSIS — M19.042 ARTHRITIS OF FINGER OF LEFT HAND: Primary | ICD-10-CM

## 2024-10-03 PROCEDURE — 99999 PR PBB SHADOW E&M-EST. PATIENT-LVL III: CPT | Mod: PBBFAC,,, | Performed by: ORTHOPAEDIC SURGERY

## 2024-10-03 RX ORDER — TRIAMCINOLONE ACETONIDE 40 MG/ML
20 INJECTION, SUSPENSION INTRA-ARTICULAR; INTRAMUSCULAR
Status: COMPLETED | OUTPATIENT
Start: 2024-10-03 | End: 2024-10-03

## 2024-10-03 RX ADMIN — TRIAMCINOLONE ACETONIDE 20 MG: 40 INJECTION, SUSPENSION INTRA-ARTICULAR; INTRAMUSCULAR at 11:10

## 2024-10-03 NOTE — PROGRESS NOTES
Subjective:      Patient ID: Prema Phillips is a 79 y.o. female.  Chief Complaint: Hand Pain (Left wrist/thumb pain)      HPI  Prema Phillips is a  79 y.o. female presenting today for follow up of left hand symptoms related to arthritis.  She reports that she is improve with the left ring finger after the previous injection but now having pain at the base of left thumb   Symptoms worse with use   No numbness or tingling reported   No trauma reported.    Review of patient's allergies indicates:   Allergen Reactions    Neomycin Other (See Comments) and Swelling     Other reaction(s): Unknown    Carbamazepine Rash     Rash per 3/02 RN telephone encounter. On interview, pt/family unable to say if she continued or stopped taking medication         Current Outpatient Medications   Medication Sig Dispense Refill    amitriptyline (ELAVIL) 25 MG tablet TAKE 1/2-1 TABLET BY MOUTH NIGHTLY FOR INSOMNIA. OK TO TAKE WITH THE LOW DOSE OF ATIVAN 90 tablet 4    aspirin (ECOTRIN) 81 MG EC tablet Take 81 mg by mouth once daily.      atorvastatin (LIPITOR) 40 MG tablet Take 1 tablet (40 mg total) by mouth once daily. 90 tablet 4    co-enzyme Q-10 30 mg capsule Take 100 mg by mouth 2 (two) times daily.      EScitalopram oxalate (LEXAPRO) 20 MG tablet Take 1 tablet (20 mg total) by mouth once daily. 90 tablet 3    levothyroxine (SYNTHROID) 75 MCG tablet Take 1 tablet (75 mcg total) by mouth before breakfast. 30 tablet 11    LORazepam (ATIVAN) 1 MG tablet Take 1 tablet (1 mg total) by mouth daily as needed for Anxiety. 30 tablet 5    omeprazole (PRILOSEC) 20 MG capsule TAKE 1 CAPSULE BY MOUTH IN THE MORNING 90 capsule 2    vitamin D (VITAMIN D3) 1000 units Tab Take 1,000 Units by mouth once daily.      vitamins A,C,E-zinc-copper (PRESERVISION AREDS) 2,148 mcg-113 mg-45 mg-17.4mg Tab Take 1 tablet by mouth 2 (two) times daily with meals.       No current facility-administered medications for this visit.       Past Medical History:  "  Diagnosis Date    Acute ischemic left MCA stroke 3/7/2024    Allergic rhinitis 5/31/2016    Carotid artery plaque 11/18/2013    History of total hysterectomy with bilateral salpingo-oophorectomy (BSO) 5/31/2016    Hyperlipidemia 7/17/2012    Hypothyroid     Insomnia     Macular degeneration, right eye 5/31/2016    Migraine headache     Osteopenia of the elderly 10/20/2015    Postmenopausal HRT (hormone replacement therapy)     RBBB 7/17/2012       Past Surgical History:   Procedure Laterality Date    APPENDECTOMY      Catatact surgery Right 06/19/2019    without any complications    COLONOSCOPY  08/27/2019    repeat in 5 years    EYE SURGERY Bilateral     cataracts extraction    HYSTERECTOMY      STAPEDES SURGERY  2006    TOTAL ABDOMINAL HYSTERECTOMY W/ BILATERAL SALPINGOOPHORECTOMY      TUBAL LIGATION         OBJECTIVE:   PHYSICAL EXAM:  Height: 5' 6" (167.6 cm)    Vitals:    10/03/24 1127   Height: 5' 6" (1.676 m)   PainSc:   9   PainLoc: Finger     Ortho/SPM Exam  Examination left hand ring finger is nontender there is some swelling PIP   Range of motion good   There is tenderness at the base of left thumb   Positive grind test mild crepitation       RADIOGRAPHS:  None  Comments: I have personally reviewed the imaging and I agree with the above radiologist's report.    ASSESSMENT/PLAN:     IMPRESSION:  CMC arthritis left thumb    PLAN:  I explained the nature of the problem to the patient   Recommended injection xylocaine sterile technique   Tolerated the procedure well without complication   I have also given her a thumb wrap for part-time use   Advil or Motrin by mouth     After pause for time-out identified the left thumb base injected CMC joint with combination Kenalog 20 mg 0.5 cc    FOLLOW UP:  4-6 weeks    Disclaimer: This note has been generated using voice-recognition software. There may be typographical errors that have been missed during proof-reading.     "

## 2024-10-09 ENCOUNTER — OFFICE VISIT (OUTPATIENT)
Dept: FAMILY MEDICINE | Facility: CLINIC | Age: 79
End: 2024-10-09
Payer: COMMERCIAL

## 2024-10-09 VITALS
DIASTOLIC BLOOD PRESSURE: 68 MMHG | TEMPERATURE: 99 F | WEIGHT: 128.31 LBS | OXYGEN SATURATION: 98 % | BODY MASS INDEX: 20.62 KG/M2 | SYSTOLIC BLOOD PRESSURE: 114 MMHG | HEART RATE: 92 BPM | HEIGHT: 66 IN

## 2024-10-09 DIAGNOSIS — E03.9 ACQUIRED HYPOTHYROIDISM: Chronic | ICD-10-CM

## 2024-10-09 DIAGNOSIS — Z23 NEEDS FLU SHOT: ICD-10-CM

## 2024-10-09 DIAGNOSIS — Z79.82 LONG-TERM USE OF ASPIRIN THERAPY: ICD-10-CM

## 2024-10-09 DIAGNOSIS — I45.10 RBBB: ICD-10-CM

## 2024-10-09 DIAGNOSIS — I67.89 HEMIPARESIS OF RIGHT DOMINANT SIDE DUE TO ACUTE CEREBROVASCULAR DISEASE: Primary | ICD-10-CM

## 2024-10-09 DIAGNOSIS — G81.91 HEMIPARESIS OF RIGHT DOMINANT SIDE DUE TO ACUTE CEREBROVASCULAR DISEASE: Primary | ICD-10-CM

## 2024-10-09 DIAGNOSIS — R47.01 APHASIA: ICD-10-CM

## 2024-10-09 DIAGNOSIS — E78.2 MIXED HYPERLIPIDEMIA: Chronic | ICD-10-CM

## 2024-10-09 DIAGNOSIS — R73.03 PREDIABETES: Chronic | ICD-10-CM

## 2024-10-09 DIAGNOSIS — Z86.73 HISTORY OF RECENT STROKE: ICD-10-CM

## 2024-10-09 DIAGNOSIS — F43.22 ADJUSTMENT REACTION WITH ANXIETY: ICD-10-CM

## 2024-10-09 PROCEDURE — 1126F AMNT PAIN NOTED NONE PRSNT: CPT | Mod: CPTII,S$GLB,, | Performed by: FAMILY MEDICINE

## 2024-10-09 PROCEDURE — 99215 OFFICE O/P EST HI 40 MIN: CPT | Mod: 25,S$GLB,, | Performed by: FAMILY MEDICINE

## 2024-10-09 PROCEDURE — 3288F FALL RISK ASSESSMENT DOCD: CPT | Mod: CPTII,S$GLB,, | Performed by: FAMILY MEDICINE

## 2024-10-09 PROCEDURE — 99999 PR PBB SHADOW E&M-EST. PATIENT-LVL IV: CPT | Mod: PBBFAC,,, | Performed by: FAMILY MEDICINE

## 2024-10-09 PROCEDURE — 90471 IMMUNIZATION ADMIN: CPT | Mod: S$GLB,,, | Performed by: FAMILY MEDICINE

## 2024-10-09 PROCEDURE — 1101F PT FALLS ASSESS-DOCD LE1/YR: CPT | Mod: CPTII,S$GLB,, | Performed by: FAMILY MEDICINE

## 2024-10-09 PROCEDURE — 3078F DIAST BP <80 MM HG: CPT | Mod: CPTII,S$GLB,, | Performed by: FAMILY MEDICINE

## 2024-10-09 PROCEDURE — 3074F SYST BP LT 130 MM HG: CPT | Mod: CPTII,S$GLB,, | Performed by: FAMILY MEDICINE

## 2024-10-09 PROCEDURE — 90653 IIV ADJUVANT VACCINE IM: CPT | Mod: S$GLB,,, | Performed by: FAMILY MEDICINE

## 2024-10-09 PROCEDURE — 1159F MED LIST DOCD IN RCRD: CPT | Mod: CPTII,S$GLB,, | Performed by: FAMILY MEDICINE

## 2024-10-09 RX ORDER — AZELASTINE 1 MG/ML
1 SPRAY, METERED NASAL 2 TIMES DAILY
COMMUNITY

## 2024-10-09 RX ORDER — LEVOTHYROXINE SODIUM 75 UG/1
75 TABLET ORAL
Qty: 90 TABLET | Refills: 4 | Status: SHIPPED | OUTPATIENT
Start: 2024-10-09 | End: 2026-01-02

## 2024-10-09 NOTE — PROGRESS NOTES
Office Visit    Patient Name: Prema Phillips    : 1945  MRN: [id number]    Subjective:  Prema is a 79 y.o. female who presents today for:    Follow-up (4m)    Most recent office visit with me 7/3/24    Most recently seen by ortho Dr. Lee 10/03/2024 for finger arthritis/symptoms improve with steroid injections.    Seen by Vascular neurology on 24  79 y.o. right handed woman with PMH HTN, HLD with recent admission for L-MCA stroke here for followup after hospitalization.  Etiology Esus.  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  Will need ILR if 30d cardiac monitor neg.   - Continue aspirin 81 mg daily for secondary stroke prevention  - Continue atorvastatin 40 for HLD.  Goal LDL <70.  - BP at goal.  Goal <130/80.  - Mediterranean Diet for stroke prevention  - Return to ED for any acute neurological symptoms  - RTC PRN    Had 30 day cardiac event monitor 2024:  Results unremarkable    Negative event monitor with no clinical arrhythmias.    Asymptomatic normal sinus rhythm.     79 y.o. patient of mine with  hypertension, hyperlipidemia, bilateral carotid stenosis(< 50%), and RBBB, history of acute stroke who presents today for lab review and 3 month follow-up.       She was admitted 2024 to 2024 secondary to acute stroke.  She was then discharged to rehab and in rehab until 2024.    She has completed outpatient therapy and most recently stopped speech therapy.  Continues to struggle with significant expressive aphasia though has seen notable improvements & strength and mobility/coordination are no longer of concern.     Had labs prior to office visit 10/2/24:  Remarkable for increase in A1c from 5.5->5.9 but labs otherwise normal with normal vitamin-D, kidney function electrolytes and LDL 94.      Appetite is decent, and eating a good variety of food.  They are eating out a bit more and she does not particularly like meat so her diet is a bit on the carb heavy  side.  Hydration improved.  Her gross motor and strength have nearly equalized and are returning to baseline.  She remains active in her daily life and goes outside for gardening into play with the dogs on a regular basis.    No recent falls.    She is doing some intermittent mild reading to help with her expressive aphasia but she is not doing any type of formal home rehabilitation.     Mood has been overall stable, anxiety overall continues to decrease since her acute stroke episode.  Doing well on Lexapro 20 mg daily and she generally does take Ativan 1 mg daily when she gets worked up/frustrated as a result of her ongoing expressive aphasia challenges or if having acute panic attack.          PAST MEDICAL HISTORY, SURGICAL/SOCIAL/FAMILY HISTORY REVIEWED AS PER CHART, WITH PERTINENT FINDINGS INCLUDED IN HISTORY SECTION OF NOTE.     Current Medications    Medication List with Changes/Refills   Current Medications    AMITRIPTYLINE (ELAVIL) 25 MG TABLET    TAKE 1/2-1 TABLET BY MOUTH NIGHTLY FOR INSOMNIA. OK TO TAKE WITH THE LOW DOSE OF ATIVAN    ASPIRIN (ECOTRIN) 81 MG EC TABLET    Take 81 mg by mouth once daily.    ATORVASTATIN (LIPITOR) 40 MG TABLET    Take 1 tablet (40 mg total) by mouth once daily.    AZELASTINE (ASTELIN) 137 MCG (0.1 %) NASAL SPRAY    1 spray by Nasal route 2 (two) times daily.    CO-ENZYME Q-10 30 MG CAPSULE    Take 100 mg by mouth 2 (two) times daily.    ESCITALOPRAM OXALATE (LEXAPRO) 20 MG TABLET    Take 1 tablet (20 mg total) by mouth once daily.    LORAZEPAM (ATIVAN) 1 MG TABLET    Take 1 tablet (1 mg total) by mouth daily as needed for Anxiety.    OMEPRAZOLE (PRILOSEC) 20 MG CAPSULE    TAKE 1 CAPSULE BY MOUTH IN THE MORNING    VITAMIN D (VITAMIN D3) 1000 UNITS TAB    Take 1,000 Units by mouth once daily.    VITAMINS A,C,E-ZINC-COPPER (PRESERVISION AREDS) 2,148 MCG-113 MG-45 MG-17.4MG TAB    Take 1 tablet by mouth 2 (two) times daily with meals.   Changed and/or Refilled Medications     "Modified Medication Previous Medication    LEVOTHYROXINE (SYNTHROID) 75 MCG TABLET levothyroxine (SYNTHROID) 75 MCG tablet       Take 1 tablet (75 mcg total) by mouth before breakfast.    Take 1 tablet (75 mcg total) by mouth before breakfast.       Allergies   Review of patient's allergies indicates:   Allergen Reactions    Neomycin Other (See Comments) and Swelling     Other reaction(s): Unknown    Carbamazepine Rash     Rash per 3/02 RN telephone encounter. On interview, pt/family unable to say if she continued or stopped taking medication         Review of Systems (Pertinent positives)  Review of Systems   Constitutional:  Negative for unexpected weight change.   HENT:  Negative for trouble swallowing.    Respiratory:  Negative for shortness of breath.    Cardiovascular:  Negative for chest pain and leg swelling.   Gastrointestinal:  Negative for constipation and diarrhea.   Genitourinary:  Negative for difficulty urinating.   Musculoskeletal:  Positive for arthralgias. Negative for gait problem.   Neurological:  Negative for dizziness and light-headedness.   Psychiatric/Behavioral:  Negative for sleep disturbance. The patient is nervous/anxious (Overall improved with Celexa and p.r.n. lorazepam).        /68 (BP Location: Left arm, Patient Position: Sitting)   Pulse 92   Temp 99 °F (37.2 °C)   Ht 5' 6" (1.676 m)   Wt 58.2 kg (128 lb 4.9 oz)   SpO2 98%   BMI 20.71 kg/m²     Physical Exam  Vitals reviewed.   Constitutional:       General: She is not in acute distress.     Appearance: Normal appearance. She is well-developed.   HENT:      Head: Normocephalic and atraumatic.      Right Ear: Tympanic membrane normal.      Left Ear: Tympanic membrane normal.      Nose: No congestion or rhinorrhea.      Mouth/Throat:      Pharynx: No oropharyngeal exudate or posterior oropharyngeal erythema.   Eyes:      General: No scleral icterus.        Right eye: No discharge.         Left eye: No discharge.      " Extraocular Movements: Extraocular movements intact.      Conjunctiva/sclera: Conjunctivae normal.   Neck:      Vascular: No carotid bruit.   Cardiovascular:      Rate and Rhythm: Normal rate and regular rhythm.      Heart sounds: No murmur heard.  Pulmonary:      Effort: Pulmonary effort is normal.      Breath sounds: Normal breath sounds.   Abdominal:      Palpations: Abdomen is soft.   Musculoskeletal:      Right lower leg: No edema.      Left lower leg: No edema.   Skin:     General: Skin is warm and dry.   Neurological:      General: No focal deficit present.      Mental Status: She is alert and oriented to person, place, and time.      Cranial Nerves: No facial asymmetry (Very slight right-sided facial droop).      Motor: No weakness or abnormal muscle tone.      Coordination: Coordination normal (can march in place, alternate leg/arm lifting).      Gait: Gait is intact. Gait normal.      Comments: Most significant deficit on neuro exam as her expressive aphasia.      Has progressed well in terms of coordination and strength.  Walks well without a mobility aid.    Psychiatric:         Mood and Affect: Mood normal.         Behavior: Behavior normal.           Assessment/Plan:  Prema Phillips is a 79 y.o. female who presents today for :        ICD-10-CM ICD-9-CM    1. Hemiparesis of right dominant side due to acute cerebrovascular disease  I67.89 436     G81.91 342.91       2. History of recent stroke  Z86.73 V12.54       3. Mixed hyperlipidemia  E78.2 272.2       4. Acquired hypothyroidism  E03.9 244.9 levothyroxine (SYNTHROID) 75 MCG tablet      5. RBBB  I45.10 426.4       6. Long-term use of aspirin therapy  Z79.82 V58.66       7. BMI 20.0-20.9, adult  Z68.20 V85.1       8. Aphasia  R47.01 784.3       9. Adjustment reaction with anxiety  F43.22 309.24       10. Prediabetes  R73.03 790.29 Comprehensive Metabolic Panel      Hemoglobin A1C      11. Needs flu shot  Z23 V04.81 influenza (adjuvanted) (Fluad) 45  mcg/0.5 mL IM vaccine (> or = 66 yo) 0.5 mL        APHASIA/HEMIPARESIS OF R (dominant) SIDE 2/2 ACUTE STROKE w/ Subsequent Small SAH:   For secondary prevention she is now on ASA 81/Lipitor 40.  Followed up with vascular Neurology most recently 08/08/2024.  LDL is less than 100 but not at goal of less than 70.  If  LDL remains greater than 70 will increase to Lipitor 80 versus changing to Crestor.  S/P Rehab and OP PT/OT/Speech.  Still struggling with significant expressive aphasia but has made progress.  Advised on some home activity she can do for ongoing rehab-visual and audio books with subsequent journaling or a talk back summary of what she just read to work on her aphasia both through spoken and written word.  Had unremarkable 30 day event monitor-was advised to follow-up for ILR per vascular neuro.  Has known mild carotid stenosis that has been followed on prior ultrasounds, less than 50% and stable, continue high-intensity statin and  ASA.  Will update carotid ultrasound in the next year for monitoring.  At her follow-up with me will re-evaluate referral for ILR.      RBB: Incomplete, noted on most recent EKG, No concerns on ECHO with normal wall motion, systolic and diastolic function     PREDIABETES: A1c improved from 5.7-->5.5-->NOW INCREASED to 5.9, low carb/ high protein diet advised with ongoing monitoring.  She can stopped drinking daily boost at this point as it may be contributing to A1c elevation.  Recheck in 4 months.     OSTEOARTHRITIS OF THE HANDS WITH ACUTE INCREASED SWELLING OF THE LEFT RING FINGER:  X-rays ordered, Medrol Dosepak advise and referral placed to Ortho Hand Dr. Lee for further evaluation.  He gave a steroid injection and her symptoms have improved.     GASTRITIS: Symptoms stable on PRILOSEC 20, PPI labs monitored.  Recently normal B12, magnesium, vitamin-D.      INSOMNIA: Amitriptyline 25 helping with sleep.      HYPOTHYROIDISM: TSH at goal on Synthroid 75 mcg, ongoing  monitoring.     DEPRESSION/ ANXIETY: Lexapro 20, good family support--  and daughter.   Ativan 1 mg p.r.n. which does help with situational anxiety and frustration surrounding her expressive aphasia.     A total of 40 minutes was spent on this encounter that included explaining differentials, symptom counseling, review of recent results, and next steps of diagnosis and management plan, along with direct documentation of the encounter.    Visit today included increased complexity associated with the care of the episodic problem rehabilitation from acute stroke addressed and managing the longitudinal care of the patient due to the serious and/or complex managed problem(s) of ongoing expressive aphasia, management of vascular risk factors.        There are no Patient Instructions on file for this visit.      Follow up in about 4 months (around 2/9/2025) for to follow up on lab results.

## 2024-12-26 DIAGNOSIS — F43.22 ADJUSTMENT REACTION WITH ANXIETY: ICD-10-CM

## 2024-12-26 DIAGNOSIS — Z63.8 STRESS DUE TO FAMILY TENSION: ICD-10-CM

## 2024-12-26 SDOH — SOCIAL DETERMINANTS OF HEALTH (SDOH): OTHER SPECIFIED PROBLEMS RELATED TO PRIMARY SUPPORT GROUP: Z63.8

## 2024-12-27 NOTE — TELEPHONE ENCOUNTER
No care due was identified.  [facility] Embedded Care Due Messages. Reference number: [id number].   12/26/2024 7:03:11 PM CST

## 2024-12-28 RX ORDER — ESCITALOPRAM OXALATE 20 MG/1
20 TABLET ORAL
Qty: 90 TABLET | Refills: 3 | Status: SHIPPED | OUTPATIENT
Start: 2024-12-28

## 2024-12-28 NOTE — TELEPHONE ENCOUNTER
Refill Decision Note   Prema Alan  is requesting a refill authorization.  Brief Assessment and Rationale for Refill:  Approve     Medication Therapy Plan:         Comments:     Note composed:12:00 AM 12/28/2024

## 2025-01-14 ENCOUNTER — OFFICE VISIT (OUTPATIENT)
Dept: INTERNAL MEDICINE | Facility: CLINIC | Age: 80
End: 2025-01-14
Payer: MEDICARE

## 2025-01-14 VITALS
WEIGHT: 131.63 LBS | DIASTOLIC BLOOD PRESSURE: 82 MMHG | BODY MASS INDEX: 21.16 KG/M2 | HEART RATE: 79 BPM | HEIGHT: 66 IN | OXYGEN SATURATION: 100 % | TEMPERATURE: 98 F | SYSTOLIC BLOOD PRESSURE: 130 MMHG

## 2025-01-14 DIAGNOSIS — Z12.31 SCREENING MAMMOGRAM FOR BREAST CANCER: ICD-10-CM

## 2025-01-14 DIAGNOSIS — G81.91 HEMIPARESIS OF RIGHT DOMINANT SIDE DUE TO ACUTE CEREBROVASCULAR DISEASE: Primary | ICD-10-CM

## 2025-01-14 DIAGNOSIS — Z79.82 LONG-TERM USE OF ASPIRIN THERAPY: ICD-10-CM

## 2025-01-14 DIAGNOSIS — I67.89 HEMIPARESIS OF RIGHT DOMINANT SIDE DUE TO ACUTE CEREBROVASCULAR DISEASE: Primary | ICD-10-CM

## 2025-01-14 DIAGNOSIS — Z51.81 ENCOUNTER FOR MONITORING LONG-TERM PROTON PUMP INHIBITOR THERAPY: ICD-10-CM

## 2025-01-14 DIAGNOSIS — Z86.73 HISTORY OF RECENT STROKE: ICD-10-CM

## 2025-01-14 DIAGNOSIS — E03.9 ACQUIRED HYPOTHYROIDISM: Chronic | ICD-10-CM

## 2025-01-14 DIAGNOSIS — R73.03 PREDIABETES: Chronic | ICD-10-CM

## 2025-01-14 DIAGNOSIS — Z79.899 ENCOUNTER FOR MONITORING LONG-TERM PROTON PUMP INHIBITOR THERAPY: ICD-10-CM

## 2025-01-14 DIAGNOSIS — I65.23 BILATERAL CAROTID ARTERY STENOSIS: Chronic | ICD-10-CM

## 2025-01-14 DIAGNOSIS — R47.01 APHASIA: ICD-10-CM

## 2025-01-14 DIAGNOSIS — I45.10 RBBB: ICD-10-CM

## 2025-01-14 DIAGNOSIS — G47.00 INSOMNIA, UNSPECIFIED TYPE: ICD-10-CM

## 2025-01-14 DIAGNOSIS — Z78.0 POSTMENOPAUSAL: ICD-10-CM

## 2025-01-14 DIAGNOSIS — E78.2 MIXED HYPERLIPIDEMIA: Chronic | ICD-10-CM

## 2025-01-14 DIAGNOSIS — F43.22 ADJUSTMENT REACTION WITH ANXIETY: ICD-10-CM

## 2025-01-14 DIAGNOSIS — Z79.899 MEDICATION MANAGEMENT: ICD-10-CM

## 2025-01-14 PROCEDURE — 1159F MED LIST DOCD IN RCRD: CPT | Mod: CPTII,S$GLB,, | Performed by: FAMILY MEDICINE

## 2025-01-14 PROCEDURE — 1126F AMNT PAIN NOTED NONE PRSNT: CPT | Mod: CPTII,S$GLB,, | Performed by: FAMILY MEDICINE

## 2025-01-14 PROCEDURE — 3079F DIAST BP 80-89 MM HG: CPT | Mod: CPTII,S$GLB,, | Performed by: FAMILY MEDICINE

## 2025-01-14 PROCEDURE — 99999 PR PBB SHADOW E&M-EST. PATIENT-LVL V: CPT | Mod: PBBFAC,,, | Performed by: FAMILY MEDICINE

## 2025-01-14 PROCEDURE — 3075F SYST BP GE 130 - 139MM HG: CPT | Mod: CPTII,S$GLB,, | Performed by: FAMILY MEDICINE

## 2025-01-14 PROCEDURE — 1101F PT FALLS ASSESS-DOCD LE1/YR: CPT | Mod: CPTII,S$GLB,, | Performed by: FAMILY MEDICINE

## 2025-01-14 PROCEDURE — 3288F FALL RISK ASSESSMENT DOCD: CPT | Mod: CPTII,S$GLB,, | Performed by: FAMILY MEDICINE

## 2025-01-14 PROCEDURE — 1160F RVW MEDS BY RX/DR IN RCRD: CPT | Mod: CPTII,S$GLB,, | Performed by: FAMILY MEDICINE

## 2025-01-14 PROCEDURE — 99214 OFFICE O/P EST MOD 30 MIN: CPT | Mod: S$GLB,,, | Performed by: FAMILY MEDICINE

## 2025-01-14 RX ORDER — LORAZEPAM 1 MG/1
1 TABLET ORAL DAILY PRN
Qty: 30 TABLET | Refills: 5 | Status: SHIPPED | OUTPATIENT
Start: 2025-01-14

## 2025-01-14 NOTE — PROGRESS NOTES
Office Visit    Patient Name: Prema Phillips    : 1945  MRN: [id number]    Subjective:  Prema is a 79 y.o. female who presents today for:    Follow-up (4m)    Most recently seen by me for routine follow-up/lab review on 10/09/2024   Had eye exam 2024.  Ortho Dr. Lee 10/03/2024 for finger arthritis/symptoms improve with steroid injections.     Seen by Vascular neurology on 24  79 y.o. right handed woman with PMH HTN, HLD with recent admission for L-MCA stroke here for followup after hospitalization.  Etiology Esus.  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  Will need ILR if 30d cardiac monitor neg.   - Continue aspirin 81 mg daily for secondary stroke prevention  - Continue atorvastatin 40 for HLD.  Goal LDL <70.  - BP at goal.  Goal <130/80.  - Mediterranean Diet for stroke prevention  - Return to ED for any acute neurological symptoms  - RTC PRN     Had 30 day cardiac event monitor 2024:  Results unremarkable    Negative event monitor with no clinical arrhythmias.    Asymptomatic normal sinus rhythm.     79 y.o. patient of mine with  hypertension, hyperlipidemia, bilateral carotid stenosis(< 50%), and RBBB, history of acute stroke who presents today for lab review and 3 month follow-up.       She was admitted 2024 to 2024 secondary to acute stroke.  She was then discharged to rehab and in rehab until 2024.     She has completed outpatient therapy and most recently stopped speech therapy.  Continues to struggle with significant expressive aphasia though has seen notable improvements & strength and mobility/coordination are no longer of concern.     Had labs prior to office visit 2025 that showed improvement in A1c from 5.9-->5.6, normal CMP with normal liver and kidney functions, and TSH of 1.7.    10/2/24:  Labs with normal vitamin-D, kidney function electrolytes and LDL 94.      Appetite is decent, and eating a fair variety of food.  They are  eating out a bit more and she does not particularly like meat so her diet is a bit on the carb heavy side. Peanut butter for protein and stopped boost.   Hydration improved.  Her gross motor and strength have nearly equalized and are returning to baseline she is still lacking coordination to a degree.    She remains active in her daily life and goes outside for gardening weather permitting, plays with dog in the yard.   Doing more house cleaning.   No recent significant falls.    Some use of home cubie.   She is doing some intermittent mild reading to help with her expressive aphasia but she is not doing any type of formal home rehabilitation.  Some use of a tablet.      Mood has been overall stable, anxiety overall continues to decrease since her acute stroke episode.    Her  brought her to the appointment today and note she seems to be feeling better with a more cheerful disposition and greater level of activity.    Doing well on Lexapro 20 mg daily and she generally does take Ativan 1 mg daily when she gets worked up/frustrated as a result of her ongoing expressive aphasia challenges or if having acute panic attack.  Amitriptyline is helping with sleep          PAST MEDICAL HISTORY, SURGICAL/SOCIAL/FAMILY HISTORY REVIEWED AS PER CHART, WITH PERTINENT FINDINGS INCLUDED IN HISTORY SECTION OF NOTE.     Current Medications    Medication List with Changes/Refills   Current Medications    AMITRIPTYLINE (ELAVIL) 25 MG TABLET    TAKE 1/2-1 TABLET BY MOUTH NIGHTLY FOR INSOMNIA. OK TO TAKE WITH THE LOW DOSE OF ATIVAN    ASPIRIN (ECOTRIN) 81 MG EC TABLET    Take 81 mg by mouth once daily.    ATORVASTATIN (LIPITOR) 40 MG TABLET    Take 1 tablet (40 mg total) by mouth once daily.    AZELASTINE (ASTELIN) 137 MCG (0.1 %) NASAL SPRAY    1 spray by Nasal route 2 (two) times daily.    CO-ENZYME Q-10 30 MG CAPSULE    Take 100 mg by mouth 2 (two) times daily.    ESCITALOPRAM OXALATE (LEXAPRO) 20 MG TABLET    TAKE 1 TABLET  "ONCE DAILY    LEVOTHYROXINE (SYNTHROID) 75 MCG TABLET    Take 1 tablet (75 mcg total) by mouth before breakfast.    OMEPRAZOLE (PRILOSEC) 20 MG CAPSULE    TAKE 1 CAPSULE BY MOUTH IN THE MORNING    VITAMIN D (VITAMIN D3) 1000 UNITS TAB    Take 1,000 Units by mouth once daily.    VITAMINS A,C,E-ZINC-COPPER (PRESERVISION AREDS) 2,148 MCG-113 MG-45 MG-17.4MG TAB    Take 1 tablet by mouth 2 (two) times daily with meals.   Changed and/or Refilled Medications    Modified Medication Previous Medication    LORAZEPAM (ATIVAN) 1 MG TABLET LORazepam (ATIVAN) 1 MG tablet       Take 1 tablet (1 mg total) by mouth daily as needed for Anxiety.    Take 1 tablet (1 mg total) by mouth daily as needed for Anxiety.       Allergies   Review of patient's allergies indicates:   Allergen Reactions    Neomycin Other (See Comments) and Swelling     Other reaction(s): Unknown    Carbamazepine Rash     Rash per 3/02 RN telephone encounter. On interview, pt/family unable to say if she continued or stopped taking medication         Review of Systems (Pertinent positives)  Review of Systems   Constitutional:  Negative for fever and unexpected weight change.   Respiratory:  Negative for shortness of breath.    Cardiovascular:  Negative for chest pain.   Gastrointestinal:  Negative for constipation and diarrhea.   Genitourinary:  Negative for urgency.   Musculoskeletal:  Negative for arthralgias.   Neurological:  Positive for speech difficulty (Chronic expressive aphasia since stroke). Negative for dizziness, light-headedness and headaches.   Psychiatric/Behavioral:  Positive for dysphoric mood. Negative for sleep disturbance (improved with amitriptyline). The patient is nervous/anxious (improving some).        /82 (BP Location: Right arm, Patient Position: Sitting)   Pulse 79   Temp 97.8 °F (36.6 °C)   Ht 5' 6" (1.676 m)   Wt 59.7 kg (131 lb 9.8 oz)   SpO2 100%   BMI 21.24 kg/m²     Physical Exam  Vitals reviewed.   Constitutional:      "  General: She is not in acute distress.     Appearance: Normal appearance. She is well-developed.   HENT:      Head: Normocephalic and atraumatic.      Right Ear: Tympanic membrane normal.      Left Ear: Tympanic membrane normal.      Nose: No congestion or rhinorrhea.      Mouth/Throat:      Pharynx: No oropharyngeal exudate or posterior oropharyngeal erythema.   Eyes:      General: No scleral icterus.        Right eye: No discharge.         Left eye: No discharge.      Extraocular Movements: Extraocular movements intact.      Conjunctiva/sclera: Conjunctivae normal.   Neck:      Vascular: No carotid bruit.   Cardiovascular:      Rate and Rhythm: Normal rate and regular rhythm.      Heart sounds: No murmur heard.  Pulmonary:      Effort: Pulmonary effort is normal.      Breath sounds: Normal breath sounds.   Abdominal:      Palpations: Abdomen is soft.   Musculoskeletal:      Right lower leg: No edema.      Left lower leg: No edema.   Skin:     General: Skin is warm and dry.   Neurological:      General: No focal deficit present.      Mental Status: She is alert and oriented to person, place, and time.      Cranial Nerves: No facial asymmetry (Very slight right-sided facial droop).      Motor: No weakness or abnormal muscle tone.      Coordination: Coordination normal (can march in place, alternate leg/arm lifting).      Gait: Gait is intact. Gait normal.      Comments: Most significant deficit on neuro exam as her expressive aphasia.      Has progressed well in terms of coordination and strength.  Walks well without a mobility aid.    Psychiatric:         Mood and Affect: Affect is flat.         Behavior: Behavior is slowed. Behavior is cooperative.           Assessment/Plan:  Prema Phillips is a 79 y.o. female who presents today for :        ICD-10-CM ICD-9-CM    1. Hemiparesis of right dominant side due to acute cerebrovascular disease  I67.89 436     G81.91 342.91       2. History of recent stroke  Z86.73 V12.54  Hemoglobin A1C      Comprehensive Metabolic Panel      Lipid Panel      CBC Auto Differential      TSH      Vitamin D      Magnesium      Vitamin B12      3. Aphasia  R47.01 784.3       4. Mixed hyperlipidemia  E78.2 272.2 Hemoglobin A1C      Comprehensive Metabolic Panel      Lipid Panel      CBC Auto Differential      TSH      Vitamin D      Magnesium      Vitamin B12      5. RBBB  I45.10 426.4       6. Bilateral carotid artery stenosis  I65.23 433.10      433.30       7. Acquired hypothyroidism  E03.9 244.9 TSH      8. Insomnia, unspecified type  G47.00 780.52       9. Adjustment reaction with anxiety  F43.22 309.24 LORazepam (ATIVAN) 1 MG tablet      10. Long-term use of aspirin therapy  Z79.82 V58.66       11. Prediabetes  R73.03 790.29 Hemoglobin A1C      Comprehensive Metabolic Panel      Lipid Panel      CBC Auto Differential      TSH      Vitamin D      Magnesium      Vitamin B12      12. BMI 21.0-21.9, adult  Z68.21 V85.1       13. Medication management  Z79.899 V58.69 Hemoglobin A1C      Comprehensive Metabolic Panel      Lipid Panel      CBC Auto Differential      TSH      Vitamin D      Magnesium      Vitamin B12      14. Encounter for monitoring long-term proton pump inhibitor therapy  Z51.81 V58.83 Hemoglobin A1C    Z79.899 V58.69 Comprehensive Metabolic Panel      Lipid Panel      CBC Auto Differential      TSH      Vitamin D      Magnesium      Vitamin B12      15. Screening mammogram for breast cancer  Z12.31 V76.12       16. Postmenopausal  Z78.0 V49.81 DXA Bone Density Axial Skeleton 1 or more sites        Mammogram 9/27/24     APHASIA/HEMIPARESIS OF R (dominant) SIDE 2/2 ACUTE STROKE w/ Subsequent Small SAH:   For secondary prevention she is now on ASA 81/Lipitor 40.  Followed up with vascular Neurology most recently 08/08/2024.  LDL is less than 100 but not at goal of less than 70.  If  LDL remains greater than 70 will increase to Lipitor 80 versus changing to Crestor.  Repeat lipid panel in 3  months.  S/P Rehab and OP PT/OT/Speech.  Still struggling with significant expressive aphasia but has made progress.  Advised on some home activities she can do for ongoing rehab-visual and audio books with subsequent journaling or a talk back summary of what she just read to work on her aphasia both through spoken and written word.  Had unremarkable 30 day event monitor-was advised to follow-up for ILR per vascular neuro.  Has known mild carotid stenosis that has been followed on prior ultrasounds, less than 50% and stable, continue high-intensity statin and  ASA.  Will update carotid ultrasound in the next year for monitoring.  No significant level of carotid stenosis noted on CTA performed for stroke workup.  At her follow-up with me will re-evaluate referral for ILR.      RBB: Incomplete, noted on most recent EKG, No concerns on ECHO with normal wall motion, systolic and diastolic function     PREDIABETES: A1c improved from 5.7-->5.5-->5.90->5.6, low carb/ high protein diet advised with ongoing monitoring.  stopped drinking daily boost-- was potentially contributing to A1c elevation.  Recheck in 6 months.     OSTEOARTHRITIS OF THE HANDS WITH ACUTE INCREASED SWELLING OF THE LEFT RING FINGER:  X-rays ordered, Medrol Dosepak advise and referral placed to Ortho Hand Dr. Lee for further evaluation.  He gave a steroid injection and her symptoms have improved.     GASTRITIS: Symptoms stable on PRILOSEC 20, PPI labs monitored.  Recently normal B12, magnesium, vitamin-D.  Will plan to recheck these with labs in 6 months.     INSOMNIA: Amitriptyline 25 helping with sleep.      HYPOTHYROIDISM: TSH at goal on Synthroid 75 mcg, ongoing monitoring.     DEPRESSION/ ANXIETY: Lexapro 20, good family support--  and daughter.   Ativan 1 mg p.r.n. which does help with situational anxiety and frustration surrounding her expressive aphasia.       There are no Patient Instructions on file for this visit.      Follow up in  about 6 months (around 7/14/2025) for to follow up on lab results, return as needed for new concerns.

## 2025-01-15 RX ORDER — AZELASTINE 1 MG/ML
1 SPRAY, METERED NASAL 2 TIMES DAILY
Qty: 137 ML | Refills: 11 | Status: SHIPPED | OUTPATIENT
Start: 2025-01-15

## 2025-01-15 NOTE — TELEPHONE ENCOUNTER
----- Message from Madai sent at 1/15/2025 11:11 AM CST -----  Contact: [phone]  Requesting an RX refill or new RX.    Is this a refill or new RX: new    RX name and strength (copy/paste from chart):  azelastine (ASTELIN) 137 mcg (0.1 %) nasal spray    Is this a 30 day or 90 day RX:     Pharmacy name and phone # (copy/paste from chart):    [id number] - [place] - [id number] [facility]  [address] [address] [place] [address]  Phone: [phone] Fax: [phone]

## 2025-01-15 NOTE — TELEPHONE ENCOUNTER
No care due was identified.  [facility] Embedded Care Due Messages. Reference number: [id number].   1/15/2025 11:38:05 AM CST

## 2025-01-16 ENCOUNTER — HOSPITAL ENCOUNTER (OUTPATIENT)
Dept: RADIOLOGY | Facility: HOSPITAL | Age: 80
Discharge: HOME | End: 2025-01-16
Attending: FAMILY MEDICINE
Payer: MEDICARE

## 2025-01-16 DIAGNOSIS — Z78.0 POSTMENOPAUSAL: ICD-10-CM

## 2025-01-16 PROCEDURE — 77080 DXA BONE DENSITY AXIAL: CPT | Mod: TC

## 2025-01-16 PROCEDURE — 77080 DXA BONE DENSITY AXIAL: CPT | Mod: 26,,, | Performed by: RADIOLOGY

## 2025-03-24 DIAGNOSIS — F43.22 ADJUSTMENT REACTION WITH ANXIETY: ICD-10-CM

## 2025-03-24 DIAGNOSIS — G47.00 INSOMNIA, UNSPECIFIED TYPE: ICD-10-CM

## 2025-03-24 DIAGNOSIS — G43.709 CHRONIC MIGRAINE WITHOUT AURA WITHOUT STATUS MIGRAINOSUS, NOT INTRACTABLE: ICD-10-CM

## 2025-03-24 NOTE — TELEPHONE ENCOUNTER
No care due was identified.  [facility] Embedded Care Due Messages. Reference number: [id number].   3/24/2025 6:04:50 PM CDT

## 2025-03-25 RX ORDER — AMITRIPTYLINE HYDROCHLORIDE 25 MG/1
TABLET, FILM COATED ORAL
Qty: 90 TABLET | Refills: 3 | Status: SHIPPED | OUTPATIENT
Start: 2025-03-25

## 2025-03-25 NOTE — TELEPHONE ENCOUNTER
Refill Routing Note   Medication(s) are not appropriate for processing by [facility] for the following reason(s):        Outside of protocol    [facility] action(s):  Route        Medication Therapy Plan: Self titraton of medication    Pharmacist review requested: Yes     Appointments  past 12m or future 3m with PCP    Date Provider   Last Visit   1/14/2025 Odette Zhang MD   Next Visit   7/15/2025 Odette Zhang MD   ED visits in past 90 days: 0        Note composed:11:58 AM 03/25/2025

## 2025-03-25 NOTE — TELEPHONE ENCOUNTER
Refill Routing Note   Medication(s) are not appropriate for processing by [facility] for the following reason(s):        Drug-disease interaction    [facility] action(s):  Defer      Medication Therapy Plan: Drug-Disease: amitriptyline and RBBB    Pharmacist review requested: Yes     Appointments  past 12m or future 3m with PCP    Date Provider   Last Visit   1/14/2025 Odette Zhang MD   Next Visit   7/15/2025 Odette Zhang MD   ED visits in past 90 days: 0        Note composed:10:26 AM 03/25/2025

## 2025-04-04 ENCOUNTER — OFFICE VISIT (OUTPATIENT)
Dept: OPTOMETRY | Facility: CLINIC | Age: 80
End: 2025-04-04
Payer: MEDICARE

## 2025-04-04 DIAGNOSIS — R48.2 APRAXIA: ICD-10-CM

## 2025-04-04 DIAGNOSIS — R47.01 APHASIA: ICD-10-CM

## 2025-04-04 DIAGNOSIS — Z86.69 HISTORY OF MACULAR DEGENERATION: ICD-10-CM

## 2025-04-04 DIAGNOSIS — I67.89 HEMIPARESIS OF RIGHT DOMINANT SIDE DUE TO ACUTE CEREBROVASCULAR DISEASE: ICD-10-CM

## 2025-04-04 DIAGNOSIS — H53.10 SUBJECTIVE VISUAL DISTURBANCE: Primary | ICD-10-CM

## 2025-04-04 DIAGNOSIS — H52.7 REFRACTIVE ERROR: ICD-10-CM

## 2025-04-04 DIAGNOSIS — Z86.73 HISTORY OF RECENT STROKE: ICD-10-CM

## 2025-04-04 DIAGNOSIS — G81.91 HEMIPARESIS OF RIGHT DOMINANT SIDE DUE TO ACUTE CEREBROVASCULAR DISEASE: ICD-10-CM

## 2025-04-04 PROCEDURE — 99999 PR PBB SHADOW E&M-EST. PATIENT-LVL I: CPT | Mod: PBBFAC,,, | Performed by: OPTOMETRIST

## 2025-04-04 NOTE — PROGRESS NOTES
HPI    81 yo F presents today for refraction. Reports decrease in vision. Follow   by outside retina specialist.     Eye Meds: OTC gtts    POHx:  Hypertensive retinopathy, bilateral  Nonexudative age-related macular degeneration, left eye, stage unspecified  Vitreous degeneration, bilateral  Nonexudative age-related macular degeneration, right eye, advanced   atrophic with subfoveal involvement    S/p PCIOL OU    Last edited by Jolene Driver MA on 4/4/2025  9:36 AM.            Assessment /Plan     For exam results, see Encounter Report.    Subjective visual disturbance    History of recent stroke    Aphasia    Apraxia    Hemiparesis of right dominant side due to acute cerebrovascular disease    Refractive error    History of macular degeneration      MONITOR. ED PT ON ALL EXAM FINDINGS  RX FINAL SPECS   CONTINUE W/ RETINA FOR CARE AND MGMT; H/O ARMD OD>OS; UPCOMING APPT  S/P PCIOL OU; UV PROTECTION; MONITOR.   RTC 1 YR//PRN FOR REE/DFE

## 2025-04-06 RX ORDER — OMEPRAZOLE 20 MG/1
20 CAPSULE, DELAYED RELEASE ORAL EVERY MORNING
Qty: 90 CAPSULE | Refills: 3 | Status: SHIPPED | OUTPATIENT
Start: 2025-04-06

## 2025-04-21 ENCOUNTER — TELEPHONE (OUTPATIENT)
Dept: INTERNAL MEDICINE | Facility: CLINIC | Age: 80
End: 2025-04-21
Payer: MEDICARE

## 2025-04-21 DIAGNOSIS — R48.2 APRAXIA: ICD-10-CM

## 2025-04-21 DIAGNOSIS — Z86.73 HISTORY OF RECENT STROKE: ICD-10-CM

## 2025-04-21 DIAGNOSIS — R47.01 APHASIA: Primary | ICD-10-CM

## 2025-04-21 NOTE — TELEPHONE ENCOUNTER
----- Message from Peace sent at 4/21/2025  1:05 PM CDT -----  Contact:   [phone]  Would like to receive medical advice.Would they like a call back or a response via [facility]: call back  Additional information:  Pt's  is calling to see if pt can start back with speech therapy.  Please call back to advise.

## 2025-04-22 ENCOUNTER — TELEPHONE (OUTPATIENT)
Dept: INTERNAL MEDICINE | Facility: CLINIC | Age: 80
End: 2025-04-22
Payer: MEDICARE

## 2025-04-22 NOTE — TELEPHONE ENCOUNTER
----- Message from Linda sent at 4/22/2025  1:52 PM CDT -----  Contact: Spouse/Asim/[phone]  [email or web address] Patient is calling for Medical Advice regarding:Status check How long has patient had these symptoms:NAPharmacy name and phone#:NAPatient wants a call back or thru [facility]: Call back Comments:pt's  said that he is calling in regards to needing to check the status of an order for Speech Therapy being put in for the patient . Please advise Please advise patient replies from provider may take up to 48 hours.

## 2025-04-25 ENCOUNTER — TELEPHONE (OUTPATIENT)
Dept: INTERNAL MEDICINE | Facility: CLINIC | Age: 80
End: 2025-04-25
Payer: MEDICARE

## 2025-04-25 ENCOUNTER — PATIENT MESSAGE (OUTPATIENT)
Dept: INTERNAL MEDICINE | Facility: CLINIC | Age: 80
End: 2025-04-25
Payer: MEDICARE

## 2025-04-25 NOTE — TELEPHONE ENCOUNTER
----- Message from Neisha sent at 4/25/2025  4:17 PM CDT -----  Contact:  # [phone]  Patient is returning a phone call.  Who left a message for the patient:Nurse  Does patient know what this is regarding: Call back  Would you like a call back, or a response through your [facility] portal?:Call back  Comments:Pt  states that when nurse call his wife didn't answer call. Pt  would like for nurse to call his phone when nurse return call.

## 2025-04-28 ENCOUNTER — CLINICAL SUPPORT (OUTPATIENT)
Dept: REHABILITATION | Facility: HOSPITAL | Age: 80
End: 2025-04-28
Payer: MEDICARE

## 2025-04-28 DIAGNOSIS — R47.01 APHASIA: Primary | ICD-10-CM

## 2025-04-28 PROCEDURE — 96105 ASSESSMENT OF APHASIA: CPT | Mod: PN

## 2025-04-28 NOTE — PROGRESS NOTES
Outpatient Rehab    Speech-Language Pathology Evaluation (only)    Patient Name: Prema Phillips  MRN: [id number]  YOB: 1945  Encounter Date: 4/28/2025    Therapy Diagnosis:   Encounter Diagnosis   Name Primary?    Aphasia Yes     Physician: Odette Zhang MD    Physician Orders: Eval and Treat  Medical Diagnosis: Aphasia  Apraxia  History of recent stroke    Visit # / Visits Authorized: 1 / 1   Insurance Authorization Period: 4/25/2025 to 4/25/2026  Date of Evaluation: 4/28/2025   Plan of Care Certification: 4/28/2025 to 7/7/2025    Time In: 1245   Time Out: 1324  Total Time: 39   Total Billable Time: 39    Intake Outcome Measure for FOTO Survey    Therapist reviewed FOTO scores for Prema Phillips on 4/28/2025.   FOTO report - see Media section or FOTO account episode details.     Intake Score: 20/25 NEURO QOL - Communication (Lower Score = Greater Disability)     Subjective   History of Present Illness  Prema is a 80 y.o. female who reports to Speech-Language Pathology with a chief concern of difficulty with speech.     The patient reports a medical diagnosis of R47.01 (ICD-10-CM) - Aphasia  R48.2 (ICD-10-CM) - Apraxia  Z86.73 (ICD-10-CM) - History of recent stroke.            Patient presents in clinic breathing with Room air.             Prior Level of Function: aphasia, apraxia of speech, difficulty with repetition and understanding complex information  Current Level of Function: worsening overall function since discharge from therapy    Prema is a 80 year old female, previously known to this writer, who returns to speech therapy for another round of aphasia treatment. History was provided by the patient, though limited due to her speech language impairments/aphasia. No new significant medical changes she reports.    Current Speech Symptoms  Patient reports: Communication Changes and Speech Changes         Pain  No Pain Reported: Yes                 Living Arrangements  Living Situation  Housing:  Home with care/services  Type of Care/Services Provided at Home: Family care  Living Arrangements: Spouse/significant other  Support Systems: Family members, Friends/neighbors, Spouse/significant other         Employment  Employment Status: Retired          Past Medical History/Physical Systems Review:   Prema Phillips  has a past medical history of Acute ischemic left MCA stroke, Allergic rhinitis, Carotid artery plaque, History of total hysterectomy with bilateral salpingo-oophorectomy (BSO), Hyperlipidemia, Hypothyroid, Insomnia, Macular degeneration, right eye, Migraine headache, Osteopenia of the elderly, Postmenopausal HRT (hormone replacement therapy), and RBBB.    Prema Phillips  has a past surgical history that includes Stapedes surgery (2006); Total abdominal hysterectomy w/ bilateral salpingoophorectomy; Appendectomy; Hysterectomy; Tubal ligation; Catatact surgery (Right, 06/19/2019); Colonoscopy (08/27/2019); and Eye surgery (Bilateral).    Prema has a current medication list which includes the following prescription(s): amitriptyline, aspirin, atorvastatin, azelastine, co-enzyme q-10, escitalopram oxalate, levothyroxine, lorazepam, omeprazole, vitamin d, and preservision areds.    Review of patient's allergies indicates:   Allergen Reactions    Neomycin Other (See Comments) and Swelling     Other reaction(s): Unknown    Carbamazepine Rash     Rash per 3/02 RN telephone encounter. On interview, pt/family unable to say if she continued or stopped taking medication        Objective                Western Aphasia Battery - Revised (WAB-R) was administered to evaluate the patient's receptive and expressive language function.The purpose stated in the manual for the WAB-R is to determine the presence, severity, and type of aphasia; measure the patient's level of performance to provide a baseline for detecting change over time; provide a comprehensive assessment of the patients language strengths and deficits in  "order to guide treatment and management; infer the location and etiology of the lesion causing the aphasia.  The following results were revealed:     Spontaneous Speech Score:   Auditory Comprehension Score: 4.85 / 10  Repetition Score:   2.3 /10  Naming and Word Finding Score: 2.5/ 10  Aphasia Quotient (AQ):   45.3 / 100  Aphasia Classification: Broca's    Subtest Results:   Information Content: 9 / 10  Fluency, Grammatical Competence, and Paraphasias: 4 /10  Yes / No Questions:   Auditory Word Recognition:   Sequential Commands:   Repetition:   Object Namin  Word Fluency:   Sentence Completion: 2 /10  Responsive Speech: 2 / 10    Description: The patient attended to all tasks independently. As compared to her initial evaluation during the last episode of care, patient's presentation allowed for a full aphasia battery test as compared to informal assessment pieces. She demonstrated relative strengths in answering simple yes/no questions and understanding the basic "gist" of conversational speech to relay information to her  in the lobby after the assessment. She presents with difficulties in object naming, following commands, repetition of varying lengths of utterances, as well as verbose content and neologisms in her thought and expressive processes, some of which she is unaware and does not attempt to self-correct.     Language Sample:  "I see having a family picnish, right here, covas, they have a román and a fish and the boats and the dog and the abouts, a big house. They drinkin something, reading something, a car, houses, um, this little boy right here maybe he's in the lake, by the lake, in the sand, playing with a kid, oh wait, they have yeah a car. Um some juan running somethin ghere he's telling him. Boy's drinking something. Brad what he's telling him, a rady (radio) and not sure him telling about him."    Time Entry(in minutes):  Aphasia Assessment " w/ Interp Time Entry: 60    Assessment & Plan   Assessment   Prema presents with symptoms that are Changing.          Functional Limitations: Aphasia impacts her ability to communicate effectively and efficiently during a state of emergency.         Evaluation/Treatment Response: Patient responded to treatment well     Patient Goal for Therapy (SLP): to improve speech  Prognosis: Fair    Assessment Details: Prema is a 80 year old female with past medical history significant for left MCA stroke, resulting in aphasia. She has received treatment last year in the acute phases of her rehabilitation and returns to the clinic today for speech-language evaluation and to re-establish care. For the first time, Prema completed the Western Aphasia Battery-Revised (WAB-R) assessment to classify her aphasia. She presents with an aphasia quotient of 45.3/100, with indications of Broca's aphasia. She demonstrates relative strengths in identifying objects and answering simple yes/no questions. However, she has more difficulty with object naming, repetition, and following more complex commands. When speaking, she presents with neologisms and sometimes off-target responses, though the main topic is re-established with probing questions from the listener. Following conversation with the patient and her , they are interested in speech therapy services.  Education  Education was done with Patient and Other recipient present. The patient's learning style includes Demonstration and Listening. The patient Requires assistance and Requires continuing/additional education.  They identified as Caregiver. The reported learning style is Demonstration and Listening. The recipient Demonstrates understanding and Verbalizes understanding.     role of speech-language pathologist, plan of care, goals      Plan  From a speech language pathology perspective, the patient would benefit from: Skilled Rehab Services  Planned therapy interventions and  modalities include: Speech/language therapy, Home program instruction, and Patient/family education.              Visit Frequency: 2 times Per Week for 10 Weeks.       This plan was discussed with Patient and Caregiver.   Discussion participants: Agreed Upon Plan of Care           Patient's spiritual, cultural, and educational needs considered and patient agreeable to plan of care and goals.     Goals:   Active       Long-Term Goals       1. Using compensatory strategies as needed, the patient will communicate and/or request 1-2 items independently to enhance her communication skills for daily living tasks.       Start:  04/28/25    Expected End:  07/07/25               Short-Term Goals       1. The patient will answer yes/no questions with 80% accuracy independently for 2 consecutive sessions to enhance her responsiveness and reliability to questions.       Start:  04/28/25    Expected End:  07/07/25            2. The patient will name objects with 80% accuracy independently with use of strategies as needed to enhance her object naming for daily tasks and environments.       Start:  04/28/25    Expected End:  07/07/25            3. The patient will follow multi-step commands with 80% accuracy and 1-2 repetitions as needed to enhance her receptive language abilities for daily living tasks.       Start:  04/28/25    Expected End:  07/07/25            4. The patient will complete reading and writing tasks with 75% accuracy and 1-2 prompts as needed to improve her skills for daily scanning and locating information.       Start:  04/28/25    Expected End:  07/07/25            5. The patient will participate in various everyday language tasks to mimic real-world environmental stimuli (grocery store, menus, checkbooks) with 80% accuracy and 1-2 prompts to enhance her abilities for daily living tasks.       Start:  04/28/25    Expected End:  07/07/25                SUNITA Clement-SLP, CCC-SLP

## 2025-04-30 ENCOUNTER — CLINICAL SUPPORT (OUTPATIENT)
Dept: REHABILITATION | Facility: HOSPITAL | Age: 80
End: 2025-04-30
Payer: MEDICARE

## 2025-04-30 DIAGNOSIS — R47.01 APHASIA: Primary | ICD-10-CM

## 2025-04-30 PROCEDURE — 92507 TX SP LANG VOICE COMM INDIV: CPT | Mod: PN

## 2025-04-30 NOTE — PROGRESS NOTES
"  Outpatient Rehab    Speech-Language Pathology Visit    Patient Name: Prema Phillips  MRN: [id number]  YOB: 1945  Encounter Date: 4/30/2025    Therapy Diagnosis:   Encounter Diagnosis   Name Primary?    Aphasia Yes     Physician: Odette Zhang MD    Physician Orders: Eval and Treat  Medical Diagnosis: Aphasia  Apraxia  History of recent stroke    Visit # / Visits Authorized: 1 / 20   Insurance Authorization Period: 4/28/2025 to 12/31/2025  Date of Evaluation: 4/28/2025   Plan of Care Certification: 4/28/2025 to 7/7/2025     Time In: 1245   Time Out: 1330  Total Time: 45   Total Billable Time: 45         Subjective   "I'm okay. My finger.".  Pain reported as 0/10.    Family/caregiver present for this visit:  (Yuan waited in [facility])      Objective            Treatment  Speech  Activity 1: Object Naming (tangible objects): Named objects with 70% accuracy independently with self-corrections noted. Phonemic paraphasias present when repeating object names. Utilized carrier phrases and cueing via descriptions for those objects she was unable to independently name.  Activity 2: Writing (tangible object names): Modeling provided for all writing tasks due to patient's difficulty writing object names. She copied words with 100% accuracy, utilizing hand over hand assistance and repetition for the letter e. She wrote her first name independently but had difficulty with one letter of her last name.    Time Entry(in minutes):  Speech Treatment (Individual) Time Entry: 45    Assessment & Plan   Assessment  Prema participated well in her first treatment session focusing on object naming, use of word finding strategies, and copying words down using pencil and paper. She participated well in all therapeutic tasks. In conversational speech, she continues with phonemic paraphasias and some neologisms, though with a topic, the listener is able to infer most of the information she is trying to convey and patient affirms " or denies with yes or no questions for clarification.  Evaluation/Treatment Tolerance: Patient tolerated treatment well    Patient will continue to benefit from skilled outpatient speech therapy to address the deficits listed in the problem list box on initial evaluation, provide pt/family education and to maximize pt's level of independence in the home and community environment.     Patient's spiritual, cultural, and educational needs considered and patient agreeable to plan of care and goals.     Education  Education was done with Patient. The patient's learning style includes Demonstration. The patient Demonstrates understanding and Verbalizes understanding.         compensatory strategies, plan of care, goals        Plan  continue speech therapy plan of care          Goals:   Active       Long-Term Goals       1. Using compensatory strategies as needed, the patient will communicate and/or request 1-2 items independently to enhance her communication skills for daily living tasks.       Start:  04/28/25    Expected End:  07/07/25               Short-Term Goals       1. The patient will answer yes/no questions with 80% accuracy independently for 2 consecutive sessions to enhance her responsiveness and reliability to questions. (Progressing)       Start:  04/28/25    Expected End:  07/07/25            2. The patient will name objects with 80% accuracy independently with use of strategies as needed to enhance her object naming for daily tasks and environments. (Progressing)       Start:  04/28/25    Expected End:  07/07/25            3. The patient will follow multi-step commands with 80% accuracy and 1-2 repetitions as needed to enhance her receptive language abilities for daily living tasks.       Start:  04/28/25    Expected End:  07/07/25            4. The patient will complete reading and writing tasks with 75% accuracy and 1-2 prompts as needed to improve her skills for daily scanning and locating information.  (Progressing)       Start:  04/28/25    Expected End:  07/07/25            5. The patient will participate in various everyday language tasks to mimic real-world environmental stimuli (grocery store, menus, checkbooks) with 80% accuracy and 1-2 prompts to enhance her abilities for daily living tasks.       Start:  04/28/25    Expected End:  07/07/25                SUNITA Clement-SLP, CCC-SLP

## 2025-05-12 ENCOUNTER — CLINICAL SUPPORT (OUTPATIENT)
Dept: REHABILITATION | Facility: HOSPITAL | Age: 80
End: 2025-05-12
Payer: MEDICARE

## 2025-05-12 DIAGNOSIS — R47.01 APHASIA: Primary | ICD-10-CM

## 2025-05-12 PROCEDURE — 92507 TX SP LANG VOICE COMM INDIV: CPT | Mod: PN

## 2025-05-12 NOTE — PROGRESS NOTES
"  Outpatient Rehab    Speech-Language Pathology Visit    Patient Name: Prema Phillips  MRN: [id number]  YOB: 1945  Encounter Date: 5/12/2025    Therapy Diagnosis:   Encounter Diagnosis   Name Primary?    Aphasia Yes     Physician: Odette Zhang MD    Physician Orders: Eval and Treat  Medical Diagnosis: Aphasia  Apraxia  History of recent stroke    Visit # / Visits Authorized: 2 / 20   Insurance Authorization Period: 4/28/2025 to 12/31/2025  Date of Evaluation: 4/28/2025   Plan of Care Certification: 4/28/2025 to 7/7/2025      Time In: 1430   Time Out: 1515  Total Time (in minutes): 45   Total Billable Time (in minutes): 45    FOTO:  Intake Score: 20%  Survey Score 2:  %  Survey Score 3:  %    Precautions:        Aphasia    Subjective   "I'm okay. I know you'll be here and Nori will be back next".           Objective            Treatment  Speech  Activity 1: Object Naming (tangible objects): Named objects with 53% accuracy independently with self-corrections noted. Phonemic paraphasias present when repeating object names. Closed ended sentences and phonemic cues did not appear to assist patient in naming objects today. SLP attempted to have patient repeat names of items unable to name with limited success.  Activity 2: Writing (tangible object names): Modeling provided for all writing tasks due to patient's difficulty writing object names. She copied words with 100% accuracy, rewrite of 1/10 words done independently  Activity 3: She was able to answer yes/no questions with 40% accuracy independently, 80% accuracy given maximum repeitions, verbal cues, and visual cues.  Activity 4: She followed 2 units commands with 30% accuracy independently, 90% accuracy given maximum models of completed commands.    Time Entry(in minutes):  Speech Treatment (Individual) Time Entry: 45    Assessment & Plan   Assessment  Prema participated well intreatment session. She required maximum cueing for receptive language " tasks and expressive language tasks. Frequent phonemic paraphasias and jargon were present in her speech today during strucutured tasks. She appeared to express herself better in conversation.  Evaluation/Treatment Tolerance: Patient tolerated treatment well    Patient will continue to benefit from skilled outpatient speech therapy to address the deficits listed in the problem list box on initial evaluation, provide pt/family education and to maximize pt's level of independence in the home and community environment.     Patient's spiritual, cultural, and educational needs considered and patient agreeable to plan of care and goals.     Education  Education was done with Patient. The patient's learning style includes Demonstration. The patient Demonstrates understanding and Verbalizes understanding.                  Plan  continue speech therapy plan of care          Goals:   Active       Long-Term Goals       1. Using compensatory strategies as needed, the patient will communicate and/or request 1-2 items independently to enhance her communication skills for daily living tasks. (Progressing)       Start:  04/28/25    Expected End:  07/07/25               Short-Term Goals       1. The patient will answer yes/no questions with 80% accuracy independently for 2 consecutive sessions to enhance her responsiveness and reliability to questions. (Progressing)       Start:  04/28/25    Expected End:  07/07/25            2. The patient will name objects with 80% accuracy independently with use of strategies as needed to enhance her object naming for daily tasks and environments. (Progressing)       Start:  04/28/25    Expected End:  07/07/25            3. The patient will follow multi-step commands with 80% accuracy and 1-2 repetitions as needed to enhance her receptive language abilities for daily living tasks. (Progressing)       Start:  04/28/25    Expected End:  07/07/25            4. The patient will complete reading and  writing tasks with 75% accuracy and 1-2 prompts as needed to improve her skills for daily scanning and locating information. (Progressing)       Start:  04/28/25    Expected End:  07/07/25            5. The patient will participate in various everyday language tasks to mimic real-world environmental stimuli (grocery store, menus, checkbooks) with 80% accuracy and 1-2 prompts to enhance her abilities for daily living tasks. (Progressing)       Start:  04/28/25    Expected End:  07/07/25                SUNITA Dior-SLP, CCC-SLP

## 2025-05-21 ENCOUNTER — OFFICE VISIT (OUTPATIENT)
Dept: ORTHOPEDICS | Facility: CLINIC | Age: 80
End: 2025-05-21
Payer: MEDICARE

## 2025-05-21 ENCOUNTER — DOCUMENTATION ONLY (OUTPATIENT)
Dept: REHABILITATION | Facility: HOSPITAL | Age: 80
End: 2025-05-21
Payer: MEDICARE

## 2025-05-21 VITALS — BODY MASS INDEX: 21.24 KG/M2 | HEIGHT: 66 IN

## 2025-05-21 DIAGNOSIS — M19.042 ARTHRITIS OF FINGER OF LEFT HAND: Primary | ICD-10-CM

## 2025-05-21 PROCEDURE — 99999 PR PBB SHADOW E&M-EST. PATIENT-LVL III: CPT | Mod: PBBFAC,,, | Performed by: ORTHOPAEDIC SURGERY

## 2025-05-21 RX ORDER — TRIAMCINOLONE ACETONIDE 40 MG/ML
40 INJECTION, SUSPENSION INTRA-ARTICULAR; INTRAMUSCULAR
Status: COMPLETED | OUTPATIENT
Start: 2025-05-21 | End: 2025-05-21

## 2025-05-21 RX ORDER — CELECOXIB 200 MG/1
200 CAPSULE ORAL DAILY
Qty: 30 CAPSULE | Refills: 2 | Status: SHIPPED | OUTPATIENT
Start: 2025-05-21 | End: 2025-08-19

## 2025-05-21 RX ADMIN — TRIAMCINOLONE ACETONIDE 40 MG: 40 INJECTION, SUSPENSION INTRA-ARTICULAR; INTRAMUSCULAR at 01:05

## 2025-05-21 NOTE — PROGRESS NOTES
"Subjective:      Patient ID: Prema Phillips is a 80 y.o. female.  Chief Complaint: Hand Pain (Left ring and thumb pain)      HPI  Prema Phillips is a  80 y.o. female presenting today for follow up of left hand symptoms related to arthritis   .  She reports that she is having pain mainly at the base of left thumb but also the left ring finger both areas are painful   Have difficulty with gripping   No numbness or tingling report.    Review of patient's allergies indicates:   Allergen Reactions    Neomycin Other (See Comments) and Swelling     Other reaction(s): Unknown    Carbamazepine Rash     Rash per 3/02 RN telephone encounter. On interview, pt/family unable to say if she continued or stopped taking medication         Current Medications[1]    Past Medical History:   Diagnosis Date    Acute ischemic left MCA stroke 3/7/2024    Allergic rhinitis 5/31/2016    Carotid artery plaque 11/18/2013    History of total hysterectomy with bilateral salpingo-oophorectomy (BSO) 5/31/2016    Hyperlipidemia 7/17/2012    Hypothyroid     Insomnia     Macular degeneration, right eye 5/31/2016    Migraine headache     Osteopenia of the elderly 10/20/2015    Postmenopausal HRT (hormone replacement therapy)     RBBB 7/17/2012       Past Surgical History:   Procedure Laterality Date    APPENDECTOMY      Catatact surgery Right 06/19/2019    without any complications    COLONOSCOPY  08/27/2019    repeat in 5 years    EYE SURGERY Bilateral     cataracts extraction    HYSTERECTOMY      STAPEDES SURGERY  2006    TOTAL ABDOMINAL HYSTERECTOMY W/ BILATERAL SALPINGOOPHORECTOMY      TUBAL LIGATION         OBJECTIVE:   PHYSICAL EXAM:  Height: 5' 6" (167.6 cm)    Vitals:    05/21/25 1303   Height: 5' 6" (1.676 m)   PainSc:   7   PainLoc: Finger     Ortho/SPM Exam  Examination left hand there is tenderness at the base of left thumb at the CMC joint  Positive grind test   No crepitation    strength decreased   The ring finger does have swelling " and moderate deformity of the PIP joint with limited flexion at the PIP   No instability sensation intact    RADIOGRAPHS:  None  Comments: I have personally reviewed the imaging and I agree with the above radiologist's report.    ASSESSMENT/PLAN:     IMPRESSION:  Left hand arthritis affecting the base of left thumb and the PIP joint of the left ring finger    PLAN:  We discussed options including injection versus surgery   She would like injection   After pause for time-out identified each area injected left ring finger and left thumb with combination Kenalog 20 mg 0.5 cc xylocaine sterile technique   Tolerated the procedure well without complication  Continue current medications I have also started her on Celebrex    FOLLOW UP:  2-3 months    Disclaimer: This note has been generated using voice-recognition software. There may be typographical errors that have been missed during proof-reading.          [1]   Current Outpatient Medications   Medication Sig Dispense Refill    amitriptyline (ELAVIL) 25 MG tablet TAKE 1/2 TO 1 TABLET  NIGHTLY FOR INSOMNIA. OK TO TAKE WITH LOW DOSE OF ATIVAN 90 tablet 3    aspirin (ECOTRIN) 81 MG EC tablet Take 81 mg by mouth once daily.      atorvastatin (LIPITOR) 40 MG tablet Take 1 tablet (40 mg total) by mouth once daily. 90 tablet 4    azelastine (ASTELIN) 137 mcg (0.1 %) nasal spray 1 spray (137 mcg total) by Nasal route 2 (two) times daily. 137 mL 11    co-enzyme Q-10 30 mg capsule Take 100 mg by mouth 2 (two) times daily.      EScitalopram oxalate (LEXAPRO) 20 MG tablet TAKE 1 TABLET ONCE DAILY 90 tablet 3    levothyroxine (SYNTHROID) 75 MCG tablet Take 1 tablet (75 mcg total) by mouth before breakfast. 90 tablet 4    LORazepam (ATIVAN) 1 MG tablet Take 1 tablet (1 mg total) by mouth daily as needed for Anxiety. 30 tablet 5    omeprazole (PRILOSEC) 20 MG capsule TAKE 1 CAPSULE BY MOUTH EVERY MORNING 90 capsule 3    vitamin D (VITAMIN D3) 1000 units Tab Take 1,000 Units by mouth  once daily.      vitamins A,C,E-zinc-copper (PRESERVISION AREDS) 2,148 mcg-113 mg-45 mg-17.4mg Tab Take 1 tablet by mouth 2 (two) times daily with meals.       No current facility-administered medications for this visit.

## 2025-05-21 NOTE — PROGRESS NOTES
No Show Note/Documentation    Patient: Prema Phillips  Date of Session: 5/21/2025  Diagnosis: No diagnosis found.   MRN: [id number]    Prema Phillips did not attend her  scheduled therapy appointment today. She did not call to cancel nor reschedule. This is the 1st appointment that she has not attended. Next appointment is scheduled for 5/26 at 1pm and will follow up with patient at that time. No charges have been posted today.     SUNITA Clement-SLP, ANTONIO-SLP   5/21/2025

## 2025-05-28 ENCOUNTER — CLINICAL SUPPORT (OUTPATIENT)
Dept: REHABILITATION | Facility: HOSPITAL | Age: 80
End: 2025-05-28
Payer: MEDICARE

## 2025-05-28 DIAGNOSIS — R47.01 APHASIA: Primary | ICD-10-CM

## 2025-05-28 PROCEDURE — 92507 TX SP LANG VOICE COMM INDIV: CPT | Mod: PN

## 2025-05-28 NOTE — PROGRESS NOTES
Outpatient Rehab    Speech-Language Pathology Progress Note    Patient Name: Prema Phillips  MRN: [id number]  YOB: 1945  Encounter Date: 5/28/2025    Therapy Diagnosis:   Encounter Diagnosis   Name Primary?    Aphasia Yes     Physician: Odette Zhang MD    Physician Orders: Eval and Treat  Medical Diagnosis: Aphasia  Apraxia  History of recent stroke    Visit # / Visits Authorized: 3 / 20   Insurance Authorization Period: 4/28/2025 to 12/31/2025  Date of Evaluation: 4/28/2025   Plan of Care Certification: 4/28/2025 to 7/7/2025      Time In: 1300   Time Out: 1345  Total Time (in minutes): 45   Total Billable Time (in minutes): 45    FOTO:  Intake Score: 20/25 NEURO QOL - Communication (Lower Score = Greater Disability)  Survey Score 2: 19/25 NEURO QOL - Communication (Lower Score = Greater Disability)      Precautions:        Communication impairment; requires additional time    Subjective   doing well today without complaints; got injection in her finger joint earlier this week.  Pain reported as 0/10.        Objective            Treatment  Speech  Activity 1: Object Naming (pictured objects): Named objects with 63% accuracy independently with self-corrections noted. She initially produced neologisms and with additional time, she then produced either the object name or a close approximation.  Activity 2: Writing (object names): She wrote object names with 25% accuracy independently with self-corrections - having trouble with writing the letter e. Modeling provided for most writing tasks due to patient's difficulty writing object names. She copied words with 80% accuracy thereafter.  Activity 3: She answered simple yes/no questions 93% accuracy independently, meeting this goal for the 1st consecutive sessions. Occasional repetition required.  Activity 4: She followed 2 units commands with 50% accuracy independently, increasing to 75% with 1 prompt and repetition.  Activity 5: Everyday Information  (Calendar): Answered questions with 80% accuracy independently related to a calendar of information, increasing to 100% with 1 prompt from clinician. Everyday Information (Grocery Store): Generated 3 items independently for the grocery list of hypothetical things she may need.  Activity 6: Reading Phrases - She read printed 3-5 word phrases with 10% accuracy independently aloud, and with 1-2 prompts per phrase, she increased her performance to 30% accuracy. She was noted to change words to similiar words, but not the actual word.    Time Entry(in minutes):  Speech Treatment (Individual) Time Entry: 45    Assessment & Plan   Assessment  A reassessment was completed today to determine Prema's progress towards her goals. She participated in various speech-language tasks, including reading a calendar, object naming, writing object names, following two-step commands presented verbally, and answering simple yes/no questions. She answered simple yes/no questions well, meeting the goal for the first of two consecutive sessions. She is slowly progressing towards her goals, including object naming and answering questions.  Evaluation/Treatment Tolerance: Patient tolerated treatment well    The patient will continue to benefit from skilled outpatient speech therapy in order to address the deficits listed in the problem list on the initial evaluation, provide patient and family education, and maximize the patients level of independence in the home and community environments.     The patient's spiritual, cultural, and educational needs were considered, and the patient is agreeable to the plan of care and goals.     Education  Education was done with Patient. The patient's learning style includes Demonstration. The patient Demonstrates understanding and Verbalizes understanding.         compensatory strategies, plan of care, goals        Plan  continue speech therapy plan of care          Goals:   Active       Long-Term Goals        1. Using compensatory strategies as needed, the patient will communicate and/or request 1-2 items independently to enhance her communication skills for daily living tasks. (Progressing)       Start:  04/28/25    Expected End:  07/07/25               Short-Term Goals       1. The patient will answer yes/no questions with 80% accuracy independently for 2 consecutive sessions to enhance her responsiveness and reliability to questions. (Progressing)       Start:  04/28/25    Expected End:  07/07/25            2. The patient will name objects with 80% accuracy independently with use of strategies as needed to enhance her object naming for daily tasks and environments. (Progressing)       Start:  04/28/25    Expected End:  07/07/25            3. The patient will follow multi-step commands with 80% accuracy and 1-2 repetitions as needed to enhance her receptive language abilities for daily living tasks. (Progressing)       Start:  04/28/25    Expected End:  07/07/25            4. The patient will complete reading and writing tasks with 75% accuracy and 1-2 prompts as needed to improve her skills for daily scanning and locating information. (Progressing)       Start:  04/28/25    Expected End:  07/07/25            5. The patient will participate in various everyday language tasks to mimic real-world environmental stimuli (grocery store, menus, checkbooks) with 80% accuracy and 1-2 prompts to enhance her abilities for daily living tasks. (Met)       Start:  04/28/25    Expected End:  07/07/25    Resolved:  05/28/25             SUNITA Clement-SLP, CCC-SLP

## 2025-06-02 ENCOUNTER — CLINICAL SUPPORT (OUTPATIENT)
Dept: REHABILITATION | Facility: HOSPITAL | Age: 80
End: 2025-06-02
Payer: MEDICARE

## 2025-06-02 DIAGNOSIS — R47.01 APHASIA: Primary | ICD-10-CM

## 2025-06-02 PROCEDURE — 92507 TX SP LANG VOICE COMM INDIV: CPT | Mod: PN

## 2025-06-02 RX ORDER — ATORVASTATIN CALCIUM 40 MG/1
40 TABLET, FILM COATED ORAL
Qty: 90 TABLET | Refills: 1 | Status: SHIPPED | OUTPATIENT
Start: 2025-06-02

## 2025-06-18 ENCOUNTER — CLINICAL SUPPORT (OUTPATIENT)
Dept: REHABILITATION | Facility: HOSPITAL | Age: 80
End: 2025-06-18
Payer: MEDICARE

## 2025-06-18 DIAGNOSIS — R47.01 APHASIA: Primary | ICD-10-CM

## 2025-06-18 PROCEDURE — 92507 TX SP LANG VOICE COMM INDIV: CPT | Mod: PN

## 2025-06-18 NOTE — PROGRESS NOTES
"  Outpatient Rehab    Speech-Language Pathology Visit    Patient Name: Prema Phillips  MRN: [id number]  YOB: 1945  Encounter Date: 6/18/2025    Therapy Diagnosis:   Encounter Diagnosis   Name Primary?    Aphasia Yes     Physician: Odette Zhang MD    Physician Orders: Eval and Treat  Medical Diagnosis: Aphasia  Apraxia  History of recent stroke  Surgical Diagnosis: Not applicable for this Episode   Surgical Date: Not applicable for this Episode  Days Since Last Surgery: Not applicable for this Episode    Visit # / Visits Authorized: 5 / 20   Insurance Authorization Period: 4/28/2025 to 12/31/2025  Date of Evaluation: 4/28/2025   Plan of Care Certification: 4/28/2025 to 7/7/2025      Time In: 1300   Time Out: 1345  Total Time (in minutes): 45   Total Billable Time (in minutes): 45    Precautions:        Communication impairment; requires additional time    Subjective   had a good vacation recently though then began stating "It was long.".  Pain reported as 0/10.        Objective            Treatment  Speech  Activity 1: Object Naming: Named objects with 45% accuracy independently (15/33 trials). With written/visual representation, she confirmed and attempted to say the object name but mostly neologisms were present.  Activity 2: Writing (Personal Information): Patient wrote her first name in cursive but could not write or copy her last name today despite attempts. Writing words to dictation was difficulty, so again copied the words legibly.  Activity 4: Tactus Therapy Advcanced Comprehension Level 4 (2 objects) - read the instructions and touched the corresponding objects with 100% accuracy independently and then increased the complexity to Level 6 (2 step objects + adjectives) with 60% accuracy independently when reading the instructions. Followed Level 4 commands when presented verbally with 10% accuracy independently. Observed patient did well with 1 step command but then required repetition. " Written prompts of the words then yielded 100% accuracy.    Time Entry(in minutes):       Assessment & Plan   Assessment  Prema participated well in her treatment session today focusing on various speech-language tasks. She followed written 2 step commands independently today with excellent ability, though her accuracy quickly decreased when the information was presented verbally. She focused again on naming pictured objects which she had more difficulty with today as compared to the previous session. In spontaneous speech and conversational tasks, the clinician was able to follow along more easily on the topics she was discussing as compared to prior sessions, demonstrating improvements with less pressured speech elicitation tasks.  Evaluation/Treatment Tolerance: Patient tolerated treatment well    The patient will continue to benefit from skilled outpatient speech therapy in order to address the deficits listed in the problem list on the initial evaluation, provide patient and family education, and maximize the patients level of independence in the home and community environments.     The patient's spiritual, cultural, and educational needs were considered, and the patient is agreeable to the plan of care and goals.     Education  Education was done with Patient. The patient's learning style includes Demonstration. The patient Requires assistance and Requires continuing/additional education.         compensatory strategies, plan of care, goals        Plan  continue speech therapy plan of care          Goals:   Active       Long-Term Goals       1. Using compensatory strategies as needed, the patient will communicate and/or request 1-2 items independently to enhance her communication skills for daily living tasks. (Progressing)       Start:  04/28/25    Expected End:  07/07/25               Short-Term Goals       1. The patient will answer yes/no questions with 80% accuracy independently for 2 consecutive sessions  to enhance her responsiveness and reliability to questions. (Progressing)       Start:  04/28/25    Expected End:  07/07/25            2. The patient will name objects with 80% accuracy independently with use of strategies as needed to enhance her object naming for daily tasks and environments. (Progressing)       Start:  04/28/25    Expected End:  07/07/25            3. The patient will follow multi-step commands with 80% accuracy and 1-2 repetitions as needed to enhance her receptive language abilities for daily living tasks. (Progressing)       Start:  04/28/25    Expected End:  07/07/25            4. The patient will complete reading and writing tasks with 75% accuracy and 1-2 prompts as needed to improve her skills for daily scanning and locating information. (Progressing)       Start:  04/28/25    Expected End:  07/07/25            5. The patient will participate in various everyday language tasks to mimic real-world environmental stimuli (grocery store, menus, checkbooks) with 80% accuracy and 1-2 prompts to enhance her abilities for daily living tasks. (Met)       Start:  04/28/25    Expected End:  07/07/25    Resolved:  05/28/25             SUNITA Clement-SLP, CCC-SLP

## 2025-06-25 ENCOUNTER — CLINICAL SUPPORT (OUTPATIENT)
Dept: REHABILITATION | Facility: HOSPITAL | Age: 80
End: 2025-06-25
Payer: COMMERCIAL

## 2025-06-25 DIAGNOSIS — R47.01 APHASIA: Primary | ICD-10-CM

## 2025-06-25 PROCEDURE — 92507 TX SP LANG VOICE COMM INDIV: CPT | Mod: PN

## 2025-06-25 NOTE — PROGRESS NOTES
Outpatient Rehab    Speech-Language Pathology Progress Note    Patient Name: Prema Phillips  MRN: [id number]  YOB: 1945  Encounter Date: 6/25/2025    Therapy Diagnosis:   Encounter Diagnosis   Name Primary?    Aphasia Yes     Physician: Odette Zhang MD    Physician Orders: Eval and Treat  Medical Diagnosis: Aphasia  Apraxia  History of recent stroke  Surgical Diagnosis: Not applicable for this Episode   Surgical Date: Not applicable for this Episode  Days Since Last Surgery: Not applicable for this Episode    Visit # / Visits Authorized: 6 / 32   Insurance Authorization Period: 4/28/2025 to 12/31/2025  Date of Evaluation: 4/28/2025   Plan of Care Certification: 4/28/2025 to 7/7/2025      Time In: 1303   Time Out: 1345  Total Time (in minutes): 42   Total Billable Time (in minutes): 42    Precautions:        Communication impairment; requires additional time    Subjective   doing well today without complaint; mentions something is wrong with her right eye.  Pain reported as 0/10.        Objective            Treatment  Speech  Activity 1: Naming (Verbs): Verb picture cards presented in which the patient verbally stated the action verb of the picture - completed with 56%accuracy independently (9/16 trials). She also named objects in the picture with 100% accuracy independently (8/8 trials). She spontaneously utilized a subject, verb, object format, though not required x3 occurrences.  Activity 2: Writing: Copied verbs from above activiy with 100% accuracy independently.  Activity 3: She answered simple yes/no questions 80% accuracy independently, meeting this goal for the 2nd consecutive sessions. Occasional repetition required.    Time Entry(in minutes):  Speech Treatment (Individual) Time Entry: 42    Assessment & Plan   Assessment  Prema participated well in her treatment session today focusing on various speech-language tasks. She named verbs occurring in picture cards, with then copying the word  down with a model. In spontaneous speech and conversational tasks, the patient described current events in her personal life. Though present with paraphasias, her speech production has been improving with topic maintenance and with clarification from the listener. She continues to progress towards her goals.  Evaluation/Treatment Tolerance: Patient tolerated treatment well    The patient will continue to benefit from skilled outpatient speech therapy in order to address the deficits listed in the problem list on the initial evaluation, provide patient and family education, and maximize the patients level of independence in the home and community environments.     The patient's spiritual, cultural, and educational needs were considered, and the patient is agreeable to the plan of care and goals.     Education  Education was done with Patient and Other recipient present. The patient's learning style includes Demonstration and Listening. The patient Demonstrates understanding and Verbalizes understanding.  They identified as Caregiver. The reported learning style is Demonstration and Listening. The recipient Demonstrates understanding and Verbalizes understanding.     progress towards goals, plan of care        Plan  continue speech therapy plan of care          Goals:   Active       Long-Term Goals       1. Using compensatory strategies as needed, the patient will communicate and/or request 1-2 items independently to enhance her communication skills for daily living tasks. (Progressing)       Start:  04/28/25    Expected End:  07/07/25               Short-Term Goals       1. The patient will answer yes/no questions with 80% accuracy independently for 2 consecutive sessions to enhance her responsiveness and reliability to questions. (Met)       Start:  04/28/25    Expected End:  07/07/25    Resolved:  06/25/25         2. The patient will name objects with 80% accuracy independently with use of strategies as needed to  enhance her object naming for daily tasks and environments. (Progressing)       Start:  04/28/25    Expected End:  07/07/25            3. The patient will follow multi-step commands with 80% accuracy and 1-2 repetitions as needed to enhance her receptive language abilities for daily living tasks. (Progressing)       Start:  04/28/25    Expected End:  07/07/25            4. The patient will complete reading and writing tasks with 75% accuracy and 1-2 prompts as needed to improve her skills for daily scanning and locating information. (Progressing)       Start:  04/28/25    Expected End:  07/07/25            5. The patient will participate in various everyday language tasks to mimic real-world environmental stimuli (grocery store, menus, checkbooks) with 80% accuracy and 1-2 prompts to enhance her abilities for daily living tasks. (Met)       Start:  04/28/25    Expected End:  07/07/25    Resolved:  05/28/25             SUNITA Clement-SLP, CCC-SLP

## 2025-07-07 ENCOUNTER — CLINICAL SUPPORT (OUTPATIENT)
Dept: REHABILITATION | Facility: HOSPITAL | Age: 80
End: 2025-07-07
Payer: COMMERCIAL

## 2025-07-07 DIAGNOSIS — R47.01 APHASIA: Primary | ICD-10-CM

## 2025-07-07 PROCEDURE — 92507 TX SP LANG VOICE COMM INDIV: CPT | Mod: PN

## 2025-07-07 NOTE — PROGRESS NOTES
Outpatient Rehab    Speech-Language Pathology Progress Note : Updated Plan of Care    Patient Name: Prema Phillips  MRN: [id number]  YOB: 1945  Encounter Date: 7/7/2025    Therapy Diagnosis:   Encounter Diagnosis   Name Primary?    Aphasia Yes     Physician: Odette Zhang MD    Physician Orders: Eval and Treat  Medical Diagnosis: Aphasia  Apraxia  History of recent stroke  Surgical Diagnosis: Not applicable for this Episode   Surgical Date: Not applicable for this Episode  Days Since Last Surgery: Not applicable for this Episode    Visit # / Visits Authorized: 7 / 32   Insurance Authorization Period: 4/28/2025 to 12/31/2025  Date of Evaluation: 4/28/2025   Plan of Care Certification: 4/28/2025 to 7/7/2025      Time In: 1300   Time Out: 1345  Total Time (in minutes): 45   Total Billable Time (in minutes): 45    Precautions:        Communication impairment; requires additional time    Subjective   no complaints today; doing well overall.  Pain reported as 0/10.        Objective            Treatment  Speech  Activity 1: Naming (Pictured Objects): Patient independently named pictured objects with 73% accuracy indpendently (22/30 trials), increasing to 100% accuracy with maximum assistance. Neologisms present  Activity 2: Writing: To dictation, she wrote 1/6 words independently; wrote her first and last name independently; required cueing and copying for address  Activity 3: Everyday Information: Constant Therapy Cynthia Read Everyday Items Level 1 completed with 80% accuracy independently related to calendars, menus, and medications.  Activity 4: Everyday Information (Menu): Pulled up 2 menus for restaurants that she stated she enjoyed visiting. She pointed to the items she would be interested in ordering, and then pointed out what her  would order.    Time Entry(in minutes):       Assessment & Plan   Assessment   Prema participated well in her treatment session today focusing on various  "speech-language tasks. She named common pictured objects, participated in "ordering" from a local restaurant menu in a mock scenario, and wrote to dictation. As compared to previous sessions, she was quicker to respond with object naming and fewer neologisms were observed. She continues to have difficulty in spontaneous speech production, though with a context or topic, the clinician was able to understand the patient's comments.  Evaluation/Treatment Tolerance: Patient tolerated treatment well  Education  Education was done with Patient. The patient's learning style includes Demonstration. The patient Demonstrates understanding and Verbalizes understanding.         compensatory strategies, plan of care, goals        The patient will continue to benefit from skilled outpatient speech therapy in order to address the deficits listed in the problem list on the initial evaluation, provide patient and family education, and maximize the patients level of independence in the home and community environments.     The patient's spiritual, cultural, and educational needs were considered, and the patient is agreeable to the plan of care and goals.     Goals:   Active       Long-Term Goals       1. Using compensatory strategies as needed, the patient will communicate and/or request 1-2 items independently to enhance her communication skills for daily living tasks. (Progressing)       Start:  04/28/25    Expected End:  08/18/25               Short-Term Goals       1. The patient will answer yes/no questions with 80% accuracy independently for 2 consecutive sessions to enhance her responsiveness and reliability to questions. (Met)       Start:  04/28/25    Expected End:  07/07/25    Resolved:  06/25/25         2. The patient will name objects with 80% accuracy independently with use of strategies as needed to enhance her object naming for daily tasks and environments. (Progressing)       Start:  04/28/25    Expected End:  " 08/18/25            3. The patient will follow multi-step commands with 80% accuracy and 1-2 repetitions as needed to enhance her receptive language abilities for daily living tasks. (Progressing)       Start:  04/28/25    Expected End:  08/18/25            4. The patient will complete reading and writing tasks with 75% accuracy and 1-2 prompts as needed to improve her skills for daily scanning and locating information. (Progressing)       Start:  04/28/25    Expected End:  08/18/25            5. The patient will participate in various everyday language tasks to mimic real-world environmental stimuli (grocery store, menus, checkbooks) with 80% accuracy and 1-2 prompts to enhance her abilities for daily living tasks. (Met)       Start:  04/28/25    Expected End:  07/07/25    Resolved:  05/28/25             SUNITA Clement-SLP, CCC-SLP

## 2025-07-08 ENCOUNTER — CLINICAL SUPPORT (OUTPATIENT)
Dept: REHABILITATION | Facility: HOSPITAL | Age: 80
End: 2025-07-08
Payer: COMMERCIAL

## 2025-07-08 DIAGNOSIS — R47.01 APHASIA: Primary | ICD-10-CM

## 2025-07-08 PROCEDURE — 92507 TX SP LANG VOICE COMM INDIV: CPT | Mod: PN

## 2025-07-08 NOTE — PROGRESS NOTES
Outpatient Rehab    Speech-Language Pathology Visit    Patient Name: Prema Phillips  MRN: [id number]  YOB: 1945  Encounter Date: 7/8/2025    Therapy Diagnosis:   Encounter Diagnosis   Name Primary?    Aphasia Yes     Physician: Odette Zhang MD    Physician Orders: Eval and Treat  Medical Diagnosis: Aphasia  Apraxia  History of recent stroke  Surgical Diagnosis: Not applicable for this Episode   Surgical Date: Not applicable for this Episode  Days Since Last Surgery: Not applicable for this Episode    Visit # / Visits Authorized: 8 / 32   Insurance Authorization Period: 4/28/2025 to 12/31/2025  Date of Evaluation: 4/28/2025   Plan of Care Certification: 7/7/2025 to 8/18/2025      Time In: 1302   Time Out: 1343  Total Time (in minutes): 41   Total Billable Time (in minutes): 41    Precautions:        Communication impairment; requires additional time    Subjective   having a good day other than dealing with the rain today.  Pain reported as 0/10.        Objective            Treatment  Speech  Activity 1: Naming: With tangible objects around the office today, patient independently named them with 60% accuracy independently. Generative naming task was completed with patient naming items she will need to pack for the beach and things she may see at the beach (upcoming trip at the end of July) - required cueing and prompting, both semantic and phonemic. Though neologisms and paraphasias were present, this trained listener was able to maintain topic of the conversation and affirm patient's thoughts via yes/no questions.  Activity 2: Following Directions: [employer] Advanced Comprehension Level 6 (2 steps + adjectives) completed with 80% accuracy independently when reading the directions as compared to hearing the directions verbally presented to her, increasing to 100% accuracy with repetition.    Time Entry(in minutes):  Speech Treatment (Individual) Time Entry: 41    Assessment & Plan    Assessment  Prema participated well in her treatment session today focusing on various speech-language tasks. She named common tangible objects, followed multi-step commands presented visually, and then discussed her upcoming trip to the beach with her daughter at the end of July. She generated a packing list and then things she may see at the beach while she is there.   Evaluation/Treatment Tolerance: Patient tolerated treatment well    The patient will continue to benefit from skilled outpatient speech therapy in order to address the deficits listed in the problem list on the initial evaluation, provide patient and family education, and maximize the patients level of independence in the home and community environments.     The patient's spiritual, cultural, and educational needs were considered, and the patient is agreeable to the plan of care and goals.     Education  Education was done with Patient. The patient's learning style includes Demonstration. The patient Demonstrates understanding and Verbalizes understanding.         compensatory strategies, plan of care, goals        Plan  continue speech therapy plan of care; 2x week for 3 more weeks (plan of care date extended due to SLP availability)          Goals:   Active       Long-Term Goals       1. Using compensatory strategies as needed, the patient will communicate and/or request 1-2 items independently to enhance her communication skills for daily living tasks. (Progressing)       Start:  04/28/25    Expected End:  08/18/25               Short-Term Goals       1. The patient will answer yes/no questions with 80% accuracy independently for 2 consecutive sessions to enhance her responsiveness and reliability to questions. (Met)       Start:  04/28/25    Expected End:  07/07/25    Resolved:  06/25/25         2. The patient will name objects with 80% accuracy independently with use of strategies as needed to enhance her object naming for daily tasks and  environments. (Progressing)       Start:  04/28/25    Expected End:  08/18/25            3. The patient will follow multi-step commands with 80% accuracy and 1-2 repetitions as needed to enhance her receptive language abilities for daily living tasks. (Progressing)       Start:  04/28/25    Expected End:  08/18/25            4. The patient will complete reading and writing tasks with 75% accuracy and 1-2 prompts as needed to improve her skills for daily scanning and locating information. (Progressing)       Start:  04/28/25    Expected End:  08/18/25            5. The patient will participate in various everyday language tasks to mimic real-world environmental stimuli (grocery store, menus, checkbooks) with 80% accuracy and 1-2 prompts to enhance her abilities for daily living tasks. (Met)       Start:  04/28/25    Expected End:  07/07/25    Resolved:  05/28/25             SUNITA Clement-SLP, CCC-SLP

## 2025-07-15 ENCOUNTER — OFFICE VISIT (OUTPATIENT)
Dept: INTERNAL MEDICINE | Facility: CLINIC | Age: 80
End: 2025-07-15
Payer: COMMERCIAL

## 2025-07-15 VITALS
OXYGEN SATURATION: 99 % | BODY MASS INDEX: 21.04 KG/M2 | SYSTOLIC BLOOD PRESSURE: 114 MMHG | DIASTOLIC BLOOD PRESSURE: 72 MMHG | HEIGHT: 66 IN | WEIGHT: 130.94 LBS | TEMPERATURE: 98 F | HEART RATE: 87 BPM

## 2025-07-15 DIAGNOSIS — E03.9 ACQUIRED HYPOTHYROIDISM: Chronic | ICD-10-CM

## 2025-07-15 DIAGNOSIS — Z29.11 NEED FOR RSV IMMUNIZATION: ICD-10-CM

## 2025-07-15 DIAGNOSIS — Z23 NEED FOR TDAP VACCINATION: ICD-10-CM

## 2025-07-15 DIAGNOSIS — E78.2 MIXED HYPERLIPIDEMIA: Chronic | ICD-10-CM

## 2025-07-15 DIAGNOSIS — M19.049 ARTHRITIS OF HAND, UNSPECIFIED LATERALITY: ICD-10-CM

## 2025-07-15 DIAGNOSIS — R73.03 PREDIABETES: Primary | Chronic | ICD-10-CM

## 2025-07-15 DIAGNOSIS — I65.23 BILATERAL CAROTID ARTERY STENOSIS: Chronic | ICD-10-CM

## 2025-07-15 DIAGNOSIS — Z12.31 SCREENING MAMMOGRAM FOR BREAST CANCER: ICD-10-CM

## 2025-07-15 DIAGNOSIS — F43.22 ADJUSTMENT REACTION WITH ANXIETY: ICD-10-CM

## 2025-07-15 DIAGNOSIS — Z79.899 MEDICATION MANAGEMENT: ICD-10-CM

## 2025-07-15 DIAGNOSIS — R47.01 APHASIA: ICD-10-CM

## 2025-07-15 DIAGNOSIS — Z79.82 LONG-TERM USE OF ASPIRIN THERAPY: ICD-10-CM

## 2025-07-15 DIAGNOSIS — Z86.73 HISTORY OF RECENT STROKE: ICD-10-CM

## 2025-07-15 PROCEDURE — 3288F FALL RISK ASSESSMENT DOCD: CPT | Mod: CPTII,S$GLB,, | Performed by: FAMILY MEDICINE

## 2025-07-15 PROCEDURE — 99214 OFFICE O/P EST MOD 30 MIN: CPT | Mod: S$GLB,,, | Performed by: FAMILY MEDICINE

## 2025-07-15 PROCEDURE — 3074F SYST BP LT 130 MM HG: CPT | Mod: CPTII,S$GLB,, | Performed by: FAMILY MEDICINE

## 2025-07-15 PROCEDURE — 99999 PR PBB SHADOW E&M-EST. PATIENT-LVL V: CPT | Mod: PBBFAC,,, | Performed by: FAMILY MEDICINE

## 2025-07-15 PROCEDURE — 1125F AMNT PAIN NOTED PAIN PRSNT: CPT | Mod: CPTII,S$GLB,, | Performed by: FAMILY MEDICINE

## 2025-07-15 PROCEDURE — G2211 COMPLEX E/M VISIT ADD ON: HCPCS | Mod: S$GLB,,, | Performed by: FAMILY MEDICINE

## 2025-07-15 PROCEDURE — 1101F PT FALLS ASSESS-DOCD LE1/YR: CPT | Mod: CPTII,S$GLB,, | Performed by: FAMILY MEDICINE

## 2025-07-15 PROCEDURE — 1160F RVW MEDS BY RX/DR IN RCRD: CPT | Mod: CPTII,S$GLB,, | Performed by: FAMILY MEDICINE

## 2025-07-15 PROCEDURE — 1159F MED LIST DOCD IN RCRD: CPT | Mod: CPTII,S$GLB,, | Performed by: FAMILY MEDICINE

## 2025-07-15 PROCEDURE — 3078F DIAST BP <80 MM HG: CPT | Mod: CPTII,S$GLB,, | Performed by: FAMILY MEDICINE

## 2025-07-15 RX ORDER — LORAZEPAM 1 MG/1
1 TABLET ORAL DAILY PRN
Qty: 30 TABLET | Refills: 5 | Status: SHIPPED | OUTPATIENT
Start: 2025-07-15

## 2025-07-15 NOTE — PROGRESS NOTES
Office Visit    Patient Name: Prema Phillips    : 1945  MRN: [id number]    Subjective:  Prmea is a 80 y.o. female who presents today for:    Annual Exam    Most recently seen by me for routine follow-up/lab review on 25  Had eye exam 25-- Ophthalmology Dr Harish Singh-- bilateral macular degeneration and hypertensive retinopathy.  Ortho Dr. Lee 25 for finger arthritis/symptoms improve with steroid injections.  Left hand arthritis affecting the base of left thumb and the PIP joint of the left ring finger-- cortisone shot provided     Seen by Vascular neurology on 24  79 y.o. right handed woman with PMH HTN, HLD with recent admission for L-[facility] stroke here for followup after hospitalization.  Etiology Esus.  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  Will need ILR if 30d cardiac monitor neg.   - Continue aspirin 81 mg daily for secondary stroke prevention  - Continue atorvastatin 40 for HLD.  Goal LDL <70.  - BP at goal.  Goal <130/80.  - Mediterranean Diet for stroke prevention  - Return to ED for any acute neurological symptoms and to Clinic PRN    Had 30 day cardiac event monitor 2024:  Results unremarkable    Negative event monitor with no clinical arrhythmias.    Asymptomatic normal sinus rhythm.     80 y.o. patient of [facility] with  hypertension, hyperlipidemia, bilateral carotid stenosis(< 50%), and RBBB, history of acute stroke, borderline prediabetic A1c, who presents today for lab review and 3 month follow-up.       She was admitted 2024 to 2024 secondary to acute stroke.  She was then discharged to rehab and in rehab until 2024.     She has been in and out of speech therapy due to ongoing struggles with expressive aphasia. has seen notable improvements from a motor standpoint & strength and coordination are no longer of concern.     Had labs prior to office visit 2025 with stable A1c of 5.6, normal CMP, LDL 90, essentially normal CBC  with hematocrit 40.2, TSH and vitamin levels all normal on current regimen.    Appetite is decent, and eating a fair variety of food.  Drinking water regularly.   Hydration improved and she is incorporating more protein in her diet.   Her gross motor and strength have essentially equalized and are returning to baseline.  She remains active in her daily life and goes outside for gardening weather permitting, plays with dog in the yard.   Doing more house cleaning.   No recent significant falls.    Some use of home cubie.   She is doing some intermittent mild reading to help with her expressive aphasia  AND SHE REPORTS BENEFITTING FROM GETTING BACK INTO OUTPATIENT SPEECH THERAPY.  Some use of a tablet.      Mood has been overall stable, anxiety overall continues to decrease since her acute stroke episode.    Her  brought her to the appointment today and notes she seems to be feeling better with a more cheerful disposition and greater level of activity.    Doing well on Lexapro 20 mg daily and she generally does take Ativan 1 mg daily when she gets worked up/frustrated as a result of her ongoing expressive aphasia challenges or if having acute panic attack.  Amitriptyline is helping with sleep     DEXA 01/16/2025 WITH STABLE MILD-TO-MODERATE OSTEOPENIA-CONTINUE WALKING, CALCIUM/VITAMIN-D AND REPEAT 3 YEARS.  MAMMOGRAM 9/27/24- repeat 1 year    PAST MEDICAL HISTORY, SURGICAL/SOCIAL/FAMILY HISTORY REVIEWED AS PER CHART, WITH PERTINENT FINDINGS INCLUDED IN HISTORY SECTION OF NOTE.     Current Medications    Medication List with Changes/Refills   Current Medications    AMITRIPTYLINE (ELAVIL) 25 MG TABLET    TAKE 1/2 TO 1 TABLET  NIGHTLY FOR INSOMNIA. OK TO TAKE WITH LOW DOSE OF ATIVAN    ASPIRIN (ECOTRIN) 81 MG EC TABLET    Take 81 mg by mouth once daily.    ATORVASTATIN (LIPITOR) 40 MG TABLET    TAKE 1 TABLET BY MOUTH EVERY DAY    AZELASTINE (ASTELIN) 137 MCG (0.1 %) NASAL SPRAY    1 spray (137 mcg total) by Nasal  route 2 (two) times daily.    CELECOXIB (CELEBREX) 200 MG CAPSULE    Take 1 capsule (200 mg total) by mouth once daily.    CO-ENZYME Q-10 30 MG CAPSULE    Take 100 mg by mouth 2 (two) times daily.    ESCITALOPRAM OXALATE (LEXAPRO) 20 MG TABLET    TAKE 1 TABLET ONCE DAILY    OMEPRAZOLE (PRILOSEC) 20 MG CAPSULE    TAKE 1 CAPSULE BY MOUTH EVERY MORNING    SYNTHROID 75 MCG TABLET    TAKE 1 TABLET BEFORE       BREAKFAST    VITAMIN D (VITAMIN D3) 1000 UNITS TAB    Take 1,000 Units by mouth once daily.    VITAMINS A,C,E-ZINC-COPPER (PRESERVISION AREDS) 2,148 MCG-113 MG-45 MG-17.4MG TAB    Take 1 tablet by mouth 2 (two) times daily with meals.   Changed and/or Refilled Medications    Modified Medication Previous Medication    LORAZEPAM (ATIVAN) 1 MG TABLET LORazepam (ATIVAN) 1 MG tablet       Take 1 tablet (1 mg total) by mouth daily as needed for Anxiety.    Take 1 tablet (1 mg total) by mouth daily as needed for Anxiety.       Allergies   Review of patient's allergies indicates:   Allergen Reactions    Neomycin Other (See Comments) and Swelling     Other reaction(s): Unknown    Carbamazepine Rash     Rash per 3/02 RN telephone encounter. On interview, pt/family unable to say if she continued or stopped taking medication         Review of Systems (Pertinent positives)  Review of Systems   Constitutional:  Negative for fever and unexpected weight change.   HENT:  Positive for hearing loss (hearing aids).    Eyes:  Positive for visual disturbance (followed by ophthalmology).   Respiratory:  Negative for shortness of breath.    Cardiovascular:  Negative for chest pain.   Gastrointestinal:  Negative for constipation (resolved with miralax daily) and diarrhea.   Genitourinary:  Negative for urgency.   Musculoskeletal:  Negative for arthralgias.   Neurological:  Positive for speech difficulty (Chronic expressive aphasia since stroke). Negative for dizziness, light-headedness and headaches.   Psychiatric/Behavioral:  Positive for  "dysphoric mood. Negative for sleep disturbance (improved with amitriptyline). The patient is nervous/anxious (improving some).        /72 (BP Location: Left arm, Patient Position: Sitting)   Pulse 87   Temp 98.2 °F (36.8 °C)   Ht 5' 6" (1.676 m)   Wt 59.4 kg (130 lb 15.3 oz)   SpO2 99%   BMI 21.14 kg/m²     Physical Exam  Vitals reviewed.   Constitutional:       General: She is not in acute distress.     Appearance: Normal appearance. She is well-developed.   HENT:      Head: Normocephalic and atraumatic.      Right Ear: Tympanic membrane normal.      Left Ear: Tympanic membrane normal.      Nose: No congestion or rhinorrhea.      Mouth/Throat:      Pharynx: No oropharyngeal exudate or posterior oropharyngeal erythema.   Eyes:      General: No scleral icterus.        Right eye: No discharge.         Left eye: No discharge.      Extraocular Movements: Extraocular movements intact.      Conjunctiva/sclera: Conjunctivae normal.   Neck:      Vascular: No carotid bruit.   Cardiovascular:      Rate and Rhythm: Normal rate and regular rhythm.      Heart sounds: No murmur heard.  Pulmonary:      Effort: Pulmonary effort is normal.      Breath sounds: Normal breath sounds.   Abdominal:      Palpations: Abdomen is soft.   Musculoskeletal:      Right lower leg: No edema.      Left lower leg: No edema.   Skin:     General: Skin is warm and dry.   Neurological:      General: No focal deficit present.      Mental Status: She is alert and oriented to person, place, and time.      Cranial Nerves: No facial asymmetry (Very slight right-sided facial droop).      Motor: No weakness or abnormal muscle tone.      Coordination: Coordination normal (can march in place, alternate leg/arm lifting).      Gait: Gait is intact. Gait normal.      Comments: Most significant deficit on neuro exam as her expressive aphasia.      Has progressed well in terms of coordination and strength.  Walks well without a mobility aid.  "   Psychiatric:         Mood and Affect: Affect is flat.         Behavior: Behavior is slowed. Behavior is cooperative.           Assessment/Plan:  Prema Phillips is a 80 y.o. female who presents today for :        ICD-10-CM ICD-9-CM    1. Prediabetes  R73.03 790.29 Hemoglobin A1C      Comprehensive Metabolic Panel      Lipid Panel      CBC Auto Differential      TSH      Magnesium      Vitamin B12      Vitamin D      Ferritin      2. Mixed hyperlipidemia  E78.2 272.2 Hemoglobin A1C      Comprehensive Metabolic Panel      Lipid Panel      CBC Auto Differential      TSH      Magnesium      Vitamin B12      Vitamin D      Ferritin      3. Acquired hypothyroidism  E03.9 244.9 TSH      4. History of recent stroke  Z86.73 V12.54       5. Aphasia  R47.01 784.3       6. Bilateral carotid artery stenosis  I65.23 433.10      433.30       7. Long-term use of aspirin therapy  Z79.82 V58.66 Hemoglobin A1C      Comprehensive Metabolic Panel      Lipid Panel      CBC Auto Differential      TSH      Magnesium      Vitamin B12      Vitamin D      Ferritin      8. Arthritis of hand, unspecified laterality  M19.049 716.94       9. Screening mammogram for breast cancer  Z12.31 V76.12 Mammo Digital Screening Bilat      10. Need for Tdap vaccination  Z23 V06.1       11. Need for RSV immunization  Z29.11 V04.82       12. Adjustment reaction with anxiety  F43.22 309.24 LORazepam (ATIVAN) 1 MG tablet      13. Medication management  Z79.899 V58.69 Hemoglobin A1C      Comprehensive Metabolic Panel      Lipid Panel      CBC Auto Differential      TSH      Magnesium      Vitamin B12      Vitamin D      Ferritin          Mammogram 9/27/24- ORDERED  DEXA SCAN DUE IN URINARY 2025-MILD-TO-MODERATE STABLE OSTEOPENIA, REPEAT 3 YEARS.     APHASIA/HEMIPARESIS OF R (dominant) SIDE 2/2 ACUTE STROKE w/ Subsequent Small SAH:   For secondary prevention she is now on ASA 81/Lipitor 40.  Followed up with vascular Neurology most recently 08/08/2024.  LDL is  less than 100 but not at goal of less than 70.  ONGOING MONITORING BUT CONSIDER INCREASING LIPITOR TO 80 VERSUS CHANGE TO CRESTOR.  REPEAT LIPID PANEL SIX-MONTHS   S/P Rehab and OP PT/OT/Speech.  Still struggling with significant expressive aphasia but has made progress and is back and outpatient speech therapy.  Advised on some home activities she can do for ongoing rehab-visual and audio books with subsequent journaling or a talk back summary of what she just read to work on her aphasia both through spoken and written word.  Had unremarkable 30 day event monitor-was advised to follow-up for ILR per vascular neuro.  Has known mild carotid stenosis that has been followed on prior ultrasounds, less than 50% and stable, continue high-intensity statin and  ASA.  Will update carotid ultrasound in the next year for monitoring.  No significant level of carotid stenosis noted on CTA performed for stroke workup.     RBB: Incomplete, noted on most recent EKG, No concerns on ECHO 3/7/24 with normal wall motion, systolic and diastolic function     PREDIABETES: A1c improved from 5.7-->5.5-->5.90->5.6, low carb/ high protein diet advised with ongoing monitoring.  stopped drinking daily boost-- was potentially contributing to A1c elevation.  Recheck in 6 months.     OSTEOARTHRITIS OF THE HANDS WITH ACUTE INCREASED SWELLING OF THE LEFT RING FINGER:  s/p X-rays & Medrol Dosepak advise and referral placed to Ortho Hand Dr. Lee for further evaluation.  He gave a steroid injection and her symptoms have improved.     GASTRITIS: Symptoms stable on PRILOSEC 20, PPI labs monitored.  Recently normal B12, magnesium, vitamin-D.  Will plan to recheck these with labs in 6 months.     INSOMNIA: Amitriptyline 25 helping with sleep.      HYPOTHYROIDISM: TSH at goal on Synthroid 75 mcg, ongoing monitoring.     DEPRESSION/ ANXIETY: Lexapro 20, good family support--  and daughter.   Ativan 1 mg p.r.n. which does help with situational  anxiety and frustration surrounding her expressive aphasia.    Visit today included increased complexity associated with the care of the episodic problem EXPRESSIVE APHASIA addressed and managing the longitudinal care of the patient due to the serious and/or complex managed problem(s) HISTORY OF STROKE WITH RESIDUAL DEFICITS, HYPERTENSION, PREDIABETES, HYPERLIPIDEMIA, CAROTID STENOSIS, HYPOTHYROIDISM.         There are no Patient Instructions on file for this visit.      Follow up in about 6 months (around 1/15/2026) for to follow up on lab results, return as needed for new concerns.

## 2025-07-16 ENCOUNTER — CLINICAL SUPPORT (OUTPATIENT)
Dept: REHABILITATION | Facility: HOSPITAL | Age: 80
End: 2025-07-16
Payer: COMMERCIAL

## 2025-07-16 DIAGNOSIS — R47.01 APHASIA: Primary | ICD-10-CM

## 2025-07-16 PROCEDURE — 92507 TX SP LANG VOICE COMM INDIV: CPT | Mod: PN

## 2025-07-16 NOTE — PROGRESS NOTES
Outpatient Rehab    Speech-Language Pathology Visit    Patient Name: Prema Phillips  MRN: [id number]  YOB: 1945  Encounter Date: 7/16/2025    Therapy Diagnosis:   Encounter Diagnosis   Name Primary?    Aphasia Yes     Physician: Odette Zhang MD    Physician Orders: Eval and Treat  Medical Diagnosis: Aphasia  Apraxia  History of recent stroke  Surgical Diagnosis: Not applicable for this Episode   Surgical Date: Not applicable for this Episode  Days Since Last Surgery: Not applicable for this Episode    Visit # / Visits Authorized: 9 / 32   Insurance Authorization Period: 4/28/2025 to 12/31/2025  Date of Evaluation: 4/28/2025   Plan of Care Certification: 7/7/2025 to 8/18/2025      Time In: 1300   Time Out: 1345  Total Time (in minutes): 45   Total Billable Time (in minutes): 45    FOTO:  Intake Score: 20%  Survey Score 2: 19%  Survey Score 3: Not applicable for this Episode%    Precautions:        Communication impairment; requires additional time    Subjective   She reported some days she can say thinkgs and other times she can;t say it..  Pain reported as 4/10. general pain in knees or arms      Objective            Treatment  Speech  Activity 1: Named pictures of real objects today with 86% accuracy with phonemic paraphasias present and 100% accuracy given closed ended sentences and phonemic cues.  Activity 2: Following Directions: Tactus Therapy Advanced Comprehension Level 6 (2 steps + adjectives) completed with 40% accuracy independently when reading the directions as compared to hearing the directions verbally presented to her, increasing to 90% accuracy with repetition of direction read aloud to her with verbal prompts.  Activity 3: Tactus Therapy match phrase to picture Level 1 with 80% accuracy independently, 100% accuracy given minimum verbal cues  Activity 4: Everyday Information (Medication and script): Answered questions regarding information from pill bottle and written prescription  kain 50% accuracy independently, 70% accuracy given maximum verbal cues.    Time Entry(in minutes):  Speech Treatment (Individual) Time Entry: 45    Assessment & Plan   Assessment  Prema participated well in her treatment session today focusing on various speech-language tasks. She named pictures of real objects, followed two step directions, and read phrases to match with pictures. She showed her [employer] carlos a that she utilizes to practice common words she uses.  Evaluation/Treatment Tolerance: Patient tolerated treatment well    The patient will continue to benefit from skilled outpatient speech therapy in order to address the deficits listed in the problem list on the initial evaluation, provide patient and family education, and maximize the patients level of independence in the home and community environments.     The patient's spiritual, cultural, and educational needs were considered, and the patient is agreeable to the plan of care and goals.     Education  Education was done with Patient. The patient's learning style includes Demonstration. The patient Demonstrates understanding and Verbalizes understanding.                  Plan  continue speech therapy plan of care; 2x week for 3 more weeks (plan of care date extended due to SLP availability)          Goals:   Active       Long-Term Goals       1. Using compensatory strategies as needed, the patient will communicate and/or request 1-2 items independently to enhance her communication skills for daily living tasks. (Progressing)       Start:  04/28/25    Expected End:  08/18/25               Short-Term Goals       1. The patient will answer yes/no questions with 80% accuracy independently for 2 consecutive sessions to enhance her responsiveness and reliability to questions. (Met)       Start:  04/28/25    Expected End:  07/07/25    Resolved:  06/25/25         2. The patient will name objects with 80% accuracy independently with use of strategies as  needed to enhance her object naming for daily tasks and environments. (Progressing)       Start:  04/28/25    Expected End:  08/18/25            3. The patient will follow multi-step commands with 80% accuracy and 1-2 repetitions as needed to enhance her receptive language abilities for daily living tasks. (Progressing)       Start:  04/28/25    Expected End:  08/18/25            4. The patient will complete reading and writing tasks with 75% accuracy and 1-2 prompts as needed to improve her skills for daily scanning and locating information. (Progressing)       Start:  04/28/25    Expected End:  08/18/25            5. The patient will participate in various everyday language tasks to mimic real-world environmental stimuli (grocery store, menus, checkbooks) with 80% accuracy and 1-2 prompts to enhance her abilities for daily living tasks. (Met)       Start:  04/28/25    Expected End:  07/07/25    Resolved:  05/28/25             SUNITA Dior-SLP, CCC-SLP

## 2025-08-01 DIAGNOSIS — M19.042 ARTHRITIS OF FINGER OF LEFT HAND: ICD-10-CM

## 2025-08-01 RX ORDER — CELECOXIB 200 MG/1
200 CAPSULE ORAL
Qty: 30 CAPSULE | Refills: 2 | Status: SHIPPED | OUTPATIENT
Start: 2025-08-01

## 2025-08-06 ENCOUNTER — CLINICAL SUPPORT (OUTPATIENT)
Dept: REHABILITATION | Facility: HOSPITAL | Age: 80
End: 2025-08-06
Payer: COMMERCIAL

## 2025-08-06 DIAGNOSIS — R47.01 APHASIA: Primary | ICD-10-CM

## 2025-08-06 PROCEDURE — 92507 TX SP LANG VOICE COMM INDIV: CPT | Mod: PN

## 2025-08-06 NOTE — PROGRESS NOTES
Outpatient Rehab    Speech-Language Pathology Visit      Patient Name: Prema Phillips  MRN: [id number]  YOB: 1945  Encounter Date: 8/6/2025    Therapy Diagnosis:   Encounter Diagnosis   Name Primary?    Aphasia Yes     Physician: Odette Zhang MD    Physician Orders: Eval and Treat  Medical Diagnosis: Aphasia  Apraxia  History of recent stroke  Surgical Diagnosis: Not applicable for this Episode   Surgical Date: Not applicable for this Episode  Days Since Last Surgery: Not applicable for this Episode    Visit # / Visits Authorized: 10 / 32   Insurance Authorization Period: 4/28/2025 to 12/31/2025  Date of Evaluation: 4/28/2025   Plan of Care Certification: 7/7/2025 to 8/18/2025      Time In: 1300   Time Out: 1345  Total Time (in minutes): 45   Total Billable Time (in minutes): 45    FOTO:  Intake Score (%): 20  Survey Score 2 (%): 19  Survey Score 3 (%): Not applicable for this Episode    Precautions:  Additional Precautions and Protocol Details: Communication impairment; requires additional time    Subjective   She reported she had a nice time in [place] at the beach on her trip. She brought in an Aphasia workbook to have SLP look over to plan activities for her to complete at home..  Pain reported as 0/10.        Objective            Treatment  Speech  Activity 1: Named pictures of real objects today with 76% accuracy with phonemic paraphasias present and 100% accuracy given closed ended sentences and phonemic cues.  Activity 2: Following Directions: Tactus Therapy Advanced Comprehension Level 5 (2 steps First/Then) completed with 100% accuracy independently when reading the directions as compared to hearing the directions verbally presented to her.  Activity 3: Tactus Therapy match phrase to picture Level 1 with 80% accuracy independently, 100% accuracy given minimum verbal cues  Activity 4: She read a paragraph and answered yes/no questions with 25% accuracy independently with no increase in  accuracy given maximum cues.  Activity 5: When counting to 1-20, patient recited 12 numbers correctly and 8 numbers required assitance from SLP.  Activity 6: Automatic phrase completion task was attempted, however, patient with increased difficulty today finishing sentences despite given maximum cues.    Time Entry(in minutes):       Assessment & Plan   Assessment  Prema participated well in treatment session today. Increased difficulty with some tasks today with phonemic paraphasias present. SLP provided home practice from Aphasia Workbook patient brought in.  Evaluation/Treatment Tolerance: Patient tolerated treatment well    The patient will continue to benefit from skilled outpatient speech therapy in order to address the deficits listed in the problem list on the initial evaluation, provide patient and family education, and maximize the patients level of independence in the home and community environments.     The patient's spiritual, cultural, and educational needs were considered, and the patient is agreeable to the plan of care and goals.     Education  Education was done with Patient. The patient's learning style includes Demonstration. The patient Demonstrates understanding and Verbalizes understanding.                  Plan  continue speech therapy plan of care; 2x week for 3 more weeks (plan of care date extended due to SLP availability)          Goals:   Active       Long-Term Goals       1. Using compensatory strategies as needed, the patient will communicate and/or request 1-2 items independently to enhance her communication skills for daily living tasks. (Progressing)       Start:  04/28/25    Expected End:  08/18/25               Short-Term Goals       1. The patient will answer yes/no questions with 80% accuracy independently for 2 consecutive sessions to enhance her responsiveness and reliability to questions. (Met)       Start:  04/28/25    Expected End:  07/07/25    Resolved:  06/25/25          2. The patient will name objects with 80% accuracy independently with use of strategies as needed to enhance her object naming for daily tasks and environments. (Progressing)       Start:  04/28/25    Expected End:  08/18/25            3. The patient will follow multi-step commands with 80% accuracy and 1-2 repetitions as needed to enhance her receptive language abilities for daily living tasks. (Progressing)       Start:  04/28/25    Expected End:  08/18/25            4. The patient will complete reading and writing tasks with 75% accuracy and 1-2 prompts as needed to improve her skills for daily scanning and locating information. (Progressing)       Start:  04/28/25    Expected End:  08/18/25            5. The patient will participate in various everyday language tasks to mimic real-world environmental stimuli (grocery store, menus, checkbooks) with 80% accuracy and 1-2 prompts to enhance her abilities for daily living tasks. (Met)       Start:  04/28/25    Expected End:  07/07/25    Resolved:  05/28/25             SUNITA Dior-SLP, ANTONIO-SLP

## 2025-08-13 ENCOUNTER — CLINICAL SUPPORT (OUTPATIENT)
Dept: REHABILITATION | Facility: HOSPITAL | Age: 80
End: 2025-08-13
Payer: COMMERCIAL

## 2025-08-13 DIAGNOSIS — R47.01 APHASIA: Primary | ICD-10-CM

## 2025-08-13 PROCEDURE — 92507 TX SP LANG VOICE COMM INDIV: CPT | Mod: PN

## 2025-08-20 ENCOUNTER — OFFICE VISIT (OUTPATIENT)
Dept: ORTHOPEDICS | Facility: CLINIC | Age: 80
End: 2025-08-20
Payer: COMMERCIAL

## 2025-08-20 VITALS — HEIGHT: 66 IN | BODY MASS INDEX: 21.14 KG/M2

## 2025-08-20 DIAGNOSIS — M19.042 ARTHRITIS OF FINGER OF LEFT HAND: ICD-10-CM

## 2025-08-20 DIAGNOSIS — M18.10 PRIMARY OSTEOARTHRITIS OF FIRST CARPOMETACARPAL JOINT, UNSPECIFIED LATERALITY: Primary | ICD-10-CM

## 2025-08-20 PROCEDURE — 99999 PR PBB SHADOW E&M-EST. PATIENT-LVL III: CPT | Mod: PBBFAC,,, | Performed by: ORTHOPAEDIC SURGERY

## 2025-08-20 RX ORDER — TRIAMCINOLONE ACETONIDE 40 MG/ML
20 INJECTION, SUSPENSION INTRA-ARTICULAR; INTRAMUSCULAR
Status: COMPLETED | OUTPATIENT
Start: 2025-08-20 | End: 2025-08-20

## 2025-08-20 RX ORDER — CELECOXIB 200 MG/1
200 CAPSULE ORAL DAILY
Qty: 30 CAPSULE | Refills: 1 | Status: SHIPPED | OUTPATIENT
Start: 2025-08-20 | End: 2025-10-19

## 2025-08-20 RX ADMIN — TRIAMCINOLONE ACETONIDE 20 MG: 40 INJECTION, SUSPENSION INTRA-ARTICULAR; INTRAMUSCULAR at 02:08

## 2025-08-27 ENCOUNTER — CLINICAL SUPPORT (OUTPATIENT)
Dept: REHABILITATION | Facility: HOSPITAL | Age: 80
End: 2025-08-27
Payer: COMMERCIAL

## 2025-08-27 DIAGNOSIS — R47.01 APHASIA: Primary | ICD-10-CM

## 2025-08-27 PROCEDURE — 92507 TX SP LANG VOICE COMM INDIV: CPT | Mod: PN
